# Patient Record
Sex: MALE | NOT HISPANIC OR LATINO | Employment: OTHER | URBAN - METROPOLITAN AREA
[De-identification: names, ages, dates, MRNs, and addresses within clinical notes are randomized per-mention and may not be internally consistent; named-entity substitution may affect disease eponyms.]

---

## 2017-01-17 ENCOUNTER — HOSPITAL ENCOUNTER (OUTPATIENT)
Dept: RADIATION ONCOLOGY | Facility: HOSPITAL | Age: 57
Setting detail: RADIATION/ONCOLOGY SERIES
Discharge: HOME | End: 2017-01-17

## 2017-01-17 ENCOUNTER — LAB (OUTPATIENT)
Dept: LAB | Facility: HOSPITAL | Age: 57
End: 2017-01-17

## 2017-01-17 ENCOUNTER — OFFICE VISIT (OUTPATIENT)
Dept: RADIATION ONCOLOGY | Facility: HOSPITAL | Age: 57
End: 2017-01-17

## 2017-01-17 ENCOUNTER — CONSULT (OUTPATIENT)
Dept: ONCOLOGY | Facility: CLINIC | Age: 57
End: 2017-01-17

## 2017-01-17 VITALS
BODY MASS INDEX: 21.6 KG/M2 | HEART RATE: 61 BPM | WEIGHT: 134.4 LBS | TEMPERATURE: 98.1 F | OXYGEN SATURATION: 99 % | SYSTOLIC BLOOD PRESSURE: 132 MMHG | HEIGHT: 66 IN | RESPIRATION RATE: 16 BRPM | DIASTOLIC BLOOD PRESSURE: 64 MMHG

## 2017-01-17 VITALS
HEART RATE: 61 BPM | HEIGHT: 66 IN | TEMPERATURE: 98.1 F | RESPIRATION RATE: 16 BRPM | SYSTOLIC BLOOD PRESSURE: 132 MMHG | WEIGHT: 132 LBS | DIASTOLIC BLOOD PRESSURE: 64 MMHG | BODY MASS INDEX: 21.21 KG/M2

## 2017-01-17 DIAGNOSIS — C18.7 MALIGNANT NEOPLASM OF SIGMOID COLON (HCC): Primary | ICD-10-CM

## 2017-01-17 DIAGNOSIS — R91.1 SOLITARY LUNG NODULE: ICD-10-CM

## 2017-01-17 DIAGNOSIS — C79.51 BONE METASTASIS: ICD-10-CM

## 2017-01-17 DIAGNOSIS — C18.7 MALIGNANT NEOPLASM OF SIGMOID COLON (HCC): ICD-10-CM

## 2017-01-17 LAB
ALBUMIN SERPL-MCNC: 4.5 G/DL (ref 3.2–4.8)
ALBUMIN/GLOB SERPL: 1.3 G/DL (ref 1.5–2.5)
ALP SERPL-CCNC: 80 U/L (ref 25–100)
ALT SERPL W P-5'-P-CCNC: 51 U/L (ref 7–40)
ANION GAP SERPL CALCULATED.3IONS-SCNC: 10 MMOL/L (ref 3–11)
AST SERPL-CCNC: 35 U/L (ref 0–33)
BILIRUB SERPL-MCNC: 0.5 MG/DL (ref 0.3–1.2)
BUN BLD-MCNC: 10 MG/DL (ref 9–23)
BUN/CREAT SERPL: 14.3 (ref 7–25)
CALCIUM SPEC-SCNC: 10 MG/DL (ref 8.7–10.4)
CEA SERPL-MCNC: 488 NG/ML (ref 0–2.5)
CHLORIDE SERPL-SCNC: 102 MMOL/L (ref 99–109)
CO2 SERPL-SCNC: 33 MMOL/L (ref 20–31)
CREAT BLD-MCNC: 0.7 MG/DL (ref 0.6–1.3)
ERYTHROCYTE [DISTWIDTH] IN BLOOD BY AUTOMATED COUNT: 23.3 % (ref 11.3–14.5)
GFR SERPL CREATININE-BSD FRML MDRD: 118 ML/MIN/1.73
GLOBULIN UR ELPH-MCNC: 3.5 GM/DL
GLUCOSE BLD-MCNC: 111 MG/DL (ref 70–100)
HCT VFR BLD AUTO: 41.3 % (ref 38.9–50.9)
HGB BLD-MCNC: 13 G/DL (ref 13.1–17.5)
LYMPHOCYTES # BLD AUTO: 0.9 10*3/MM3 (ref 0.6–4.8)
LYMPHOCYTES NFR BLD AUTO: 18.5 % (ref 24–44)
MCH RBC QN AUTO: 22.6 PG (ref 27–31)
MCHC RBC AUTO-ENTMCNC: 31.6 G/DL (ref 32–36)
MCV RBC AUTO: 71.4 FL (ref 80–99)
MONOCYTES # BLD AUTO: 0.4 10*3/MM3 (ref 0–1)
MONOCYTES NFR BLD AUTO: 8.4 % (ref 0–12)
NEUTROPHILS # BLD AUTO: 3.5 10*3/MM3 (ref 1.5–8.3)
NEUTROPHILS NFR BLD AUTO: 73.1 % (ref 41–71)
PLATELET # BLD AUTO: 226 10*3/MM3 (ref 150–450)
PMV BLD AUTO: 7.8 FL (ref 6–12)
POTASSIUM BLD-SCNC: 4.4 MMOL/L (ref 3.5–5.5)
PROT SERPL-MCNC: 8 G/DL (ref 5.7–8.2)
RBC # BLD AUTO: 5.78 10*6/MM3 (ref 4.2–5.76)
SODIUM BLD-SCNC: 145 MMOL/L (ref 132–146)
TSH SERPL DL<=0.05 MIU/L-ACNC: 1.95 MIU/ML (ref 0.35–5.35)
WBC NRBC COR # BLD: 4.8 10*3/MM3 (ref 3.5–10.8)

## 2017-01-17 PROCEDURE — 99205 OFFICE O/P NEW HI 60 MIN: CPT | Performed by: INTERNAL MEDICINE

## 2017-01-17 PROCEDURE — 84443 ASSAY THYROID STIM HORMONE: CPT | Performed by: INTERNAL MEDICINE

## 2017-01-17 PROCEDURE — 80053 COMPREHEN METABOLIC PANEL: CPT | Performed by: INTERNAL MEDICINE

## 2017-01-17 PROCEDURE — 36415 COLL VENOUS BLD VENIPUNCTURE: CPT

## 2017-01-17 PROCEDURE — 85025 COMPLETE CBC W/AUTO DIFF WBC: CPT

## 2017-01-17 PROCEDURE — 82378 CARCINOEMBRYONIC ANTIGEN: CPT | Performed by: INTERNAL MEDICINE

## 2017-01-17 RX ORDER — LACTULOSE 10 G/15ML
20 SOLUTION ORAL 3 TIMES DAILY
Qty: 240 ML | Refills: 2 | Status: SHIPPED | OUTPATIENT
Start: 2017-01-17 | End: 2017-01-26

## 2017-01-17 NOTE — LETTER
January 17, 2017     Jose Bradshaw MD  [address] [place] [id number]    Patient: Maqruis Esteban   YOB: 1960   Date of Visit: 1/17/2017       Dear Dr. Augustine MD:    Marquis Esteban was in my office today. Below is a copy of my note.    If you have questions, please do not hesitate to call me. I look forward to following Marquis along with you.         Sincerely,        Kristofer Rodriguez MD        CC: Pravin Gautam MD    DATE OF CONSULTATION: 1/17/2017    REFERRING PHYSICIAN: Jose Bradshaw MD    Dear Dr. Jose Bradshaw MD  Thank you for asking for my medical advice on this patient. I saw him in the  [place] office on 1/17/2017    REASON FOR CONSULTATION: Metastatic colon cancer     HISTORY OF PRESENT ILLNESS: The patient is a very pleasant 56 y.o.  male   who was in his usual state of health until approximately 10 years. The patient presented with abdominal pain as well as rectal bleeding.  Patient found to have sigmoid colon adenocarcinoma.  He underwent low anterior resection done by Dr. Young March 2007.  Final pathology showed poorly differentiated adenocarcinoma with 25 negative lymph nodes and clear surgical margins.  Isolated tumor deposits was found in the pericolonic fat.  Pathologic stage T2 N1c M0 stage IIIA disease.  Patient received 1 cycle of adjuvant chemotherapy with FOLFOX and stopped secondary to multiple toxicities.  The patient has been followed by serial colonoscopies as well as CAT scans that did document pulmonary metastatic disease with left lower lobe nodule that is gradually increasing in size.  Patient was doing fairly well until recently, 3 months ago, patient presented with low back pain.  Patient had restaging workup that revealed hypermetabolic L2 vertebral body lesion.  Patient had biopsy done on October 11, 2016 that revealed metastatic adenocarcinoma consistent of colon primary with CK 7 negative CK 20 positive CDX2 positive.  The patient was treated  with CyberKnife with significant improvement in his symptoms.  Patient had next generation gene sequencing that revealed high mutational load with microsatellite instability.  He met with [facility] oncologist for an opinion and it was recommended for him to undergo immune therapy.  Patient was referred to me to establish care with a local physician.    SUBJECTIVE: When I saw the patient today he is doing fairly well.  He continued to be fairly asymptomatic from his current disease.  He continues to be active exercise and work daily.    Review of Systems   Constitutional: Negative for activity change, appetite change, chills, fatigue, fever and unexpected weight change.   HENT: Negative for hearing loss, mouth sores, nosebleeds, sore throat and trouble swallowing.    Eyes: Negative for visual disturbance.   Respiratory: Negative for cough, chest tightness, shortness of breath and wheezing.    Cardiovascular: Negative for chest pain, palpitations and leg swelling.   Gastrointestinal: Negative for abdominal distention, abdominal pain, blood in stool, constipation, diarrhea, nausea, rectal pain and vomiting.   Endocrine: Negative for cold intolerance and heat intolerance.   Genitourinary: Negative for difficulty urinating, dysuria, frequency and urgency.   Musculoskeletal: Negative for arthralgias, back pain, gait problem, joint swelling and myalgias.   Skin: Negative for rash.   Neurological: Negative for dizziness, tremors, syncope, weakness, light-headedness, numbness and headaches.   Hematological: Negative for adenopathy. Does not bruise/bleed easily.   Psychiatric/Behavioral: Negative for confusion, sleep disturbance and suicidal ideas. The patient is not nervous/anxious.        Past Medical History   Diagnosis Date   • Colon cancer    • FHx: chemotherapy    • Metastatic cancer        Social History     Social History   • Marital status:      Spouse name: N/A   • Number of children: N/A   • Years of  education: N/A     Occupational History   • Not on file.     Social History Main Topics   • Smoking status: Never Smoker   • Smokeless tobacco: Never Used   • Alcohol use No   • Drug use: No   • Sexual activity: Defer     Other Topics Concern   • Not on file     Social History Narrative       Family History   Problem Relation Age of Onset   • Hypertension Father    • Prostate cancer Father    • Diabetes Brother        Past Surgical History   Procedure Laterality Date   • Colectomy partial / total       2007   • Cardiac catheterization  1996 & 2006   • Interventional radiology procedure Right 10/11/2016     Procedure: Biopsy of L2 vertebral body (right side);  Surgeon: Ashok Gonzalez MD;  Location: Located within [facility] INVASIVE LOCATION;  Service:        No Known Allergies       Current Outpatient Prescriptions:   •  Cholecalciferol (VITAMIN D3) 5000 UNITS capsule capsule, Take 5,000 Units by mouth Daily., Disp: , Rfl:     PHYSICAL EXAMINATION:   There were no vitals taken for this visit.   ECOG Performance Status: 0 - Asymptomatic  General Appearance:  alert, cooperative, no apparent distress and appears stated age   Neurologic/Psychiatric: A&O x 3, gait steady, appropriate affect, strength 5/5 in all muscle groups   HEENT:  Normocephalic, without obvious abnormality, mucous membranes moist   Neck: Supple, symmetrical, trachea midline, no adenopathy;  No thyromegaly, masses, or tenderness   Lungs:   Clear to auscultation bilaterally; respirations regular, even, and unlabored bilaterally   Heart:  Regular rate and rhythm, no murmurs appreciated   Abdomen:   Soft, non-tender, non-distended and no organomegaly   Lymph nodes: No cervical, supraclavicular, inguinal or axillary adenopathy noted   Extremities: Normal, atraumatic; no clubbing, cyanosis, or edema    Skin: No rashes, ulcers, or suspicious lesions noted       No visits with results within 2 Week(s) from this visit.  Latest known visit with results is:    Hospital  Outpatient Visit on 10/17/2016   Component Date Value Ref Range Status   • Creatinine 10/17/2016 0.80  0.60 - 1.30 mg/dL Final    Serial Number: [id number]    : [id number]        No results found.      DIAGNOSTIC DATA:   1. Radiology:   EXAMINATION: MRI CYBERKNIFE LUMBAR SPINE W CONTRAST-10/17/2016:      INDICATION: Bone mets to L2; C79.51-Secondary malignant neoplasm of  bone, bone mets.      TECHNIQUE: T1 MPR, post contrast, CyberKnife study.      COMPARISON: NONE      FINDINGS: A large soft tissue mass involves the right transverse process  of L2 extending into the right paraspinal soft tissues. There is also  extension of the lesion into the body of L2 and posterior elements of  L2. There is complete obliteration of the right neural foramen. The  lesion is somewhat necrotic centrally. The lesion measures approximately  5.6 x 4.9 cm in cross-sectional area. There is encroachment into the  rightward aspect of the epidural space at the L1-L2 level.      IMPRESSION:  Large necrotic mass involving the right lateral elements  and posterior elements of L2 with extension into the paraspinal soft  tissues and epidural space.      D: 10/17/2016  E: 10/17/2016      2. Dr. Gautam's note from September 22, 2016 reviewed by me and documented in the  chart.   3. Pathology report:   L2 BIOPSY:  Metastatic moderately differentiated adenocarcinoma, compatible with colonic primary.   DGD/klb    Electronically signed by Jose Guadalupe Kaur MD on 10/13/2016 at 1653   Gross Description       Received in two formalin container labeled as L2 biopsy is a 4.0 x 4.0 x 0.8 cm aggregate of red/pink bone fragments and blood clot, wrapped and submitted entirely in cassettes A-C following decalcification. HBM/klb    Special Stains       Testing performed at outside laboratory. See scanned report.    Microscopic Description       Sections show copious amounts of blood with numerous islands of adenocarcinomatous neoplasm. The tumor forms small  glandular elements with darkly hyperchromatic nuclei and features highly suggestive of colonic origin on the H&E stains. Immunohistochemistry with CDX-2 and CK20 are strongly positive, while antibodies CK7, and chromogranin are negative.    Scan on 10/13/2016  2:02 PM by Abril Charles : IO MSI by IHC             4. Laboratory data:  Results for NOELLE GUILLORY (MRN [id number]) as of 1/17/2017 10:09   Ref. Range 10/17/2016 08:54   Creatinine Latest Ref Range: 0.60 - 1.30 mg/dL 0.80       ASSESSMENT: The patient is a very pleasant 56 y.o.  male  with stage IV colon cancer    PROBLEM LIST:   1.  Currently stage IV colon cancer with lung and bony metastases.  2.  Status post low anterior resection done by Dr. Young 2007, [id number]  3.  Attempted adjuvant chemotherapy for 1 cycle could not tolerate secondary to multiple toxicities.  4.  Biopsy-proven L2 metastases done October 11, 2016.  Status post CyberKnife radiation treatment.    PLAN:   1. I had a long discussion today with the patient and his wife about his  diagnosis of metastatic colon cancer. I did go over the final pathology report in  detail with both of them. I reviewed the patient's documents including refereing provider's notes, lab results, imaging, and path report.   2.  I completely agree with [facility] oncologist recommendation.  Studies have shown patient to have high mutational load as well as microsatellite instability may respond well to immune therapy.  This is also recommended by NCCN guidelines.  3.  I will go ahead and do restaging workup with whole body PET scan.  4.  I will go ahead and get immune therapy approved with Keytruda 200 mg IV every 3 weeks.  5.  I will obtain the original pathology report, [facility] full report, and the most recent PET scan report done September 2016.  6.  Patient will come back and see me in 2 weeks to get treatment started as well as to get over the PET scan results.  7.  I shared with the patient potential  side effects from immune therapy including immune mediated reactions with pneumonitis, hepatitis, colitis, thyroiditis, and potential death.  I shared with the patient and his wife that this is not FDA approved treatment however there are multiple active clinical trials looking at immunotherapy in patients with microsatellite instability furthermore this has been adopted by NCCN guidelines.  8.  If immune therapy fails subsequent line treatment would include chemotherapy with anti VEGF treatment.  Patient also might be a candidate for anti-EGFR treatment if he does not have K-edwige, N-cedric, or BRAF mutations.  9.  I advised the patient to hold his alternative treatment with high dose vitamin C while he is on immunetherapy.    Kristofer Rodriguez MD  1/17/2017

## 2017-01-17 NOTE — LETTER
2017     Jose Bradshaw MD  [address] [place] [id number]    Patient: Marquis Esteban   YOB: 1960   Date of Visit: 2017       Dear Dr. Augustine MD:    Marquis Esteban was in my office today. Below is a copy of my note.    If you have questions, please do not hesitate to call me. I look forward to following Marquis along with you.         Sincerely,        Pravin Gautam MD        CC: MD Anson Castro MD    FOLLOW UP NOTE    PATIENT:                                                      Marquis Esteban  MEDICAL RECORD #:                        [id number]  :                                                          1960  COMPLETION DATE:   10/20/2016 & 2016  DIAGNOSIS:     Metastatic Colon Cancer      BRIEF HISTORY:    Initial follow-up visit status post stereotactic body radiotherapy for oligometastatic late recurrence of colon cancer involving the rightward L2 and left lower lobe lung.  He tolerated treatment with excellent early pain relief.  As he became more active, he has noticed some increase in right back pain and has backed off on physical exercise regimen.  He denies any change in bowel function or pulmonary function.  Not yet had follow-up imaging studies or blood work/CEA.  He is planned for MRI of the spine when he returns to his neurosurgeon.  Genetic testing through [facility] showed high MSI.  He would like to be considered for immune therapy.  His oncologist at [facility] does not have a clinical study.  He would like to see an oncologist locally.    MEDICATIONS: Medication reconciliation for the patient was reviewed and confirmed in the electronic medical record.    Review of Systems   Musculoskeletal: Positive for back pain.   Psychiatric/Behavioral: Positive for sleep disturbance.       KPS 80%    Physical Exam   Constitutional: He is oriented to person, place, and time. He appears well-developed and well-nourished.   HENT:  "  Head: Normocephalic and atraumatic.   Neck: Normal range of motion. Neck supple.   Cardiovascular: Normal rate, regular rhythm and normal heart sounds.    No murmur heard.  Pulmonary/Chest: Effort normal and breath sounds normal. He has no wheezes. He has no rales.   Abdominal: Soft. Bowel sounds are normal. He exhibits no distension. There is no hepatosplenomegaly. There is no tenderness.   Musculoskeletal: Normal range of motion. He exhibits tenderness (minimal tenderness right back/flank). He exhibits no edema.   Lymphadenopathy:     He has no cervical adenopathy.     He has no axillary adenopathy.        Right: No supraclavicular adenopathy present.        Left: No supraclavicular adenopathy present.   Neurological: He is alert and oriented to person, place, and time. He has normal strength. No sensory deficit.   Skin: Skin is warm and dry.   Psychiatric: He has a normal mood and affect. His behavior is normal. Judgment and thought content normal.   Nursing note and vitals reviewed.      VITAL SIGNS:   Vitals:    01/17/17 0909   BP: 132/64   Pulse: 61   Resp: 16   Temp: 98.1 °F (36.7 °C)   TempSrc: Oral   SpO2: 99%   Weight: 134 lb 6.4 oz (61 kg)   Height: 66\" (167.6 cm)   PainSc: 4  Comment: Low back pain       The following portions of the patient's history were reviewed and updated as appropriate: allergies, current medications, past family history, past medical history, past social history, past surgical history and problem list.         Malignant neoplasm of sigmoid colon [C18.7]    IMPRESSION:  Colorectal cancer, oligometastatic sites in the spine and lung were treated with SBRT 2 months ago.  He tolerated treatment well.    RECOMMENDATIONS:  Medical oncology consultation today with Dr. Rodriguez to consider immune therapy.  Restaging PET CT to be performed next month at approximately 3 months post completion of SBRT.  MRI and neurosurgical follow-up with Dr. Frazier  Blood work including CEA and any " additional radiographic graphic imaging helpful for follow-up and treatment decision making will be coordinated with Dr. Rodriguez.     Return in about 4 weeks (around 2/14/2017) for Imaging - See orders, Office Visit.     Pravin Gautam MD    Errors in dictation may reflect use of voice recognition software and not all errors in transcription may have been detected prior to signing.

## 2017-01-17 NOTE — PROGRESS NOTES
DATE OF CONSULTATION: 1/17/2017    REFERRING PHYSICIAN: Jose Bradshaw MD    Dear Dr. Jose Bradshaw MD  Thank you for asking for my medical advice on this patient. I saw him in the  [place] office on 1/17/2017    REASON FOR CONSULTATION: Metastatic colon cancer     HISTORY OF PRESENT ILLNESS: The patient is a very pleasant 56 y.o.  male   who was in his usual state of health until approximately 10 years. The patient presented with abdominal pain as well as rectal bleeding.  Patient found to have sigmoid colon adenocarcinoma.  He underwent low anterior resection done by Dr. Young March 2007.  Final pathology showed poorly differentiated adenocarcinoma with 25 negative lymph nodes and clear surgical margins.  Isolated tumor deposits was found in the pericolonic fat.  Pathologic stage T2 N1c M0 stage IIIA disease.  Patient received 1 cycle of adjuvant chemotherapy with FOLFOX and stopped secondary to multiple toxicities.  The patient has been followed by serial colonoscopies as well as CAT scans that did document pulmonary metastatic disease with left lower lobe nodule that is gradually increasing in size.  Patient was doing fairly well until recently, 3 months ago, patient presented with low back pain.  Patient had restaging workup that revealed hypermetabolic L2 vertebral body lesion.  Patient had biopsy done on October 11, 2016 that revealed metastatic adenocarcinoma consistent of colon primary with CK 7 negative CK 20 positive CDX2 positive.  The patient was treated with CyberKnife with significant improvement in his symptoms.  Patient had next generation gene sequencing that revealed high mutational load with microsatellite instability.  He met with [facility] oncologist for an opinion and it was recommended for him to undergo immune therapy.  Patient was referred to me to establish care with a local physician.    SUBJECTIVE: When I saw the patient today he is doing fairly well.  He continued to be  fairly asymptomatic from his current disease.  He continues to be active exercise and work daily.    Review of Systems   Constitutional: Negative for activity change, appetite change, chills, fatigue, fever and unexpected weight change.   HENT: Negative for hearing loss, mouth sores, nosebleeds, sore throat and trouble swallowing.    Eyes: Negative for visual disturbance.   Respiratory: Negative for cough, chest tightness, shortness of breath and wheezing.    Cardiovascular: Negative for chest pain, palpitations and leg swelling.   Gastrointestinal: Negative for abdominal distention, abdominal pain, blood in stool, constipation, diarrhea, nausea, rectal pain and vomiting.   Endocrine: Negative for cold intolerance and heat intolerance.   Genitourinary: Negative for difficulty urinating, dysuria, frequency and urgency.   Musculoskeletal: Negative for arthralgias, back pain, gait problem, joint swelling and myalgias.   Skin: Negative for rash.   Neurological: Negative for dizziness, tremors, syncope, weakness, light-headedness, numbness and headaches.   Hematological: Negative for adenopathy. Does not bruise/bleed easily.   Psychiatric/Behavioral: Negative for confusion, sleep disturbance and suicidal ideas. The patient is not nervous/anxious.        Past Medical History   Diagnosis Date   • Colon cancer    • FHx: chemotherapy    • Metastatic cancer        Social History     Social History   • Marital status:      Spouse name: N/A   • Number of children: N/A   • Years of education: N/A     Occupational History   • Not on file.     Social History Main Topics   • Smoking status: Never Smoker   • Smokeless tobacco: Never Used   • Alcohol use No   • Drug use: No   • Sexual activity: Defer     Other Topics Concern   • Not on file     Social History Narrative       Family History   Problem Relation Age of Onset   • Hypertension Father    • Prostate cancer Father    • Diabetes Brother        Past Surgical History    Procedure Laterality Date   • Colectomy partial / total       2007   • Cardiac catheterization  1996 & 2006   • Interventional radiology procedure Right 10/11/2016     Procedure: Biopsy of L2 vertebral body (right side);  Surgeon: Ashok Gonzalez MD;  Location: [facility] INVASIVE LOCATION;  Service:        No Known Allergies       Current Outpatient Prescriptions:   •  Cholecalciferol (VITAMIN D3) 5000 UNITS capsule capsule, Take 5,000 Units by mouth Daily., Disp: , Rfl:     PHYSICAL EXAMINATION:   There were no vitals taken for this visit.   ECOG Performance Status: 0 - Asymptomatic  General Appearance:  alert, cooperative, no apparent distress and appears stated age   Neurologic/Psychiatric: A&O x 3, gait steady, appropriate affect, strength 5/5 in all muscle groups   HEENT:  Normocephalic, without obvious abnormality, mucous membranes moist   Neck: Supple, symmetrical, trachea midline, no adenopathy;  No thyromegaly, masses, or tenderness   Lungs:   Clear to auscultation bilaterally; respirations regular, even, and unlabored bilaterally   Heart:  Regular rate and rhythm, no murmurs appreciated   Abdomen:   Soft, non-tender, non-distended and no organomegaly   Lymph nodes: No cervical, supraclavicular, inguinal or axillary adenopathy noted   Extremities: Normal, atraumatic; no clubbing, cyanosis, or edema    Skin: No rashes, ulcers, or suspicious lesions noted       No visits with results within 2 Week(s) from this visit.  Latest known visit with results is:    Hospital Outpatient Visit on 10/17/2016   Component Date Value Ref Range Status   • Creatinine 10/17/2016 0.80  0.60 - 1.30 mg/dL Final    Serial Number: [id number]    : [id number]        No results found.      DIAGNOSTIC DATA:   1. Radiology:   EXAMINATION: MRI CYBERKNIFE LUMBAR SPINE W CONTRAST-10/17/2016:      INDICATION: Bone mets to L2; C79.51-Secondary malignant neoplasm of  bone, bone mets.      TECHNIQUE: T1 MPR, post contrast, CyberKnife  study.      COMPARISON: NONE      FINDINGS: A large soft tissue mass involves the right transverse process  of L2 extending into the right paraspinal soft tissues. There is also  extension of the lesion into the body of L2 and posterior elements of  L2. There is complete obliteration of the right neural foramen. The  lesion is somewhat necrotic centrally. The lesion measures approximately  5.6 x 4.9 cm in cross-sectional area. There is encroachment into the  rightward aspect of the epidural space at the L1-L2 level.      IMPRESSION:  Large necrotic mass involving the right lateral elements  and posterior elements of L2 with extension into the paraspinal soft  tissues and epidural space.      D: 10/17/2016  E: 10/17/2016      2. Dr. Gautam's note from September 22, 2016 reviewed by me and documented in the  chart.   3. Pathology report:   L2 BIOPSY:  Metastatic moderately differentiated adenocarcinoma, compatible with colonic primary.   DGD/klb    Electronically signed by Jose Guadalupe Kaur MD on 10/13/2016 at 1653   Gross Description       Received in two formalin container labeled as L2 biopsy is a 4.0 x 4.0 x 0.8 cm aggregate of red/pink bone fragments and blood clot, wrapped and submitted entirely in cassettes A-C following decalcification. HBM/klb    Special Stains       Testing performed at outside laboratory. See scanned report.    Microscopic Description       Sections show copious amounts of blood with numerous islands of adenocarcinomatous neoplasm. The tumor forms small glandular elements with darkly hyperchromatic nuclei and features highly suggestive of colonic origin on the H&E stains. Immunohistochemistry with CDX-2 and CK20 are strongly positive, while antibodies CK7, and chromogranin are negative.    Scan on 10/13/2016  2:02 PM by Abril Charles : IO MSI by IHC             4. Laboratory data:  Results for NOELLE GUILLORY (MRN [id number]) as of 1/17/2017 10:09   Ref. Range 10/17/2016 08:54   Creatinine  Latest Ref Range: 0.60 - 1.30 mg/dL 0.80       ASSESSMENT: The patient is a very pleasant 56 y.o.  male  with stage IV colon cancer    PROBLEM LIST:   1.  Currently stage IV colon cancer with lung and bony metastases.  2.  Status post low anterior resection done by Dr. Young 2007, [id number]  3.  Attempted adjuvant chemotherapy for 1 cycle could not tolerate secondary to multiple toxicities.  4.  Biopsy-proven L2 metastases done October 11, 2016.  Status post CyberKnife radiation treatment.    PLAN:   1. I had a long discussion today with the patient and his wife about his  diagnosis of metastatic colon cancer. I did go over the final pathology report in  detail with both of them. I reviewed the patient's documents including refereing provider's notes, lab results, imaging, and path report.   2.  I completely agree with [facility] oncologist recommendation.  Studies have shown patient to have high mutational load as well as microsatellite instability may respond well to immune therapy.  This is also recommended by NCCN guidelines.  3.  I will go ahead and do restaging workup with whole body PET scan.  4.  I will go ahead and get immune therapy approved with Keytruda 200 mg IV every 3 weeks.  5.  I will obtain the original pathology report, [facility] full report, and the most recent PET scan report done September 2016.  6.  Patient will come back and see me in 2 weeks to get treatment started as well as to get over the PET scan results.  7.  I shared with the patient potential side effects from immune therapy including immune mediated reactions with pneumonitis, hepatitis, colitis, thyroiditis, and potential death.  I shared with the patient and his wife that this is not FDA approved treatment however there are multiple active clinical trials looking at immunotherapy in patients with microsatellite instability furthermore this has been adopted by NCCN guidelines.  8.  If immune therapy fails subsequent line  treatment would include chemotherapy with anti VEGF treatment.  Patient also might be a candidate for anti-EGFR treatment if he does not have K-edwige, N-cedric, or BRAF mutations.  9.  I advised the patient to hold his alternative treatment with high dose vitamin C while he is on immunetherapy.    Kristofer Rodriguez MD  1/17/2017

## 2017-01-17 NOTE — PROGRESS NOTES
FOLLOW UP NOTE    PATIENT:                                                      Marquis Esteban  MEDICAL RECORD #:                        [id number]  :                                                          1960  COMPLETION DATE:   10/20/2016 & 2016  DIAGNOSIS:     Metastatic Colon Cancer      BRIEF HISTORY:    Initial follow-up visit status post stereotactic body radiotherapy for oligometastatic late recurrence of colon cancer involving the rightward L2 and left lower lobe lung.  He tolerated treatment with excellent early pain relief.  As he became more active, he has noticed some increase in right back pain and has backed off on physical exercise regimen.  He denies any change in bowel function or pulmonary function.  Not yet had follow-up imaging studies or blood work/CEA.  He is planned for MRI of the spine when he returns to his neurosurgeon.  Genetic testing through [facility] showed high MSI.  He would like to be considered for immune therapy.  His oncologist at [facility] does not have a clinical study.  He would like to see an oncologist locally.    MEDICATIONS: Medication reconciliation for the patient was reviewed and confirmed in the electronic medical record.    Review of Systems   Musculoskeletal: Positive for back pain.   Psychiatric/Behavioral: Positive for sleep disturbance.       KPS 80%    Physical Exam   Constitutional: He is oriented to person, place, and time. He appears well-developed and well-nourished.   HENT:   Head: Normocephalic and atraumatic.   Neck: Normal range of motion. Neck supple.   Cardiovascular: Normal rate, regular rhythm and normal heart sounds.    No murmur heard.  Pulmonary/Chest: Effort normal and breath sounds normal. He has no wheezes. He has no rales.   Abdominal: Soft. Bowel sounds are normal. He exhibits no distension. There is no hepatosplenomegaly. There is no tenderness.   Musculoskeletal: Normal range of motion. He exhibits tenderness (minimal  "tenderness right back/flank). He exhibits no edema.   Lymphadenopathy:     He has no cervical adenopathy.     He has no axillary adenopathy.        Right: No supraclavicular adenopathy present.        Left: No supraclavicular adenopathy present.   Neurological: He is alert and oriented to person, place, and time. He has normal strength. No sensory deficit.   Skin: Skin is warm and dry.   Psychiatric: He has a normal mood and affect. His behavior is normal. Judgment and thought content normal.   Nursing note and vitals reviewed.      VITAL SIGNS:   Vitals:    01/17/17 0909   BP: 132/64   Pulse: 61   Resp: 16   Temp: 98.1 °F (36.7 °C)   TempSrc: Oral   SpO2: 99%   Weight: 134 lb 6.4 oz (61 kg)   Height: 66\" (167.6 cm)   PainSc: 4  Comment: Low back pain       The following portions of the patient's history were reviewed and updated as appropriate: allergies, current medications, past family history, past medical history, past social history, past surgical history and problem list.         Malignant neoplasm of sigmoid colon [C18.7]    IMPRESSION:  Colorectal cancer, oligometastatic sites in the spine and lung were treated with SBRT 2 months ago.  He tolerated treatment well.    RECOMMENDATIONS:  Medical oncology consultation today with Dr. Rodriguez to consider immune therapy.  Restaging PET CT to be performed next month at approximately 3 months post completion of SBRT.  MRI and neurosurgical follow-up with Dr. Frazier  Blood work including CEA and any additional radiographic graphic imaging helpful for follow-up and treatment decision making will be coordinated with Dr. Rodriguez.     Return in about 4 weeks (around 2/14/2017) for Imaging - See orders, Office Visit.     Pravin Gauatm MD    Errors in dictation may reflect use of voice recognition software and not all errors in transcription may have been detected prior to signing.  "

## 2017-01-26 ENCOUNTER — HOSPITAL ENCOUNTER (OUTPATIENT)
Dept: PET IMAGING | Facility: HOSPITAL | Age: 57
Discharge: HOME | End: 2017-01-26
Attending: INTERNAL MEDICINE | Admitting: INTERNAL MEDICINE

## 2017-01-26 ENCOUNTER — OFFICE VISIT (OUTPATIENT)
Dept: RADIATION ONCOLOGY | Facility: HOSPITAL | Age: 57
End: 2017-01-26

## 2017-01-26 ENCOUNTER — HOSPITAL ENCOUNTER (OUTPATIENT)
Dept: PET IMAGING | Facility: HOSPITAL | Age: 57
Discharge: HOME | End: 2017-01-26
Attending: INTERNAL MEDICINE

## 2017-01-26 VITALS
HEART RATE: 75 BPM | BODY MASS INDEX: 21.21 KG/M2 | OXYGEN SATURATION: 96 % | SYSTOLIC BLOOD PRESSURE: 123 MMHG | WEIGHT: 132 LBS | RESPIRATION RATE: 16 BRPM | HEIGHT: 66 IN | TEMPERATURE: 98.1 F | DIASTOLIC BLOOD PRESSURE: 63 MMHG

## 2017-01-26 DIAGNOSIS — C18.7 MALIGNANT NEOPLASM OF SIGMOID COLON (HCC): Primary | ICD-10-CM

## 2017-01-26 DIAGNOSIS — R91.1 SOLITARY LUNG NODULE: ICD-10-CM

## 2017-01-26 DIAGNOSIS — C79.51 BONE METASTASIS: ICD-10-CM

## 2017-01-26 DIAGNOSIS — C18.7 MALIGNANT NEOPLASM OF SIGMOID COLON (HCC): ICD-10-CM

## 2017-01-26 LAB — GLUCOSE BLDC GLUCOMTR-MCNC: 78 MG/DL (ref 70–130)

## 2017-01-26 PROCEDURE — 82962 GLUCOSE BLOOD TEST: CPT

## 2017-01-26 PROCEDURE — A9552 F18 FDG: HCPCS | Performed by: INTERNAL MEDICINE

## 2017-01-26 PROCEDURE — G0463 HOSPITAL OUTPT CLINIC VISIT: HCPCS

## 2017-01-26 PROCEDURE — 0 FLUDEOXYGLUCOSE F18 SOLUTION: Performed by: INTERNAL MEDICINE

## 2017-01-26 PROCEDURE — 78816 PET IMAGE W/CT FULL BODY: CPT

## 2017-01-26 RX ORDER — OXYCODONE AND ACETAMINOPHEN 10; 325 MG/1; MG/1
1 TABLET ORAL EVERY 6 HOURS PRN
COMMUNITY
End: 2017-03-14

## 2017-01-26 RX ADMIN — FLUDEOXYGLUCOSE F18 1 DOSE: 300 INJECTION INTRAVENOUS at 12:56

## 2017-01-26 NOTE — LETTER
2017     Jose Bradshaw MD  [address] [place] [id number]    Patient: Marquis Esteban   YOB: 1960   Date of Visit: 2017       Dear Dr. Augustine MD:    Marquis Esteban was in my office today. Below is a copy of my note.    If you have questions, please do not hesitate to call me. I look forward to following Marquis along with you.         Sincerely,        Pravin Gautam MD        CC: MD Anson Castro MD    FOLLOW UP NOTE    PATIENT:                                                      Marquis Esteban  MEDICAL RECORD #:                        [id number]  :                                                          1960  COMPLETION DATE:   L2- 10/24/2016, LLL tumor 2016  DIAGNOSIS:     Metastatic Colon Cancer      BRIEF HISTORY:    Back pain has been increasing recently.  He is still ambulating and has full bowel/bladder control.  MRI and CT scan of lumbar spine from [facility] show persistent expansile mass involving the rightward paravertebral L2 with persistent extension into the spinal canal and paraspinous musculature.  The spinal cord is displaced to the left.  Additionally, a new rightward L1 vertebral body tumor is identified is either a completely separate tumor or direct extension into that vertebral body.  On PET CT, the central and lateral portion of The L2 tumor is no longer metabolically active; however, it is difficult to tell how much is viable residual neoplasm versus imflammatory process/edema after SBRT.  The treated left lower lung tumor appears to be significantly improving with reduction in size and diminishing metabolic activity.  There are some tiny new non-metabolically active parenchymal lung nodules which are suspicious.  There is no other suspicious foci of metastatic disease.  CEA is 488.  He has not yet initiated immunotherapy.      MEDICATIONS: Medication reconciliation for the patient was reviewed and confirmed  "in the electronic medical record.    Review of Systems   Musculoskeletal: Positive for back pain.   Psychiatric/Behavioral: Positive for sleep disturbance.       KPS 80%    Physical Exam    VITAL SIGNS:   Vitals:    01/26/17 1556   BP: 123/63   Pulse: 75   Resp: 16   Temp: 98.1 °F (36.7 °C)   TempSrc: Oral   SpO2: 96%   Weight: 132 lb (59.9 kg)   Height: 66\" (167.6 cm)   PainSc: 5  Comment: Pain level form 3-5       The following portions of the patient's history were reviewed and updated as appropriate: allergies, current medications, past family history, past medical history, past social history, past surgical history and problem list.         Malignant neoplasm of sigmoid colon [C18.7]    IMPRESSION:  oligometastatic colorectal cancer with mixed response to CyberKnife SBRT.    The treated left lower lung tumor appears to be doing well.  The L2 tumor is responding, but there is persistent tumor, viable or necrotic, within the spinal canal causing impingement on the spinal cord.  Neurosurgical decompression has been recommended to preserve neurologic function.  There is a definite new metastatic deposit in L1 vertebral body. If not resected, this would be amenable to treatment with SBRT.  He has rising CEA and multiple small new pulmonary nodules indicating other subclinical disease.    RECOMMENDATIONS:    #1 agree with neurosurgical decompression at L2.  #2 initiate immune therapy as soon as possible.  #3 SBRT to L1 for control of that tumor and to possible facilitate immune response.      Pravin Gautam MD    Errors in dictation may reflect use of voice recognition software and not all errors in transcription may have been detected prior to signing.  "

## 2017-01-26 NOTE — PROGRESS NOTES
"FOLLOW UP NOTE    PATIENT:                                                      Marquis Esteban  MEDICAL RECORD #:                        [id number]  :                                                          1960  COMPLETION DATE:   L2- 10/24/2016, LLL tumor 2016  DIAGNOSIS:     Metastatic Colon Cancer      BRIEF HISTORY:    Back pain has been increasing recently.  He is still ambulating and has full bowel/bladder control.  MRI and CT scan of lumbar spine from [facility] show persistent expansile mass involving the rightward paravertebral L2 with persistent extension into the spinal canal and paraspinous musculature.  The spinal cord is displaced to the left.  Additionally, a new rightward L1 vertebral body tumor is identified is either a completely separate tumor or direct extension into that vertebral body.  On PET CT, the central and lateral portion of The L2 tumor is no longer metabolically active; however, it is difficult to tell how much is viable residual neoplasm versus imflammatory process/edema after SBRT.  The treated left lower lung tumor appears to be significantly improving with reduction in size and diminishing metabolic activity.  There are some tiny new non-metabolically active parenchymal lung nodules which are suspicious.  There is no other suspicious foci of metastatic disease.  CEA is 488.  He has not yet initiated immunotherapy.      MEDICATIONS: Medication reconciliation for the patient was reviewed and confirmed in the electronic medical record.    Review of Systems   Musculoskeletal: Positive for back pain.   Psychiatric/Behavioral: Positive for sleep disturbance.       KPS 80%    Physical Exam    VITAL SIGNS:   Vitals:    17 1556   BP: 123/63   Pulse: 75   Resp: 16   Temp: 98.1 °F (36.7 °C)   TempSrc: Oral   SpO2: 96%   Weight: 132 lb (59.9 kg)   Height: 66\" (167.6 cm)   PainSc: 5  Comment: Pain level form 3-5       The following portions of the patient's history were " reviewed and updated as appropriate: allergies, current medications, past family history, past medical history, past social history, past surgical history and problem list.         Malignant neoplasm of sigmoid colon [C18.7]    IMPRESSION:  oligometastatic colorectal cancer with mixed response to CyberKnife SBRT.    The treated left lower lung tumor appears to be doing well.  The L2 tumor is responding, but there is persistent tumor, viable or necrotic, within the spinal canal causing impingement on the spinal cord.  Neurosurgical decompression has been recommended to preserve neurologic function.  There is a definite new metastatic deposit in L1 vertebral body. If not resected, this would be amenable to treatment with SBRT.  He has rising CEA and multiple small new pulmonary nodules indicating other subclinical disease.    RECOMMENDATIONS:    #1 agree with neurosurgical decompression at L2.  #2 initiate immune therapy as soon as possible.  #3 SBRT to L1 for control of that tumor and to possible facilitate immune response.      Pravin Gautam MD    Errors in dictation may reflect use of voice recognition software and not all errors in transcription may have been detected prior to signing.

## 2017-01-31 ENCOUNTER — APPOINTMENT (OUTPATIENT)
Dept: LAB | Facility: HOSPITAL | Age: 57
End: 2017-01-31

## 2017-01-31 ENCOUNTER — APPOINTMENT (OUTPATIENT)
Dept: PET IMAGING | Facility: HOSPITAL | Age: 57
End: 2017-01-31
Attending: RADIOLOGY

## 2017-01-31 ENCOUNTER — HOSPITAL ENCOUNTER (OUTPATIENT)
Dept: CARDIOLOGY | Facility: HOSPITAL | Age: 57
Discharge: HOME | End: 2017-01-31
Admitting: NURSE PRACTITIONER

## 2017-01-31 ENCOUNTER — OFFICE VISIT (OUTPATIENT)
Dept: ONCOLOGY | Facility: CLINIC | Age: 57
End: 2017-01-31

## 2017-01-31 VITALS
SYSTOLIC BLOOD PRESSURE: 118 MMHG | DIASTOLIC BLOOD PRESSURE: 71 MMHG | TEMPERATURE: 98.3 F | RESPIRATION RATE: 14 BRPM | WEIGHT: 138 LBS | BODY MASS INDEX: 22.18 KG/M2 | HEART RATE: 72 BPM | HEIGHT: 66 IN

## 2017-01-31 DIAGNOSIS — C18.7 MALIGNANT NEOPLASM OF SIGMOID COLON (HCC): Primary | ICD-10-CM

## 2017-01-31 DIAGNOSIS — R91.1 SOLITARY LUNG NODULE: ICD-10-CM

## 2017-01-31 LAB
ALBUMIN SERPL-MCNC: 4.5 G/DL (ref 3.2–4.8)
ALBUMIN/GLOB SERPL: 1.3 G/DL (ref 1.5–2.5)
ALP SERPL-CCNC: 81 U/L (ref 25–100)
ALT SERPL W P-5'-P-CCNC: 54 U/L (ref 7–40)
ANION GAP SERPL CALCULATED.3IONS-SCNC: 6 MMOL/L (ref 3–11)
AST SERPL-CCNC: 37 U/L (ref 0–33)
BILIRUB SERPL-MCNC: 0.4 MG/DL (ref 0.3–1.2)
BUN BLD-MCNC: 13 MG/DL (ref 9–23)
BUN/CREAT SERPL: 18.6 (ref 7–25)
CALCIUM SPEC-SCNC: 10.1 MG/DL (ref 8.7–10.4)
CHLORIDE SERPL-SCNC: 98 MMOL/L (ref 99–109)
CO2 SERPL-SCNC: 34 MMOL/L (ref 20–31)
CREAT BLD-MCNC: 0.7 MG/DL (ref 0.6–1.3)
ERYTHROCYTE [DISTWIDTH] IN BLOOD BY AUTOMATED COUNT: 22.2 % (ref 11.3–14.5)
GFR SERPL CREATININE-BSD FRML MDRD: 118 ML/MIN/1.73
GLOBULIN UR ELPH-MCNC: 3.5 GM/DL
GLUCOSE BLD-MCNC: 94 MG/DL (ref 70–100)
HCT VFR BLD AUTO: 39.8 % (ref 38.9–50.9)
HGB BLD-MCNC: 12.7 G/DL (ref 13.1–17.5)
LDH SERPL-CCNC: 185 U/L (ref 120–246)
LYMPHOCYTES # BLD AUTO: 0.8 10*3/MM3 (ref 0.6–4.8)
LYMPHOCYTES NFR BLD AUTO: 17.2 % (ref 24–44)
MAGNESIUM SERPL-MCNC: 2.3 MG/DL (ref 1.3–2.7)
MCH RBC QN AUTO: 22.9 PG (ref 27–31)
MCHC RBC AUTO-ENTMCNC: 31.9 G/DL (ref 32–36)
MCV RBC AUTO: 71.6 FL (ref 80–99)
MONOCYTES # BLD AUTO: 0.4 10*3/MM3 (ref 0–1)
MONOCYTES NFR BLD AUTO: 9 % (ref 0–12)
NEUTROPHILS # BLD AUTO: 3.6 10*3/MM3 (ref 1.5–8.3)
NEUTROPHILS NFR BLD AUTO: 73.8 % (ref 41–71)
PHOSPHATE SERPL-MCNC: 3.9 MG/DL (ref 2.4–5.1)
PLATELET # BLD AUTO: 213 10*3/MM3 (ref 150–450)
PMV BLD AUTO: 7.8 FL (ref 6–12)
POTASSIUM BLD-SCNC: 4.4 MMOL/L (ref 3.5–5.5)
PROT SERPL-MCNC: 8 G/DL (ref 5.7–8.2)
RBC # BLD AUTO: 5.56 10*6/MM3 (ref 4.2–5.76)
SODIUM BLD-SCNC: 138 MMOL/L (ref 132–146)
WBC NRBC COR # BLD: 4.9 10*3/MM3 (ref 3.5–10.8)

## 2017-01-31 PROCEDURE — 93005 ELECTROCARDIOGRAM TRACING: CPT | Performed by: NURSE PRACTITIONER

## 2017-01-31 PROCEDURE — 36415 COLL VENOUS BLD VENIPUNCTURE: CPT | Performed by: INTERNAL MEDICINE

## 2017-01-31 PROCEDURE — 93010 ELECTROCARDIOGRAM REPORT: CPT | Performed by: INTERNAL MEDICINE

## 2017-01-31 PROCEDURE — 83615 LACTATE (LD) (LDH) ENZYME: CPT | Performed by: NURSE PRACTITIONER

## 2017-01-31 PROCEDURE — 84100 ASSAY OF PHOSPHORUS: CPT | Performed by: NURSE PRACTITIONER

## 2017-01-31 PROCEDURE — 85025 COMPLETE CBC W/AUTO DIFF WBC: CPT | Performed by: NURSE PRACTITIONER

## 2017-01-31 PROCEDURE — 83735 ASSAY OF MAGNESIUM: CPT | Performed by: NURSE PRACTITIONER

## 2017-01-31 PROCEDURE — 80053 COMPREHEN METABOLIC PANEL: CPT | Performed by: NURSE PRACTITIONER

## 2017-01-31 PROCEDURE — 99215 OFFICE O/P EST HI 40 MIN: CPT | Performed by: INTERNAL MEDICINE

## 2017-01-31 NOTE — PROGRESS NOTES
DATE OF VISIT: 1/31/2017    REASON FOR VISIT: Followup for metastatic colon cancer.      HISTORY OF PRESENT ILLNESS: The patient is a very pleasant 56 y.o. male  with past medical history significant for metastatic colon cancer diagnosed March 2007 . He is status post lower anterior resection as well as 1 cycle FOLFOX, stopped due to multiple toxicities. Final pathology showed poorly differentiated adenocarcinoma with 25 negative lymph nodes and clear surgical margins. Isolated tumor deposits was found in the pericolonic fat. Pathologic stage T2 N1c M0 stage IIIA disease.The patient has been followed by serial colonoscopies as well as CAT scans that did document pulmonary metastatic disease with left lower lobe nodule that is gradually increasing in size. Patient was doing fairly well until recently, 3 months ago, patient presented with low back pain. Patient had restaging workup that revealed hypermetabolic L2 vertebral body lesion. Patient had biopsy done on October 11, 2016 that revealed metastatic adenocarcinoma consistent of colon primary with CK 7 negative CK 20 positive CDX2 positive. Patient had next generation gene sequencing that revealed intermediate mutational load with microsatellite stability.  The patient is here today for scheduled follow up visit.     SUBJECTIVE: The patient has been doing fairly well. He continues to complain of back pain. He has no difficulty with bowel or bladder control. He is eating and drinking well. He has no cough or shortness of breath.     PAST MEDICAL HISTORY/SOCIAL HISTORY/FAMILY HISTORY: Unchanged from my prior documentation done on 01/17/2017.     Review of Systems   Constitutional: Negative for activity change, appetite change, chills, fatigue, fever and unexpected weight change.   HENT: Negative for hearing loss, mouth sores, nosebleeds, sore throat and trouble swallowing.    Eyes: Negative for visual disturbance.   Respiratory: Negative for cough, chest tightness,  shortness of breath and wheezing.    Cardiovascular: Negative for chest pain, palpitations and leg swelling.   Gastrointestinal: Negative for abdominal distention, abdominal pain, blood in stool, constipation, diarrhea, nausea, rectal pain and vomiting.   Endocrine: Negative for cold intolerance and heat intolerance.   Genitourinary: Negative for difficulty urinating, dysuria, frequency and urgency.   Musculoskeletal: Positive for back pain. Negative for arthralgias, gait problem, joint swelling and myalgias.   Skin: Negative for rash.   Neurological: Negative for dizziness, tremors, syncope, weakness, light-headedness, numbness and headaches.        Sleep disturbance   Hematological: Negative for adenopathy. Does not bruise/bleed easily.   Psychiatric/Behavioral: Negative for confusion, sleep disturbance and suicidal ideas. The patient is not nervous/anxious.          Current Outpatient Prescriptions:   •  Cholecalciferol (VITAMIN D3) 5000 UNITS capsule capsule, Take 5,000 Units by mouth Daily., Disp: , Rfl:   •  magnesium hydroxide (MILK OF MAGNESIA) 2400 MG/10ML suspension suspension, Take 10 mL by mouth Daily., Disp: , Rfl:   •  oxyCODONE-acetaminophen (PERCOCET)  MG per tablet, Take 1 tablet by mouth Every 6 (Six) Hours As Needed for moderate pain (4-6)., Disp: , Rfl:     PHYSICAL EXAMINATION:   There were no vitals taken for this visit.   ECOG Performance Status: 1 - Symptomatic but completely ambulatory  General Appearance:  alert, cooperative, no apparent distress and appears stated age   Neurologic/Psychiatric: A&O x 3, gait steady, appropriate affect, strength 5/5 in all muscle groups   HEENT:  Normocephalic, without obvious abnormality, mucous membranes moist   Neck: Supple, symmetrical, trachea midline, no adenopathy;  No thyromegaly, masses, or tenderness   Lungs:   Clear to auscultation bilaterally; respirations regular, even, and unlabored bilaterally   Heart:  Regular rate and rhythm, no  murmurs appreciated   Abdomen:   Soft, non-tender, non-distended and no organomegaly   Lymph nodes: No cervical, supraclavicular, inguinal or axillary adenopathy noted   Extremities: Normal, atraumatic; no clubbing, cyanosis, or edema    Skin: No rashes, ulcers, or suspicious lesions noted     Hospital Outpatient Visit on 01/26/2017   Component Date Value Ref Range Status   • Glucose 01/26/2017 78  70 - 130 mg/dL Final        Mri Outside Films    Result Date: 1/25/2017  Narrative: This order has been auto-finalized and does not contain a result.    Mri Outside Films    Result Date: 1/25/2017  Narrative: This order has been auto-finalized and does not contain a result.    Nm Pet Whole Body    Result Date: 1/26/2017  Narrative: EXAMINATION: NM PET WHOLE BODY-  INDICATION: restage colon cancer; C18.7-Malignant neoplasm of sigmoid colon     TECHNIQUE: With fasting blood glucose level of 79 MG/DL, a total of 13.7 mCi of FDG was was administered via left antecubital vein. Following appropriate delay, PET and CT images were obtained from the level of the skull vertex to the feet and fused multiplanar images reconstructed. The CT scan is an unenhanced low-dose study for reference to the PET scan only and does not constitute a standard diagnostic CT scan.  COMPARISON: Outside PET scan from 9/15/2016. Previous outside CT and MRI studies dated. 1/25/2017   FINDINGS: 3-D images show strong uptake interposed almost precisely between kidneys, mimicking a third renal collecting system. This correlates with a large destructive mass of the thoracic spine on CT scan. Focus of mild uptake is seen in the left lung base medially. There appears to be largely normal variant uptake elsewhere.  Multiplanar images show no significant asymmetry of uptake in the brain. No hypermetabolic node or mass is seen in the neck.  In the chest, there is no evidence of hypermetabolic mediastinal, hilar or axillary disease. Previously noted strongly  hypermetabolic left lower lobe mass is measured at 10.8 maximal SUV on the old study, and has diminished only 3.8 today.  Since the prior PET exam, numerous but mostly small parenchymal nodules have developed bilaterally in the lungs, the largest of which is only approximately 14 mm in the right posterior costophrenic recess. Maximal SUV is only 1.0, but these presumably represent small metastases.  Below the diaphragm, there is a destructive mass of the lumbar spine and paraspinous soft tissues at L1 and 2 on the right, with extension of disease in the canal, which previously appeared confined to the L2 level. Maximal SUV the today is approximately 12.4, previously 22.5. No new hypermetabolic spinal lesion is seen. No hypermetabolic node or mass is seen in the abdomen or pelvis. There is no evidence of hypermetabolic solid organ disease.  No pathologic marrow space uptake is identified elsewhere. Incidental note is made of some normal variant vascular activity in the lower extremities as is typical, and a small amount of normal variant brown fat uptake in the supraclavicular fossae.       Impression: 1. Decreasing size and decreasing hypermetabolic activity in the patient's primary left lower lobe mass. 2. Interval development of numerous small pulmonary nodules, nonhypermetabolic but very suspicious for metastatic disease. 3. Extension of the patient's destructive right L2/paraspinous mass since outside CT scan from September 20, 2016, to involve L1 as well. Decreased overall PET activity in this lesion however. 4. No new hypermetabolic disease elsewhere.    This report was finalized on 1/26/2017 2:56 PM by DR. Eliseo Riley MD.      Ct Outside Films    Result Date: 1/25/2017  Narrative: This order has been auto-finalized and does not contain a result.      ASSESSMENT: The patient is a very pleasant 56 y.o. male with stage IV colon cancer    PROBLEM LIST:  1. Currently stage IV colon cancer with lung and bony  metastases.  K-edwige mutant, microsatellite stable, and intermediate mutational load.  2. Status post low anterior resection done by Dr. Young 2007, [id number]  3.  Attempted adjuvant chemotherapy for 1 cycle could not tolerate secondary to multiple toxicities.  4. Biopsy-proven L2 metastases done October 11, 2016. Status post CyberKnife radiation treatment.  5.  Will start  Keytruda 200 mg IV every 3 weeks tomorrow February 1, 2017    PLAN:  1.I reviewed the PET scan results with the patient and his wife. I told him that there was evidence of multiple small pulmonary nodules suspicious for metastatic disease, as well as increased size of the L2 mass with extension to L1.   2. The patient is interested in enrolling in the MATCH trial. We will proceed with registration and tissue submission. Per study protocol, registration has to be completed prior to initiation of active treatment.  3. We will plan to start treatment with Keytruda 200 mg IV given every 3 weeks tomorrow.  I told the patient that I am less into the gastric about the likelihood immune therapy working given the fact that his facial Foundation one report did not show microsatellite instability.  Patient is interested in immune therapy at this point.  He is not interested in chemotherapy.  He is not a candidate for anti-EGFR treatment because of the K-edwige mutation.  4. He will continue planned treatment with CyberKnife to L1 lesion under the care of Dr. Gautam.   5. He will continue follow up with neurosurgery regarding decompression at L2.   6. I reviewed with the patient the potential side effects from immune therapy including immune mediated reactions with pneumonitis, hepatitis, colitis, thyroiditis, and potential death.   7.  If immune therapy fails subsequent line treatment would include chemotherapy with anti VEGF treatment.  Another option might be targeted treatment on match trial.  8.  I advised the patient to hold his alternative treatment with  high dose vitamin C while he is on immunetherapy.  9. The patient will come back to see us in 3 weeks for next cycle of treatment.     Scribed for Kristofer Rodriguez MD by JESSEE Osborne. 1/31/2017  2:07 PM  Kristofer Rodriguez MD  1/31/2017   I have reviewed the notes, assessments, and/or procedures performed by JESSEE Osborne, I concur with her/his documentation of Marquis Esteban.

## 2017-01-31 NOTE — MR AVS SNAPSHOT
Marquis Carlito   1/31/2017 1:15 PM   Office Visit    Dept Phone:  [phone]   Encounter #:  [id number]    Provider:  Kristofer Rodriguez MD   Department:  [facility] HEMATOLOGY  AND ONCOLOGY                Your Full Care Plan              Your Updated Medication List          This list is accurate as of: 1/31/17  2:29 PM.  Always use your most recent med list.                magnesium hydroxide 2400 MG/10ML suspension suspension   Commonly known as:  MILK OF MAGNESIA       oxyCODONE-acetaminophen  MG per tablet   Commonly known as:  PERCOCET       vitamin D3 5000 UNITS capsule capsule               We Performed the Following     CBC & Differential     Comprehensive Metabolic Panel     ECG 12 Lead     Lactate Dehydrogenase     Magnesium     Phosphorus       Instructions     None    Patient Instructions History      [employer] Signup     Our records indicate that you have an active [employer] account.    You can view your After Visit Summary by going to [facility] and logging in with your [employer] username and password.  If you don't have a [employer] username and password but a parent or guardian has access to your record, the parent or guardian should login with their own [employer] username and password and access your record to view the After Visit Summary.    If you have questions, you can email [email or web address] or call [phone] to talk to our [employer] staff.  Remember, [employer] is NOT to be used for urgent needs.  For medical emergencies, dial 911.               Other Info from Your Visit           Your appointments     Date & Time Provider Appointment Department    Feb 01, 2017  1:00 PM EST CHAIR [facility] OUTPATIENT ONCOLOGY    Feb 21, 2017  5:10 AM [facility] ONC BILLING ONLY Billing Only: Do Not Attend [facility]    This appointment was scheduled for billing  "purposes only. Please note that you should not plan to attend this appointment.    Feb 21, 2017  8:45 AM JUVE Rodriguez MD FOLLOW UP [facility] HEMATOLOGY  AND ONCOLOGY    Feb 21, 2017  9:30 AM EST CHAIR 17 [facility] OUTPATIENT ONCOLOGY        [facility] OUTPATIENT ONCOLOGY  [facility]  [address] [place], [place] [id number] [phone] [facility] HEMATOLOGY  AND ONCOLOGY  [address], [place] [id number] [phone] [place]  [place]  [address] [place], [place] [address] [phone]              Vital Signs     Blood Pressure Pulse Temperature Respirations Height Weight    118/71 72 98.3 °F (36.8 °C) 14 66\" (167.6 cm) 138 lb (62.6 kg)    Body Mass Index Smoking Status                22.27 kg/m2 Never Smoker          Problems and Diagnoses Noted     Colon cancer        "

## 2017-01-31 NOTE — LETTER
January 31, 2017     Jose Bradshaw MD  [address] [place] [id number]    Patient: Marquis Esteban   YOB: 1960   Date of Visit: 1/31/2017       Dear Dr. Augustine MD:    Marquis Esteban was in my office today. Below is a copy of my note.    If you have questions, please do not hesitate to call me. I look forward to following Marquis along with you.         Sincerely,        Kristofer Rodriguez MD        CC: MD Anson Baxter MD    DATE OF VISIT: 1/31/2017    REASON FOR VISIT: Followup for metastatic colon cancer.      HISTORY OF PRESENT ILLNESS: The patient is a very pleasant 56 y.o. male  with past medical history significant for metastatic colon cancer diagnosed March 2007 . He is status post lower anterior resection as well as 1 cycle FOLFOX, stopped due to multiple toxicities. Final pathology showed poorly differentiated adenocarcinoma with 25 negative lymph nodes and clear surgical margins. Isolated tumor deposits was found in the pericolonic fat. Pathologic stage T2 N1c M0 stage IIIA disease.The patient has been followed by serial colonoscopies as well as CAT scans that did document pulmonary metastatic disease with left lower lobe nodule that is gradually increasing in size. Patient was doing fairly well until recently, 3 months ago, patient presented with low back pain. Patient had restaging workup that revealed hypermetabolic L2 vertebral body lesion. Patient had biopsy done on October 11, 2016 that revealed metastatic adenocarcinoma consistent of colon primary with CK 7 negative CK 20 positive CDX2 positive. Patient had next generation gene sequencing that revealed intermediate mutational load with microsatellite stability.  The patient is here today for scheduled follow up visit.     SUBJECTIVE: The patient has been doing fairly well. He continues to complain of back pain. He has no difficulty with bowel or bladder control. He is eating and drinking well. He has no cough  or shortness of breath.     PAST MEDICAL HISTORY/SOCIAL HISTORY/FAMILY HISTORY: Unchanged from my prior documentation done on 01/17/2017.     Review of Systems   Constitutional: Negative for activity change, appetite change, chills, fatigue, fever and unexpected weight change.   HENT: Negative for hearing loss, mouth sores, nosebleeds, sore throat and trouble swallowing.    Eyes: Negative for visual disturbance.   Respiratory: Negative for cough, chest tightness, shortness of breath and wheezing.    Cardiovascular: Negative for chest pain, palpitations and leg swelling.   Gastrointestinal: Negative for abdominal distention, abdominal pain, blood in stool, constipation, diarrhea, nausea, rectal pain and vomiting.   Endocrine: Negative for cold intolerance and heat intolerance.   Genitourinary: Negative for difficulty urinating, dysuria, frequency and urgency.   Musculoskeletal: Positive for back pain. Negative for arthralgias, gait problem, joint swelling and myalgias.   Skin: Negative for rash.   Neurological: Negative for dizziness, tremors, syncope, weakness, light-headedness, numbness and headaches.        Sleep disturbance   Hematological: Negative for adenopathy. Does not bruise/bleed easily.   Psychiatric/Behavioral: Negative for confusion, sleep disturbance and suicidal ideas. The patient is not nervous/anxious.          Current Outpatient Prescriptions:   •  Cholecalciferol (VITAMIN D3) 5000 UNITS capsule capsule, Take 5,000 Units by mouth Daily., Disp: , Rfl:   •  magnesium hydroxide (MILK OF MAGNESIA) 2400 MG/10ML suspension suspension, Take 10 mL by mouth Daily., Disp: , Rfl:   •  oxyCODONE-acetaminophen (PERCOCET)  MG per tablet, Take 1 tablet by mouth Every 6 (Six) Hours As Needed for moderate pain (4-6)., Disp: , Rfl:     PHYSICAL EXAMINATION:   There were no vitals taken for this visit.   ECOG Performance Status: 1 - Symptomatic but completely ambulatory  General Appearance:  alert, cooperative,  no apparent distress and appears stated age   Neurologic/Psychiatric: A&O x 3, gait steady, appropriate affect, strength 5/5 in all muscle groups   HEENT:  Normocephalic, without obvious abnormality, mucous membranes moist   Neck: Supple, symmetrical, trachea midline, no adenopathy;  No thyromegaly, masses, or tenderness   Lungs:   Clear to auscultation bilaterally; respirations regular, even, and unlabored bilaterally   Heart:  Regular rate and rhythm, no murmurs appreciated   Abdomen:   Soft, non-tender, non-distended and no organomegaly   Lymph nodes: No cervical, supraclavicular, inguinal or axillary adenopathy noted   Extremities: Normal, atraumatic; no clubbing, cyanosis, or edema    Skin: No rashes, ulcers, or suspicious lesions noted     Hospital Outpatient Visit on 01/26/2017   Component Date Value Ref Range Status   • Glucose 01/26/2017 78  70 - 130 mg/dL Final        Mri Outside Films    Result Date: 1/25/2017  Narrative: This order has been auto-finalized and does not contain a result.    Mri Outside Films    Result Date: 1/25/2017  Narrative: This order has been auto-finalized and does not contain a result.    Nm Pet Whole Body    Result Date: 1/26/2017  Narrative: EXAMINATION: NM PET WHOLE BODY-  INDICATION: restage colon cancer; C18.7-Malignant neoplasm of sigmoid colon     TECHNIQUE: With fasting blood glucose level of 79 MG/DL, a total of 13.7 mCi of FDG was was administered via left antecubital vein. Following appropriate delay, PET and CT images were obtained from the level of the skull vertex to the feet and fused multiplanar images reconstructed. The CT scan is an unenhanced low-dose study for reference to the PET scan only and does not constitute a standard diagnostic CT scan.  COMPARISON: Outside PET scan from 9/15/2016. Previous outside CT and MRI studies dated. 1/25/2017   FINDINGS: 3-D images show strong uptake interposed almost precisely between kidneys, mimicking a third renal collecting  system. This correlates with a large destructive mass of the thoracic spine on CT scan. Focus of mild uptake is seen in the left lung base medially. There appears to be largely normal variant uptake elsewhere.  Multiplanar images show no significant asymmetry of uptake in the brain. No hypermetabolic node or mass is seen in the neck.  In the chest, there is no evidence of hypermetabolic mediastinal, hilar or axillary disease. Previously noted strongly hypermetabolic left lower lobe mass is measured at 10.8 maximal SUV on the old study, and has diminished only 3.8 today.  Since the prior PET exam, numerous but mostly small parenchymal nodules have developed bilaterally in the lungs, the largest of which is only approximately 14 mm in the right posterior costophrenic recess. Maximal SUV is only 1.0, but these presumably represent small metastases.  Below the diaphragm, there is a destructive mass of the lumbar spine and paraspinous soft tissues at L1 and 2 on the right, with extension of disease in the canal, which previously appeared confined to the L2 level. Maximal SUV the today is approximately 12.4, previously 22.5. No new hypermetabolic spinal lesion is seen. No hypermetabolic node or mass is seen in the abdomen or pelvis. There is no evidence of hypermetabolic solid organ disease.  No pathologic marrow space uptake is identified elsewhere. Incidental note is made of some normal variant vascular activity in the lower extremities as is typical, and a small amount of normal variant brown fat uptake in the supraclavicular fossae.       Impression: 1. Decreasing size and decreasing hypermetabolic activity in the patient's primary left lower lobe mass. 2. Interval development of numerous small pulmonary nodules, nonhypermetabolic but very suspicious for metastatic disease. 3. Extension of the patient's destructive right L2/paraspinous mass since outside CT scan from September 20, 2016, to involve L1 as well.  Decreased overall PET activity in this lesion however. 4. No new hypermetabolic disease elsewhere.    This report was finalized on 1/26/2017 2:56 PM by DR. Eliseo Riley MD.      Ct Outside Films    Result Date: 1/25/2017  Narrative: This order has been auto-finalized and does not contain a result.      ASSESSMENT: The patient is a very pleasant 56 y.o. male with stage IV colon cancer    PROBLEM LIST:  1. Currently stage IV colon cancer with lung and bony metastases.  K-edwige mutant, microsatellite stable, and intermediate mutational load.  2. Status post low anterior resection done by Dr. Young 2007, [id number]  3.  Attempted adjuvant chemotherapy for 1 cycle could not tolerate secondary to multiple toxicities.  4. Biopsy-proven L2 metastases done October 11, 2016. Status post CyberKnife radiation treatment.  5.  Will start  Keytruda 200 mg IV every 3 weeks tomorrow February 1, 2017    PLAN:  1.I reviewed the PET scan results with the patient and his wife. I told him that there was evidence of multiple small pulmonary nodules suspicious for metastatic disease, as well as increased size of the L2 mass with extension to L1.   2. The patient is interested in enrolling in the MATCH trial. We will proceed with registration and tissue submission. Per study protocol, registration has to be completed prior to initiation of active treatment.  3. We will plan to start treatment with Keytruda 200 mg IV given every 3 weeks tomorrow.  I told the patient that I am less into the gastric about the likelihood immune therapy working given the fact that his facial Foundation one report did not show microsatellite instability.  Patient is interested in immune therapy at this point.  He is not interested in chemotherapy.  He is not a candidate for anti-EGFR treatment because of the K-edwige mutation.  4. He will continue planned treatment with CyberKnife to L1 lesion under the care of Dr. Gautam.   5. He will continue follow up with neurosurgery  regarding decompression at L2.   6. I reviewed with the patient the potential side effects from immune therapy including immune mediated reactions with pneumonitis, hepatitis, colitis, thyroiditis, and potential death.   7.  If immune therapy fails subsequent line treatment would include chemotherapy with anti VEGF treatment.  Another option might be targeted treatment on match trial.  8.  I advised the patient to hold his alternative treatment with high dose vitamin C while he is on immunetherapy.  9. The patient will come back to see us in 3 weeks for next cycle of treatment.     Scribed for Kristofer Rodriguez MD by JESSEE Osborne. 1/31/2017  2:07 PM  Kristofer Rodriguez MD  1/31/2017   I have reviewed the notes, assessments, and/or procedures performed by JESSEE Osborne, I concur with her/his documentation of Marquis Esteban.

## 2017-02-01 ENCOUNTER — INFUSION (OUTPATIENT)
Dept: ONCOLOGY | Facility: HOSPITAL | Age: 57
End: 2017-02-01

## 2017-02-01 ENCOUNTER — DOCUMENTATION (OUTPATIENT)
Dept: RADIATION ONCOLOGY | Facility: HOSPITAL | Age: 57
End: 2017-02-01

## 2017-02-01 ENCOUNTER — DOCUMENTATION (OUTPATIENT)
Dept: NUTRITION | Facility: HOSPITAL | Age: 57
End: 2017-02-01

## 2017-02-01 ENCOUNTER — EDUCATION (OUTPATIENT)
Dept: ONCOLOGY | Facility: HOSPITAL | Age: 57
End: 2017-02-01

## 2017-02-01 VITALS
WEIGHT: 133 LBS | TEMPERATURE: 98 F | RESPIRATION RATE: 14 BRPM | HEART RATE: 68 BPM | HEIGHT: 66 IN | SYSTOLIC BLOOD PRESSURE: 139 MMHG | DIASTOLIC BLOOD PRESSURE: 67 MMHG | BODY MASS INDEX: 21.38 KG/M2

## 2017-02-01 DIAGNOSIS — C79.51 BONE METASTASIS: ICD-10-CM

## 2017-02-01 DIAGNOSIS — C18.7 MALIGNANT NEOPLASM OF SIGMOID COLON (HCC): ICD-10-CM

## 2017-02-01 DIAGNOSIS — C18.7 MALIGNANT NEOPLASM OF SIGMOID COLON (HCC): Primary | ICD-10-CM

## 2017-02-01 DIAGNOSIS — C79.51 BONE METASTASIS: Primary | ICD-10-CM

## 2017-02-01 LAB
T4 FREE SERPL-MCNC: 1.12 NG/DL (ref 0.89–1.76)
TSH SERPL DL<=0.05 MIU/L-ACNC: 2.4 MIU/ML (ref 0.35–5.35)

## 2017-02-01 PROCEDURE — 96413 CHEMO IV INFUSION 1 HR: CPT

## 2017-02-01 PROCEDURE — 25010000002 PEMBROLIZUMAB 100 MG/4ML SOLUTION 4 ML VIAL: Performed by: NURSE PRACTITIONER

## 2017-02-01 RX ORDER — SODIUM CHLORIDE 9 MG/ML
250 INJECTION, SOLUTION INTRAVENOUS ONCE
Status: CANCELLED | OUTPATIENT
Start: 2017-02-07

## 2017-02-01 RX ORDER — SODIUM CHLORIDE 9 MG/ML
250 INJECTION, SOLUTION INTRAVENOUS ONCE
Status: COMPLETED | OUTPATIENT
Start: 2017-02-01 | End: 2017-02-01

## 2017-02-01 RX ORDER — ONDANSETRON HYDROCHLORIDE 8 MG/1
8 TABLET, FILM COATED ORAL 3 TIMES DAILY PRN
Qty: 30 TABLET | Refills: 5 | Status: SHIPPED | OUTPATIENT
Start: 2017-02-01 | End: 2017-03-14

## 2017-02-01 RX ADMIN — SODIUM CHLORIDE 250 ML: 9 INJECTION, SOLUTION INTRAVENOUS at 14:19

## 2017-02-01 RX ADMIN — SODIUM CHLORIDE 200 MG: 9 INJECTION, SOLUTION INTRAVENOUS at 14:20

## 2017-02-01 NOTE — PROGRESS NOTES
Pt is [facility], for CK MRI Friday @ 3:45 for 4:15p, clinic on 2/7 @2p, sim @ 2:30p.  Pt notified.

## 2017-02-01 NOTE — PLAN OF CARE
Outpatient Infusion • [address] [facility] • [address] • [place] [id number] • [phone]      CHEMOTHERAPY EDUCATION SHEET    NAME:  Marquis Esteban      : 1960           DATE: 17    Booklets Given: Chemotherapy and You []  Eating Hints []    Sexuality/Fertility Books []     Chemotherapy/Biotherapy Education Sheets: (list all that apply)  pembrolizumab                                                                                                                                                                Chemotherapy Regimen:  Pembrolizumab every 3 weeks (cycle 1)    TOPICS EDUCATION PROVIDED EDUCATION REINFORCED COMMENTS   ANEMIA:  role of RBC, cause, s/s, ways to manage, role of transfusion [x] []    THROMBOCYTOPENIA:  role of platelet, cause, s/s, ways to prevent bleeding, things to avoid, when to seek help [x] []    NEUTROPENIA:  role of WBC, cause, infection precautions, s/s of infection, when to call MD [x] []    NUTRITION & APPETITE CHANGES:  importance of maintaining healthy diet & weight, ways to manage to improve intake, dietary consult, exercise regimen [x] []    DIARRHEA:  causes, s/s of dehydration, ways to manage, dietary changes, when to call MD [x] [] Discussed risk of diarrhea and colitis.  Discussed Immodium might not be successful but steroids at a high dose taper might be needed.  Advised to call MD office to report # of stools per 24 hours.   CONSTIPATION:  causes, ways to manage, dietary changes, when to call MD [x] []    NAUSEA & VOMITING:  cause, use of antiemetics, dietary changes, when to call MD [x] [] Discussed minimal emetic risk.   MOUTH SORES:  causes, oral care, ways to manage [] []    ALOPECIA:  cause, ways to manage, resources [] []    INFERTILITY & SEXUALITY:  causes, fertility preservation options, sexuality changes, ways to manage, importance of birth control [] []    NERVOUS SYSTEM CHANGES:  causes, s/s, neuropathies, cognitive changes, ways to manage [] []     PAIN:  causes, ways to manage [] [] ????   SKIN & NAIL CHANGES:  cause, s/s, ways to manage [x] [] Discussed risk of rash and the possible need of steroid cream.   ORGAN TOXICITIES:  cause, s/s, need for diagnostic tests, labs, when to notify MD [x] [] Discussed labs to monitor: Scr, LFTs, TSH.   SURVIVORSHIP:  distress, distress assessment, secondary malignancies, early/late effects, follow-up, social issues, social support [] []    HOME CARE:  use of spill kits, storing of PO chemo, how to manage bodily fluids [] []    MISCELLANEOUS:  drug interactions, administration, vesicant, et [x] [] Discussed MOA of immunotherapy.  Discussed length of infusion and what to expect with first day of treatment.     Referrals:        Notes:   Provided patient and wife with my business card and advised to call with any questions/concerns.

## 2017-02-02 ENCOUNTER — DOCUMENTATION (OUTPATIENT)
Dept: RADIATION ONCOLOGY | Facility: HOSPITAL | Age: 57
End: 2017-02-02

## 2017-02-02 NOTE — PROGRESS NOTES
Oncology Nutrition Screening    Patient Name:  Marquis Esteban  YOB: 1960  MRN: [id number]  Date:  02/02/17  Physician:  Dr. Rodriguez    Type of Cancer Treatment:   Chemotherapy:  Keytruda-every 3 weeks    Patient Active Problem List   Diagnosis   • Malignant neoplasm of sigmoid colon   • Bone metastasis   • Solitary lung nodule       Current Outpatient Prescriptions   Medication Sig Dispense Refill   • Cholecalciferol (VITAMIN D3) 5000 UNITS capsule capsule Take 5,000 Units by mouth Daily.     • magnesium hydroxide (MILK OF MAGNESIA) 2400 MG/10ML suspension suspension Take 10 mL by mouth Daily.     • ondansetron (ZOFRAN) 8 MG tablet Take 1 tablet by mouth 3 (Three) Times a Day As Needed for nausea or vomiting. 30 tablet 5   • oxyCODONE-acetaminophen (PERCOCET)  MG per tablet Take 1 tablet by mouth Every 6 (Six) Hours As Needed for moderate pain (4-6).       No current facility-administered medications for this visit.        Glycemic Risk:   n/a    Weight:   Height: 66 inches  Weight: 133 lbs.  Usual Body Weight: 135-140 lbs.   BMI: 21.5  Normal  Weight -no significant changes.    Oral Food Intake:  Patient follows a vegan diet.    Hydration Status:   How many 8 ounce glass of water of fluid do you drink per day?  Specific amount not assessed during consult.    Enteral Feeding:   n/a    Nutrition Symptoms:   No Problems with Eating    Activity:   Not assessed at time of consult.     reports that he has never smoked. He has never used smokeless tobacco. He reports that he does not drink alcohol or use illicit drugs.    Evaluation of Nutritional Risk:   Patient identified at nutritional risk due to diagnosis and treatment plan.  Met with patient and his wife during his initial immunotherapy infusion appointment.  Nutritional screening completed and confirmed by patient as above.    Discussed the importance of good nutrition during his treatment course focusing on adequate calorie, protein, nutrient and  fluid intake.  Encouraged him to be consuming smaller more frequent meals throughout the day.  Emphasized the importance of protein and its role in the diet; reviewed high protein vegan foods; and recommended he have a protein source at each meal/snack.  Also emphasized the importance of hydration and recommended he drink at least 64 ounces of hydrating fluids daily.  Discussed nutritional supplements and their role in the diet and suggested he purchase a vegan protein powder to make homemade protein shakes/smoothies.    Answered their questions and both voiced understanding of information discussed.  RD's contact information provided and encouraged them to call with further nutritional questions or concerns.  Will follow up as needed.  RD available to assist prn.  Thanks,     Electronically signed by:  Deanna Schulz RD  12:27 PM

## 2017-02-02 NOTE — PROGRESS NOTES
He initiated systemic immune treatment yesterday.  Spine surgery is on hold since he is neurologically intact and a neurosurgical procedure for tumor involving the L2 spine would involve highly complex reconstruction and perhaps long rehabilitation.  Pain from the new tumor involving L1 vertebral body is persistent.  Palliative treatment to L1 is indicated.  This would again be best accomplished with stereotactic body radiotherapy since the new focus of metastasis is immediately adjacent and overlapping his previous radiation fields.    We'll proceed with CyberKnife treatment planning this week to deliver radiosurgery to the L1 tumor.  Due to overlap of radiation dose to the immediately adjacent spinal cord, he will likely require a 3 fraction regimen.

## 2017-02-03 ENCOUNTER — HOSPITAL ENCOUNTER (OUTPATIENT)
Dept: RADIATION ONCOLOGY | Facility: HOSPITAL | Age: 57
Discharge: HOME | End: 2017-02-03

## 2017-02-03 ENCOUNTER — HOSPITAL ENCOUNTER (OUTPATIENT)
Dept: RADIATION ONCOLOGY | Facility: HOSPITAL | Age: 57
Setting detail: RADIATION/ONCOLOGY SERIES
Discharge: HOME | End: 2017-02-03

## 2017-02-03 ENCOUNTER — HOSPITAL ENCOUNTER (OUTPATIENT)
Dept: MRI IMAGING | Facility: HOSPITAL | Age: 57
Discharge: HOME | End: 2017-02-03
Attending: RADIOLOGY | Admitting: RADIOLOGY

## 2017-02-03 DIAGNOSIS — C79.51 BONE METASTASIS: ICD-10-CM

## 2017-02-03 PROCEDURE — 72149 MRI LUMBAR SPINE W/DYE: CPT

## 2017-02-03 PROCEDURE — A9577 INJ MULTIHANCE: HCPCS | Performed by: RADIOLOGY

## 2017-02-03 PROCEDURE — 77470 SPECIAL RADIATION TREATMENT: CPT | Performed by: RADIOLOGY

## 2017-02-03 PROCEDURE — 77290 THER RAD SIMULAJ FIELD CPLX: CPT | Performed by: RADIOLOGY

## 2017-02-03 PROCEDURE — 0 GADOBENATE DIMEGLUMINE 529 MG/ML SOLUTION: Performed by: RADIOLOGY

## 2017-02-03 RX ADMIN — GADOBENATE DIMEGLUMINE 12 ML: 529 INJECTION, SOLUTION INTRAVENOUS at 16:30

## 2017-02-07 ENCOUNTER — APPOINTMENT (OUTPATIENT)
Dept: ONCOLOGY | Facility: HOSPITAL | Age: 57
End: 2017-02-07

## 2017-02-07 ENCOUNTER — APPOINTMENT (OUTPATIENT)
Dept: MRI IMAGING | Facility: HOSPITAL | Age: 57
End: 2017-02-07
Attending: RADIOLOGY

## 2017-02-09 PROCEDURE — 77334 RADIATION TREATMENT AID(S): CPT | Performed by: RADIOLOGY

## 2017-02-09 PROCEDURE — 77295 3-D RADIOTHERAPY PLAN: CPT | Performed by: RADIOLOGY

## 2017-02-09 PROCEDURE — 77300 RADIATION THERAPY DOSE PLAN: CPT | Performed by: RADIOLOGY

## 2017-02-09 PROCEDURE — 77370 RADIATION PHYSICS CONSULT: CPT | Performed by: RADIOLOGY

## 2017-02-10 ENCOUNTER — HOSPITAL ENCOUNTER (OUTPATIENT)
Dept: RADIATION ONCOLOGY | Facility: HOSPITAL | Age: 57
Discharge: HOME | End: 2017-02-10

## 2017-02-10 PROCEDURE — 77373 STRTCTC BDY RAD THER TX DLVR: CPT | Performed by: RADIOLOGY

## 2017-02-10 PROCEDURE — 77290 THER RAD SIMULAJ FIELD CPLX: CPT | Performed by: RADIOLOGY

## 2017-02-13 ENCOUNTER — TELEPHONE (OUTPATIENT)
Dept: ONCOLOGY | Facility: CLINIC | Age: 57
End: 2017-02-13

## 2017-02-13 NOTE — TELEPHONE ENCOUNTER
----- Message from Tameka Platt sent at 2/13/2017 11:58 AM EST -----  Regarding: BADIN/REFILL REQUEST  Contact: [phone]  PATIENT IS REQUESTING A REFILL ON HIS oxyCODONE-acetaminophen (PERCOCET)  MG per tablet    HE CAN BE REACHED AT [phone] IF SOMEONE NEEDS TO SPEAK TO HIM

## 2017-02-14 ENCOUNTER — HOSPITAL ENCOUNTER (OUTPATIENT)
Dept: RADIATION ONCOLOGY | Facility: HOSPITAL | Age: 57
Discharge: HOME | End: 2017-02-14

## 2017-02-14 PROCEDURE — 77373 STRTCTC BDY RAD THER TX DLVR: CPT | Performed by: RADIOLOGY

## 2017-02-14 PROCEDURE — 77280 THER RAD SIMULAJ FIELD SMPL: CPT | Performed by: RADIOLOGY

## 2017-02-15 ENCOUNTER — DOCUMENTATION (OUTPATIENT)
Dept: NEUROSURGERY | Facility: HOSPITAL | Age: 57
End: 2017-02-15

## 2017-02-15 NOTE — PROGRESS NOTES
Procedure   Procedures      Date of procedure: February 10, 2017    Procedure: CyberKnife treatment to the L1 vertebral body and lesion 3000gy    Surgeon: Dr. Anson Frazier    Radiation oncologist: Dr. 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 He has been followed in the clinic for metastatic disease to the spine. Previous CyberKnife treatment to the L2 vertebral body where he he had metastatic disease. Unfortunately, expansion of the bone after treatment. Symptomatically improved with almost complete resolution of his back pain. Surveillance MRI at 3 months showed expansion of the bone and severe spinal stenosis. Treatment options were discussed with him at that time. There is also a new lesion within the bone at L1. Decision was made to proceed with immunotherapy and defer surgery. The risk of surgery deferments was discussed including potential permanent spinal cord injury and cauda equina syndrome. He understands the risk and decided to proceed with nonoperative treatment. Discussed with Dr. Swan and myself concerning appropriate radiation treatment to the L1 vertebral body. I think CyberKnife offers him a reasonable opportunity to control this lesion and took her further growth.    Treatment:    The patient had planning CT scan and MRI of the thoracic lumbar area. The L1 vertebral body was contoured as well as the lesion that extended into the pedicle on the right. Proposed radiation treatment was discussed with Dr. Swan. The spinal canal was appropriately identified. Plan for 3 treatments to reach the optimal dosing to the tumor and the vertebral body while limiting the dose to the spinal canal. The final plan was reviewed on the day of the first procedure. Discussion of the procedure was had with the patient and his family. The patient proceeded with a planned 3000 gy to the  L1 lesion. Isodense lines were reviewed and appropriate.    Follow-up: 1 month with a surveillance MRI

## 2017-02-16 ENCOUNTER — HOSPITAL ENCOUNTER (OUTPATIENT)
Dept: RADIATION ONCOLOGY | Facility: HOSPITAL | Age: 57
Discharge: HOME | End: 2017-02-16

## 2017-02-16 DIAGNOSIS — C18.7 MALIGNANT NEOPLASM OF SIGMOID COLON (HCC): ICD-10-CM

## 2017-02-16 DIAGNOSIS — C79.51 BONE METASTASIS: ICD-10-CM

## 2017-02-16 PROCEDURE — 77336 RADIATION PHYSICS CONSULT: CPT | Performed by: RADIOLOGY

## 2017-02-16 PROCEDURE — 77373 STRTCTC BDY RAD THER TX DLVR: CPT | Performed by: RADIOLOGY

## 2017-02-16 PROCEDURE — 77280 THER RAD SIMULAJ FIELD SMPL: CPT | Performed by: RADIOLOGY

## 2017-02-21 ENCOUNTER — OFFICE VISIT (OUTPATIENT)
Dept: ONCOLOGY | Facility: CLINIC | Age: 57
End: 2017-02-21

## 2017-02-21 ENCOUNTER — DOCUMENTATION (OUTPATIENT)
Dept: SOCIAL WORK | Facility: HOSPITAL | Age: 57
End: 2017-02-21

## 2017-02-21 ENCOUNTER — INFUSION (OUTPATIENT)
Dept: ONCOLOGY | Facility: HOSPITAL | Age: 57
End: 2017-02-21

## 2017-02-21 ENCOUNTER — LAB (OUTPATIENT)
Dept: LAB | Facility: HOSPITAL | Age: 57
End: 2017-02-21

## 2017-02-21 VITALS
HEIGHT: 66 IN | BODY MASS INDEX: 21.62 KG/M2 | TEMPERATURE: 98.6 F | DIASTOLIC BLOOD PRESSURE: 63 MMHG | SYSTOLIC BLOOD PRESSURE: 103 MMHG | RESPIRATION RATE: 16 BRPM | HEART RATE: 74 BPM | WEIGHT: 134.5 LBS

## 2017-02-21 DIAGNOSIS — C18.7 MALIGNANT NEOPLASM OF SIGMOID COLON (HCC): Primary | ICD-10-CM

## 2017-02-21 DIAGNOSIS — C79.51 BONE METASTASIS: ICD-10-CM

## 2017-02-21 DIAGNOSIS — C18.7 MALIGNANT NEOPLASM OF SIGMOID COLON (HCC): ICD-10-CM

## 2017-02-21 LAB
ALBUMIN SERPL-MCNC: 4.1 G/DL (ref 3.2–4.8)
ALBUMIN/GLOB SERPL: 1.2 G/DL (ref 1.5–2.5)
ALP SERPL-CCNC: 94 U/L (ref 25–100)
ALT SERPL W P-5'-P-CCNC: 857 U/L (ref 7–40)
ANION GAP SERPL CALCULATED.3IONS-SCNC: 4 MMOL/L (ref 3–11)
AST SERPL-CCNC: 563 U/L (ref 0–33)
BILIRUB SERPL-MCNC: 0.6 MG/DL (ref 0.3–1.2)
BUN BLD-MCNC: 7 MG/DL (ref 9–23)
BUN/CREAT SERPL: 11.7 (ref 7–25)
CALCIUM SPEC-SCNC: 9.2 MG/DL (ref 8.7–10.4)
CEA SERPL-MCNC: >500 NG/ML (ref 0–2.5)
CHLORIDE SERPL-SCNC: 100 MMOL/L (ref 99–109)
CO2 SERPL-SCNC: 32 MMOL/L (ref 20–31)
CREAT BLD-MCNC: 0.6 MG/DL (ref 0.6–1.3)
ERYTHROCYTE [DISTWIDTH] IN BLOOD BY AUTOMATED COUNT: 21.8 % (ref 11.3–14.5)
GFR SERPL CREATININE-BSD FRML MDRD: 140 ML/MIN/1.73
GLOBULIN UR ELPH-MCNC: 3.3 GM/DL
GLUCOSE BLD-MCNC: 149 MG/DL (ref 70–100)
HCT VFR BLD AUTO: 38.1 % (ref 38.9–50.9)
HGB BLD-MCNC: 12 G/DL (ref 13.1–17.5)
LYMPHOCYTES # BLD AUTO: 0.6 10*3/MM3 (ref 0.6–4.8)
LYMPHOCYTES NFR BLD AUTO: 15.4 % (ref 24–44)
MCH RBC QN AUTO: 22 PG (ref 27–31)
MCHC RBC AUTO-ENTMCNC: 31.4 G/DL (ref 32–36)
MCV RBC AUTO: 70.1 FL (ref 80–99)
MONOCYTES # BLD AUTO: 0.4 10*3/MM3 (ref 0–1)
MONOCYTES NFR BLD AUTO: 10 % (ref 0–12)
NEUTROPHILS # BLD AUTO: 2.9 10*3/MM3 (ref 1.5–8.3)
NEUTROPHILS NFR BLD AUTO: 74.6 % (ref 41–71)
PLATELET # BLD AUTO: 139 10*3/MM3 (ref 150–450)
PMV BLD AUTO: 6.9 FL (ref 6–12)
POTASSIUM BLD-SCNC: 4 MMOL/L (ref 3.5–5.5)
PROT SERPL-MCNC: 7.4 G/DL (ref 5.7–8.2)
RBC # BLD AUTO: 5.43 10*6/MM3 (ref 4.2–5.76)
SODIUM BLD-SCNC: 136 MMOL/L (ref 132–146)
T4 FREE SERPL-MCNC: 1.05 NG/DL (ref 0.89–1.76)
TSH SERPL DL<=0.05 MIU/L-ACNC: 2.05 MIU/ML (ref 0.35–5.35)
WBC NRBC COR # BLD: 3.9 10*3/MM3 (ref 3.5–10.8)

## 2017-02-21 PROCEDURE — 25010000002 PEMBROLIZUMAB 100 MG/4ML SOLUTION 4 ML VIAL

## 2017-02-21 PROCEDURE — 80053 COMPREHEN METABOLIC PANEL: CPT | Performed by: INTERNAL MEDICINE

## 2017-02-21 PROCEDURE — 85025 COMPLETE CBC W/AUTO DIFF WBC: CPT

## 2017-02-21 PROCEDURE — 84439 ASSAY OF FREE THYROXINE: CPT | Performed by: INTERNAL MEDICINE

## 2017-02-21 PROCEDURE — 99215 OFFICE O/P EST HI 40 MIN: CPT | Performed by: INTERNAL MEDICINE

## 2017-02-21 PROCEDURE — 36415 COLL VENOUS BLD VENIPUNCTURE: CPT

## 2017-02-21 PROCEDURE — 96413 CHEMO IV INFUSION 1 HR: CPT

## 2017-02-21 PROCEDURE — 84443 ASSAY THYROID STIM HORMONE: CPT | Performed by: INTERNAL MEDICINE

## 2017-02-21 PROCEDURE — 82378 CARCINOEMBRYONIC ANTIGEN: CPT | Performed by: INTERNAL MEDICINE

## 2017-02-21 RX ORDER — OXYCODONE HYDROCHLORIDE 10 MG/1
10 TABLET ORAL EVERY 6 HOURS PRN
Qty: 120 TABLET | Refills: 0 | Status: SHIPPED | OUTPATIENT
Start: 2017-02-21 | End: 2017-07-03

## 2017-02-21 RX ORDER — SODIUM CHLORIDE 9 MG/ML
250 INJECTION, SOLUTION INTRAVENOUS ONCE
Status: CANCELLED | OUTPATIENT
Start: 2017-02-21

## 2017-02-21 NOTE — PROGRESS NOTES
DATE OF VISIT: 2/21/2017    REASON FOR VISIT: Followup for metastatic colon cancer.      HISTORY OF PRESENT ILLNESS: The patient is a very pleasant 57 y.o. male  with past medical history significant for metastatic colon cancer diagnosed March 2007 . He is status post lower anterior resection as well as 1 cycle FOLFOX, stopped due to multiple toxicities. Final pathology showed poorly differentiated adenocarcinoma with 25 negative lymph nodes and clear surgical margins. Isolated tumor deposits was found in the pericolonic fat. Pathologic stage T2 N1c M0 stage IIIA disease.The patient has been followed by serial colonoscopies as well as CAT scans that did document pulmonary metastatic disease with left lower lobe nodule that is gradually increasing in size. Patient was doing fairly well until recently, 3 months ago, patient presented with low back pain. Patient had restaging workup that revealed hypermetabolic L2 vertebral body lesion. Patient had biopsy done on October 11, 2016 that revealed metastatic adenocarcinoma consistent of colon primary with CK 7 negative CK 20 positive CDX2 positive. Patient had next generation gene sequencing that revealed intermediate mutational load with microsatellite stability.  He started chemotherapy with Keytruda on 2/1/2017. The patient is here for cycle # 2 of treatment.     SUBJECTIVE: The patient has been doing fairly well. He continues to complain of back pain. He has no difficulty with bowel or bladder control. He is eating and drinking well. He has no cough or shortness of breath.     PAST MEDICAL HISTORY/SOCIAL HISTORY/FAMILY HISTORY: Unchanged from my prior documentation done on 01/17/2017.     Review of Systems   Constitutional: Negative for activity change, appetite change, chills, fatigue, fever and unexpected weight change.   HENT: Negative for hearing loss, mouth sores, nosebleeds, sore throat and trouble swallowing.    Eyes: Negative for visual disturbance.    Respiratory: Negative for cough, chest tightness, shortness of breath and wheezing.    Cardiovascular: Negative for chest pain, palpitations and leg swelling.   Gastrointestinal: Negative for abdominal distention, abdominal pain, blood in stool, constipation, diarrhea, nausea, rectal pain and vomiting.   Endocrine: Negative for cold intolerance and heat intolerance.   Genitourinary: Negative for difficulty urinating, dysuria, frequency and urgency.   Musculoskeletal: Positive for back pain. Negative for arthralgias, gait problem, joint swelling and myalgias.   Skin: Negative for rash.   Neurological: Negative for dizziness, tremors, syncope, weakness, light-headedness, numbness and headaches.        Sleep disturbance   Hematological: Negative for adenopathy. Does not bruise/bleed easily.   Psychiatric/Behavioral: Negative for confusion, sleep disturbance and suicidal ideas. The patient is not nervous/anxious.          Current Outpatient Prescriptions:   •  Cholecalciferol (VITAMIN D3) 5000 UNITS capsule capsule, Take 5,000 Units by mouth Daily., Disp: , Rfl:   •  magnesium hydroxide (MILK OF MAGNESIA) 2400 MG/10ML suspension suspension, Take 10 mL by mouth Daily., Disp: , Rfl:   •  ondansetron (ZOFRAN) 8 MG tablet, Take 1 tablet by mouth 3 (Three) Times a Day As Needed for nausea or vomiting., Disp: 30 tablet, Rfl: 5  •  oxyCODONE-acetaminophen (PERCOCET)  MG per tablet, Take 1 tablet by mouth Every 6 (Six) Hours As Needed for moderate pain (4-6)., Disp: , Rfl:     PHYSICAL EXAMINATION:   There were no vitals taken for this visit.   ECOG Performance Status: 1 - Symptomatic but completely ambulatory  General Appearance:  alert, cooperative, no apparent distress and appears stated age   Neurologic/Psychiatric: A&O x 3, gait steady, appropriate affect, strength 5/5 in all muscle groups   HEENT:  Normocephalic, without obvious abnormality, mucous membranes moist   Neck: Supple, symmetrical, trachea midline, no  adenopathy;  No thyromegaly, masses, or tenderness   Lungs:   Clear to auscultation bilaterally; respirations regular, even, and unlabored bilaterally   Heart:  Regular rate and rhythm, no murmurs appreciated   Abdomen:   Soft, non-tender, non-distended and no organomegaly   Lymph nodes: No cervical, supraclavicular, inguinal or axillary adenopathy noted   Extremities: Normal, atraumatic; no clubbing, cyanosis, or edema    Skin: No rashes, ulcers, or suspicious lesions noted     No visits with results within 2 Week(s) from this visit.  Latest known visit with results is:    Infusion on 02/01/2017   Component Date Value Ref Range Status   • TSH 02/01/2017 2.402  0.350 - 5.350 mIU/mL Final   • Free T4 02/01/2017 1.12  0.89 - 1.76 ng/dL Final        Mri Outside Films    Result Date: 1/25/2017  Narrative: This order has been auto-finalized and does not contain a result.    Mri Outside Films    Result Date: 1/25/2017  Narrative: This order has been auto-finalized and does not contain a result.    Nm Pet Whole Body    Result Date: 1/26/2017  Narrative: EXAMINATION: NM PET WHOLE BODY-  INDICATION: restage colon cancer; C18.7-Malignant neoplasm of sigmoid colon     TECHNIQUE: With fasting blood glucose level of 79 MG/DL, a total of 13.7 mCi of FDG was was administered via left antecubital vein. Following appropriate delay, PET and CT images were obtained from the level of the skull vertex to the feet and fused multiplanar images reconstructed. The CT scan is an unenhanced low-dose study for reference to the PET scan only and does not constitute a standard diagnostic CT scan.  COMPARISON: Outside PET scan from 9/15/2016. Previous outside CT and MRI studies dated. 1/25/2017   FINDINGS: 3-D images show strong uptake interposed almost precisely between kidneys, mimicking a third renal collecting system. This correlates with a large destructive mass of the thoracic spine on CT scan. Focus of mild uptake is seen in the left lung  base medially. There appears to be largely normal variant uptake elsewhere.  Multiplanar images show no significant asymmetry of uptake in the brain. No hypermetabolic node or mass is seen in the neck.  In the chest, there is no evidence of hypermetabolic mediastinal, hilar or axillary disease. Previously noted strongly hypermetabolic left lower lobe mass is measured at 10.8 maximal SUV on the old study, and has diminished only 3.8 today.  Since the prior PET exam, numerous but mostly small parenchymal nodules have developed bilaterally in the lungs, the largest of which is only approximately 14 mm in the right posterior costophrenic recess. Maximal SUV is only 1.0, but these presumably represent small metastases.  Below the diaphragm, there is a destructive mass of the lumbar spine and paraspinous soft tissues at L1 and 2 on the right, with extension of disease in the canal, which previously appeared confined to the L2 level. Maximal SUV the today is approximately 12.4, previously 22.5. No new hypermetabolic spinal lesion is seen. No hypermetabolic node or mass is seen in the abdomen or pelvis. There is no evidence of hypermetabolic solid organ disease.  No pathologic marrow space uptake is identified elsewhere. Incidental note is made of some normal variant vascular activity in the lower extremities as is typical, and a small amount of normal variant brown fat uptake in the supraclavicular fossae.       Impression: 1. Decreasing size and decreasing hypermetabolic activity in the patient's primary left lower lobe mass. 2. Interval development of numerous small pulmonary nodules, nonhypermetabolic but very suspicious for metastatic disease. 3. Extension of the patient's destructive right L2/paraspinous mass since outside CT scan from September 20, 2016, to involve L1 as well. Decreased overall PET activity in this lesion however. 4. No new hypermetabolic disease elsewhere.    This report was finalized on 1/26/2017  2:56 PM by DR. Eliseo Riley MD.      Mri Cyberknife Lumbar Spine With Contrast    Result Date: 2/4/2017  Narrative: EXAMINATION: MRI CYBERKNIFE LUMBAR SPINE W/CONTRAST - 02/03/2017  INDICATION:  C79.51-Secondary malignant neoplasm of bone. Pain.  TECHNIQUE: Axial images of the thoracic spine demonstrate a destructive process involving the rightward aspect of the L2 vertebral body extending into the pedicle transverse process and lamina but with also lytic change in a smaller portion of the rightward aspect of the L1 vertebral body.  COMPARISON: None.  FINDINGS: CyberKnife protocol was used for therapeutic purposes      Impression: Destructive processes involving the L2 vertebral body, pedicle and transverse process and to a lesser extent L1 involving only the vertebral body.  DICTATED:     02/03/2017 EDITED:         02/03/2017    This report was finalized on 2/4/2017 8:36 AM by Dr. Iker Sanders MD.      Ct Outside Films    Result Date: 1/25/2017  Narrative: This order has been auto-finalized and does not contain a result.      ASSESSMENT: The patient is a very pleasant 56 y.o. male with stage IV colon cancer    PROBLEM LIST:  1. Currently stage IV colon cancer with lung and bony metastases.  K-edwige mutant, microsatellite stable, and intermediate mutational load.  2. Status post low anterior resection done by Dr. Young 2007, [id number]  3.  Attempted adjuvant chemotherapy for 1 cycle could not tolerate secondary to multiple toxicities.  4. Biopsy-proven L2 metastases done October 11, 2016. Status post CyberKnife radiation treatment.  5.  Will start  Keytruda 200 mg IV every 3 weeks tomorrow February 1, 2017  6. Cough    PLAN:  1. We will proceed with cycle # 2 of Keytruda as scheduled today.   2. I will watch the patient cough for now since it is mild and not affecting his daily activities.   3. The patient will consider decompression of the spinal cord if neurologic compromise is suspected. At this time, this has been  deferred as the patient has no neurologic deficit.   4. I reviewed with the patient the potential side effects from immune therapy including immune mediated reactions with pneumonitis, hepatitis, colitis, thyroiditis, and potential death.   5.  If immune therapy fails subsequent line treatment would include chemotherapy with anti VEGF treatment.  Another option might be targeted treatment on match trial.  6.  I advised the patient to hold his alternative treatment with high dose vitamin C while he is on immunetherapy.  7. The patient will come back to see us in 3 weeks for next cycle of treatment.   8. We will monitor labs throughout treatment including blood counts, kidney function, liver functions, serum CEA, and thyroid function.   9. The patient will continue Zofran as needed for treatment related nausea.  10. The patient will continue Percocet 10/325 mg taken every 6 hours as needed for pain. He is currently using it once a day at bedtime.     Scribed for Kristofer Rodriguez MD by JESSEE Osborne. 2/21/2017  8:04 AM  Kristofer Rodriguez MD  2/21/2017   I, Kristofer Rodriguez MD, personally performed the services described in this documentation as scribed by the above named individual in my presence, and it is both accurate and complete.  3/21/2017  7:41 AM

## 2017-02-21 NOTE — PROGRESS NOTES
SW met with pt and wife in infusion room to provide support and resources.  Pt received his labwork today and plans to take a week off before his chemo treatment.  Pt appreciative of visit.  SW provided support to pt and educated pt about oncology social work services.  SW provided contact information for future needs or concerns.  SW available for ongoing support and resource needs.

## 2017-02-28 ENCOUNTER — APPOINTMENT (OUTPATIENT)
Dept: ONCOLOGY | Facility: HOSPITAL | Age: 57
End: 2017-02-28

## 2017-03-08 ENCOUNTER — TELEPHONE (OUTPATIENT)
Dept: ONCOLOGY | Facility: CLINIC | Age: 57
End: 2017-03-08

## 2017-03-09 DIAGNOSIS — C18.7 MALIGNANT NEOPLASM OF SIGMOID COLON (HCC): ICD-10-CM

## 2017-03-09 DIAGNOSIS — C79.51 BONE METASTASIS: ICD-10-CM

## 2017-03-13 LAB
ALBUMIN SERPL-MCNC: 4.5 G/DL (ref 3.2–4.8)
ALBUMIN/GLOB SERPL: 1.3 G/DL (ref 1.5–2.5)
ALP SERPL-CCNC: 98 U/L (ref 25–100)
ALT SERPL W P-5'-P-CCNC: 284 U/L (ref 7–40)
ANION GAP SERPL CALCULATED.3IONS-SCNC: 3 MMOL/L (ref 3–11)
AST SERPL-CCNC: 94 U/L (ref 0–33)
BASOPHILS # BLD AUTO: 0.01 10*3/MM3 (ref 0–0.2)
BASOPHILS NFR BLD AUTO: 0.2 % (ref 0–1)
BILIRUB SERPL-MCNC: 0.4 MG/DL (ref 0.3–1.2)
BUN BLD-MCNC: 8 MG/DL (ref 9–23)
BUN/CREAT SERPL: 13.3 (ref 7–25)
CALCIUM SPEC-SCNC: 9.9 MG/DL (ref 8.7–10.4)
CHLORIDE SERPL-SCNC: 99 MMOL/L (ref 99–109)
CO2 SERPL-SCNC: 33 MMOL/L (ref 20–31)
CREAT BLD-MCNC: 0.6 MG/DL (ref 0.6–1.3)
DEPRECATED RDW RBC AUTO: 53.9 FL (ref 37–54)
EOSINOPHIL # BLD AUTO: 0.47 10*3/MM3 (ref 0.1–0.3)
EOSINOPHIL NFR BLD AUTO: 9.2 % (ref 0–3)
ERYTHROCYTE [DISTWIDTH] IN BLOOD BY AUTOMATED COUNT: 20.9 % (ref 11.3–14.5)
GFR SERPL CREATININE-BSD FRML MDRD: 140 ML/MIN/1.73
GLOBULIN UR ELPH-MCNC: 3.6 GM/DL
GLUCOSE BLD-MCNC: 81 MG/DL (ref 70–100)
HCT VFR BLD AUTO: 36.1 % (ref 38.9–50.9)
HGB BLD-MCNC: 11.6 G/DL (ref 13.1–17.5)
IMM GRANULOCYTES # BLD: 0.01 10*3/MM3 (ref 0–0.03)
IMM GRANULOCYTES NFR BLD: 0.2 % (ref 0–0.6)
LYMPHOCYTES # BLD AUTO: 1.01 10*3/MM3 (ref 0.6–4.8)
LYMPHOCYTES NFR BLD AUTO: 19.8 % (ref 24–44)
MCH RBC QN AUTO: 22.8 PG (ref 27–31)
MCHC RBC AUTO-ENTMCNC: 32.1 G/DL (ref 32–36)
MCV RBC AUTO: 70.9 FL (ref 80–99)
MONOCYTES # BLD AUTO: 0.58 10*3/MM3 (ref 0–1)
MONOCYTES NFR BLD AUTO: 11.4 % (ref 0–12)
NEUTROPHILS # BLD AUTO: 3.03 10*3/MM3 (ref 1.5–8.3)
NEUTROPHILS NFR BLD AUTO: 59.2 % (ref 41–71)
PLATELET # BLD AUTO: 160 10*3/MM3 (ref 150–450)
PMV BLD AUTO: 8.4 FL (ref 6–12)
POTASSIUM BLD-SCNC: 4.4 MMOL/L (ref 3.5–5.5)
PROT SERPL-MCNC: 8.1 G/DL (ref 5.7–8.2)
RBC # BLD AUTO: 5.09 10*6/MM3 (ref 4.2–5.76)
SODIUM BLD-SCNC: 135 MMOL/L (ref 132–146)
T4 FREE SERPL-MCNC: 1.07 NG/DL (ref 0.89–1.76)
TSH SERPL DL<=0.05 MIU/L-ACNC: 2.56 MIU/ML (ref 0.35–5.35)
WBC NRBC COR # BLD: 5.11 10*3/MM3 (ref 3.5–10.8)

## 2017-03-13 PROCEDURE — 80053 COMPREHEN METABOLIC PANEL: CPT | Performed by: INTERNAL MEDICINE

## 2017-03-13 PROCEDURE — 85025 COMPLETE CBC W/AUTO DIFF WBC: CPT | Performed by: INTERNAL MEDICINE

## 2017-03-13 PROCEDURE — 84443 ASSAY THYROID STIM HORMONE: CPT | Performed by: INTERNAL MEDICINE

## 2017-03-13 PROCEDURE — 82378 CARCINOEMBRYONIC ANTIGEN: CPT | Performed by: INTERNAL MEDICINE

## 2017-03-13 PROCEDURE — 36415 COLL VENOUS BLD VENIPUNCTURE: CPT

## 2017-03-13 PROCEDURE — 84439 ASSAY OF FREE THYROXINE: CPT | Performed by: INTERNAL MEDICINE

## 2017-03-13 RX ORDER — SODIUM CHLORIDE 9 MG/ML
250 INJECTION, SOLUTION INTRAVENOUS ONCE
Status: CANCELLED | OUTPATIENT
Start: 2017-03-14

## 2017-03-13 NOTE — PROGRESS NOTES
DATE OF VISIT: 3/13/2017    REASON FOR VISIT: Followup for metastatic colon cancer.      HISTORY OF PRESENT ILLNESS: The patient is a very pleasant 57 y.o. male  with past medical history significant for metastatic colon cancer diagnosed March 2007 . He is status post lower anterior resection as well as 1 cycle FOLFOX, stopped due to multiple toxicities. Final pathology showed poorly differentiated adenocarcinoma with 25 negative lymph nodes and clear surgical margins. Isolated tumor deposits was found in the pericolonic fat. Pathologic stage T2 N1c M0 stage IIIA disease.The patient has been followed by serial colonoscopies as well as CAT scans that did document pulmonary metastatic disease with left lower lobe nodule that is gradually increasing in size. Patient was doing fairly well until recently, 3 months ago, patient presented with low back pain. Patient had restaging workup that revealed hypermetabolic L2 vertebral body lesion. Patient had biopsy done on October 11, 2016 that revealed metastatic adenocarcinoma consistent of colon primary with CK 7 negative CK 20 positive CDX2 positive. Patient had next generation gene sequencing that revealed intermediate mutational load with microsatellite stability.  He started chemotherapy with Keytruda on 2/1/2017. The patient is here for cycle # 3 of treatment.     SUBJECTIVE: The patient has been doing fairly well. He continues to complain of back pain. He has no difficulty with bowel or bladder control. He is eating and drinking well. He has no cough or shortness of breath.     PAST MEDICAL HISTORY/SOCIAL HISTORY/FAMILY HISTORY: Unchanged from my prior documentation done on 01/17/2017.     Review of Systems   Constitutional: Negative for activity change, appetite change, chills, fatigue, fever and unexpected weight change.   HENT: Negative for hearing loss, mouth sores, nosebleeds, sore throat and trouble swallowing.    Eyes: Negative for visual disturbance.    Respiratory: Negative for cough, chest tightness, shortness of breath and wheezing.    Cardiovascular: Negative for chest pain, palpitations and leg swelling.   Gastrointestinal: Negative for abdominal distention, abdominal pain, blood in stool, constipation, diarrhea, nausea, rectal pain and vomiting.   Endocrine: Negative for cold intolerance and heat intolerance.   Genitourinary: Negative for difficulty urinating, dysuria, frequency and urgency.   Musculoskeletal: Positive for back pain. Negative for arthralgias, gait problem, joint swelling and myalgias.   Skin: Negative for rash.   Neurological: Negative for dizziness, tremors, syncope, weakness, light-headedness, numbness and headaches.        Sleep disturbance   Hematological: Negative for adenopathy. Does not bruise/bleed easily.   Psychiatric/Behavioral: Negative for confusion, sleep disturbance and suicidal ideas. The patient is not nervous/anxious.          Current Outpatient Prescriptions:   •  Cholecalciferol (VITAMIN D3) 5000 UNITS capsule capsule, Take 5,000 Units by mouth Daily., Disp: , Rfl:   •  magnesium hydroxide (MILK OF MAGNESIA) 2400 MG/10ML suspension suspension, Take 10 mL by mouth Daily., Disp: , Rfl:   •  ondansetron (ZOFRAN) 8 MG tablet, Take 1 tablet by mouth 3 (Three) Times a Day As Needed for nausea or vomiting., Disp: 30 tablet, Rfl: 5  •  oxyCODONE (ROXICODONE) 10 MG tablet, Take 1 tablet by mouth Every 6 (Six) Hours As Needed for moderate pain (4-6)., Disp: 120 tablet, Rfl: 0  •  oxyCODONE-acetaminophen (PERCOCET)  MG per tablet, Take 1 tablet by mouth Every 6 (Six) Hours As Needed for moderate pain (4-6)., Disp: , Rfl:     PHYSICAL EXAMINATION:   There were no vitals taken for this visit.   ECOG Performance Status: 1 - Symptomatic but completely ambulatory  General Appearance:  alert, cooperative, no apparent distress and appears stated age   Neurologic/Psychiatric: A&O x 3, gait steady, appropriate affect, strength 5/5 in  all muscle groups   HEENT:  Normocephalic, without obvious abnormality, mucous membranes moist   Neck: Supple, symmetrical, trachea midline, no adenopathy;  No thyromegaly, masses, or tenderness   Lungs:   Clear to auscultation bilaterally; respirations regular, even, and unlabored bilaterally   Heart:  Regular rate and rhythm, no murmurs appreciated   Abdomen:   Soft, non-tender, non-distended and no organomegaly   Lymph nodes: No cervical, supraclavicular, inguinal or axillary adenopathy noted   Extremities: Normal, atraumatic; no clubbing, cyanosis, or edema    Skin: No rashes, ulcers, or suspicious lesions noted     No visits with results within 2 Week(s) from this visit.  Latest known visit with results is:    Lab on 02/21/2017   Component Date Value Ref Range Status   • Glucose 02/21/2017 149* 70 - 100 mg/dL Final   • BUN 02/21/2017 7* 9 - 23 mg/dL Final   • Creatinine 02/21/2017 0.60  0.60 - 1.30 mg/dL Final   • Sodium 02/21/2017 136  132 - 146 mmol/L Final   • Potassium 02/21/2017 4.0  3.5 - 5.5 mmol/L Final   • Chloride 02/21/2017 100  99 - 109 mmol/L Final   • CO2 02/21/2017 32.0* 20.0 - 31.0 mmol/L Final   • Calcium 02/21/2017 9.2  8.7 - 10.4 mg/dL Final   • Total Protein 02/21/2017 7.4  5.7 - 8.2 g/dL Final   • Albumin 02/21/2017 4.10  3.20 - 4.80 g/dL Final   • ALT (SGPT) 02/21/2017 857* 7 - 40 U/L Final   • AST (SGOT) 02/21/2017 563* 0 - 33 U/L Final   • Alkaline Phosphatase 02/21/2017 94  25 - 100 U/L Final   • Total Bilirubin 02/21/2017 0.6  0.3 - 1.2 mg/dL Final   • eGFR Non African Amer 02/21/2017 140  >60 mL/min/1.73 Final   • Globulin 02/21/2017 3.3  gm/dL Final   • A/G Ratio 02/21/2017 1.2* 1.5 - 2.5 g/dL Final   • BUN/Creatinine Ratio 02/21/2017 11.7  7.0 - 25.0 Final   • Anion Gap 02/21/2017 4.0  3.0 - 11.0 mmol/L Final   • TSH 02/21/2017 2.054  0.350 - 5.350 mIU/mL Final   • Free T4 02/21/2017 1.05  0.89 - 1.76 ng/dL Final   • WBC 02/21/2017 3.90  3.50 - 10.80 10*3/mm3 Final   • RBC  02/21/2017 5.43  4.20 - 5.76 10*6/mm3 Final   • Hemoglobin 02/21/2017 12.0* 13.1 - 17.5 g/dL Final   • Hematocrit 02/21/2017 38.1* 38.9 - 50.9 % Final   • RDW 02/21/2017 21.8* 11.3 - 14.5 % Final   • MCV 02/21/2017 70.1* 80.0 - 99.0 fL Final   • MCH 02/21/2017 22.0* 27.0 - 31.0 pg Final   • MCHC 02/21/2017 31.4* 32.0 - 36.0 g/dL Final   • MPV 02/21/2017 6.9  6.0 - 12.0 fL Final   • Platelets 02/21/2017 139* 150 - 450 10*3/mm3 Final    Verified by repeat analysis.    • Neutrophil % 02/21/2017 74.6* 41.0 - 71.0 % Final   • Lymphocyte % 02/21/2017 15.4* 24.0 - 44.0 % Final   • Monocyte % 02/21/2017 10.0  0.0 - 12.0 % Final   • Neutrophils, Absolute 02/21/2017 2.90  1.50 - 8.30 10*3/mm3 Final   • Lymphocytes, Absolute 02/21/2017 0.60  0.60 - 4.80 10*3/mm3 Final   • Monocytes, Absolute 02/21/2017 0.40  0.00 - 1.00 10*3/mm3 Final   • CEA 02/21/2017 >500.00* 0.00 - 2.50 ng/mL Final        No results found.    ASSESSMENT: The patient is a very pleasant 56 y.o. male with stage IV colon cancer    PROBLEM LIST:  1. Currently stage IV colon cancer with lung and bony metastases.  K-edwige mutant, microsatellite stable, and intermediate mutational load.  2. Status post low anterior resection done by Dr. Young 2007, [id number]  3.  Attempted adjuvant chemotherapy for 1 cycle could not tolerate secondary to multiple toxicities.  4. Biopsy-proven L2 metastases done October 11, 2016. Status post CyberKnife radiation treatment.  5.  Started Keytruda 200 mg IV every 3 weeks on February 1, 2017, status post 2 cycles of treatment  6. Cough  7.  Treatment induced hepatitis    PLAN:  1. We will proceed with cycle # 3 of Keytruda as scheduled today.   2. I will watch the patient cough for now since it is mild and not affecting his daily activities.   3. The patient will consider decompression of the spinal cord if neurologic compromise is suspected. At this time, this has been deferred as the patient has no neurologic deficit.   4. I reviewed with  the patient the potential side effects from immune therapy including immune mediated reactions with pneumonitis, hepatitis, colitis, thyroiditis, and potential death.   5.  If immune therapy fails subsequent line treatment would include chemotherapy with anti VEGF treatment.  Another option might be targeted treatment on match trial.  6.  I advised the patient to hold his alternative treatment with high dose vitamin C while he is on immunetherapy.  7. The patient will come back to see us in 3 weeks for next cycle of treatment.   8. We will monitor labs throughout treatment including blood counts, kidney function, liver functions, serum CEA, and thyroid function.   9. The patient will continue Zofran as needed for treatment related nausea.  10. The patient will continue oxycodone 10 mg as needed for cancer-related pain.  We'll stop Percocet secondary to increased liver enzymes.  He is currently using it once a day at bedtime.   11.  I did go over the blood work results with the patient I reassured him that his liver enzymes are significantly better.    Scribed for Kristofer Rodriguez MD by JESSEE Osborne. 3/13/2017  4:30 PM  Kristofer Rodriguez MD  3/13/2017   I, Kristofer Rodriguez MD, personally performed the services described in this documentation as scribed by the above named individual in my presence, and it is both accurate and complete.  3/21/2017  7:56 AM

## 2017-03-14 ENCOUNTER — OFFICE VISIT (OUTPATIENT)
Dept: ONCOLOGY | Facility: CLINIC | Age: 57
End: 2017-03-14

## 2017-03-14 ENCOUNTER — INFUSION (OUTPATIENT)
Dept: ONCOLOGY | Facility: HOSPITAL | Age: 57
End: 2017-03-14

## 2017-03-14 VITALS
WEIGHT: 135 LBS | SYSTOLIC BLOOD PRESSURE: 116 MMHG | DIASTOLIC BLOOD PRESSURE: 67 MMHG | RESPIRATION RATE: 16 BRPM | BODY MASS INDEX: 21.79 KG/M2 | TEMPERATURE: 97.1 F | HEART RATE: 78 BPM

## 2017-03-14 DIAGNOSIS — C18.7 MALIGNANT NEOPLASM OF SIGMOID COLON (HCC): ICD-10-CM

## 2017-03-14 DIAGNOSIS — C79.51 BONE METASTASIS: ICD-10-CM

## 2017-03-14 DIAGNOSIS — C18.7 MALIGNANT NEOPLASM OF SIGMOID COLON (HCC): Primary | ICD-10-CM

## 2017-03-14 LAB — CEA SERPL-MCNC: >500 NG/ML (ref 0–2.5)

## 2017-03-14 PROCEDURE — 99215 OFFICE O/P EST HI 40 MIN: CPT | Performed by: INTERNAL MEDICINE

## 2017-03-14 PROCEDURE — 96413 CHEMO IV INFUSION 1 HR: CPT

## 2017-03-14 PROCEDURE — 25010000002 PEMBROLIZUMAB 100 MG/4ML SOLUTION 4 ML VIAL

## 2017-03-14 RX ORDER — SODIUM CHLORIDE 9 MG/ML
250 INJECTION, SOLUTION INTRAVENOUS ONCE
Status: DISCONTINUED | OUTPATIENT
Start: 2017-03-14 | End: 2017-11-15 | Stop reason: HOSPADM

## 2017-03-30 ENCOUNTER — HOSPITAL ENCOUNTER (OUTPATIENT)
Dept: PET IMAGING | Facility: HOSPITAL | Age: 57
Discharge: HOME | End: 2017-03-30
Attending: INTERNAL MEDICINE | Admitting: INTERNAL MEDICINE

## 2017-03-30 ENCOUNTER — HOSPITAL ENCOUNTER (OUTPATIENT)
Dept: PET IMAGING | Facility: HOSPITAL | Age: 57
Discharge: HOME | End: 2017-03-30
Attending: INTERNAL MEDICINE

## 2017-03-30 DIAGNOSIS — C79.51 BONE METASTASIS: ICD-10-CM

## 2017-03-30 DIAGNOSIS — C18.7 MALIGNANT NEOPLASM OF SIGMOID COLON (HCC): ICD-10-CM

## 2017-03-30 LAB — GLUCOSE BLDC GLUCOMTR-MCNC: 78 MG/DL (ref 70–130)

## 2017-03-30 PROCEDURE — A9552 F18 FDG: HCPCS | Performed by: INTERNAL MEDICINE

## 2017-03-30 PROCEDURE — 0 FLUDEOXYGLUCOSE F18 SOLUTION: Performed by: INTERNAL MEDICINE

## 2017-03-30 PROCEDURE — 82962 GLUCOSE BLOOD TEST: CPT

## 2017-03-30 PROCEDURE — 78816 PET IMAGE W/CT FULL BODY: CPT

## 2017-03-30 RX ADMIN — FLUDEOXYGLUCOSE F18 1 DOSE: 300 INJECTION INTRAVENOUS at 09:30

## 2017-04-01 DIAGNOSIS — C18.7 MALIGNANT NEOPLASM OF SIGMOID COLON (HCC): ICD-10-CM

## 2017-04-01 DIAGNOSIS — C79.51 BONE METASTASIS: ICD-10-CM

## 2017-04-01 RX ORDER — SODIUM CHLORIDE 9 MG/ML
250 INJECTION, SOLUTION INTRAVENOUS ONCE
Status: CANCELLED | OUTPATIENT
Start: 2017-04-02

## 2017-04-02 ENCOUNTER — LAB (OUTPATIENT)
Dept: LAB | Facility: HOSPITAL | Age: 57
End: 2017-04-02
Attending: INTERNAL MEDICINE

## 2017-04-02 DIAGNOSIS — C79.51 BONE METASTASIS: ICD-10-CM

## 2017-04-02 DIAGNOSIS — C18.7 MALIGNANT NEOPLASM OF SIGMOID COLON (HCC): ICD-10-CM

## 2017-04-02 LAB
ALBUMIN SERPL-MCNC: 4.3 G/DL (ref 3.2–4.8)
ALBUMIN/GLOB SERPL: 1.3 G/DL (ref 1.5–2.5)
ALP SERPL-CCNC: 91 U/L (ref 25–100)
ALT SERPL W P-5'-P-CCNC: 55 U/L (ref 7–40)
ANION GAP SERPL CALCULATED.3IONS-SCNC: 10 MMOL/L (ref 3–11)
AST SERPL-CCNC: 41 U/L (ref 0–33)
BASOPHILS # BLD AUTO: 0.01 10*3/MM3 (ref 0–0.2)
BASOPHILS NFR BLD AUTO: 0.2 % (ref 0–1)
BILIRUB SERPL-MCNC: 0.4 MG/DL (ref 0.3–1.2)
BUN BLD-MCNC: 10 MG/DL (ref 9–23)
BUN/CREAT SERPL: 14.3 (ref 7–25)
CALCIUM SPEC-SCNC: 10 MG/DL (ref 8.7–10.4)
CEA SERPL-MCNC: 450.4 NG/ML (ref 0–2.5)
CHLORIDE SERPL-SCNC: 100 MMOL/L (ref 99–109)
CO2 SERPL-SCNC: 26 MMOL/L (ref 20–31)
CREAT BLD-MCNC: 0.7 MG/DL (ref 0.6–1.3)
DEPRECATED RDW RBC AUTO: 53.7 FL (ref 37–54)
EOSINOPHIL # BLD AUTO: 0.57 10*3/MM3 (ref 0.1–0.3)
EOSINOPHIL NFR BLD AUTO: 10.8 % (ref 0–3)
ERYTHROCYTE [DISTWIDTH] IN BLOOD BY AUTOMATED COUNT: 20.8 % (ref 11.3–14.5)
GFR SERPL CREATININE-BSD FRML MDRD: 117 ML/MIN/1.73
GLOBULIN UR ELPH-MCNC: 3.2 GM/DL
GLUCOSE BLD-MCNC: 97 MG/DL (ref 70–100)
HCT VFR BLD AUTO: 35.8 % (ref 38.9–50.9)
HGB BLD-MCNC: 11.5 G/DL (ref 13.1–17.5)
IMM GRANULOCYTES # BLD: 0 10*3/MM3 (ref 0–0.03)
IMM GRANULOCYTES NFR BLD: 0 % (ref 0–0.6)
LYMPHOCYTES # BLD AUTO: 0.73 10*3/MM3 (ref 0.6–4.8)
LYMPHOCYTES NFR BLD AUTO: 13.8 % (ref 24–44)
MCH RBC QN AUTO: 22.8 PG (ref 27–31)
MCHC RBC AUTO-ENTMCNC: 32.1 G/DL (ref 32–36)
MCV RBC AUTO: 70.9 FL (ref 80–99)
MONOCYTES # BLD AUTO: 0.6 10*3/MM3 (ref 0–1)
MONOCYTES NFR BLD AUTO: 11.3 % (ref 0–12)
NEUTROPHILS # BLD AUTO: 3.39 10*3/MM3 (ref 1.5–8.3)
NEUTROPHILS NFR BLD AUTO: 63.9 % (ref 41–71)
PLATELET # BLD AUTO: 168 10*3/MM3 (ref 150–450)
PMV BLD AUTO: 9.1 FL (ref 6–12)
POTASSIUM BLD-SCNC: 4.3 MMOL/L (ref 3.5–5.5)
PROT SERPL-MCNC: 7.5 G/DL (ref 5.7–8.2)
RBC # BLD AUTO: 5.05 10*6/MM3 (ref 4.2–5.76)
SODIUM BLD-SCNC: 136 MMOL/L (ref 132–146)
T4 FREE SERPL-MCNC: 1.02 NG/DL (ref 0.89–1.76)
TSH SERPL DL<=0.05 MIU/L-ACNC: 1.23 MIU/ML (ref 0.35–5.35)
WBC NRBC COR # BLD: 5.3 10*3/MM3 (ref 3.5–10.8)

## 2017-04-02 PROCEDURE — 80053 COMPREHEN METABOLIC PANEL: CPT

## 2017-04-02 PROCEDURE — 36415 COLL VENOUS BLD VENIPUNCTURE: CPT

## 2017-04-02 PROCEDURE — 85025 COMPLETE CBC W/AUTO DIFF WBC: CPT

## 2017-04-02 PROCEDURE — 84439 ASSAY OF FREE THYROXINE: CPT

## 2017-04-02 PROCEDURE — 82378 CARCINOEMBRYONIC ANTIGEN: CPT

## 2017-04-02 PROCEDURE — 84443 ASSAY THYROID STIM HORMONE: CPT

## 2017-04-04 ENCOUNTER — INFUSION (OUTPATIENT)
Dept: ONCOLOGY | Facility: HOSPITAL | Age: 57
End: 2017-04-04

## 2017-04-04 ENCOUNTER — OFFICE VISIT (OUTPATIENT)
Dept: ONCOLOGY | Facility: CLINIC | Age: 57
End: 2017-04-04

## 2017-04-04 VITALS
RESPIRATION RATE: 14 BRPM | HEIGHT: 66 IN | SYSTOLIC BLOOD PRESSURE: 115 MMHG | HEART RATE: 72 BPM | TEMPERATURE: 98.6 F | WEIGHT: 138 LBS | BODY MASS INDEX: 22.18 KG/M2 | DIASTOLIC BLOOD PRESSURE: 63 MMHG

## 2017-04-04 DIAGNOSIS — C79.51 BONE METASTASIS: ICD-10-CM

## 2017-04-04 DIAGNOSIS — C18.7 MALIGNANT NEOPLASM OF SIGMOID COLON (HCC): Primary | ICD-10-CM

## 2017-04-04 PROCEDURE — 96413 CHEMO IV INFUSION 1 HR: CPT

## 2017-04-04 PROCEDURE — 25010000002 PEMBROLIZUMAB 100 MG/4ML SOLUTION 4 ML VIAL

## 2017-04-04 PROCEDURE — 99215 OFFICE O/P EST HI 40 MIN: CPT | Performed by: INTERNAL MEDICINE

## 2017-04-04 RX ORDER — SODIUM CHLORIDE 9 MG/ML
250 INJECTION, SOLUTION INTRAVENOUS ONCE
Status: CANCELLED | OUTPATIENT
Start: 2017-06-12

## 2017-04-04 RX ORDER — SODIUM CHLORIDE 9 MG/ML
250 INJECTION, SOLUTION INTRAVENOUS ONCE
Status: CANCELLED | OUTPATIENT
Start: 2017-05-23

## 2017-04-04 RX ORDER — SODIUM CHLORIDE 9 MG/ML
250 INJECTION, SOLUTION INTRAVENOUS ONCE
Status: CANCELLED | OUTPATIENT
Start: 2017-05-02

## 2017-04-04 RX ORDER — SODIUM CHLORIDE 9 MG/ML
250 INJECTION, SOLUTION INTRAVENOUS ONCE
Status: DISCONTINUED | OUTPATIENT
Start: 2017-04-04 | End: 2017-04-04 | Stop reason: HOSPADM

## 2017-04-04 NOTE — PROGRESS NOTES
DATE OF VISIT: 4/4/2017    REASON FOR VISIT: Followup for metastatic colon cancer.      HISTORY OF PRESENT ILLNESS: The patient is a very pleasant 57 y.o. male  with past medical history significant for metastatic colon cancer diagnosed March 2007 . He is status post lower anterior resection as well as 1 cycle FOLFOX, stopped due to multiple toxicities. Final pathology showed poorly differentiated adenocarcinoma with 25 negative lymph nodes and clear surgical margins. Isolated tumor deposits was found in the pericolonic fat. Pathologic stage T2 N1c M0 stage IIIA disease.The patient has been followed by serial colonoscopies as well as CAT scans that did document pulmonary metastatic disease with left lower lobe nodule that is gradually increasing in size. Patient was doing fairly well until recently, 3 months ago, patient presented with low back pain. Patient had restaging workup that revealed hypermetabolic L2 vertebral body lesion. Patient had biopsy done on October 11, 2016 that revealed metastatic adenocarcinoma consistent of colon primary with CK 7 negative CK 20 positive CDX2 positive. Patient had next generation gene sequencing that revealed intermediate mutational load with microsatellite stability.  He started chemotherapy with Keytruda on 2/1/2017. The patient is here for cycle # 4 of treatment.     SUBJECTIVE: The patient has been doing fairly well. He continues to complain of back pain. He has no difficulty with bowel or bladder control. He is eating and drinking well. He has no cough or shortness of breath.     PAST MEDICAL HISTORY/SOCIAL HISTORY/FAMILY HISTORY: Unchanged from my prior documentation done on 01/17/2017.     Review of Systems   Constitutional: Negative for activity change, appetite change, chills, fatigue, fever and unexpected weight change.   HENT: Negative for hearing loss, mouth sores, nosebleeds, sore throat and trouble swallowing.    Eyes: Negative for visual disturbance.    Respiratory: Negative for cough, chest tightness, shortness of breath and wheezing.    Cardiovascular: Negative for chest pain, palpitations and leg swelling.   Gastrointestinal: Negative for abdominal distention, abdominal pain, blood in stool, constipation, diarrhea, nausea, rectal pain and vomiting.   Endocrine: Negative for cold intolerance and heat intolerance.   Genitourinary: Negative for difficulty urinating, dysuria, frequency and urgency.   Musculoskeletal: Positive for back pain. Negative for arthralgias, gait problem, joint swelling and myalgias.   Skin: Negative for rash.   Neurological: Negative for dizziness, tremors, syncope, weakness, light-headedness, numbness and headaches.        Sleep disturbance   Hematological: Negative for adenopathy. Does not bruise/bleed easily.   Psychiatric/Behavioral: Negative for confusion, sleep disturbance and suicidal ideas. The patient is not nervous/anxious.          Current Outpatient Prescriptions:   •  Cholecalciferol (VITAMIN D3) 5000 UNITS capsule capsule, Take 5,000 Units by mouth Daily., Disp: , Rfl:   •  magnesium hydroxide (MILK OF MAGNESIA) 2400 MG/10ML suspension suspension, Take 10 mL by mouth Daily., Disp: , Rfl:   •  oxyCODONE (ROXICODONE) 10 MG tablet, Take 1 tablet by mouth Every 6 (Six) Hours As Needed for moderate pain (4-6)., Disp: 120 tablet, Rfl: 0  No current facility-administered medications for this visit.     Facility-Administered Medications Ordered in Other Visits:   •  sodium chloride 0.9 % infusion 250 mL, 250 mL, Intravenous, Once, JESSEE Manuel    PHYSICAL EXAMINATION:   There were no vitals taken for this visit.   ECOG Performance Status: 1 - Symptomatic but completely ambulatory  General Appearance:  alert, cooperative, no apparent distress and appears stated age   Neurologic/Psychiatric: A&O x 3, gait steady, appropriate affect, strength 5/5 in all muscle groups   HEENT:  Normocephalic, without obvious abnormality, mucous  membranes moist   Neck: Supple, symmetrical, trachea midline, no adenopathy;  No thyromegaly, masses, or tenderness   Lungs:   Clear to auscultation bilaterally; respirations regular, even, and unlabored bilaterally   Heart:  Regular rate and rhythm, no murmurs appreciated   Abdomen:   Soft, non-tender, non-distended and no organomegaly   Lymph nodes: No cervical, supraclavicular, inguinal or axillary adenopathy noted   Extremities: Normal, atraumatic; no clubbing, cyanosis, or edema    Skin: No rashes, ulcers, or suspicious lesions noted     Lab on 04/02/2017   Component Date Value Ref Range Status   • CEA 04/02/2017 450.40* 0.00 - 2.50 ng/mL Final   • Glucose 04/02/2017 97  70 - 100 mg/dL Final   • BUN 04/02/2017 10  9 - 23 mg/dL Final   • Creatinine 04/02/2017 0.70  0.60 - 1.30 mg/dL Final   • Sodium 04/02/2017 136  132 - 146 mmol/L Final   • Potassium 04/02/2017 4.3  3.5 - 5.5 mmol/L Final   • Chloride 04/02/2017 100  99 - 109 mmol/L Final   • CO2 04/02/2017 26.0  20.0 - 31.0 mmol/L Final   • Calcium 04/02/2017 10.0  8.7 - 10.4 mg/dL Final   • Total Protein 04/02/2017 7.5  5.7 - 8.2 g/dL Final   • Albumin 04/02/2017 4.30  3.20 - 4.80 g/dL Final   • ALT (SGPT) 04/02/2017 55* 7 - 40 U/L Final   • AST (SGOT) 04/02/2017 41* 0 - 33 U/L Final   • Alkaline Phosphatase 04/02/2017 91  25 - 100 U/L Final   • Total Bilirubin 04/02/2017 0.4  0.3 - 1.2 mg/dL Final   • eGFR Non African Amer 04/02/2017 117  >60 mL/min/1.73 Final   • Globulin 04/02/2017 3.2  gm/dL Final   • A/G Ratio 04/02/2017 1.3* 1.5 - 2.5 g/dL Final   • BUN/Creatinine Ratio 04/02/2017 14.3  7.0 - 25.0 Final   • Anion Gap 04/02/2017 10.0  3.0 - 11.0 mmol/L Final   • TSH 04/02/2017 1.232  0.350 - 5.350 mIU/mL Final   • Free T4 04/02/2017 1.02  0.89 - 1.76 ng/dL Final   • WBC 04/02/2017 5.30  3.50 - 10.80 10*3/mm3 Final   • RBC 04/02/2017 5.05  4.20 - 5.76 10*6/mm3 Final   • Hemoglobin 04/02/2017 11.5* 13.1 - 17.5 g/dL Final   • Hematocrit 04/02/2017 35.8*  38.9 - 50.9 % Final   • MCV 04/02/2017 70.9* 80.0 - 99.0 fL Final   • MCH 04/02/2017 22.8* 27.0 - 31.0 pg Final   • MCHC 04/02/2017 32.1  32.0 - 36.0 g/dL Final   • RDW 04/02/2017 20.8* 11.3 - 14.5 % Final   • RDW-SD 04/02/2017 53.7  37.0 - 54.0 fl Final   • MPV 04/02/2017 9.1  6.0 - 12.0 fL Final   • Platelets 04/02/2017 168  150 - 450 10*3/mm3 Final   • Neutrophil % 04/02/2017 63.9  41.0 - 71.0 % Final   • Lymphocyte % 04/02/2017 13.8* 24.0 - 44.0 % Final   • Monocyte % 04/02/2017 11.3  0.0 - 12.0 % Final   • Eosinophil % 04/02/2017 10.8* 0.0 - 3.0 % Final   • Basophil % 04/02/2017 0.2  0.0 - 1.0 % Final   • Immature Grans % 04/02/2017 0.0  0.0 - 0.6 % Final   • Neutrophils, Absolute 04/02/2017 3.39  1.50 - 8.30 10*3/mm3 Final   • Lymphocytes, Absolute 04/02/2017 0.73  0.60 - 4.80 10*3/mm3 Final   • Monocytes, Absolute 04/02/2017 0.60  0.00 - 1.00 10*3/mm3 Final   • Eosinophils, Absolute 04/02/2017 0.57* 0.10 - 0.30 10*3/mm3 Final   • Basophils, Absolute 04/02/2017 0.01  0.00 - 0.20 10*3/mm3 Final   • Immature Grans, Absolute 04/02/2017 0.00  0.00 - 0.03 10*3/mm3 Final   Hospital Outpatient Visit on 03/30/2017   Component Date Value Ref Range Status   • Glucose 03/30/2017 78  70 - 130 mg/dL Final        Nm Pet Whole Body    Result Date: 3/30/2017  Narrative: EXAMINATION: NUCLEAR MEDICINE PET, WHOLE BODY-03/30/2017:  INDICATION: Follow-up scan; C18.7-Malignant neoplasm of sigmoid colon; C79.51-Secondary malignant neoplasm of bone, bone mets, sigmoid colon cancer.  TECHNIQUE: With a fasting blood glucose of 78 mg/dL, 12.54 mCi of F-18 glucose was injected into the right antecubital fossa. After the appropriate amount of time, the patient was scanned. The CT data set acquired for this exam is utilized for fusion purposes only as it is of low dose technique.  The radiation dose reduction device was turned on as low as reasonably achievable for each scan per ALARA protocol.  COMPARISON: 01/26/2017.  FINDINGS:  HEAD AND  NECK:  There is normal distribution of radiopharmaceutical throughout the brain parenchyma. Expected activity is present in the extraocular muscles, parotid gland, nasopharynx, oropharynx, submandibular glands, glottis, and thyroid gland. There are no abnormal areas of FDG activity in the head or neck.  CHEST:  A 5 mm soft tissue nodule is present in the left apex demonstrating a maximum SUV of 3. Multiple patchy airspace opacities are present in the left lower lobe with hypermetabolic uptake; the size of these lesions have somewhat increased since the previous exam. Collectively these lesions measure approximately 8.7 cm in diameter and demonstrating a maximum SUV of 4.3. A hypermetabolic focus is present in the anterior portion of the right lower lobe measuring approximately 5 mm; this lesion demonstrates a maximum SUV of 2.3. Multiple 2-3 mm slightly hypermetabolic nodules are present throughout the right middle and right lower lobes which are not significantly changed since the previous exam. The right upper lobe is normal.  ABDOMEN:  For reference, the maximum SUV of the hepatic parenchyma measures 2.3. There are no hepatic masses identified. Normal renal uptake is present. A lytic lesion involves the vertebral body of L1 and extends into the vertebral body of L2 and posterior elements of L2. The degree of hypermetabolic uptake throughout this area measures a maximum value of 17.3 which suggests persistent tumor. The overall size of the lesion has not significantly changed since the previous exam. There is increasing sclerosis within the L2 vertebra which suggests an element of healing. The pelvic structures are grossly unremarkable. There are no abnormal areas of FDG activity in the pelvis.  Lower extremities:  There are no abnormal areas of FDG activity in the lower extremities.      Impression: 1.  New hypermetabolic focus in the left upper lobe measuring 5 mm consistent with metastatic disease. 2.  Stable  patchy airspace opacities with moderately increased FDG activity in the left lower lobe possibly representing treated metastatic disease. 3.  Multiple punctate slightly hypermetabolic nodules in the right lower and right middle lobe; not significantly changed. 4.  Mixed lytic and sclerotic lesions involving the L1 and L2 vertebra; the size of these lesions have not significantly changed, however there is significant hypermetabolic uptake. There is some increased sclerosis in this lesion which suggests an element of healing.  Please fax a copy of this report to Dr. Anson Frazier.  D:  03/30/2017 E:  03/30/2017  This report was finalized on 3/30/2017 12:50 PM by Dr. Esa Pham MD.        ASSESSMENT: The patient is a very pleasant 56 y.o. male with stage IV colon cancer    PROBLEM LIST:  1. Currently stage IV colon cancer with lung and bony metastases.  K-edwige mutant, microsatellite stable, and intermediate mutational load.  2. Status post low anterior resection done by Dr. Young 2007, [id number]  3.  Attempted adjuvant chemotherapy for 1 cycle could not tolerate secondary to multiple toxicities.  4. Biopsy-proven L2 metastases done October 11, 2016. Status post CyberKnife radiation treatment.  5.  Started Keytruda 200 mg IV every 3 weeks on February 1, 2017, status post 3 cycles of treatment  6. Cough  7.  Treatment induced hepatitis    PLAN:  1. We will proceed with cycle # 4 of Keytruda as scheduled today.   2. I will watch the patient cough for now since it is mild and not affecting his daily activities.   3. The patient will consider decompression of the spinal cord if neurologic compromise is suspected. At this time, this has been deferred as the patient has no neurologic deficit.   4. I reviewed with the patient the potential side effects from immune therapy including immune mediated reactions with pneumonitis, hepatitis, colitis, thyroiditis, and potential death.   5.  If immune therapy fails subsequent line  treatment would include chemotherapy with anti VEGF treatment.  Another option might be targeted treatment on match trial.  6.  I advised the patient to hold his alternative treatment with high dose vitamin C while he is on immunetherapy.  7. The patient will come back to see us in 3 weeks for next cycle of treatment.   8. We will monitor labs throughout treatment including blood counts, kidney function, liver functions, serum CEA, and thyroid function. We will monitor his liver enzymes that have been elevated secondary to treatment with immune-therapy.   9. The patient will continue Zofran as needed for treatment related nausea.  10. The patient will continue oxycodone 10 mg as needed for cancer-related pain.    11.  I did go over the PET scan results as well as the pictures with the patient and his wife.  I reviewed the films myself.  We compared the current films to previous PET scan done January 2016.  I told the patient the slight increased activity in the L2 mass as well as left upper lobe nodule is mild and this can be pseudo-progression from inflammation.  We'll do repeat CAT scan down the road to assure stability and we will continue to monitor patient blood work specifically his liver enzymes and CEA levels.    Scribed for Kristofer Rodriguez MD by JESSEE Osborne. 4/4/2017  8:07 AM  Kristofer Rodriguez MD  4/4/2017   I, Kristofer Rodriguez MD, personally performed the services described in this documentation as scribed by the above named individual in my presence, and it is both accurate and complete.  4/4/2017  9:23 AM

## 2017-04-11 ENCOUNTER — HOSPITAL ENCOUNTER (OUTPATIENT)
Dept: RADIATION ONCOLOGY | Facility: HOSPITAL | Age: 57
Setting detail: RADIATION/ONCOLOGY SERIES
Discharge: HOME | End: 2017-04-11

## 2017-04-25 ENCOUNTER — APPOINTMENT (OUTPATIENT)
Dept: ONCOLOGY | Facility: HOSPITAL | Age: 57
End: 2017-04-25

## 2017-04-30 ENCOUNTER — LAB (OUTPATIENT)
Dept: LAB | Facility: HOSPITAL | Age: 57
End: 2017-04-30

## 2017-04-30 DIAGNOSIS — C18.7 MALIGNANT NEOPLASM OF SIGMOID COLON (HCC): ICD-10-CM

## 2017-04-30 DIAGNOSIS — C79.51 BONE METASTASIS: ICD-10-CM

## 2017-04-30 LAB
ALBUMIN SERPL-MCNC: 4 G/DL (ref 3.2–4.8)
ALBUMIN/GLOB SERPL: 1.3 G/DL (ref 1.5–2.5)
ALP SERPL-CCNC: 73 U/L (ref 25–100)
ALT SERPL W P-5'-P-CCNC: 28 U/L (ref 7–40)
ANION GAP SERPL CALCULATED.3IONS-SCNC: 10 MMOL/L (ref 3–11)
AST SERPL-CCNC: 24 U/L (ref 0–33)
BASOPHILS # BLD AUTO: 0.01 10*3/MM3 (ref 0–0.2)
BASOPHILS NFR BLD AUTO: 0.1 % (ref 0–1)
BILIRUB SERPL-MCNC: 0.3 MG/DL (ref 0.3–1.2)
BUN BLD-MCNC: 13 MG/DL (ref 9–23)
BUN/CREAT SERPL: 21.7 (ref 7–25)
CALCIUM SPEC-SCNC: 9.5 MG/DL (ref 8.7–10.4)
CEA SERPL-MCNC: 78.6 NG/ML (ref 0–2.5)
CHLORIDE SERPL-SCNC: 101 MMOL/L (ref 99–109)
CO2 SERPL-SCNC: 26 MMOL/L (ref 20–31)
CREAT BLD-MCNC: 0.6 MG/DL (ref 0.6–1.3)
DEPRECATED RDW RBC AUTO: 56.9 FL (ref 37–54)
EOSINOPHIL # BLD AUTO: 0.25 10*3/MM3 (ref 0.1–0.3)
EOSINOPHIL NFR BLD AUTO: 3.2 % (ref 0–3)
ERYTHROCYTE [DISTWIDTH] IN BLOOD BY AUTOMATED COUNT: 21.1 % (ref 11.3–14.5)
GFR SERPL CREATININE-BSD FRML MDRD: 140 ML/MIN/1.73
GLOBULIN UR ELPH-MCNC: 3 GM/DL
GLUCOSE BLD-MCNC: 178 MG/DL (ref 70–100)
HCT VFR BLD AUTO: 30.5 % (ref 38.9–50.9)
HGB BLD-MCNC: 9.8 G/DL (ref 13.1–17.5)
IMM GRANULOCYTES # BLD: 0.02 10*3/MM3 (ref 0–0.03)
IMM GRANULOCYTES NFR BLD: 0.3 % (ref 0–0.6)
LYMPHOCYTES # BLD AUTO: 0.84 10*3/MM3 (ref 0.6–4.8)
LYMPHOCYTES NFR BLD AUTO: 10.6 % (ref 24–44)
MCH RBC QN AUTO: 23.6 PG (ref 27–31)
MCHC RBC AUTO-ENTMCNC: 32.1 G/DL (ref 32–36)
MCV RBC AUTO: 73.3 FL (ref 80–99)
MONOCYTES # BLD AUTO: 0.64 10*3/MM3 (ref 0–1)
MONOCYTES NFR BLD AUTO: 8.1 % (ref 0–12)
NEUTROPHILS # BLD AUTO: 6.16 10*3/MM3 (ref 1.5–8.3)
NEUTROPHILS NFR BLD AUTO: 77.7 % (ref 41–71)
PLATELET # BLD AUTO: 269 10*3/MM3 (ref 150–450)
PMV BLD AUTO: 8.7 FL (ref 6–12)
POTASSIUM BLD-SCNC: 4.5 MMOL/L (ref 3.5–5.5)
PROT SERPL-MCNC: 7 G/DL (ref 5.7–8.2)
RBC # BLD AUTO: 4.16 10*6/MM3 (ref 4.2–5.76)
SODIUM BLD-SCNC: 137 MMOL/L (ref 132–146)
T4 FREE SERPL-MCNC: 1.08 NG/DL (ref 0.89–1.76)
TSH SERPL DL<=0.05 MIU/L-ACNC: 1.74 MIU/ML (ref 0.35–5.35)
WBC NRBC COR # BLD: 7.92 10*3/MM3 (ref 3.5–10.8)

## 2017-04-30 PROCEDURE — 85025 COMPLETE CBC W/AUTO DIFF WBC: CPT

## 2017-04-30 PROCEDURE — 84439 ASSAY OF FREE THYROXINE: CPT

## 2017-04-30 PROCEDURE — 80053 COMPREHEN METABOLIC PANEL: CPT

## 2017-04-30 PROCEDURE — 36415 COLL VENOUS BLD VENIPUNCTURE: CPT

## 2017-04-30 PROCEDURE — 84443 ASSAY THYROID STIM HORMONE: CPT

## 2017-04-30 PROCEDURE — 82378 CARCINOEMBRYONIC ANTIGEN: CPT

## 2017-05-02 ENCOUNTER — INFUSION (OUTPATIENT)
Dept: ONCOLOGY | Facility: HOSPITAL | Age: 57
End: 2017-05-02

## 2017-05-02 ENCOUNTER — HOSPITAL ENCOUNTER (OUTPATIENT)
Dept: CARDIOLOGY | Facility: HOSPITAL | Age: 57
Discharge: HOME | End: 2017-05-02
Admitting: NURSE PRACTITIONER

## 2017-05-02 ENCOUNTER — OFFICE VISIT (OUTPATIENT)
Dept: ONCOLOGY | Facility: CLINIC | Age: 57
End: 2017-05-02

## 2017-05-02 VITALS
SYSTOLIC BLOOD PRESSURE: 124 MMHG | WEIGHT: 138 LBS | BODY MASS INDEX: 22.18 KG/M2 | RESPIRATION RATE: 16 BRPM | HEIGHT: 66 IN | HEART RATE: 81 BPM | DIASTOLIC BLOOD PRESSURE: 72 MMHG | TEMPERATURE: 97.9 F

## 2017-05-02 DIAGNOSIS — C18.7 MALIGNANT NEOPLASM OF SIGMOID COLON (HCC): ICD-10-CM

## 2017-05-02 DIAGNOSIS — Z51.11 ENCOUNTER FOR ANTINEOPLASTIC CHEMOTHERAPY: Primary | ICD-10-CM

## 2017-05-02 DIAGNOSIS — C79.51 BONE METASTASIS: ICD-10-CM

## 2017-05-02 DIAGNOSIS — C18.7 MALIGNANT NEOPLASM OF SIGMOID COLON (HCC): Primary | ICD-10-CM

## 2017-05-02 DIAGNOSIS — Z51.11 ENCOUNTER FOR ANTINEOPLASTIC CHEMOTHERAPY: ICD-10-CM

## 2017-05-02 LAB
LDH SERPL-CCNC: 159 U/L (ref 120–246)
MAGNESIUM SERPL-MCNC: 2.2 MG/DL (ref 1.3–2.7)
PHOSPHATE SERPL-MCNC: 3 MG/DL (ref 2.4–5.1)

## 2017-05-02 PROCEDURE — 93005 ELECTROCARDIOGRAM TRACING: CPT | Performed by: NURSE PRACTITIONER

## 2017-05-02 PROCEDURE — 36415 COLL VENOUS BLD VENIPUNCTURE: CPT | Performed by: INTERNAL MEDICINE

## 2017-05-02 PROCEDURE — 93010 ELECTROCARDIOGRAM REPORT: CPT | Performed by: INTERNAL MEDICINE

## 2017-05-02 PROCEDURE — 96413 CHEMO IV INFUSION 1 HR: CPT

## 2017-05-02 PROCEDURE — 99215 OFFICE O/P EST HI 40 MIN: CPT | Performed by: INTERNAL MEDICINE

## 2017-05-02 PROCEDURE — 83735 ASSAY OF MAGNESIUM: CPT | Performed by: NURSE PRACTITIONER

## 2017-05-02 PROCEDURE — 83615 LACTATE (LD) (LDH) ENZYME: CPT | Performed by: NURSE PRACTITIONER

## 2017-05-02 PROCEDURE — 25010000002 PEMBROLIZUMAB 100 MG/4ML SOLUTION 4 ML VIAL

## 2017-05-02 PROCEDURE — 84100 ASSAY OF PHOSPHORUS: CPT | Performed by: NURSE PRACTITIONER

## 2017-05-02 RX ORDER — CELECOXIB 200 MG/1
200 CAPSULE ORAL DAILY
COMMUNITY
End: 2017-07-03

## 2017-05-02 RX ORDER — DIAZEPAM 5 MG/1
TABLET ORAL
COMMUNITY
Start: 2017-04-24 | End: 2017-09-10

## 2017-05-02 RX ORDER — LACTULOSE 10 G/15ML
SOLUTION ORAL
COMMUNITY
Start: 2017-04-28 | End: 2017-07-03

## 2017-05-02 RX ORDER — OXYCODONE HYDROCHLORIDE 5 MG/1
TABLET ORAL
COMMUNITY
Start: 2017-04-24 | End: 2017-07-03

## 2017-05-07 ENCOUNTER — LAB (OUTPATIENT)
Dept: LAB | Facility: HOSPITAL | Age: 57
End: 2017-05-07

## 2017-05-07 DIAGNOSIS — R50.9 FEVER CHILLS: ICD-10-CM

## 2017-05-07 DIAGNOSIS — C79.51 BONE METASTASIS: ICD-10-CM

## 2017-05-07 DIAGNOSIS — C18.7 MALIGNANT NEOPLASM OF SIGMOID COLON (HCC): ICD-10-CM

## 2017-05-07 DIAGNOSIS — C18.7 MALIGNANT NEOPLASM OF SIGMOID COLON (HCC): Primary | ICD-10-CM

## 2017-05-07 LAB
ALBUMIN SERPL-MCNC: 4.4 G/DL (ref 3.2–4.8)
ALBUMIN/GLOB SERPL: 1.2 G/DL (ref 1.5–2.5)
ALP SERPL-CCNC: 88 U/L (ref 25–100)
ALT SERPL W P-5'-P-CCNC: 29 U/L (ref 7–40)
ANION GAP SERPL CALCULATED.3IONS-SCNC: 8 MMOL/L (ref 3–11)
AST SERPL-CCNC: 20 U/L (ref 0–33)
BASOPHILS # BLD AUTO: 0.01 10*3/MM3 (ref 0–0.2)
BASOPHILS NFR BLD AUTO: 0.1 % (ref 0–1)
BILIRUB SERPL-MCNC: 0.4 MG/DL (ref 0.3–1.2)
BILIRUB UR QL STRIP: NEGATIVE
BUN BLD-MCNC: 9 MG/DL (ref 9–23)
BUN/CREAT SERPL: 15 (ref 7–25)
CALCIUM SPEC-SCNC: 9.9 MG/DL (ref 8.7–10.4)
CHLORIDE SERPL-SCNC: 99 MMOL/L (ref 99–109)
CLARITY UR: CLEAR
CO2 SERPL-SCNC: 31 MMOL/L (ref 20–31)
COLOR UR: YELLOW
CREAT BLD-MCNC: 0.6 MG/DL (ref 0.6–1.3)
DEPRECATED RDW RBC AUTO: 57.9 FL (ref 37–54)
EOSINOPHIL # BLD AUTO: 0.19 10*3/MM3 (ref 0.1–0.3)
EOSINOPHIL NFR BLD AUTO: 2.3 % (ref 0–3)
ERYTHROCYTE [DISTWIDTH] IN BLOOD BY AUTOMATED COUNT: 21.1 % (ref 11.3–14.5)
GFR SERPL CREATININE-BSD FRML MDRD: 140 ML/MIN/1.73
GLOBULIN UR ELPH-MCNC: 3.8 GM/DL
GLUCOSE BLD-MCNC: 191 MG/DL (ref 70–100)
GLUCOSE UR STRIP-MCNC: NEGATIVE MG/DL
HCT VFR BLD AUTO: 34.9 % (ref 38.9–50.9)
HGB BLD-MCNC: 11.1 G/DL (ref 13.1–17.5)
HGB UR QL STRIP.AUTO: NEGATIVE
IMM GRANULOCYTES # BLD: 0.03 10*3/MM3 (ref 0–0.03)
IMM GRANULOCYTES NFR BLD: 0.4 % (ref 0–0.6)
KETONES UR QL STRIP: NEGATIVE
LEUKOCYTE ESTERASE UR QL STRIP.AUTO: NEGATIVE
LYMPHOCYTES # BLD AUTO: 0.83 10*3/MM3 (ref 0.6–4.8)
LYMPHOCYTES NFR BLD AUTO: 9.9 % (ref 24–44)
MCH RBC QN AUTO: 23.8 PG (ref 27–31)
MCHC RBC AUTO-ENTMCNC: 31.8 G/DL (ref 32–36)
MCV RBC AUTO: 74.7 FL (ref 80–99)
MONOCYTES # BLD AUTO: 0.59 10*3/MM3 (ref 0–1)
MONOCYTES NFR BLD AUTO: 7.1 % (ref 0–12)
NEUTROPHILS # BLD AUTO: 6.71 10*3/MM3 (ref 1.5–8.3)
NEUTROPHILS NFR BLD AUTO: 80.2 % (ref 41–71)
NITRITE UR QL STRIP: NEGATIVE
PH UR STRIP.AUTO: 7.5 [PH] (ref 5–8)
PLATELET # BLD AUTO: 317 10*3/MM3 (ref 150–450)
PMV BLD AUTO: 8.2 FL (ref 6–12)
POTASSIUM BLD-SCNC: 5.2 MMOL/L (ref 3.5–5.5)
PROT SERPL-MCNC: 8.2 G/DL (ref 5.7–8.2)
PROT UR QL STRIP: NEGATIVE
RBC # BLD AUTO: 4.67 10*6/MM3 (ref 4.2–5.76)
SODIUM BLD-SCNC: 138 MMOL/L (ref 132–146)
SP GR UR STRIP: <=1.005 (ref 1–1.03)
UROBILINOGEN UR QL STRIP: NORMAL
WBC NRBC COR # BLD: 8.36 10*3/MM3 (ref 3.5–10.8)

## 2017-05-07 PROCEDURE — 36415 COLL VENOUS BLD VENIPUNCTURE: CPT

## 2017-05-07 PROCEDURE — 85025 COMPLETE CBC W/AUTO DIFF WBC: CPT

## 2017-05-07 PROCEDURE — 80053 COMPREHEN METABOLIC PANEL: CPT

## 2017-05-07 PROCEDURE — 81003 URINALYSIS AUTO W/O SCOPE: CPT | Performed by: RADIOLOGY

## 2017-05-16 ENCOUNTER — APPOINTMENT (OUTPATIENT)
Dept: ONCOLOGY | Facility: HOSPITAL | Age: 57
End: 2017-05-16

## 2017-05-21 ENCOUNTER — LAB (OUTPATIENT)
Dept: LAB | Facility: HOSPITAL | Age: 57
End: 2017-05-21
Attending: INTERNAL MEDICINE

## 2017-05-21 DIAGNOSIS — C18.7 MALIGNANT NEOPLASM OF SIGMOID COLON (HCC): ICD-10-CM

## 2017-05-21 DIAGNOSIS — C79.51 BONE METASTASIS: ICD-10-CM

## 2017-05-21 LAB
ALBUMIN SERPL-MCNC: 4.6 G/DL (ref 3.2–4.8)
ALBUMIN/GLOB SERPL: 1.3 G/DL (ref 1.5–2.5)
ALP SERPL-CCNC: 99 U/L (ref 25–100)
ALT SERPL W P-5'-P-CCNC: 28 U/L (ref 7–40)
ANION GAP SERPL CALCULATED.3IONS-SCNC: 7 MMOL/L (ref 3–11)
AST SERPL-CCNC: 26 U/L (ref 0–33)
BASOPHILS # BLD AUTO: 0.01 10*3/MM3 (ref 0–0.2)
BASOPHILS NFR BLD AUTO: 0.2 % (ref 0–1)
BILIRUB SERPL-MCNC: 0.4 MG/DL (ref 0.3–1.2)
BUN BLD-MCNC: 10 MG/DL (ref 9–23)
BUN/CREAT SERPL: 16.7 (ref 7–25)
CALCIUM SPEC-SCNC: 10.2 MG/DL (ref 8.7–10.4)
CEA SERPL-MCNC: 45.4 NG/ML (ref 0–2.5)
CHLORIDE SERPL-SCNC: 102 MMOL/L (ref 99–109)
CO2 SERPL-SCNC: 31 MMOL/L (ref 20–31)
CREAT BLD-MCNC: 0.6 MG/DL (ref 0.6–1.3)
DEPRECATED RDW RBC AUTO: 55.6 FL (ref 37–54)
EOSINOPHIL # BLD AUTO: 0.26 10*3/MM3 (ref 0.1–0.3)
EOSINOPHIL NFR BLD AUTO: 3.9 % (ref 0–3)
ERYTHROCYTE [DISTWIDTH] IN BLOOD BY AUTOMATED COUNT: 20.6 % (ref 11.3–14.5)
GFR SERPL CREATININE-BSD FRML MDRD: 140 ML/MIN/1.73
GLOBULIN UR ELPH-MCNC: 3.5 GM/DL
GLUCOSE BLD-MCNC: 124 MG/DL (ref 70–100)
HCT VFR BLD AUTO: 35.7 % (ref 38.9–50.9)
HGB BLD-MCNC: 11.2 G/DL (ref 13.1–17.5)
IMM GRANULOCYTES # BLD: 0.02 10*3/MM3 (ref 0–0.03)
IMM GRANULOCYTES NFR BLD: 0.3 % (ref 0–0.6)
LYMPHOCYTES # BLD AUTO: 0.73 10*3/MM3 (ref 0.6–4.8)
LYMPHOCYTES NFR BLD AUTO: 11 % (ref 24–44)
MCH RBC QN AUTO: 23 PG (ref 27–31)
MCHC RBC AUTO-ENTMCNC: 31.4 G/DL (ref 32–36)
MCV RBC AUTO: 73.5 FL (ref 80–99)
MONOCYTES # BLD AUTO: 0.47 10*3/MM3 (ref 0–1)
MONOCYTES NFR BLD AUTO: 7.1 % (ref 0–12)
NEUTROPHILS # BLD AUTO: 5.16 10*3/MM3 (ref 1.5–8.3)
NEUTROPHILS NFR BLD AUTO: 77.5 % (ref 41–71)
PLATELET # BLD AUTO: 356 10*3/MM3 (ref 150–450)
PMV BLD AUTO: 8.9 FL (ref 6–12)
POTASSIUM BLD-SCNC: 4.2 MMOL/L (ref 3.5–5.5)
PROT SERPL-MCNC: 8.1 G/DL (ref 5.7–8.2)
RBC # BLD AUTO: 4.86 10*6/MM3 (ref 4.2–5.76)
SODIUM BLD-SCNC: 140 MMOL/L (ref 132–146)
T4 FREE SERPL-MCNC: 1.31 NG/DL (ref 0.89–1.76)
TSH SERPL DL<=0.05 MIU/L-ACNC: 2.64 MIU/ML (ref 0.35–5.35)
WBC NRBC COR # BLD: 6.65 10*3/MM3 (ref 3.5–10.8)

## 2017-05-21 PROCEDURE — 80053 COMPREHEN METABOLIC PANEL: CPT

## 2017-05-21 PROCEDURE — 36415 COLL VENOUS BLD VENIPUNCTURE: CPT

## 2017-05-21 PROCEDURE — 85025 COMPLETE CBC W/AUTO DIFF WBC: CPT

## 2017-05-21 PROCEDURE — 82378 CARCINOEMBRYONIC ANTIGEN: CPT

## 2017-05-21 PROCEDURE — 84443 ASSAY THYROID STIM HORMONE: CPT

## 2017-05-21 PROCEDURE — 84439 ASSAY OF FREE THYROXINE: CPT

## 2017-05-23 ENCOUNTER — OFFICE VISIT (OUTPATIENT)
Dept: ONCOLOGY | Facility: CLINIC | Age: 57
End: 2017-05-23

## 2017-05-23 ENCOUNTER — INFUSION (OUTPATIENT)
Dept: ONCOLOGY | Facility: HOSPITAL | Age: 57
End: 2017-05-23

## 2017-05-23 VITALS
SYSTOLIC BLOOD PRESSURE: 117 MMHG | TEMPERATURE: 97.3 F | HEART RATE: 86 BPM | DIASTOLIC BLOOD PRESSURE: 68 MMHG | RESPIRATION RATE: 16 BRPM | HEIGHT: 66 IN | BODY MASS INDEX: 21.38 KG/M2 | WEIGHT: 133 LBS

## 2017-05-23 DIAGNOSIS — C79.51 BONE METASTASIS: ICD-10-CM

## 2017-05-23 DIAGNOSIS — C18.7 MALIGNANT NEOPLASM OF SIGMOID COLON (HCC): Primary | ICD-10-CM

## 2017-05-23 PROCEDURE — 96413 CHEMO IV INFUSION 1 HR: CPT

## 2017-05-23 PROCEDURE — 99215 OFFICE O/P EST HI 40 MIN: CPT | Performed by: INTERNAL MEDICINE

## 2017-05-23 PROCEDURE — 25010000002 PEMBROLIZUMAB 100 MG/4ML SOLUTION 4 ML VIAL: Performed by: INTERNAL MEDICINE

## 2017-05-23 RX ORDER — BLOOD-GLUCOSE METER
EACH MISCELLANEOUS
COMMUNITY
Start: 2017-05-16 | End: 2017-09-10

## 2017-05-23 RX ORDER — SODIUM CHLORIDE 9 MG/ML
250 INJECTION, SOLUTION INTRAVENOUS ONCE
Status: CANCELLED | OUTPATIENT
Start: 2017-07-24

## 2017-05-23 RX ORDER — LANCETS 33 GAUGE
EACH MISCELLANEOUS
COMMUNITY
Start: 2017-05-16 | End: 2017-09-19

## 2017-05-23 RX ORDER — ZOLPIDEM TARTRATE 10 MG/1
5 TABLET ORAL NIGHTLY PRN
COMMUNITY
Start: 2017-05-15 | End: 2017-07-03

## 2017-05-23 RX ORDER — MIRTAZAPINE 15 MG/1
15 TABLET, FILM COATED ORAL NIGHTLY
Qty: 30 TABLET | Refills: 5 | Status: SHIPPED | OUTPATIENT
Start: 2017-05-23 | End: 2017-07-03

## 2017-05-23 RX ORDER — METFORMIN HYDROCHLORIDE 500 MG/1
TABLET, EXTENDED RELEASE ORAL
COMMUNITY
Start: 2017-05-15 | End: 2017-09-10

## 2017-05-23 RX ORDER — SODIUM CHLORIDE 9 MG/ML
250 INJECTION, SOLUTION INTRAVENOUS ONCE
Status: CANCELLED | OUTPATIENT
Start: 2017-07-03

## 2017-05-23 RX ADMIN — SODIUM CHLORIDE 200 MG: 9 INJECTION, SOLUTION INTRAVENOUS at 09:56

## 2017-06-06 ENCOUNTER — APPOINTMENT (OUTPATIENT)
Dept: ONCOLOGY | Facility: HOSPITAL | Age: 57
End: 2017-06-06

## 2017-06-11 ENCOUNTER — LAB (OUTPATIENT)
Dept: LAB | Facility: HOSPITAL | Age: 57
End: 2017-06-11
Attending: INTERNAL MEDICINE

## 2017-06-11 DIAGNOSIS — C79.51 BONE METASTASIS: ICD-10-CM

## 2017-06-11 DIAGNOSIS — C18.7 MALIGNANT NEOPLASM OF SIGMOID COLON (HCC): ICD-10-CM

## 2017-06-11 LAB
ALBUMIN SERPL-MCNC: 4.4 G/DL (ref 3.2–4.8)
ALBUMIN/GLOB SERPL: 1.2 G/DL (ref 1.5–2.5)
ALP SERPL-CCNC: 83 U/L (ref 25–100)
ALT SERPL W P-5'-P-CCNC: 21 U/L (ref 7–40)
ANION GAP SERPL CALCULATED.3IONS-SCNC: 10 MMOL/L (ref 3–11)
AST SERPL-CCNC: 31 U/L (ref 0–33)
BASOPHILS # BLD AUTO: 0 10*3/MM3 (ref 0–0.2)
BASOPHILS NFR BLD AUTO: 0 % (ref 0–1)
BILIRUB SERPL-MCNC: 0.4 MG/DL (ref 0.3–1.2)
BUN BLD-MCNC: 8 MG/DL (ref 9–23)
BUN/CREAT SERPL: 13.3 (ref 7–25)
CALCIUM SPEC-SCNC: 10.2 MG/DL (ref 8.7–10.4)
CEA SERPL-MCNC: 47.1 NG/ML (ref 0–2.5)
CHLORIDE SERPL-SCNC: 102 MMOL/L (ref 99–109)
CO2 SERPL-SCNC: 29 MMOL/L (ref 20–31)
CREAT BLD-MCNC: 0.6 MG/DL (ref 0.6–1.3)
DEPRECATED RDW RBC AUTO: 52.8 FL (ref 37–54)
EOSINOPHIL # BLD AUTO: 0.3 10*3/MM3 (ref 0.1–0.3)
EOSINOPHIL NFR BLD AUTO: 4.8 % (ref 0–3)
ERYTHROCYTE [DISTWIDTH] IN BLOOD BY AUTOMATED COUNT: 19.7 % (ref 11.3–14.5)
GFR SERPL CREATININE-BSD FRML MDRD: 140 ML/MIN/1.73
GLOBULIN UR ELPH-MCNC: 3.7 GM/DL
GLUCOSE BLD-MCNC: 76 MG/DL (ref 70–100)
HCT VFR BLD AUTO: 35.2 % (ref 38.9–50.9)
HGB BLD-MCNC: 10.9 G/DL (ref 13.1–17.5)
IMM GRANULOCYTES # BLD: 0.01 10*3/MM3 (ref 0–0.03)
IMM GRANULOCYTES NFR BLD: 0.2 % (ref 0–0.6)
LYMPHOCYTES # BLD AUTO: 0.76 10*3/MM3 (ref 0.6–4.8)
LYMPHOCYTES NFR BLD AUTO: 12.3 % (ref 24–44)
MCH RBC QN AUTO: 22.5 PG (ref 27–31)
MCHC RBC AUTO-ENTMCNC: 31 G/DL (ref 32–36)
MCV RBC AUTO: 72.6 FL (ref 80–99)
MONOCYTES # BLD AUTO: 0.54 10*3/MM3 (ref 0–1)
MONOCYTES NFR BLD AUTO: 8.7 % (ref 0–12)
NEUTROPHILS # BLD AUTO: 4.58 10*3/MM3 (ref 1.5–8.3)
NEUTROPHILS NFR BLD AUTO: 74 % (ref 41–71)
PLATELET # BLD AUTO: 220 10*3/MM3 (ref 150–450)
PMV BLD AUTO: 8.2 FL (ref 6–12)
POTASSIUM BLD-SCNC: 4.5 MMOL/L (ref 3.5–5.5)
PROT SERPL-MCNC: 8.1 G/DL (ref 5.7–8.2)
RBC # BLD AUTO: 4.85 10*6/MM3 (ref 4.2–5.76)
SODIUM BLD-SCNC: 141 MMOL/L (ref 132–146)
T4 FREE SERPL-MCNC: 1.16 NG/DL (ref 0.89–1.76)
TSH SERPL DL<=0.05 MIU/L-ACNC: 3.09 MIU/ML (ref 0.35–5.35)
WBC NRBC COR # BLD: 6.19 10*3/MM3 (ref 3.5–10.8)

## 2017-06-11 PROCEDURE — 85025 COMPLETE CBC W/AUTO DIFF WBC: CPT

## 2017-06-11 PROCEDURE — 82378 CARCINOEMBRYONIC ANTIGEN: CPT

## 2017-06-11 PROCEDURE — 83540 ASSAY OF IRON: CPT

## 2017-06-11 PROCEDURE — 36415 COLL VENOUS BLD VENIPUNCTURE: CPT

## 2017-06-11 PROCEDURE — 82728 ASSAY OF FERRITIN: CPT

## 2017-06-11 PROCEDURE — 85045 AUTOMATED RETICULOCYTE COUNT: CPT

## 2017-06-11 PROCEDURE — 83550 IRON BINDING TEST: CPT

## 2017-06-11 PROCEDURE — 84443 ASSAY THYROID STIM HORMONE: CPT

## 2017-06-11 PROCEDURE — 84439 ASSAY OF FREE THYROXINE: CPT

## 2017-06-11 PROCEDURE — 80053 COMPREHEN METABOLIC PANEL: CPT

## 2017-06-12 ENCOUNTER — INFUSION (OUTPATIENT)
Dept: ONCOLOGY | Facility: HOSPITAL | Age: 57
End: 2017-06-12

## 2017-06-12 ENCOUNTER — OFFICE VISIT (OUTPATIENT)
Dept: ONCOLOGY | Facility: CLINIC | Age: 57
End: 2017-06-12

## 2017-06-12 VITALS
HEART RATE: 75 BPM | TEMPERATURE: 97.4 F | BODY MASS INDEX: 21.69 KG/M2 | WEIGHT: 135 LBS | HEIGHT: 66 IN | DIASTOLIC BLOOD PRESSURE: 73 MMHG | SYSTOLIC BLOOD PRESSURE: 110 MMHG | RESPIRATION RATE: 16 BRPM

## 2017-06-12 DIAGNOSIS — C79.51 BONE METASTASIS: ICD-10-CM

## 2017-06-12 DIAGNOSIS — C18.7 MALIGNANT NEOPLASM OF SIGMOID COLON (HCC): Primary | ICD-10-CM

## 2017-06-12 LAB
FERRITIN SERPL-MCNC: 45 NG/ML (ref 22–322)
IRON 24H UR-MRATE: 38 MCG/DL (ref 50–175)
IRON SATN MFR SERPL: 10 % (ref 20–50)
RETICS/RBC NFR AUTO: 1.11 % (ref 0.5–1.5)
TIBC SERPL-MCNC: 364 MCG/DL (ref 250–450)

## 2017-06-12 PROCEDURE — 99215 OFFICE O/P EST HI 40 MIN: CPT | Performed by: INTERNAL MEDICINE

## 2017-06-12 PROCEDURE — 25010000002 PEMBROLIZUMAB 100 MG/4ML SOLUTION 4 ML VIAL

## 2017-06-12 PROCEDURE — 96413 CHEMO IV INFUSION 1 HR: CPT

## 2017-06-12 RX ORDER — ZALEPLON 10 MG/1
10 CAPSULE ORAL NIGHTLY PRN
COMMUNITY
Start: 2017-06-07 | End: 2018-06-12

## 2017-06-12 RX ORDER — SODIUM CHLORIDE 9 MG/ML
250 INJECTION, SOLUTION INTRAVENOUS ONCE
Status: COMPLETED | OUTPATIENT
Start: 2017-06-12 | End: 2017-06-12

## 2017-06-12 RX ADMIN — SODIUM CHLORIDE 250 ML: 9 INJECTION, SOLUTION INTRAVENOUS at 14:10

## 2017-06-12 NOTE — PROGRESS NOTES
DATE OF VISIT: 6/12/2017    REASON FOR VISIT: Followup for metastatic colon cancer.      HISTORY OF PRESENT ILLNESS: The patient is a very pleasant 57 y.o. male  with past medical history significant for metastatic colon cancer diagnosed March 2007 . He is status post lower anterior resection as well as 1 cycle FOLFOX, stopped due to multiple toxicities. Final pathology showed poorly differentiated adenocarcinoma with 25 negative lymph nodes and clear surgical margins. Isolated tumor deposits was found in the pericolonic fat. Pathologic stage T2 N1c M0 stage IIIA disease.The patient has been followed by serial colonoscopies as well as CAT scans that did document pulmonary metastatic disease with left lower lobe nodule that is gradually increasing in size. Patient was doing fairly well until recently, 3 months ago, patient presented with low back pain. Patient had restaging workup that revealed hypermetabolic L2 vertebral body lesion. Patient had biopsy done on October 11, 2016 that revealed metastatic adenocarcinoma consistent of colon primary with CK 7 negative CK 20 positive CDX2 positive. Patient had next generation gene sequencing that revealed intermediate mutational load with microsatellite stability.  He started chemotherapy with Keytruda on 2/1/2017. The patient is here for cycle # 7 of treatment.     SUBJECTIVE: The patient has been doing fairly well. His back pain is gone, he is currently not taking any pain medicine. He has no difficulty with bowel or bladder control. He is eating and drinking well. He has no cough or shortness of breath.  He is complaining of fatigue.    PAST MEDICAL HISTORY/SOCIAL HISTORY/FAMILY HISTORY: Unchanged from my prior documentation done on 01/17/2017.     Review of Systems   Constitutional: Negative for activity change, appetite change, chills, fatigue, fever and unexpected weight change.   HENT: Negative for hearing loss, mouth sores, nosebleeds, sore throat and trouble  swallowing.    Eyes: Negative for visual disturbance.   Respiratory: Negative for cough, chest tightness, shortness of breath and wheezing.    Cardiovascular: Negative for chest pain, palpitations and leg swelling.   Gastrointestinal: Negative for abdominal distention, abdominal pain, blood in stool, constipation, diarrhea, nausea, rectal pain and vomiting.   Endocrine: Negative for cold intolerance and heat intolerance.   Genitourinary: Negative for difficulty urinating, dysuria, frequency and urgency.   Musculoskeletal: Positive for back pain. Negative for arthralgias, gait problem, joint swelling and myalgias.   Skin: Negative for rash.   Neurological: Negative for dizziness, tremors, syncope, weakness, light-headedness, numbness and headaches.        Sleep disturbance   Hematological: Negative for adenopathy. Does not bruise/bleed easily.   Psychiatric/Behavioral: Negative for confusion, sleep disturbance and suicidal ideas. The patient is not nervous/anxious.          Current Outpatient Prescriptions:   •  Blood Glucose Monitoring Suppl (ONE TOUCH ULTRA 2) W/DEVICE kit, , Disp: , Rfl:   •  celecoxib (CeleBREX) 200 MG capsule, Take 200 mg by mouth Daily., Disp: , Rfl:   •  Cholecalciferol (VITAMIN D3) 5000 UNITS capsule capsule, Take 5,000 Units by mouth Daily., Disp: , Rfl:   •  diazePAM (VALIUM) 5 MG tablet, , Disp: , Rfl:   •  lactulose (CHRONULAC) 10 GM/15ML solution, , Disp: , Rfl:   •  magnesium hydroxide (MILK OF MAGNESIA) 2400 MG/10ML suspension suspension, Take 10 mL by mouth Daily., Disp: , Rfl:   •  metFORMIN ER (GLUCOPHAGE-XR) 500 MG 24 hr tablet, , Disp: , Rfl:   •  mirtazapine (REMERON) 15 MG tablet, Take 1 tablet by mouth Every Night., Disp: 30 tablet, Rfl: 5  •  ONE TOUCH ULTRA TEST test strip, , Disp: , Rfl:   •  ONETOUCH DELICA LANCETS 33G misc, , Disp: , Rfl:   •  oxyCODONE (ROXICODONE) 10 MG tablet, Take 1 tablet by mouth Every 6 (Six) Hours As Needed for moderate pain (4-6)., Disp: 120  "tablet, Rfl: 0  •  oxyCODONE (ROXICODONE) 5 MG immediate release tablet, , Disp: , Rfl:   •  zaleplon (SONATA) 10 MG capsule, , Disp: , Rfl:   •  zolpidem (AMBIEN) 10 MG tablet, Take 5 mg by mouth At Night As Needed., Disp: , Rfl:   No current facility-administered medications for this visit.     Facility-Administered Medications Ordered in Other Visits:   •  sodium chloride 0.9 % infusion 250 mL, 250 mL, Intravenous, Once, Natasha Herrera, APRN    PHYSICAL EXAMINATION:   /73 Comment: RUE  Pulse 75  Temp 97.4 °F (36.3 °C) (Temporal Artery )   Resp 16  Ht 66\" (167.6 cm)  Wt 135 lb (61.2 kg)  BMI 21.79 kg/m2   ECOG Performance Status: 1 - Symptomatic but completely ambulatory  General Appearance:  alert, cooperative, no apparent distress and appears stated age   Neurologic/Psychiatric: A&O x 3, gait steady, appropriate affect, strength 5/5 in all muscle groups   HEENT:  Normocephalic, without obvious abnormality, mucous membranes moist   Neck: Supple, symmetrical, trachea midline, no adenopathy;  No thyromegaly, masses, or tenderness   Lungs:   Clear to auscultation bilaterally; respirations regular, even, and unlabored bilaterally   Heart:  Regular rate and rhythm, no murmurs appreciated   Abdomen:   Soft, non-tender, non-distended and no organomegaly   Lymph nodes: No cervical, supraclavicular, inguinal or axillary adenopathy noted   Extremities: Normal, atraumatic; no clubbing, cyanosis, or edema    Skin: No rashes, ulcers, or suspicious lesions noted     Lab on 06/11/2017   Component Date Value Ref Range Status   • CEA 06/11/2017 47.10* 0.00 - 2.50 ng/mL Final   • TSH 06/11/2017 3.086  0.350 - 5.350 mIU/mL Final   • Free T4 06/11/2017 1.16  0.89 - 1.76 ng/dL Final   • Glucose 06/11/2017 76  70 - 100 mg/dL Final   • BUN 06/11/2017 8* 9 - 23 mg/dL Final   • Creatinine 06/11/2017 0.60  0.60 - 1.30 mg/dL Final   • Sodium 06/11/2017 141  132 - 146 mmol/L Final   • Potassium 06/11/2017 4.5  3.5 - 5.5 mmol/L " Final   • Chloride 06/11/2017 102  99 - 109 mmol/L Final   • CO2 06/11/2017 29.0  20.0 - 31.0 mmol/L Final   • Calcium 06/11/2017 10.2  8.7 - 10.4 mg/dL Final   • Total Protein 06/11/2017 8.1  5.7 - 8.2 g/dL Final   • Albumin 06/11/2017 4.40  3.20 - 4.80 g/dL Final   • ALT (SGPT) 06/11/2017 21  7 - 40 U/L Final   • AST (SGOT) 06/11/2017 31  0 - 33 U/L Final   • Alkaline Phosphatase 06/11/2017 83  25 - 100 U/L Final   • Total Bilirubin 06/11/2017 0.4  0.3 - 1.2 mg/dL Final   • eGFR Non African Amer 06/11/2017 140  >60 mL/min/1.73 Final   • Globulin 06/11/2017 3.7  gm/dL Final   • A/G Ratio 06/11/2017 1.2* 1.5 - 2.5 g/dL Final   • BUN/Creatinine Ratio 06/11/2017 13.3  7.0 - 25.0 Final   • Anion Gap 06/11/2017 10.0  3.0 - 11.0 mmol/L Final   • WBC 06/11/2017 6.19  3.50 - 10.80 10*3/mm3 Final   • RBC 06/11/2017 4.85  4.20 - 5.76 10*6/mm3 Final   • Hemoglobin 06/11/2017 10.9* 13.1 - 17.5 g/dL Final   • Hematocrit 06/11/2017 35.2* 38.9 - 50.9 % Final   • MCV 06/11/2017 72.6* 80.0 - 99.0 fL Final   • MCH 06/11/2017 22.5* 27.0 - 31.0 pg Final   • MCHC 06/11/2017 31.0* 32.0 - 36.0 g/dL Final   • RDW 06/11/2017 19.7* 11.3 - 14.5 % Final   • RDW-SD 06/11/2017 52.8  37.0 - 54.0 fl Final   • MPV 06/11/2017 8.2  6.0 - 12.0 fL Final   • Platelets 06/11/2017 220  150 - 450 10*3/mm3 Final   • Neutrophil % 06/11/2017 74.0* 41.0 - 71.0 % Final   • Lymphocyte % 06/11/2017 12.3* 24.0 - 44.0 % Final   • Monocyte % 06/11/2017 8.7  0.0 - 12.0 % Final   • Eosinophil % 06/11/2017 4.8* 0.0 - 3.0 % Final   • Basophil % 06/11/2017 0.0  0.0 - 1.0 % Final   • Immature Grans % 06/11/2017 0.2  0.0 - 0.6 % Final   • Neutrophils, Absolute 06/11/2017 4.58  1.50 - 8.30 10*3/mm3 Final   • Lymphocytes, Absolute 06/11/2017 0.76  0.60 - 4.80 10*3/mm3 Final   • Monocytes, Absolute 06/11/2017 0.54  0.00 - 1.00 10*3/mm3 Final   • Eosinophils, Absolute 06/11/2017 0.30  0.10 - 0.30 10*3/mm3 Final   • Basophils, Absolute 06/11/2017 0.00  0.00 - 0.20 10*3/mm3  Final   • Immature Grans, Absolute 06/11/2017 0.01  0.00 - 0.03 10*3/mm3 Final        No results found.    ASSESSMENT: The patient is a very pleasant 56 y.o. male with stage IV colon cancer    PROBLEM LIST:  1. Currently stage IV colon cancer with lung and bony metastases.  K-edwige mutant, microsatellite stable, and intermediate mutational load.  2. Status post low anterior resection done by Dr. Young 2007, [id number]  3.  Attempted adjuvant chemotherapy for 1 cycle could not tolerate secondary to multiple toxicities.  4. Biopsy-proven L2 metastases done October 11, 2016. Status post CyberKnife radiation treatment.  5.  Started Keytruda 200 mg IV every 3 weeks on February 1, 2017, status post 6 cycles of treatment  6. Status post tumor debulking at L2 done by Dr. Frazier at [facility] on April 21, 2017  7.  Treatment induced hepatitis, resolved  8.  Cancer related pain  9.  Opioid-induced constipation  10.  Treatment induced asthenia  11.  Mild depression  12.  Insomnia    PLAN:  1.  We will proceed with cycle # 7 of Keytruda as scheduled today.   2.  The patient will follow-up with me in 3 weeks for next cycle of treatment.  3.  Patient will continue taking over-the-counter stool softeners for constipation.  4.  I reviewed with the patient the potential side effects from immune therapy including immune mediated reactions with pneumonitis, hepatitis, colitis, thyroiditis, and potential death.   5.  If immune therapy fails subsequent line treatment would include chemotherapy with anti VEGF treatment.  Another option might be targeted treatment on match trial.  Patient was consented again for the study today we will submit a fresh tissue from his recent surgery.  6.  I advised the patient to hold his alternative treatment with high dose vitamin C while he is on immunetherapy.  7.  I will order repeat PET scan before cycle #8. This will be ordered for prior to return.    8. We will monitor labs throughout treatment  including blood counts, kidney function, liver functions, serum CEA, and thyroid function. We will monitor his liver enzymes that have been elevated secondary to treatment with immune-therapy.   9. The patient will continue Zofran as needed for treatment related nausea.  10. The patient will continue oxycodone 10 mg as needed for cancer-related pain.  We'll continue Celebrex 200 mg twice a day.  11.  I offered patient Ritalin 5 mg daily to see if that might help with his fatigue that he would like to hold off for now.  12.  The patient could not tolerate Remeron 15 mg daily at bedtime secondary to mental status changes.  13.  Patient will continue Zaleplon as needed for insomnia.  14.  I will do basic anemia workup today.    Kristofer Rodriguez MD  6/12/2017   1:16 PM

## 2017-06-13 ENCOUNTER — APPOINTMENT (OUTPATIENT)
Dept: ONCOLOGY | Facility: HOSPITAL | Age: 57
End: 2017-06-13

## 2017-06-28 DIAGNOSIS — C18.7 MALIGNANT NEOPLASM OF SIGMOID COLON (HCC): Primary | ICD-10-CM

## 2017-06-30 ENCOUNTER — APPOINTMENT (OUTPATIENT)
Dept: PET IMAGING | Facility: HOSPITAL | Age: 57
End: 2017-06-30
Attending: INTERNAL MEDICINE

## 2017-06-30 ENCOUNTER — HOSPITAL ENCOUNTER (OUTPATIENT)
Dept: CT IMAGING | Facility: HOSPITAL | Age: 57
Discharge: HOME | End: 2017-06-30
Attending: INTERNAL MEDICINE | Admitting: INTERNAL MEDICINE

## 2017-06-30 DIAGNOSIS — C18.7 MALIGNANT NEOPLASM OF SIGMOID COLON (HCC): ICD-10-CM

## 2017-06-30 PROCEDURE — 0 IOPAMIDOL 61 % SOLUTION: Performed by: INTERNAL MEDICINE

## 2017-06-30 PROCEDURE — 74177 CT ABD & PELVIS W/CONTRAST: CPT

## 2017-06-30 PROCEDURE — 71260 CT THORAX DX C+: CPT

## 2017-06-30 RX ADMIN — IOPAMIDOL 80 ML: 612 INJECTION, SOLUTION INTRAVENOUS at 09:32

## 2017-07-01 ENCOUNTER — LAB (OUTPATIENT)
Dept: LAB | Facility: HOSPITAL | Age: 57
End: 2017-07-01

## 2017-07-01 DIAGNOSIS — C18.7 MALIGNANT NEOPLASM OF SIGMOID COLON (HCC): ICD-10-CM

## 2017-07-01 DIAGNOSIS — C79.51 BONE METASTASIS: ICD-10-CM

## 2017-07-01 DIAGNOSIS — C79.51 BONE METASTASIS: Primary | ICD-10-CM

## 2017-07-01 LAB
25(OH)D3 SERPL-MCNC: 85.1 NG/ML
ALBUMIN SERPL-MCNC: 4.5 G/DL (ref 3.2–4.8)
ALBUMIN/GLOB SERPL: 1.2 G/DL (ref 1.5–2.5)
ALP SERPL-CCNC: 93 U/L (ref 25–100)
ALT SERPL W P-5'-P-CCNC: 17 U/L (ref 7–40)
ANION GAP SERPL CALCULATED.3IONS-SCNC: 7 MMOL/L (ref 3–11)
AST SERPL-CCNC: 22 U/L (ref 0–33)
BASOPHILS # BLD AUTO: 0.01 10*3/MM3 (ref 0–0.2)
BASOPHILS NFR BLD AUTO: 0.2 % (ref 0–1)
BILIRUB SERPL-MCNC: 0.4 MG/DL (ref 0.3–1.2)
BUN BLD-MCNC: 9 MG/DL (ref 9–23)
BUN/CREAT SERPL: 15 (ref 7–25)
CALCIUM SPEC-SCNC: 9.8 MG/DL (ref 8.7–10.4)
CEA SERPL-MCNC: 54.4 NG/ML (ref 0–2.5)
CHLORIDE SERPL-SCNC: 102 MMOL/L (ref 99–109)
CO2 SERPL-SCNC: 31 MMOL/L (ref 20–31)
CREAT BLD-MCNC: 0.6 MG/DL (ref 0.6–1.3)
DEPRECATED RDW RBC AUTO: 51.2 FL (ref 37–54)
EOSINOPHIL # BLD AUTO: 0.45 10*3/MM3 (ref 0–0.3)
EOSINOPHIL NFR BLD AUTO: 7.7 % (ref 0–3)
ERYTHROCYTE [DISTWIDTH] IN BLOOD BY AUTOMATED COUNT: 19.6 % (ref 11.3–14.5)
GFR SERPL CREATININE-BSD FRML MDRD: 140 ML/MIN/1.73
GLOBULIN UR ELPH-MCNC: 3.8 GM/DL
GLUCOSE BLD-MCNC: 118 MG/DL (ref 70–100)
HCT VFR BLD AUTO: 35.7 % (ref 38.9–50.9)
HGB BLD-MCNC: 11.1 G/DL (ref 13.1–17.5)
IMM GRANULOCYTES # BLD: 0.01 10*3/MM3 (ref 0–0.03)
IMM GRANULOCYTES NFR BLD: 0.2 % (ref 0–0.6)
LYMPHOCYTES # BLD AUTO: 1.12 10*3/MM3 (ref 0.6–4.8)
LYMPHOCYTES NFR BLD AUTO: 19.2 % (ref 24–44)
MCH RBC QN AUTO: 22 PG (ref 27–31)
MCHC RBC AUTO-ENTMCNC: 31.1 G/DL (ref 32–36)
MCV RBC AUTO: 70.8 FL (ref 80–99)
MONOCYTES # BLD AUTO: 0.38 10*3/MM3 (ref 0–1)
MONOCYTES NFR BLD AUTO: 6.5 % (ref 0–12)
NEUTROPHILS # BLD AUTO: 3.85 10*3/MM3 (ref 1.5–8.3)
NEUTROPHILS NFR BLD AUTO: 66.2 % (ref 41–71)
PLATELET # BLD AUTO: 271 10*3/MM3 (ref 150–450)
PMV BLD AUTO: 8.8 FL (ref 6–12)
POTASSIUM BLD-SCNC: 4.3 MMOL/L (ref 3.5–5.5)
PROT SERPL-MCNC: 8.3 G/DL (ref 5.7–8.2)
RBC # BLD AUTO: 5.04 10*6/MM3 (ref 4.2–5.76)
SODIUM BLD-SCNC: 140 MMOL/L (ref 132–146)
T4 FREE SERPL-MCNC: 0.96 NG/DL (ref 0.89–1.76)
TSH SERPL DL<=0.05 MIU/L-ACNC: 3.88 MIU/ML (ref 0.35–5.35)
WBC NRBC COR # BLD: 5.82 10*3/MM3 (ref 3.5–10.8)

## 2017-07-01 PROCEDURE — 82378 CARCINOEMBRYONIC ANTIGEN: CPT

## 2017-07-01 PROCEDURE — 80053 COMPREHEN METABOLIC PANEL: CPT

## 2017-07-01 PROCEDURE — 84443 ASSAY THYROID STIM HORMONE: CPT

## 2017-07-01 PROCEDURE — 36415 COLL VENOUS BLD VENIPUNCTURE: CPT

## 2017-07-01 PROCEDURE — 82306 VITAMIN D 25 HYDROXY: CPT | Performed by: INTERNAL MEDICINE

## 2017-07-01 PROCEDURE — 84439 ASSAY OF FREE THYROXINE: CPT

## 2017-07-01 PROCEDURE — 85025 COMPLETE CBC W/AUTO DIFF WBC: CPT

## 2017-07-03 ENCOUNTER — INFUSION (OUTPATIENT)
Dept: ONCOLOGY | Facility: HOSPITAL | Age: 57
End: 2017-07-03

## 2017-07-03 ENCOUNTER — OFFICE VISIT (OUTPATIENT)
Dept: ONCOLOGY | Facility: CLINIC | Age: 57
End: 2017-07-03

## 2017-07-03 VITALS
HEART RATE: 72 BPM | WEIGHT: 135 LBS | SYSTOLIC BLOOD PRESSURE: 114 MMHG | RESPIRATION RATE: 13 BRPM | DIASTOLIC BLOOD PRESSURE: 75 MMHG | BODY MASS INDEX: 21.79 KG/M2 | TEMPERATURE: 97.6 F

## 2017-07-03 DIAGNOSIS — C79.51 BONE METASTASIS: ICD-10-CM

## 2017-07-03 DIAGNOSIS — C18.7 MALIGNANT NEOPLASM OF SIGMOID COLON (HCC): Primary | ICD-10-CM

## 2017-07-03 PROCEDURE — 96413 CHEMO IV INFUSION 1 HR: CPT

## 2017-07-03 PROCEDURE — 25010000002 PEMBROLIZUMAB 100 MG/4ML SOLUTION 4 ML VIAL: Performed by: INTERNAL MEDICINE

## 2017-07-03 PROCEDURE — 99215 OFFICE O/P EST HI 40 MIN: CPT | Performed by: INTERNAL MEDICINE

## 2017-07-03 RX ORDER — SODIUM CHLORIDE 9 MG/ML
250 INJECTION, SOLUTION INTRAVENOUS ONCE
Status: CANCELLED | OUTPATIENT
Start: 2017-08-14

## 2017-07-03 RX ORDER — SODIUM CHLORIDE 9 MG/ML
250 INJECTION, SOLUTION INTRAVENOUS ONCE
Status: COMPLETED | OUTPATIENT
Start: 2017-07-03 | End: 2017-07-03

## 2017-07-03 RX ADMIN — SODIUM CHLORIDE 200 MG: 9 INJECTION, SOLUTION INTRAVENOUS at 09:44

## 2017-07-03 RX ADMIN — SODIUM CHLORIDE 250 ML: 9 INJECTION, SOLUTION INTRAVENOUS at 09:40

## 2017-07-03 NOTE — PROGRESS NOTES
DATE OF VISIT: 7/3/2017    REASON FOR VISIT: Followup for metastatic colon cancer.      HISTORY OF PRESENT ILLNESS: The patient is a very pleasant 57 y.o. male  with past medical history significant for metastatic colon cancer diagnosed March 2007 . He is status post lower anterior resection as well as 1 cycle FOLFOX, stopped due to multiple toxicities. Final pathology showed poorly differentiated adenocarcinoma with 25 negative lymph nodes and clear surgical margins. Isolated tumor deposits was found in the pericolonic fat. Pathologic stage T2 N1c M0 stage IIIA disease.The patient has been followed by serial colonoscopies as well as CAT scans that did document pulmonary metastatic disease with left lower lobe nodule that is gradually increasing in size. Patient was doing fairly well until recently, 3 months ago, patient presented with low back pain. Patient had restaging workup that revealed hypermetabolic L2 vertebral body lesion. Patient had biopsy done on October 11, 2016 that revealed metastatic adenocarcinoma consistent of colon primary with CK 7 negative CK 20 positive CDX2 positive. Patient had next generation gene sequencing that revealed intermediate mutational load with microsatellite stability.  He started chemotherapy with Keytruda on 2/1/2017. The patient is here for cycle # 8 of treatment.     SUBJECTIVE: The patient has been doing fairly well. His back pain is gone, he is currently not taking any pain medicine. He has no difficulty with bowel or bladder control. He is eating and drinking well. He has no cough or shortness of breath.  He is complaining of fatigue.    PAST MEDICAL HISTORY/SOCIAL HISTORY/FAMILY HISTORY: Unchanged from my prior documentation done on 01/17/2017.     Review of Systems   Constitutional: Negative for activity change, appetite change, chills, fatigue, fever and unexpected weight change.   HENT: Negative for hearing loss, mouth sores, nosebleeds, sore throat and trouble  swallowing.    Eyes: Negative for visual disturbance.   Respiratory: Negative for cough, chest tightness, shortness of breath and wheezing.    Cardiovascular: Negative for chest pain, palpitations and leg swelling.   Gastrointestinal: Negative for abdominal distention, abdominal pain, blood in stool, constipation, diarrhea, nausea, rectal pain and vomiting.   Endocrine: Negative for cold intolerance and heat intolerance.   Genitourinary: Negative for difficulty urinating, dysuria, frequency and urgency.   Musculoskeletal: Positive for back pain. Negative for arthralgias, gait problem, joint swelling and myalgias.   Skin: Negative for rash.   Neurological: Negative for dizziness, tremors, syncope, weakness, light-headedness, numbness and headaches.        Sleep disturbance   Hematological: Negative for adenopathy. Does not bruise/bleed easily.   Psychiatric/Behavioral: Negative for confusion, sleep disturbance and suicidal ideas. The patient is not nervous/anxious.          Current Outpatient Prescriptions:   •  Blood Glucose Monitoring Suppl (ONE TOUCH ULTRA 2) W/DEVICE kit, , Disp: , Rfl:   •  Cholecalciferol (VITAMIN D3) 5000 UNITS capsule capsule, Take 5,000 Units by mouth Daily., Disp: , Rfl:   •  diazePAM (VALIUM) 5 MG tablet, , Disp: , Rfl:   •  magnesium hydroxide (MILK OF MAGNESIA) 2400 MG/10ML suspension suspension, Take 10 mL by mouth Daily., Disp: , Rfl:   •  metFORMIN ER (GLUCOPHAGE-XR) 500 MG 24 hr tablet, , Disp: , Rfl:   •  ONE TOUCH ULTRA TEST test strip, , Disp: , Rfl:   •  ONETOUCH DELICA LANCETS 33G misc, , Disp: , Rfl:   •  zaleplon (SONATA) 10 MG capsule, , Disp: , Rfl:   No current facility-administered medications for this visit.     Facility-Administered Medications Ordered in Other Visits:   •  sodium chloride 0.9 % infusion 250 mL, 250 mL, Intravenous, Once, Natasha Herrera, APRN    PHYSICAL EXAMINATION:   /75  Pulse 72  Temp 97.6 °F (36.4 °C) (Temporal Artery )   Resp 13  Wt 135  lb (61.2 kg)  BMI 21.79 kg/m2   ECOG Performance Status: 1 - Symptomatic but completely ambulatory  General Appearance:  alert, cooperative, no apparent distress and appears stated age   Neurologic/Psychiatric: A&O x 3, gait steady, appropriate affect, strength 5/5 in all muscle groups   HEENT:  Normocephalic, without obvious abnormality, mucous membranes moist   Neck: Supple, symmetrical, trachea midline, no adenopathy;  No thyromegaly, masses, or tenderness   Lungs:   Clear to auscultation bilaterally; respirations regular, even, and unlabored bilaterally   Heart:  Regular rate and rhythm, no murmurs appreciated   Abdomen:   Soft, non-tender, non-distended and no organomegaly   Lymph nodes: No cervical, supraclavicular, inguinal or axillary adenopathy noted   Extremities: Normal, atraumatic; no clubbing, cyanosis, or edema    Skin: No rashes, ulcers, or suspicious lesions noted     Lab on 07/01/2017   Component Date Value Ref Range Status   • CEA 07/01/2017 54.40* 0.00 - 2.50 ng/mL Final   • TSH 07/01/2017 3.876  0.350 - 5.350 mIU/mL Final   • Free T4 07/01/2017 0.96  0.89 - 1.76 ng/dL Final   • WBC 07/01/2017 5.82  3.50 - 10.80 10*3/mm3 Final   • RBC 07/01/2017 5.04  4.20 - 5.76 10*6/mm3 Final   • Hemoglobin 07/01/2017 11.1* 13.1 - 17.5 g/dL Final   • Hematocrit 07/01/2017 35.7* 38.9 - 50.9 % Final   • MCV 07/01/2017 70.8* 80.0 - 99.0 fL Final   • MCH 07/01/2017 22.0* 27.0 - 31.0 pg Final   • MCHC 07/01/2017 31.1* 32.0 - 36.0 g/dL Final   • RDW 07/01/2017 19.6* 11.3 - 14.5 % Final   • RDW-SD 07/01/2017 51.2  37.0 - 54.0 fl Final   • MPV 07/01/2017 8.8  6.0 - 12.0 fL Final   • Platelets 07/01/2017 271  150 - 450 10*3/mm3 Final   • Neutrophil % 07/01/2017 66.2  41.0 - 71.0 % Final   • Lymphocyte % 07/01/2017 19.2* 24.0 - 44.0 % Final   • Monocyte % 07/01/2017 6.5  0.0 - 12.0 % Final   • Eosinophil % 07/01/2017 7.7* 0.0 - 3.0 % Final   • Basophil % 07/01/2017 0.2  0.0 - 1.0 % Final   • Immature Grans % 07/01/2017  0.2  0.0 - 0.6 % Final   • Neutrophils, Absolute 07/01/2017 3.85  1.50 - 8.30 10*3/mm3 Final   • Lymphocytes, Absolute 07/01/2017 1.12  0.60 - 4.80 10*3/mm3 Final   • Monocytes, Absolute 07/01/2017 0.38  0.00 - 1.00 10*3/mm3 Final   • Eosinophils, Absolute 07/01/2017 0.45* 0.00 - 0.30 10*3/mm3 Final   • Basophils, Absolute 07/01/2017 0.01  0.00 - 0.20 10*3/mm3 Final   • Immature Grans, Absolute 07/01/2017 0.01  0.00 - 0.03 10*3/mm3 Final   • Glucose 07/01/2017 118* 70 - 100 mg/dL Final   • BUN 07/01/2017 9  9 - 23 mg/dL Final   • Creatinine 07/01/2017 0.60  0.60 - 1.30 mg/dL Final   • Sodium 07/01/2017 140  132 - 146 mmol/L Final   • Potassium 07/01/2017 4.3  3.5 - 5.5 mmol/L Final   • Chloride 07/01/2017 102  99 - 109 mmol/L Final   • CO2 07/01/2017 31.0  20.0 - 31.0 mmol/L Final   • Calcium 07/01/2017 9.8  8.7 - 10.4 mg/dL Final   • Total Protein 07/01/2017 8.3* 5.7 - 8.2 g/dL Final   • Albumin 07/01/2017 4.50  3.20 - 4.80 g/dL Final   • ALT (SGPT) 07/01/2017 17  7 - 40 U/L Final   • AST (SGOT) 07/01/2017 22  0 - 33 U/L Final   • Alkaline Phosphatase 07/01/2017 93  25 - 100 U/L Final   • Total Bilirubin 07/01/2017 0.4  0.3 - 1.2 mg/dL Final   • eGFR Non African Amer 07/01/2017 140  >60 mL/min/1.73 Final   • Globulin 07/01/2017 3.8  gm/dL Final   • A/G Ratio 07/01/2017 1.2* 1.5 - 2.5 g/dL Final   • BUN/Creatinine Ratio 07/01/2017 15.0  7.0 - 25.0 Final   • Anion Gap 07/01/2017 7.0  3.0 - 11.0 mmol/L Final   Orders Only on 07/01/2017   Component Date Value Ref Range Status   • 25 Hydroxy, Vitamin D 07/01/2017 85.1  ng/ml Final        Ct Chest With Contrast    Result Date: 6/30/2017  Narrative: EXAMINATION: CT CHEST W CONTRAST-  INDICATION: Followup scan; C18.7-Malignant neoplasm of sigmoid colon.  TECHNIQUE: CT scan of the chest was performed following intravenous contrast.  The radiation dose reduction device was turned on for each scan per the ALARA (As Low as Reasonably Achievable) protocol.  COMPARISON: Whole  body PET/CT dated 03/30/2017.  FINDINGS: There is an enlarged left axillary lymph node which is new when compared to the PET CT scan of 03/30/2017. This has a short axis diameter of 9 mm. There is no mediastinal or hilar lymphadenopathy. There is no pericardial effusion.  Images displayed at lung window settings reveal a dense lower lobe nodular process with surrounding linear changes, insignificantly changed.  The tiny punctate nodule in the left upper lobe is unchanged. There are rare small punctate nodules in the right middle and right lower lobe, insignificantly changed.      Impression: There is a somewhat coalescent nodular linear change in the left lower lobe as well as rare punctate nodules in the left upper, right middle and right lower lobe, the sum of which has not changed significantly since the whole body PET CT scan of 03/30/2017.  D:  06/30/2017 E:  06/30/2017   This report was finalized on 6/30/2017 11:02 AM by Dr. Iker Sanders MD.      Ct Abdomen Pelvis With Contrast    Result Date: 6/30/2017  Narrative: EXAMINATION: CT ABDOMEN AND PELVIS W CONTRAST-  INDICATION: Followup scan; C18.7-Malignant neoplasm of sigmoid colon.  TECHNIQUE: Images of the abdomen and pelvis were obtained following intravenous contrast.  The radiation dose reduction device was turned on for each scan per the ALARA (As Low as Reasonably Achievable) protocol.  COMPARISON: Whole-body PET/CT scan dated 03/30/2017.  FINDINGS: The liver and spleen are normal. There is no adrenal or pancreatic mass. There is no renal mass, stone or obstruction. There is no ascites, aneurysm or retroperitoneal lymphadenopathy. There is no pelvic mass or fluid and there is no inguinal lymphadenopathy. There is a marked destructive process involving the posterior rightward aspect of the vertebral bodies and right pedicle at L1 and L2. This area is stabilized with posterior lumbar fusion. These findings were also present on the CT scan of 03/30/2017.       Impression: Grossly destructive lesions at L1 and at L2, stabilizing posterior lumbar fusion. There are no other abnormalities and the overall appearance of the chest has not changed significantly since a whole-body PET/CT scan dated 03/30/2017.  D:  06/30/2017 E:  06/30/2017  This report was finalized on 6/30/2017 11:01 AM by Dr. Iker Sanders MD.        ASSESSMENT: The patient is a very pleasant 56 y.o. male with stage IV colon cancer    PROBLEM LIST:  1. Currently stage IV colon cancer with lung and bony metastases.  K-edwige mutant, microsatellite stable, and intermediate mutational load.  2. Status post low anterior resection done by Dr. Young 2007, [id number]  3.  Attempted adjuvant chemotherapy for 1 cycle could not tolerate secondary to multiple toxicities.  4. Biopsy-proven L2 metastases done October 11, 2016. Status post CyberKnife radiation treatment.  5.  Started Keytruda 200 mg IV every 3 weeks on February 1, 2017, status post 7 cycles of treatment  6. Status post tumor debulking at L2 done by Dr. Frazier at [facility] on April 21, 2017  7.  Treatment induced hepatitis, resolved  8.  Cancer related pain  9.  Opioid-induced constipation  10.  Treatment induced asthenia  11.  Mild depression  12.  Insomnia    PLAN:  1.  We will proceed with cycle # 8 of Keytruda as scheduled today.   2.  The patient will follow-up with me in 3 weeks for next cycle of treatment.  3.  Patient will continue taking over-the-counter stool softeners for constipation.  4.  I reviewed with the patient the potential side effects from immune therapy including immune mediated reactions with pneumonitis, hepatitis, colitis, thyroiditis, and potential death.   5.  If immune therapy fails subsequent line treatment would include chemotherapy with anti VEGF treatment.  Patient tissue was submitted for the matched trial he did match on PETN mutation however that arm was closed.  6.  I advised the patient to hold his alternative  treatment with high dose vitamin C while he is on immunetherapy.  7.  I did go over the CT scan results with the patient and his wife, I reviewed the films myself.  Patient has essentially stable disease.  I will do a follow-up CAT scan in 3 months which would be due end of September 2017.  8. We will monitor labs throughout treatment including blood counts, kidney function, liver functions, serum CEA, and thyroid function. We will monitor his liver enzymes that have been elevated secondary to treatment with immune-therapy.   9. The patient will continue Zofran as needed for treatment related nausea.  10. The patient will continue oxycodone 10 mg as needed for cancer-related pain.  We'll continue Celebrex 200 mg twice a day.  He has not been requiring any pain medicine over the last few weeks.  11.  I offered patient Ritalin 5 mg daily to see if that might help with his fatigue that he would like to hold off for now.  12.  The patient could not tolerate Remeron 15 mg daily at bedtime secondary to mental status changes.  13.  Patient will continue Zaleplon as needed for insomnia.  Kristofer Rodriguez MD  7/3/2017   8:48 AM

## 2017-07-07 ENCOUNTER — APPOINTMENT (OUTPATIENT)
Dept: CT IMAGING | Facility: HOSPITAL | Age: 57
End: 2017-07-07
Attending: INTERNAL MEDICINE

## 2017-08-02 ENCOUNTER — LAB (OUTPATIENT)
Dept: LAB | Facility: HOSPITAL | Age: 57
End: 2017-08-02

## 2017-08-02 DIAGNOSIS — C79.51 BONE METASTASIS: ICD-10-CM

## 2017-08-02 DIAGNOSIS — C18.7 MALIGNANT NEOPLASM OF SIGMOID COLON (HCC): ICD-10-CM

## 2017-08-02 LAB
ALBUMIN SERPL-MCNC: 4.2 G/DL (ref 3.2–4.8)
ALBUMIN/GLOB SERPL: 1.1 G/DL (ref 1.5–2.5)
ALP SERPL-CCNC: 80 U/L (ref 25–100)
ALT SERPL W P-5'-P-CCNC: 18 U/L (ref 7–40)
ANION GAP SERPL CALCULATED.3IONS-SCNC: 7 MMOL/L (ref 3–11)
AST SERPL-CCNC: 26 U/L (ref 0–33)
BASOPHILS # BLD AUTO: 0.01 10*3/MM3 (ref 0–0.2)
BASOPHILS NFR BLD AUTO: 0.2 % (ref 0–1)
BILIRUB SERPL-MCNC: 0.4 MG/DL (ref 0.3–1.2)
BUN BLD-MCNC: 14 MG/DL (ref 9–23)
BUN/CREAT SERPL: 23.3 (ref 7–25)
CALCIUM SPEC-SCNC: 9.6 MG/DL (ref 8.7–10.4)
CEA SERPL-MCNC: 64.7 NG/ML (ref 0–2.5)
CHLORIDE SERPL-SCNC: 103 MMOL/L (ref 99–109)
CO2 SERPL-SCNC: 30 MMOL/L (ref 20–31)
CREAT BLD-MCNC: 0.6 MG/DL (ref 0.6–1.3)
DEPRECATED RDW RBC AUTO: 49.5 FL (ref 37–54)
EOSINOPHIL # BLD AUTO: 0.59 10*3/MM3 (ref 0–0.3)
EOSINOPHIL NFR BLD AUTO: 12.2 % (ref 0–3)
ERYTHROCYTE [DISTWIDTH] IN BLOOD BY AUTOMATED COUNT: 19.9 % (ref 11.3–14.5)
GFR SERPL CREATININE-BSD FRML MDRD: 140 ML/MIN/1.73
GLOBULIN UR ELPH-MCNC: 3.7 GM/DL
GLUCOSE BLD-MCNC: 122 MG/DL (ref 70–100)
HCT VFR BLD AUTO: 33.7 % (ref 38.9–50.9)
HGB BLD-MCNC: 10.5 G/DL (ref 13.1–17.5)
IMM GRANULOCYTES # BLD: 0.01 10*3/MM3 (ref 0–0.03)
IMM GRANULOCYTES NFR BLD: 0.2 % (ref 0–0.6)
LYMPHOCYTES # BLD AUTO: 1.18 10*3/MM3 (ref 0.6–4.8)
LYMPHOCYTES NFR BLD AUTO: 24.5 % (ref 24–44)
MCH RBC QN AUTO: 21.1 PG (ref 27–31)
MCHC RBC AUTO-ENTMCNC: 31.2 G/DL (ref 32–36)
MCV RBC AUTO: 67.7 FL (ref 80–99)
MONOCYTES # BLD AUTO: 0.35 10*3/MM3 (ref 0–1)
MONOCYTES NFR BLD AUTO: 7.3 % (ref 0–12)
NEUTROPHILS # BLD AUTO: 2.68 10*3/MM3 (ref 1.5–8.3)
NEUTROPHILS NFR BLD AUTO: 55.6 % (ref 41–71)
PLATELET # BLD AUTO: 183 10*3/MM3 (ref 150–450)
PMV BLD AUTO: 8.4 FL (ref 6–12)
POTASSIUM BLD-SCNC: 4.8 MMOL/L (ref 3.5–5.5)
PROT SERPL-MCNC: 7.9 G/DL (ref 5.7–8.2)
RBC # BLD AUTO: 4.98 10*6/MM3 (ref 4.2–5.76)
SODIUM BLD-SCNC: 140 MMOL/L (ref 132–146)
T4 FREE SERPL-MCNC: 0.96 NG/DL (ref 0.89–1.76)
TSH SERPL DL<=0.05 MIU/L-ACNC: 1.68 MIU/ML (ref 0.35–5.35)
WBC NRBC COR # BLD: 4.82 10*3/MM3 (ref 3.5–10.8)

## 2017-08-02 PROCEDURE — 84439 ASSAY OF FREE THYROXINE: CPT

## 2017-08-02 PROCEDURE — 36415 COLL VENOUS BLD VENIPUNCTURE: CPT

## 2017-08-02 PROCEDURE — 85025 COMPLETE CBC W/AUTO DIFF WBC: CPT

## 2017-08-02 PROCEDURE — 82378 CARCINOEMBRYONIC ANTIGEN: CPT

## 2017-08-02 PROCEDURE — 83036 HEMOGLOBIN GLYCOSYLATED A1C: CPT

## 2017-08-02 PROCEDURE — 84443 ASSAY THYROID STIM HORMONE: CPT

## 2017-08-02 PROCEDURE — 80053 COMPREHEN METABOLIC PANEL: CPT

## 2017-08-03 ENCOUNTER — TELEPHONE (OUTPATIENT)
Dept: ONCOLOGY | Facility: CLINIC | Age: 57
End: 2017-08-03

## 2017-08-03 DIAGNOSIS — C18.7 MALIGNANT NEOPLASM OF SIGMOID COLON (HCC): Primary | ICD-10-CM

## 2017-08-03 LAB — HBA1C MFR BLD: 5.4 % (ref 4.8–5.6)

## 2017-08-03 NOTE — TELEPHONE ENCOUNTER
----- Message from Abida Otoole sent at 8/3/2017 10:56 AM EDT -----  Regarding: [email or web address]  Contact: [phone]  Dr hernadez ask that you call him because he wants you to add a lab order.

## 2017-08-04 ENCOUNTER — INFUSION (OUTPATIENT)
Dept: ONCOLOGY | Facility: HOSPITAL | Age: 57
End: 2017-08-04

## 2017-08-04 ENCOUNTER — OFFICE VISIT (OUTPATIENT)
Dept: ONCOLOGY | Facility: CLINIC | Age: 57
End: 2017-08-04

## 2017-08-04 VITALS
SYSTOLIC BLOOD PRESSURE: 129 MMHG | DIASTOLIC BLOOD PRESSURE: 74 MMHG | HEART RATE: 66 BPM | TEMPERATURE: 97.2 F | RESPIRATION RATE: 16 BRPM | WEIGHT: 135 LBS | HEIGHT: 66 IN | BODY MASS INDEX: 21.69 KG/M2

## 2017-08-04 DIAGNOSIS — C79.51 BONE METASTASIS: ICD-10-CM

## 2017-08-04 DIAGNOSIS — C18.7 MALIGNANT NEOPLASM OF SIGMOID COLON (HCC): Primary | ICD-10-CM

## 2017-08-04 PROCEDURE — 96413 CHEMO IV INFUSION 1 HR: CPT

## 2017-08-04 PROCEDURE — 99215 OFFICE O/P EST HI 40 MIN: CPT | Performed by: INTERNAL MEDICINE

## 2017-08-04 PROCEDURE — 25010000002 PEMBROLIZUMAB 100 MG/4ML SOLUTION 4 ML VIAL

## 2017-08-04 RX ORDER — OXYCODONE AND ACETAMINOPHEN 10; 325 MG/1; MG/1
1 TABLET ORAL EVERY 6 HOURS PRN
COMMUNITY
End: 2018-02-12

## 2017-08-04 RX ORDER — CELECOXIB 200 MG/1
200 CAPSULE ORAL DAILY PRN
COMMUNITY
End: 2018-06-12

## 2017-08-04 RX ORDER — SODIUM CHLORIDE 9 MG/ML
250 INJECTION, SOLUTION INTRAVENOUS ONCE
Status: DISCONTINUED | OUTPATIENT
Start: 2017-08-04 | End: 2017-08-04 | Stop reason: HOSPADM

## 2017-08-04 NOTE — PROGRESS NOTES
DATE OF VISIT: 8/4/2017    REASON FOR VISIT: Followup for metastatic colon cancer.      HISTORY OF PRESENT ILLNESS: The patient is a very pleasant 57 y.o. male  with past medical history significant for metastatic colon cancer diagnosed March 2007 . He is status post lower anterior resection as well as 1 cycle FOLFOX, stopped due to multiple toxicities. Final pathology showed poorly differentiated adenocarcinoma with 25 negative lymph nodes and clear surgical margins. Isolated tumor deposits was found in the pericolonic fat. Pathologic stage T2 N1c M0 stage IIIA disease.The patient has been followed by serial colonoscopies as well as CAT scans that did document pulmonary metastatic disease with left lower lobe nodule that is gradually increasing in size. Patient was doing fairly well until recently, 3 months ago, patient presented with low back pain. Patient had restaging workup that revealed hypermetabolic L2 vertebral body lesion. Patient had biopsy done on October 11, 2016 that revealed metastatic adenocarcinoma consistent of colon primary with CK 7 negative CK 20 positive CDX2 positive. Patient had next generation gene sequencing that revealed intermediate mutational load with microsatellite stability.  He started chemotherapy with Keytruda on 2/1/2017. The patient is here for cycle # 9 of treatment.     SUBJECTIVE: The patient has been doing fairly well. His back pain is stable.  It gets worse after trip to [place].  Currently he is using Percocet 10 mg at night only.  He has no difficulty with bowel or bladder control. He is eating and drinking well. He has no cough or shortness of breath.  He is complaining of fatigue.    PAST MEDICAL HISTORY/SOCIAL HISTORY/FAMILY HISTORY: Unchanged from my prior documentation done on 01/17/2017.     Review of Systems   Constitutional: Negative for activity change, appetite change, chills, fatigue, fever and unexpected weight change.   HENT: Negative for hearing loss, mouth  sores, nosebleeds, sore throat and trouble swallowing.    Eyes: Negative for visual disturbance.   Respiratory: Negative for cough, chest tightness, shortness of breath and wheezing.    Cardiovascular: Negative for chest pain, palpitations and leg swelling.   Gastrointestinal: Negative for abdominal distention, abdominal pain, blood in stool, constipation, diarrhea, nausea, rectal pain and vomiting.   Endocrine: Negative for cold intolerance and heat intolerance.   Genitourinary: Negative for difficulty urinating, dysuria, frequency and urgency.   Musculoskeletal: Positive for back pain. Negative for arthralgias, gait problem, joint swelling and myalgias.   Skin: Negative for rash.   Neurological: Negative for dizziness, tremors, syncope, weakness, light-headedness, numbness and headaches.        Sleep disturbance   Hematological: Negative for adenopathy. Does not bruise/bleed easily.   Psychiatric/Behavioral: Negative for confusion, sleep disturbance and suicidal ideas. The patient is not nervous/anxious.          Current Outpatient Prescriptions:   •  celecoxib (CELEBREX) 200 MG capsule, Take 200 mg by mouth Daily As Needed for Mild Pain (1-3)., Disp: , Rfl:   •  Blood Glucose Monitoring Suppl (ONE TOUCH ULTRA 2) W/DEVICE kit, , Disp: , Rfl:   •  Cholecalciferol (VITAMIN D3) 5000 UNITS capsule capsule, Take 5,000 Units by mouth Daily., Disp: , Rfl:   •  diazePAM (VALIUM) 5 MG tablet, , Disp: , Rfl:   •  magnesium hydroxide (MILK OF MAGNESIA) 2400 MG/10ML suspension suspension, Take 10 mL by mouth Daily., Disp: , Rfl:   •  metFORMIN ER (GLUCOPHAGE-XR) 500 MG 24 hr tablet, , Disp: , Rfl:   •  ONE TOUCH ULTRA TEST test strip, , Disp: , Rfl:   •  ONETOUCH DELICA LANCETS 33G misc, , Disp: , Rfl:   •  oxyCODONE-acetaminophen (PERCOCET)  MG per tablet, Take 1 tablet by mouth., Disp: , Rfl:   •  zaleplon (SONATA) 10 MG capsule, , Disp: , Rfl:   No current facility-administered medications for this visit.  "    Facility-Administered Medications Ordered in Other Visits:   •  sodium chloride 0.9 % infusion 250 mL, 250 mL, Intravenous, Once, Natasha Herrera, APRN    PHYSICAL EXAMINATION:   /74 Comment: RUE  Pulse 66  Temp 97.2 °F (36.2 °C) (Temporal Artery )   Resp 16  Ht 66\" (167.6 cm)  Wt 135 lb (61.2 kg)  BMI 21.79 kg/m2   ECOG Performance Status: 1 - Symptomatic but completely ambulatory  General Appearance:  alert, cooperative, no apparent distress and appears stated age   Neurologic/Psychiatric: A&O x 3, gait steady, appropriate affect, strength 5/5 in all muscle groups   HEENT:  Normocephalic, without obvious abnormality, mucous membranes moist   Neck: Supple, symmetrical, trachea midline, no adenopathy;  No thyromegaly, masses, or tenderness   Lungs:   Clear to auscultation bilaterally; respirations regular, even, and unlabored bilaterally   Heart:  Regular rate and rhythm, no murmurs appreciated   Abdomen:   Soft, non-tender, non-distended and no organomegaly   Lymph nodes: No cervical, supraclavicular, inguinal or axillary adenopathy noted   Extremities: Normal, atraumatic; no clubbing, cyanosis, or edema    Skin: No rashes, ulcers, or suspicious lesions noted     Lab on 08/02/2017   Component Date Value Ref Range Status   • CEA 08/02/2017 64.70* 0.00 - 2.50 ng/mL Final   • Glucose 08/02/2017 122* 70 - 100 mg/dL Final   • BUN 08/02/2017 14  9 - 23 mg/dL Final   • Creatinine 08/02/2017 0.60  0.60 - 1.30 mg/dL Final   • Sodium 08/02/2017 140  132 - 146 mmol/L Final   • Potassium 08/02/2017 4.8  3.5 - 5.5 mmol/L Final   • Chloride 08/02/2017 103  99 - 109 mmol/L Final   • CO2 08/02/2017 30.0  20.0 - 31.0 mmol/L Final   • Calcium 08/02/2017 9.6  8.7 - 10.4 mg/dL Final   • Total Protein 08/02/2017 7.9  5.7 - 8.2 g/dL Final   • Albumin 08/02/2017 4.20  3.20 - 4.80 g/dL Final   • ALT (SGPT) 08/02/2017 18  7 - 40 U/L Final   • AST (SGOT) 08/02/2017 26  0 - 33 U/L Final   • Alkaline Phosphatase 08/02/2017 80  " 25 - 100 U/L Final   • Total Bilirubin 08/02/2017 0.4  0.3 - 1.2 mg/dL Final   • eGFR Non African Amer 08/02/2017 140  >60 mL/min/1.73 Final   • Globulin 08/02/2017 3.7  gm/dL Final   • A/G Ratio 08/02/2017 1.1* 1.5 - 2.5 g/dL Final   • BUN/Creatinine Ratio 08/02/2017 23.3  7.0 - 25.0 Final   • Anion Gap 08/02/2017 7.0  3.0 - 11.0 mmol/L Final   • TSH 08/02/2017 1.680  0.350 - 5.350 mIU/mL Final   • Free T4 08/02/2017 0.96  0.89 - 1.76 ng/dL Final   • WBC 08/02/2017 4.82  3.50 - 10.80 10*3/mm3 Final   • RBC 08/02/2017 4.98  4.20 - 5.76 10*6/mm3 Final   • Hemoglobin 08/02/2017 10.5* 13.1 - 17.5 g/dL Final   • Hematocrit 08/02/2017 33.7* 38.9 - 50.9 % Final   • MCV 08/02/2017 67.7* 80.0 - 99.0 fL Final   • MCH 08/02/2017 21.1* 27.0 - 31.0 pg Final   • MCHC 08/02/2017 31.2* 32.0 - 36.0 g/dL Final   • RDW 08/02/2017 19.9* 11.3 - 14.5 % Final   • RDW-SD 08/02/2017 49.5  37.0 - 54.0 fl Final   • MPV 08/02/2017 8.4  6.0 - 12.0 fL Final   • Platelets 08/02/2017 183  150 - 450 10*3/mm3 Final   • Neutrophil % 08/02/2017 55.6  41.0 - 71.0 % Final   • Lymphocyte % 08/02/2017 24.5  24.0 - 44.0 % Final   • Monocyte % 08/02/2017 7.3  0.0 - 12.0 % Final   • Eosinophil % 08/02/2017 12.2* 0.0 - 3.0 % Final   • Basophil % 08/02/2017 0.2  0.0 - 1.0 % Final   • Immature Grans % 08/02/2017 0.2  0.0 - 0.6 % Final   • Neutrophils, Absolute 08/02/2017 2.68  1.50 - 8.30 10*3/mm3 Final   • Lymphocytes, Absolute 08/02/2017 1.18  0.60 - 4.80 10*3/mm3 Final   • Monocytes, Absolute 08/02/2017 0.35  0.00 - 1.00 10*3/mm3 Final   • Eosinophils, Absolute 08/02/2017 0.59* 0.00 - 0.30 10*3/mm3 Final   • Basophils, Absolute 08/02/2017 0.01  0.00 - 0.20 10*3/mm3 Final   • Immature Grans, Absolute 08/02/2017 0.01  0.00 - 0.03 10*3/mm3 Final   • Hemoglobin A1C 08/02/2017 5.40  4.80 - 5.60 % Final        No results found.    ASSESSMENT: The patient is a very pleasant 56 y.o. male with stage IV colon cancer    PROBLEM LIST:  1. Currently stage IV colon  cancer with lung and bony metastases.  K-edwige mutant, microsatellite stable, and intermediate mutational load.  2. Status post low anterior resection done by Dr. Young 2007, [id number]  3.  Attempted adjuvant chemotherapy for 1 cycle could not tolerate secondary to multiple toxicities.  4. Biopsy-proven L2 metastases done October 11, 2016. Status post CyberKnife radiation treatment.  5.  Started Keytruda 200 mg IV every 3 weeks on February 1, 2017, status post 7 cycles of treatment  6. Status post tumor debulking at L2 done by Dr. Frazier at [facility] on April 21, 2017  7.  Treatment induced hepatitis, resolved  8.  Cancer related pain  9.  Opioid-induced constipation  10.  Treatment induced asthenia  11.  Mild depression  12.  Insomnia    PLAN:  1.  We will proceed with cycle # 9 of Keytruda as scheduled today.   2.  The patient will follow-up with me in 3 weeks for next cycle of treatment.  3.  Patient will continue taking over-the-counter stool softeners for constipation.  4.  I reviewed with the patient the potential side effects from immune therapy including immune mediated reactions with pneumonitis, hepatitis, colitis, thyroiditis, and potential death.   5.  If immune therapy fails subsequent line treatment would include chemotherapy with anti VEGF treatment.  Patient tissue was submitted for the matched trial he did match on PETN mutation however that arm was closed.  6.  I advised the patient to hold his alternative treatment with high dose vitamin C while he is on immunetherapy.  7.  I did go over the CT scan results with the patient and his wife, I reviewed the films myself.  Patient has essentially stable disease.  I will do a follow-up CAT scan in 3 months which would be due end of September 2017.  8. We will monitor labs throughout treatment including blood counts, kidney function, liver functions, serum CEA, and thyroid function. We will monitor his liver enzymes that have been elevated  secondary to treatment with immune-therapy.   9. The patient will continue Zofran as needed for treatment related nausea.  10. The patient will continue oxycodone 10 mg as needed for cancer-related pain.  We'll continue Celebrex 200 mg twice a day as needed.    11.  I offered patient Ritalin 5 mg daily to see if that might help with his fatigue that he would like to hold off for now.  12.  The patient could not tolerate Remeron 15 mg daily at bedtime secondary to mental status changes.  13.  Patient will continue Zaleplon as needed for insomnia.    Kristofer Rodriguez MD  8/4/2017   1:47 PM

## 2017-08-27 ENCOUNTER — LAB (OUTPATIENT)
Dept: LAB | Facility: HOSPITAL | Age: 57
End: 2017-08-27
Attending: INTERNAL MEDICINE

## 2017-08-27 DIAGNOSIS — C18.7 MALIGNANT NEOPLASM OF SIGMOID COLON (HCC): ICD-10-CM

## 2017-08-27 DIAGNOSIS — C79.51 BONE METASTASIS: ICD-10-CM

## 2017-08-27 LAB
ALBUMIN SERPL-MCNC: 4.3 G/DL (ref 3.2–4.8)
ALBUMIN/GLOB SERPL: 1.3 G/DL (ref 1.5–2.5)
ALP SERPL-CCNC: 78 U/L (ref 25–100)
ALT SERPL W P-5'-P-CCNC: 22 U/L (ref 7–40)
ANION GAP SERPL CALCULATED.3IONS-SCNC: 5 MMOL/L (ref 3–11)
AST SERPL-CCNC: 30 U/L (ref 0–33)
BASOPHILS # BLD AUTO: 0.02 10*3/MM3 (ref 0–0.2)
BASOPHILS NFR BLD AUTO: 0.3 % (ref 0–1)
BILIRUB SERPL-MCNC: 0.4 MG/DL (ref 0.3–1.2)
BUN BLD-MCNC: 14 MG/DL (ref 9–23)
BUN/CREAT SERPL: 23.3 (ref 7–25)
CALCIUM SPEC-SCNC: 9 MG/DL (ref 8.7–10.4)
CEA SERPL-MCNC: 140.6 NG/ML (ref 0–2.5)
CHLORIDE SERPL-SCNC: 103 MMOL/L (ref 99–109)
CO2 SERPL-SCNC: 29 MMOL/L (ref 20–31)
CREAT BLD-MCNC: 0.6 MG/DL (ref 0.6–1.3)
DEPRECATED RDW RBC AUTO: 49.9 FL (ref 37–54)
EOSINOPHIL # BLD AUTO: 0.75 10*3/MM3 (ref 0–0.3)
EOSINOPHIL NFR BLD AUTO: 11.3 % (ref 0–3)
ERYTHROCYTE [DISTWIDTH] IN BLOOD BY AUTOMATED COUNT: 20.3 % (ref 11.3–14.5)
GFR SERPL CREATININE-BSD FRML MDRD: 140 ML/MIN/1.73
GLOBULIN UR ELPH-MCNC: 3.2 GM/DL
GLUCOSE BLD-MCNC: 115 MG/DL (ref 70–100)
HCT VFR BLD AUTO: 34.4 % (ref 38.9–50.9)
HGB BLD-MCNC: 10.7 G/DL (ref 13.1–17.5)
IMM GRANULOCYTES # BLD: 0.01 10*3/MM3 (ref 0–0.03)
IMM GRANULOCYTES NFR BLD: 0.2 % (ref 0–0.6)
LYMPHOCYTES # BLD AUTO: 1.53 10*3/MM3 (ref 0.6–4.8)
LYMPHOCYTES NFR BLD AUTO: 23.1 % (ref 24–44)
MCH RBC QN AUTO: 20.8 PG (ref 27–31)
MCHC RBC AUTO-ENTMCNC: 31.1 G/DL (ref 32–36)
MCV RBC AUTO: 66.9 FL (ref 80–99)
MONOCYTES # BLD AUTO: 0.52 10*3/MM3 (ref 0–1)
MONOCYTES NFR BLD AUTO: 7.8 % (ref 0–12)
NEUTROPHILS # BLD AUTO: 3.8 10*3/MM3 (ref 1.5–8.3)
NEUTROPHILS NFR BLD AUTO: 57.3 % (ref 41–71)
PLATELET # BLD AUTO: 199 10*3/MM3 (ref 150–450)
PMV BLD AUTO: 9.3 FL (ref 6–12)
POTASSIUM BLD-SCNC: 4.3 MMOL/L (ref 3.5–5.5)
PROT SERPL-MCNC: 7.5 G/DL (ref 5.7–8.2)
RBC # BLD AUTO: 5.14 10*6/MM3 (ref 4.2–5.76)
SODIUM BLD-SCNC: 137 MMOL/L (ref 132–146)
T4 FREE SERPL-MCNC: 1.03 NG/DL (ref 0.89–1.76)
TSH SERPL DL<=0.05 MIU/L-ACNC: 2.38 MIU/ML (ref 0.35–5.35)
WBC NRBC COR # BLD: 6.63 10*3/MM3 (ref 3.5–10.8)

## 2017-08-27 PROCEDURE — 84443 ASSAY THYROID STIM HORMONE: CPT

## 2017-08-27 PROCEDURE — 85025 COMPLETE CBC W/AUTO DIFF WBC: CPT

## 2017-08-27 PROCEDURE — 82378 CARCINOEMBRYONIC ANTIGEN: CPT

## 2017-08-27 PROCEDURE — 84439 ASSAY OF FREE THYROXINE: CPT

## 2017-08-27 PROCEDURE — 36415 COLL VENOUS BLD VENIPUNCTURE: CPT

## 2017-08-27 PROCEDURE — 80053 COMPREHEN METABOLIC PANEL: CPT

## 2017-08-29 ENCOUNTER — DOCUMENTATION (OUTPATIENT)
Dept: ONCOLOGY | Facility: CLINIC | Age: 57
End: 2017-08-29

## 2017-08-29 ENCOUNTER — OFFICE VISIT (OUTPATIENT)
Dept: ONCOLOGY | Facility: CLINIC | Age: 57
End: 2017-08-29

## 2017-08-29 ENCOUNTER — INFUSION (OUTPATIENT)
Dept: ONCOLOGY | Facility: HOSPITAL | Age: 57
End: 2017-08-29

## 2017-08-29 VITALS
HEART RATE: 55 BPM | BODY MASS INDEX: 21.86 KG/M2 | TEMPERATURE: 97.8 F | RESPIRATION RATE: 14 BRPM | SYSTOLIC BLOOD PRESSURE: 121 MMHG | WEIGHT: 136 LBS | HEIGHT: 66 IN | DIASTOLIC BLOOD PRESSURE: 67 MMHG

## 2017-08-29 DIAGNOSIS — C79.51 BONE METASTASIS: ICD-10-CM

## 2017-08-29 DIAGNOSIS — C18.7 MALIGNANT NEOPLASM OF SIGMOID COLON (HCC): Primary | ICD-10-CM

## 2017-08-29 PROCEDURE — 96413 CHEMO IV INFUSION 1 HR: CPT

## 2017-08-29 PROCEDURE — 99215 OFFICE O/P EST HI 40 MIN: CPT | Performed by: INTERNAL MEDICINE

## 2017-08-29 PROCEDURE — 25010000002 PEMBROLIZUMAB 100 MG/4ML SOLUTION 4 ML VIAL

## 2017-08-29 RX ORDER — SODIUM CHLORIDE 9 MG/ML
250 INJECTION, SOLUTION INTRAVENOUS ONCE
Status: CANCELLED | OUTPATIENT
Start: 2017-08-29

## 2017-08-29 RX ORDER — SODIUM CHLORIDE 9 MG/ML
250 INJECTION, SOLUTION INTRAVENOUS ONCE
Status: COMPLETED | OUTPATIENT
Start: 2017-08-29 | End: 2017-08-29

## 2017-08-29 RX ADMIN — SODIUM CHLORIDE 250 ML: 9 INJECTION, SOLUTION INTRAVENOUS at 10:23

## 2017-08-29 NOTE — PROGRESS NOTES
DATE OF VISIT: 8/29/2017    REASON FOR VISIT: Followup for metastatic colon cancer.      HISTORY OF PRESENT ILLNESS: The patient is a very pleasant 57 y.o. male  with past medical history significant for metastatic colon cancer diagnosed March 2007 . He is status post lower anterior resection as well as 1 cycle FOLFOX, stopped due to multiple toxicities. Final pathology showed poorly differentiated adenocarcinoma with 25 negative lymph nodes and clear surgical margins. Isolated tumor deposits was found in the pericolonic fat. Pathologic stage T2 N1c M0 stage IIIA disease.The patient has been followed by serial colonoscopies as well as CAT scans that did document pulmonary metastatic disease with left lower lobe nodule that is gradually increasing in size. Patient was doing fairly well until recently, 3 months ago, patient presented with low back pain. Patient had restaging workup that revealed hypermetabolic L2 vertebral body lesion. Patient had biopsy done on October 11, 2016 that revealed metastatic adenocarcinoma consistent of colon primary with CK 7 negative CK 20 positive CDX2 positive. Patient had next generation gene sequencing that revealed intermediate mutational load with microsatellite stability.  He started chemotherapy with Keytruda on 2/1/2017. The patient is here for cycle # 10 of treatment.     SUBJECTIVE: The patient has been doing fairly well. His back pain is stable.  It gets worse after trip to [place].  Currently he is using Percocet 10 mg at night only.  He has no difficulty with bowel or bladder control. He is eating and drinking well. He has no cough or shortness of breath.  He is complaining of fatigue.    PAST MEDICAL HISTORY/SOCIAL HISTORY/FAMILY HISTORY: Unchanged from my prior documentation done on 01/17/2017.     Review of Systems   Constitutional: Negative for activity change, appetite change, chills, fatigue, fever and unexpected weight change.   HENT: Negative for hearing loss, mouth  sores, nosebleeds, sore throat and trouble swallowing.    Eyes: Negative for visual disturbance.   Respiratory: Negative for cough, chest tightness, shortness of breath and wheezing.    Cardiovascular: Negative for chest pain, palpitations and leg swelling.   Gastrointestinal: Negative for abdominal distention, abdominal pain, blood in stool, constipation, diarrhea, nausea, rectal pain and vomiting.   Endocrine: Negative for cold intolerance and heat intolerance.   Genitourinary: Negative for difficulty urinating, dysuria, frequency and urgency.   Musculoskeletal: Positive for back pain. Negative for arthralgias, gait problem, joint swelling and myalgias.   Skin: Negative for rash.   Neurological: Negative for dizziness, tremors, syncope, weakness, light-headedness, numbness and headaches.        Sleep disturbance   Hematological: Negative for adenopathy. Does not bruise/bleed easily.   Psychiatric/Behavioral: Negative for confusion, sleep disturbance and suicidal ideas. The patient is not nervous/anxious.          Current Outpatient Prescriptions:   •  Blood Glucose Monitoring Suppl (ONE TOUCH ULTRA 2) W/DEVICE kit, , Disp: , Rfl:   •  celecoxib (CELEBREX) 200 MG capsule, Take 200 mg by mouth Daily As Needed for Mild Pain (1-3)., Disp: , Rfl:   •  Cholecalciferol (VITAMIN D3) 5000 UNITS capsule capsule, Take 5,000 Units by mouth Daily., Disp: , Rfl:   •  diazePAM (VALIUM) 5 MG tablet, , Disp: , Rfl:   •  magnesium hydroxide (MILK OF MAGNESIA) 2400 MG/10ML suspension suspension, Take 10 mL by mouth Daily., Disp: , Rfl:   •  metFORMIN ER (GLUCOPHAGE-XR) 500 MG 24 hr tablet, , Disp: , Rfl:   •  ONE TOUCH ULTRA TEST test strip, , Disp: , Rfl:   •  ONETOUCH DELICA LANCETS 33G misc, , Disp: , Rfl:   •  oxyCODONE-acetaminophen (PERCOCET)  MG per tablet, Take 1 tablet by mouth., Disp: , Rfl:   •  zaleplon (SONATA) 10 MG capsule, , Disp: , Rfl:   No current facility-administered medications for this visit.      Facility-Administered Medications Ordered in Other Visits:   •  sodium chloride 0.9 % infusion 250 mL, 250 mL, Intravenous, Once, Natasha Herrera, JESSEE    PHYSICAL EXAMINATION:   There were no vitals taken for this visit.   ECOG Performance Status: 1 - Symptomatic but completely ambulatory  General Appearance:  alert, cooperative, no apparent distress and appears stated age   Neurologic/Psychiatric: A&O x 3, gait steady, appropriate affect, strength 5/5 in all muscle groups   HEENT:  Normocephalic, without obvious abnormality, mucous membranes moist   Neck: Supple, symmetrical, trachea midline, no adenopathy;  No thyromegaly, masses, or tenderness   Lungs:   Clear to auscultation bilaterally; respirations regular, even, and unlabored bilaterally   Heart:  Regular rate and rhythm, no murmurs appreciated   Abdomen:   Soft, non-tender, non-distended and no organomegaly   Lymph nodes: No cervical, supraclavicular, inguinal or axillary adenopathy noted   Extremities: Normal, atraumatic; no clubbing, cyanosis, or edema    Skin: No rashes, ulcers, or suspicious lesions noted     Lab on 08/27/2017   Component Date Value Ref Range Status   • CEA 08/27/2017 140.60* 0.00 - 2.50 ng/mL Final   • Glucose 08/27/2017 115* 70 - 100 mg/dL Final   • BUN 08/27/2017 14  9 - 23 mg/dL Final   • Creatinine 08/27/2017 0.60  0.60 - 1.30 mg/dL Final   • Sodium 08/27/2017 137  132 - 146 mmol/L Final   • Potassium 08/27/2017 4.3  3.5 - 5.5 mmol/L Final   • Chloride 08/27/2017 103  99 - 109 mmol/L Final   • CO2 08/27/2017 29.0  20.0 - 31.0 mmol/L Final   • Calcium 08/27/2017 9.0  8.7 - 10.4 mg/dL Final   • Total Protein 08/27/2017 7.5  5.7 - 8.2 g/dL Final   • Albumin 08/27/2017 4.30  3.20 - 4.80 g/dL Final   • ALT (SGPT) 08/27/2017 22  7 - 40 U/L Final   • AST (SGOT) 08/27/2017 30  0 - 33 U/L Final   • Alkaline Phosphatase 08/27/2017 78  25 - 100 U/L Final   • Total Bilirubin 08/27/2017 0.4  0.3 - 1.2 mg/dL Final   • eGFR Non African Amer  08/27/2017 140  >60 mL/min/1.73 Final   • Globulin 08/27/2017 3.2  gm/dL Final   • A/G Ratio 08/27/2017 1.3* 1.5 - 2.5 g/dL Final   • BUN/Creatinine Ratio 08/27/2017 23.3  7.0 - 25.0 Final   • Anion Gap 08/27/2017 5.0  3.0 - 11.0 mmol/L Final   • TSH 08/27/2017 2.381  0.350 - 5.350 mIU/mL Final   • Free T4 08/27/2017 1.03  0.89 - 1.76 ng/dL Final   • WBC 08/27/2017 6.63  3.50 - 10.80 10*3/mm3 Final   • RBC 08/27/2017 5.14  4.20 - 5.76 10*6/mm3 Final   • Hemoglobin 08/27/2017 10.7* 13.1 - 17.5 g/dL Final   • Hematocrit 08/27/2017 34.4* 38.9 - 50.9 % Final   • MCV 08/27/2017 66.9* 80.0 - 99.0 fL Final   • MCH 08/27/2017 20.8* 27.0 - 31.0 pg Final   • MCHC 08/27/2017 31.1* 32.0 - 36.0 g/dL Final   • RDW 08/27/2017 20.3* 11.3 - 14.5 % Final   • RDW-SD 08/27/2017 49.9  37.0 - 54.0 fl Final   • MPV 08/27/2017 9.3  6.0 - 12.0 fL Final   • Platelets 08/27/2017 199  150 - 450 10*3/mm3 Final   • Neutrophil % 08/27/2017 57.3  41.0 - 71.0 % Final   • Lymphocyte % 08/27/2017 23.1* 24.0 - 44.0 % Final   • Monocyte % 08/27/2017 7.8  0.0 - 12.0 % Final   • Eosinophil % 08/27/2017 11.3* 0.0 - 3.0 % Final   • Basophil % 08/27/2017 0.3  0.0 - 1.0 % Final   • Immature Grans % 08/27/2017 0.2  0.0 - 0.6 % Final   • Neutrophils, Absolute 08/27/2017 3.80  1.50 - 8.30 10*3/mm3 Final   • Lymphocytes, Absolute 08/27/2017 1.53  0.60 - 4.80 10*3/mm3 Final   • Monocytes, Absolute 08/27/2017 0.52  0.00 - 1.00 10*3/mm3 Final   • Eosinophils, Absolute 08/27/2017 0.75* 0.00 - 0.30 10*3/mm3 Final   • Basophils, Absolute 08/27/2017 0.02  0.00 - 0.20 10*3/mm3 Final   • Immature Grans, Absolute 08/27/2017 0.01  0.00 - 0.03 10*3/mm3 Final        No results found.    ASSESSMENT: The patient is a very pleasant 56 y.o. male with stage IV colon cancer    PROBLEM LIST:  1. Currently stage IV colon cancer with lung and bony metastases.  K-edwige mutant, microsatellite stable, and intermediate mutational load.  2. Status post low anterior resection done by   [facility] 2007, [id number]  3.  Attempted adjuvant chemotherapy for 1 cycle could not tolerate secondary to multiple toxicities.  4. Biopsy-proven L2 metastases done October 11, 2016. Status post CyberKnife radiation treatment.  5.  Started Keytruda 200 mg IV every 3 weeks on February 1, 2017, status post 9 cycles of treatment  6. Status post tumor debulking at L2 done by Dr. Frazier at [facility] on April 21, 2017  7.  Treatment induced hepatitis, resolved  8.  Cancer related pain  9.  Opioid-induced constipation  10.  Treatment induced asthenia  11.  Mild depression  12.  Insomnia    PLAN:  1.  We will proceed with cycle # 10 of Keytruda as scheduled today.   2.  The patient will follow-up with me in 3 weeks for next cycle of treatment.  3.  Patient will continue taking over-the-counter stool softeners for constipation.  4.  I reviewed with the patient the potential side effects from immune therapy including immune mediated reactions with pneumonitis, hepatitis, colitis, thyroiditis, and potential death.   5.  If immune therapy fails subsequent line treatment would include chemotherapy with anti VEGF treatment.  Patient tissue was submitted for the matched trial he did match on PETN mutation however that arm was closed.  6.  I advised the patient to hold his alternative treatment with high dose vitamin C while he is on immunetherapy.  7.  I will do a follow-up PET scan prior to return.  The reason for PET is constantly increasing CEA, new symptoms with worsening low back pain, as well as his last CAT scan showed abnormalities that were not sure if related to malignancy or not.    8. We will monitor labs throughout treatment including blood counts, kidney function, liver functions, serum CEA, and thyroid function. We will monitor his liver enzymes that have been elevated secondary to treatment with immune-therapy.   9. The patient will continue Zofran as needed for treatment related nausea.  10. The patient will  continue oxycodone 10 mg as needed for cancer-related pain.  We'll continue Celebrex 200 mg twice a day as needed.    11.  I offered patient Ritalin 5 mg daily to see if that might help with his fatigue that he would like to hold off for now.  12.  The patient could not tolerate Remeron 15 mg daily at bedtime secondary to mental status changes.  13.  Patient will continue Zaleplon as needed for insomnia.    Scribed for Kristofer Rodriguez MD by JESSEE Osborne. 8/29/2017  8:09 AM  Kristofer Rodriguez MD  8/29/2017   8:08 AM  I, Kristofer Rodriguez MD, personally performed the services described in this documentation as scribed by the above named individual in my presence, and it is both accurate and complete.  8/29/2017  9:18 AM

## 2017-08-29 NOTE — PROGRESS NOTES
8/29/2017  Rec'd disability form.  JULIA-8/29/2017  $15 fee collected.  Once completed, fax to #[phone]    Completed form, attached MR, & put in MD box.    8/31/2017  Was notified that pt was informed by disability that form is not needed.  Not sure by this but will fax just in case when I get it back.    9/1/2017  Rec'd form with MD signature.  Faxed to Tash Arce @ fax# [phone].

## 2017-08-30 ENCOUNTER — TELEPHONE (OUTPATIENT)
Dept: ONCOLOGY | Facility: CLINIC | Age: 57
End: 2017-08-30

## 2017-08-31 ENCOUNTER — APPOINTMENT (OUTPATIENT)
Dept: OTHER | Facility: HOSPITAL | Age: 57
End: 2017-08-31
Attending: INTERNAL MEDICINE

## 2017-08-31 ENCOUNTER — HOSPITAL ENCOUNTER (OUTPATIENT)
Dept: PET IMAGING | Facility: HOSPITAL | Age: 57
Discharge: HOME | End: 2017-08-31
Attending: INTERNAL MEDICINE

## 2017-08-31 ENCOUNTER — HOSPITAL ENCOUNTER (OUTPATIENT)
Dept: PET IMAGING | Facility: HOSPITAL | Age: 57
Discharge: HOME | End: 2017-08-31
Attending: INTERNAL MEDICINE | Admitting: INTERNAL MEDICINE

## 2017-08-31 DIAGNOSIS — C18.7 MALIGNANT NEOPLASM OF SIGMOID COLON (HCC): ICD-10-CM

## 2017-08-31 DIAGNOSIS — Z00.6 EXAMINATION FOR NORMAL COMPARISON FOR CLINICAL RESEARCH: ICD-10-CM

## 2017-08-31 LAB — GLUCOSE BLDC GLUCOMTR-MCNC: 96 MG/DL (ref 70–130)

## 2017-08-31 PROCEDURE — 0 FLUDEOXYGLUCOSE F18 SOLUTION: Performed by: INTERNAL MEDICINE

## 2017-08-31 PROCEDURE — 82962 GLUCOSE BLOOD TEST: CPT

## 2017-08-31 PROCEDURE — 78816 PET IMAGE W/CT FULL BODY: CPT

## 2017-08-31 PROCEDURE — A9552 F18 FDG: HCPCS | Performed by: INTERNAL MEDICINE

## 2017-08-31 RX ADMIN — FLUDEOXYGLUCOSE F18 1 DOSE: 300 INJECTION INTRAVENOUS at 08:06

## 2017-09-01 DIAGNOSIS — C79.51 BONE METASTASIS: ICD-10-CM

## 2017-09-01 DIAGNOSIS — C18.7 MALIGNANT NEOPLASM OF SIGMOID COLON (HCC): Primary | ICD-10-CM

## 2017-09-01 RX ORDER — ONDANSETRON HYDROCHLORIDE 8 MG/1
8 TABLET, FILM COATED ORAL 3 TIMES DAILY PRN
Qty: 30 TABLET | Refills: 5 | Status: SHIPPED | OUTPATIENT
Start: 2017-09-01 | End: 2017-09-19 | Stop reason: SDUPTHER

## 2017-09-05 ENCOUNTER — PREP FOR SURGERY (OUTPATIENT)
Dept: OTHER | Facility: HOSPITAL | Age: 57
End: 2017-09-05

## 2017-09-05 ENCOUNTER — TELEPHONE (OUTPATIENT)
Dept: ONCOLOGY | Facility: CLINIC | Age: 57
End: 2017-09-05

## 2017-09-05 ENCOUNTER — SPECIALTY PHARMACY (OUTPATIENT)
Dept: ONCOLOGY | Facility: HOSPITAL | Age: 57
End: 2017-09-05

## 2017-09-05 DIAGNOSIS — C79.51 COLON CANCER METASTASIZED TO BONE (HCC): Primary | ICD-10-CM

## 2017-09-05 DIAGNOSIS — C79.51 BONE METASTASIS: ICD-10-CM

## 2017-09-05 DIAGNOSIS — C18.7 MALIGNANT NEOPLASM OF SIGMOID COLON (HCC): ICD-10-CM

## 2017-09-05 DIAGNOSIS — C18.7 MALIGNANT NEOPLASM OF SIGMOID COLON (HCC): Primary | ICD-10-CM

## 2017-09-05 DIAGNOSIS — C18.9 MALIGNANT NEOPLASM OF COLON, UNSPECIFIED PART OF COLON (HCC): Primary | ICD-10-CM

## 2017-09-05 DIAGNOSIS — C18.9 COLON CANCER METASTASIZED TO BONE (HCC): Primary | ICD-10-CM

## 2017-09-05 RX ORDER — DEXTROSE MONOHYDRATE 50 MG/ML
250 INJECTION, SOLUTION INTRAVENOUS ONCE
Status: CANCELLED | OUTPATIENT
Start: 2017-10-10

## 2017-09-05 RX ORDER — DEXTROSE MONOHYDRATE 50 MG/ML
250 INJECTION, SOLUTION INTRAVENOUS ONCE
Status: CANCELLED | OUTPATIENT
Start: 2017-10-31

## 2017-09-05 RX ORDER — CAPECITABINE 500 MG/1
TABLET, FILM COATED ORAL
Qty: 56 TABLET | Refills: 3 | Status: SHIPPED | OUTPATIENT
Start: 2017-09-05 | End: 2018-01-03 | Stop reason: SDUPTHER

## 2017-09-05 RX ORDER — CHLORHEXIDINE GLUCONATE 500 MG/1
1 CLOTH TOPICAL EVERY 12 HOURS PRN
Status: CANCELLED | OUTPATIENT
Start: 2017-09-18

## 2017-09-05 RX ORDER — SODIUM CHLORIDE 9 MG/ML
250 INJECTION, SOLUTION INTRAVENOUS ONCE
Status: CANCELLED | OUTPATIENT
Start: 2017-09-19

## 2017-09-05 RX ORDER — DEXTROSE MONOHYDRATE 50 MG/ML
250 INJECTION, SOLUTION INTRAVENOUS ONCE
Status: CANCELLED | OUTPATIENT
Start: 2017-09-19

## 2017-09-05 RX ORDER — PALONOSETRON 0.05 MG/ML
0.25 INJECTION, SOLUTION INTRAVENOUS ONCE
Status: CANCELLED | OUTPATIENT
Start: 2017-10-31

## 2017-09-05 RX ORDER — PALONOSETRON 0.05 MG/ML
0.25 INJECTION, SOLUTION INTRAVENOUS ONCE
Status: CANCELLED | OUTPATIENT
Start: 2017-09-19

## 2017-09-05 RX ORDER — PALONOSETRON 0.05 MG/ML
0.25 INJECTION, SOLUTION INTRAVENOUS ONCE
Status: CANCELLED | OUTPATIENT
Start: 2017-10-10

## 2017-09-05 RX ORDER — SODIUM CHLORIDE 9 MG/ML
250 INJECTION, SOLUTION INTRAVENOUS ONCE
Status: CANCELLED | OUTPATIENT
Start: 2017-10-10

## 2017-09-05 RX ORDER — SODIUM CHLORIDE 9 MG/ML
250 INJECTION, SOLUTION INTRAVENOUS ONCE
Status: CANCELLED | OUTPATIENT
Start: 2017-10-31

## 2017-09-05 NOTE — PROGRESS NOTES
9/5/17 @ 8:16 am - Pending provider signature  9/5/17 @ 3:03- Went on cover my meds [id number] pending for PA   9/6/17 @ 9:06 am - Fax came thorough no PA needed- Sent to pharmacy-   9/6/17- email and call from [facility] pharmacy can not fill due to contract. Gabriela email script to [facility]  9/6/17 @ 2:54- I apologize for any extra work.  Our PA team was able to approve the PA with the diagnoses that were provided on the prescription.  His copay is $839.48 and I am having our copay assistance team look now to see if there is any funding available.  I will let you know what they are able to find.   9/6/17 @ 3:30-Our copay assistance team advised that at this time there isn’t any funding available for his diagnosis.  We have placed him on our ‘fully allocated’ list and will have him called as soon as the ayla funding becomes available.  Let me know if you would like to have him called now to schedule or wait to see if funding opens up before his need by date.  9/7/17 emails with [facility] to check copay relief funding had closed they have patient on [facility] pending assistance for his DX list.   9/7/17 @ 11:02- checked online  1- PANF- Allocated funding  2-good days - Allocated  3- copays relief  9/8/17 @ 8:40 funds are still allocated - will keep looking for other options  9/11/17 @ 9:57 am -  1-PANF - Allocated  2-Copay Relief Allocated  3-Cancer Care- Allocated/closed  Will continue to look- [facility] is also looking  9/12/17 @ 12:42- emailed [facility] to obtain new status?  9/12/17 @ 1:57- I just double checked with the copay assistance supervisor again and there is still nothing open for colon at this time.  I will check with her again tomorrow.  9/13/17 @ 1:27 pm -Looks like patient will not qualify for foundation assistance his income is to high but should funding come open if we could leave him on list to try and obtain assistance as to his income will change due to diagnosis.   I just spoke with patient looks like he is  going to do 7 day supply to start Delivery date is 9/14/17. Patient will be in soon for infusion 9/16.  Gabriela please call patient and advise on medication   Thank you!  Cholo Multani to close- marnie

## 2017-09-05 NOTE — TELEPHONE ENCOUNTER
----- Message from Zara Pro MA sent at 9/5/2017  8:17 AM EDT -----  Regarding: Michael- questions about port  Contact: [phone]  Pt has questions about port placement scheduling and when to expect treatments, etc. He asked to discuss this in detail with Jessenia.

## 2017-09-05 NOTE — TELEPHONE ENCOUNTER
Advised patient that we will try to get port scheduled with Dr. Cornejo on Monday, 9/11, and have him see Dr Rodriguez and start treatment on 9/12 per request.  Message sent to scheduling with update, and to schedule appointments for patient.

## 2017-09-05 NOTE — PROGRESS NOTES
Xeloda 1000mg PO BID on days 1-14, followed by 7 days off - Submitted to [facility]   Oral Chemotherapy Teaching      Patient Name/:  Marquis Esteban   1960  Oral Chemotherapy Regimen:  Xeloda 1000mg PO BID on days 1-14, followed by 7 days off  + Oxaliplatin IV every 21 days + (starting with Cycle 2 Avastin IV every 21 days)  Date Started Medication: Cycle1: 17    Initial Teaching Follow Up Comments     Safety     Storage instructions (away from children; away from heat/cold, sunlight, or moisture), handling - use of gloves (caregivers), washing hands after touching pills, managing waste     “How are you storing your medications?”, reminders on storage, proper handling (caregivers using gloves, washing hands, away from children, managing waste, etc.), disposal of medication with D/C or dosage change    Discussed appropriate handling and storage of hazardous medication and oral chemotherapy. Advised to wash hands after handling oral chemotherapy. If others assist in handling medication advised to wear latex gloves to avoid unnecessary exposure.      Adherence      patient and/or caregiver on how to take medication, take with/without food, assess their adherence potential, stress importance of adherence, ways to manage adherence (pill boxes, phone reminders, calendars), what to do if miss a dose   “How are you taking your medication?” “How are you remembering to take your medication?”, “How many doses have you missed?”, determine reasons for non-adherence (not remembering, side effects, etc), ways to improve, overadherence? Remind patient of ways to improve/maintain adherence   Discussed starting oral chemotherapy on start date of 17 with IV oxaliplatin for start of Cycle 1. Patient instructed to take 2 tablets (1000mg) PO BID on days 1-14, followed by 7 days off. Instructed to take oral Xeloda within 30 minutes of food. Will mail out calendar. Discussed oxaliplatin administration every 21 days. Will  add Bevacizumab with cycle 2.       Side Effects/Adverse Reactions      patient on potential side effects, s/s, ways to manage, when to call MD/seek help     Determine if patient experiencing side effects, ways to manage  Discussed side effects including but not limited to: diarrhea, HFS, neutropenia, myelosuppresion, proteinuria, fatigue, cold induced neuropathy, peripheral neuropathy, and N/V.      Miscellaneous     Food interactions, DDIs, financial issues Determine if patient started any new medications since being placed on oral chemo (analyze for DDI) Patient prefers only first 7 days of oral Xeloda to be sent at this time. Processed with schedule for delivery on 9/14/17. Patient with high copay, no funding currently open or available, Cholo and Prasanth are continuing to look for support for future.      Additional Notes: Discussed aforementioned material with patient over the phone. Answered all questions. Will mail out calendar.

## 2017-09-06 RX ORDER — CAPECITABINE 500 MG/1
TABLET, FILM COATED ORAL
Qty: 56 TABLET | Refills: 3 | Status: SHIPPED | OUTPATIENT
Start: 2017-09-06 | End: 2018-01-03 | Stop reason: SDUPTHER

## 2017-09-10 ENCOUNTER — APPOINTMENT (OUTPATIENT)
Dept: PREADMISSION TESTING | Facility: HOSPITAL | Age: 57
End: 2017-09-10

## 2017-09-10 ENCOUNTER — HOSPITAL ENCOUNTER (OUTPATIENT)
Dept: GENERAL RADIOLOGY | Facility: HOSPITAL | Age: 57
Discharge: HOME | End: 2017-09-10
Admitting: PHYSICIAN ASSISTANT

## 2017-09-10 VITALS — BODY MASS INDEX: 22.25 KG/M2 | WEIGHT: 138.45 LBS | HEIGHT: 66 IN

## 2017-09-10 DIAGNOSIS — C18.9 MALIGNANT NEOPLASM OF COLON, UNSPECIFIED PART OF COLON (HCC): ICD-10-CM

## 2017-09-10 LAB
ANION GAP SERPL CALCULATED.3IONS-SCNC: 1 MMOL/L (ref 3–11)
APTT PPP: 26.3 SECONDS (ref 24–31)
BUN BLD-MCNC: 13 MG/DL (ref 9–23)
BUN/CREAT SERPL: 21.7 (ref 7–25)
CALCIUM SPEC-SCNC: 9.2 MG/DL (ref 8.7–10.4)
CHLORIDE SERPL-SCNC: 104 MMOL/L (ref 99–109)
CO2 SERPL-SCNC: 33 MMOL/L (ref 20–31)
CREAT BLD-MCNC: 0.6 MG/DL (ref 0.6–1.3)
DEPRECATED RDW RBC AUTO: 50 FL (ref 37–54)
ERYTHROCYTE [DISTWIDTH] IN BLOOD BY AUTOMATED COUNT: 20.4 % (ref 11.3–14.5)
GFR SERPL CREATININE-BSD FRML MDRD: 140 ML/MIN/1.73
GLUCOSE BLD-MCNC: 87 MG/DL (ref 70–100)
HBA1C MFR BLD: 5 % (ref 4.8–5.6)
HCT VFR BLD AUTO: 35.6 % (ref 38.9–50.9)
HGB BLD-MCNC: 11.2 G/DL (ref 13.1–17.5)
INR PPP: 1.05
MCH RBC QN AUTO: 21.2 PG (ref 27–31)
MCHC RBC AUTO-ENTMCNC: 31.5 G/DL (ref 32–36)
MCV RBC AUTO: 67.4 FL (ref 80–99)
PLATELET # BLD AUTO: 177 10*3/MM3 (ref 150–450)
PMV BLD AUTO: 8.7 FL (ref 6–12)
POTASSIUM BLD-SCNC: 4.3 MMOL/L (ref 3.5–5.5)
PROTHROMBIN TIME: 11.5 SECONDS (ref 9.6–11.5)
RBC # BLD AUTO: 5.28 10*6/MM3 (ref 4.2–5.76)
SODIUM BLD-SCNC: 138 MMOL/L (ref 132–146)
WBC NRBC COR # BLD: 6.68 10*3/MM3 (ref 3.5–10.8)

## 2017-09-10 NOTE — PAT
PATIENT ORIGINALLY SCHEDULED FOR PORT PLACEMENT ON 9/18 AND PAT ON 9/17. PATIENT ARRIVED AT PAT TODAY UNDER THE IMPRESSION THAT HIS SURGERY DATE HAD BEEN CHANGED TO 9/11 AND REPORTS THAT HE HAD SPOKEN WITH DR KIDD' OFFICE ABOUT THE CHANGE. AFTER CONFERRING WITH PREOP AND CALLING JOSEPH FROM DR KIDD' OFFICE WE COULD FIND NO INDICATION THAT THE SURGERY DATE HAD BEEN CHANGED. JOSEPH INSTRUCTED ME TO COMPLETE PAT AND LET THE PATIENT KNOW THAT SOMEONE FROM THEIR OFFICE WOULD NOTIFY HIM IN THE MORNING OF PROCEDURE SCHEDULE. PATIENT AND WIFE INDICATED THAT THEY WOULD CALL THE OFFICE IN THE MORNING AND ARE STILL HOPING TO HAVE PORT PLACED TOMORROW.

## 2017-09-12 ENCOUNTER — APPOINTMENT (OUTPATIENT)
Dept: GENERAL RADIOLOGY | Facility: HOSPITAL | Age: 57
End: 2017-09-12

## 2017-09-12 ENCOUNTER — ANESTHESIA EVENT (OUTPATIENT)
Dept: PERIOP | Facility: HOSPITAL | Age: 57
End: 2017-09-12

## 2017-09-12 ENCOUNTER — HOSPITAL ENCOUNTER (OUTPATIENT)
Facility: HOSPITAL | Age: 57
Setting detail: HOSPITAL OUTPATIENT SURGERY
Discharge: HOME | End: 2017-09-12
Attending: THORACIC SURGERY (CARDIOTHORACIC VASCULAR SURGERY) | Admitting: THORACIC SURGERY (CARDIOTHORACIC VASCULAR SURGERY)

## 2017-09-12 ENCOUNTER — ANESTHESIA (OUTPATIENT)
Dept: PERIOP | Facility: HOSPITAL | Age: 57
End: 2017-09-12

## 2017-09-12 VITALS
DIASTOLIC BLOOD PRESSURE: 81 MMHG | RESPIRATION RATE: 16 BRPM | OXYGEN SATURATION: 99 % | BODY MASS INDEX: 22.18 KG/M2 | SYSTOLIC BLOOD PRESSURE: 119 MMHG | WEIGHT: 138 LBS | HEIGHT: 66 IN | TEMPERATURE: 97.3 F | HEART RATE: 60 BPM

## 2017-09-12 DIAGNOSIS — C18.9 MALIGNANT NEOPLASM OF COLON, UNSPECIFIED PART OF COLON (HCC): ICD-10-CM

## 2017-09-12 DIAGNOSIS — C18.7 MALIGNANT NEOPLASM OF SIGMOID COLON (HCC): ICD-10-CM

## 2017-09-12 DIAGNOSIS — C79.51 BONE METASTASIS: ICD-10-CM

## 2017-09-12 PROCEDURE — S0260 H&P FOR SURGERY: HCPCS | Performed by: THORACIC SURGERY (CARDIOTHORACIC VASCULAR SURGERY)

## 2017-09-12 PROCEDURE — 71010 HC CHEST PA OR AP: CPT

## 2017-09-12 PROCEDURE — 25010000002 HEPARIN (PORCINE) PER 1000 UNITS: Performed by: THORACIC SURGERY (CARDIOTHORACIC VASCULAR SURGERY)

## 2017-09-12 PROCEDURE — 36561 INSERT TUNNELED CV CATH: CPT | Performed by: THORACIC SURGERY (CARDIOTHORACIC VASCULAR SURGERY)

## 2017-09-12 PROCEDURE — 25010000002 CEFUROXIME PER 750 MG: Performed by: PHYSICIAN ASSISTANT

## 2017-09-12 PROCEDURE — 25010000002 PROPOFOL 10 MG/ML EMULSION: Performed by: NURSE ANESTHETIST, CERTIFIED REGISTERED

## 2017-09-12 PROCEDURE — C1788 PORT, INDWELLING, IMP: HCPCS | Performed by: THORACIC SURGERY (CARDIOTHORACIC VASCULAR SURGERY)

## 2017-09-12 PROCEDURE — C1769 GUIDE WIRE: HCPCS | Performed by: THORACIC SURGERY (CARDIOTHORACIC VASCULAR SURGERY)

## 2017-09-12 PROCEDURE — 25010000002 FENTANYL CITRATE (PF) 100 MCG/2ML SOLUTION: Performed by: NURSE ANESTHETIST, CERTIFIED REGISTERED

## 2017-09-12 DEVICE — POWERPORT CLEARVUE IMPLANTABLE PORT WITH ATTACHABLE 8F POLYURETHANE OPEN-ENDED SINGLE-LUMEN VENOUS CATHETER INTERMEDIATE KIT
Type: IMPLANTABLE DEVICE | Status: FUNCTIONAL
Brand: POWERPORT CLEARVUE

## 2017-09-12 RX ORDER — PROPOFOL 10 MG/ML
VIAL (ML) INTRAVENOUS CONTINUOUS PRN
Status: DISCONTINUED | OUTPATIENT
Start: 2017-09-12 | End: 2017-09-12 | Stop reason: SURG

## 2017-09-12 RX ORDER — PROMETHAZINE HYDROCHLORIDE 25 MG/ML
6.25 INJECTION, SOLUTION INTRAMUSCULAR; INTRAVENOUS ONCE AS NEEDED
Status: DISCONTINUED | OUTPATIENT
Start: 2017-09-12 | End: 2017-09-12 | Stop reason: HOSPADM

## 2017-09-12 RX ORDER — ONDANSETRON 2 MG/ML
4 INJECTION INTRAMUSCULAR; INTRAVENOUS ONCE AS NEEDED
Status: DISCONTINUED | OUTPATIENT
Start: 2017-09-12 | End: 2017-09-12 | Stop reason: HOSPADM

## 2017-09-12 RX ORDER — LIDOCAINE HYDROCHLORIDE 10 MG/ML
0.5 INJECTION, SOLUTION EPIDURAL; INFILTRATION; INTRACAUDAL; PERINEURAL ONCE AS NEEDED
Status: COMPLETED | OUTPATIENT
Start: 2017-09-12 | End: 2017-09-12

## 2017-09-12 RX ORDER — FAMOTIDINE 20 MG/1
20 TABLET, FILM COATED ORAL ONCE
Status: COMPLETED | OUTPATIENT
Start: 2017-09-12 | End: 2017-09-12

## 2017-09-12 RX ORDER — FAMOTIDINE 10 MG/ML
20 INJECTION, SOLUTION INTRAVENOUS ONCE
Status: DISCONTINUED | OUTPATIENT
Start: 2017-09-12 | End: 2017-09-12

## 2017-09-12 RX ORDER — PROPOFOL 10 MG/ML
VIAL (ML) INTRAVENOUS AS NEEDED
Status: DISCONTINUED | OUTPATIENT
Start: 2017-09-12 | End: 2017-09-12 | Stop reason: SURG

## 2017-09-12 RX ORDER — PROMETHAZINE HYDROCHLORIDE 25 MG/1
25 TABLET ORAL ONCE AS NEEDED
Status: DISCONTINUED | OUTPATIENT
Start: 2017-09-12 | End: 2017-09-12 | Stop reason: HOSPADM

## 2017-09-12 RX ORDER — LIDOCAINE HYDROCHLORIDE 10 MG/ML
INJECTION, SOLUTION INFILTRATION; PERINEURAL AS NEEDED
Status: DISCONTINUED | OUTPATIENT
Start: 2017-09-12 | End: 2017-09-12 | Stop reason: SURG

## 2017-09-12 RX ORDER — PROMETHAZINE HYDROCHLORIDE 25 MG/1
25 SUPPOSITORY RECTAL ONCE AS NEEDED
Status: DISCONTINUED | OUTPATIENT
Start: 2017-09-12 | End: 2017-09-12 | Stop reason: HOSPADM

## 2017-09-12 RX ORDER — FENTANYL CITRATE 50 UG/ML
25 INJECTION, SOLUTION INTRAMUSCULAR; INTRAVENOUS
Status: DISCONTINUED | OUTPATIENT
Start: 2017-09-12 | End: 2017-09-12 | Stop reason: HOSPADM

## 2017-09-12 RX ORDER — LIDOCAINE HYDROCHLORIDE 10 MG/ML
INJECTION, SOLUTION INFILTRATION; PERINEURAL AS NEEDED
Status: DISCONTINUED | OUTPATIENT
Start: 2017-09-12 | End: 2017-09-12 | Stop reason: HOSPADM

## 2017-09-12 RX ORDER — SODIUM CHLORIDE 0.9 % (FLUSH) 0.9 %
1-10 SYRINGE (ML) INJECTION AS NEEDED
Status: DISCONTINUED | OUTPATIENT
Start: 2017-09-12 | End: 2017-09-12 | Stop reason: HOSPADM

## 2017-09-12 RX ORDER — SODIUM CHLORIDE, SODIUM LACTATE, POTASSIUM CHLORIDE, CALCIUM CHLORIDE 600; 310; 30; 20 MG/100ML; MG/100ML; MG/100ML; MG/100ML
9 INJECTION, SOLUTION INTRAVENOUS CONTINUOUS
Status: DISCONTINUED | OUTPATIENT
Start: 2017-09-12 | End: 2017-09-12 | Stop reason: HOSPADM

## 2017-09-12 RX ORDER — CHLORHEXIDINE GLUCONATE 500 MG/1
1 CLOTH TOPICAL EVERY 12 HOURS PRN
Status: DISCONTINUED | OUTPATIENT
Start: 2017-09-18 | End: 2017-09-12 | Stop reason: HOSPADM

## 2017-09-12 RX ADMIN — LIDOCAINE HYDROCHLORIDE 0.5 ML: 10 INJECTION, SOLUTION EPIDURAL; INFILTRATION; INTRACAUDAL; PERINEURAL at 07:07

## 2017-09-12 RX ADMIN — LIDOCAINE HYDROCHLORIDE 60 MG: 10 INJECTION, SOLUTION INFILTRATION; PERINEURAL at 07:36

## 2017-09-12 RX ADMIN — FENTANYL CITRATE 25 MCG: 50 INJECTION, SOLUTION INTRAMUSCULAR; INTRAVENOUS at 08:30

## 2017-09-12 RX ADMIN — FENTANYL CITRATE 25 MCG: 50 INJECTION, SOLUTION INTRAMUSCULAR; INTRAVENOUS at 08:35

## 2017-09-12 RX ADMIN — FAMOTIDINE 20 MG: 20 TABLET, FILM COATED ORAL at 07:07

## 2017-09-12 RX ADMIN — SODIUM CHLORIDE, POTASSIUM CHLORIDE, SODIUM LACTATE AND CALCIUM CHLORIDE 9 ML/HR: 600; 310; 30; 20 INJECTION, SOLUTION INTRAVENOUS at 07:08

## 2017-09-12 RX ADMIN — PROPOFOL 50 MG: 10 INJECTION, EMULSION INTRAVENOUS at 07:36

## 2017-09-12 RX ADMIN — CEFUROXIME 1.5 G: 1.5 INJECTION, SOLUTION INTRAVENOUS at 07:50

## 2017-09-12 RX ADMIN — FENTANYL CITRATE 25 MCG: 50 INJECTION, SOLUTION INTRAMUSCULAR; INTRAVENOUS at 08:40

## 2017-09-12 RX ADMIN — FENTANYL CITRATE 25 MCG: 50 INJECTION, SOLUTION INTRAMUSCULAR; INTRAVENOUS at 08:45

## 2017-09-12 RX ADMIN — PROPOFOL 125 MCG/KG/MIN: 10 INJECTION, EMULSION INTRAVENOUS at 07:36

## 2017-09-12 NOTE — OP NOTE
Operative Report      Marquis Esteban    : 1960  MRN: [id number]  [facility]: [id number]      Date:17      Preop Diagnosis: Metastatic carcinoma of the colon      Postop Diagnosis: Metastatic carcinoma of the colon      Procedure: Insertion of a Port-A-Cath (left subclavian vein)      Surgeon: Herberth Cardoso MD      Assistant: None      Indications: 57-year-old male with metastatic colon cancer being prepared or chemotherapy.  Central venous access was deemed necessary the patient is brought to the operating room at this point in time for placement of a Port-A-Cath      Operative Findings: There was some scarring about the innominate vein.  Access to the subclavian vein was obtained and a Glidewire was passed into the inferior vena cava.  The Port-A-Cath catheter was passed over the Glidewire and positioned under fluoroscopic control.      EBL: 2 cc      Operative Narrative: The patient was brought to the operating room and prepared for surgery in the usual fashion.  He was sedated and monitored by anesthesia.  The anterior chest was prepped and draped in the usual manner.  15 cc of 1% lidocaine was utilized to obtain local anesthesia of the left anterior chest wall.  A quarter inch incision was made 1 fingerbreadth below and parallel to the left clavicle.  The left subclavian vein was cannulated area. Attempts to pass a guidewire unsuccessful initially utilizing the standard J-wire.  A long Glidewire was then successfully passed into the right atrium.  A pocket was made in the usual fashion.  Hemostasis was obtained with Bovie cauterization.  Sheath over a dilator was passed over the Glidewire and left in position.  The catheter was tunneled from the lower vision to the upper incision.  The Glidewire and dilator were removed and the catheter was passed under fluoroscopic control through the sheath.  Initially the catheter first up the right internal jugular vein.  It was pulled back and the Glidewire was  reintroduced through the catheter. The catheter was then positioned in the superior vena cava at the junction of the right atrium and the superior vena cava. The Glidewire was removed, the catheter was cut and attached to the port.  The port was placed in the pocket.  The port was accessed and flushed with heparinized saline.  The 2 incisions were closed with layers of 4-0 Vicryl. Steri-Strips were placed on the wounds and sterile dressings were placed.  The patient was transferred to the recovery room in stable condition.          Herberth Cardoso MD  CTSurgery  09/12/17   11:55 AM

## 2017-09-12 NOTE — H&P
Patient Care Team:      Chief complaint: Here for insertion of port a cath for chemotherapy.    Subjective:      HISTORY OF PRESENT ILLNESS: The patient is a  57 y.o. male with colon cancer.    diagnosed March 2007. Was seen by Dr. Rodriguez on 8/29/2017 with the following note:     He is status post lower anterior resection as well as 1 cycle FOLFOX, stopped due to multiple toxicities. Final pathology showed poorly differentiated adenocarcinoma with 25 negative lymph nodes and clear surgical margins. Isolated tumor deposits was found in the pericolonic fat. Pathologic stage T2 N1c M0 stage IIIA disease.The patient has been followed by serial colonoscopies as well as CAT scans that did document pulmonary metastatic disease with left lower lobe nodule that is gradually increasing in size. Patient was doing fairly well until recently, 3 months ago, patient presented with low back pain. Patient had restaging workup that revealed hypermetabolic L2 vertebral body lesion. Patient had biopsy done on October 11, 2016 that revealed metastatic adenocarcinoma consistent of colon primary with CK 7 negative CK 20 positive CDX2 positive. Patient had next generation gene sequencing that revealed intermediate mutational load with microsatellite stability.  He started chemotherapy with Keytruda on 2/1/2017.      SUBJECTIVE: The patient has been doing fairly well. His back pain is stable.  It gets worse after trip to [place].  Currently he is using Percocet 10 mg at night only.  He has no difficulty with bowel or bladder control. He is eating and drinking well. He has no cough or shortness of breath.  He is complaining of fatigue.      Allergies: No Known Allergies       Latex: No  Contrast Dye: No    Home Meds    Prescriptions Prior to Admission   Medication Sig Dispense Refill Last Dose   • celecoxib (CELEBREX) 200 MG capsule Take 200 mg by mouth Daily As Needed for Mild Pain (1-3).   9/11/2017 at 0900   • Cholecalciferol (VITAMIN D3)  5000 UNITS capsule capsule Take 5,000 Units by mouth Daily.   9/11/2017 at 0900   • magnesium hydroxide (MILK OF MAGNESIA) 2400 MG/10ML suspension suspension Take 10 mL by mouth Daily.   9/11/2017 at 2100   • oxyCODONE-acetaminophen (PERCOCET)  MG per tablet Take 1 tablet by mouth Every 6 (Six) Hours As Needed for Severe Pain .   9/11/2017 at 2100   • zaleplon (SONATA) 10 MG capsule Take 10 mg by mouth At Night As Needed.   Past Month at Unknown time   • capecitabine (XELODA) 500 MG chemo tablet Take 2 tab(s) by mouth in the morning and 2 tabs by mouth in the evening on days 1-14, then off for 7 days. 56 tablet 3    • capecitabine (XELODA) 500 MG chemo tablet Take 2 tablets by mouth in the morning and 2 tablets by mouth in the evening on days 1-14, then off for 7 days. 56 tablet 3    • ondansetron (ZOFRAN) 8 MG tablet Take 1 tablet by mouth 3 (Three) Times a Day As Needed for Nausea or Vomiting. 30 tablet 5 More than a month at Unknown time   • ONE TOUCH ULTRA TEST test strip    Not Taking   • ONETOUCH DELICA LANCETS 33G misc    Not Taking     PMH:   Past Medical History:   Diagnosis Date   • Colon cancer    • Depression    • FHx: chemotherapy    • Hepatitis B     SERO CONVERTED WITH NO HX OF DISEASE   • Inguinal hernia     RIGHT   • Keloid scar of skin    • Metastatic cancer    • Wears glasses      PSH:    Past Surgical History:   Procedure Laterality Date   • BACK SURGERY      lumbar spine surgery (hardware and tumor debulking)   • CARDIAC CATHETERIZATION  1996 & 2006    NON INTERVENTION   • COLECTOMY PARTIAL / TOTAL      2007   • COLONOSCOPY     • INTERVENTIONAL RADIOLOGY PROCEDURE Right 10/11/2016    Procedure: Biopsy of L2 vertebral body (right side);  Surgeon: Ashok Gonzalez MD;  Location: [facility] INVASIVE LOCATION;  Service:    • PORTACATH PLACEMENT Left 2007     Immunization History: pneumo: Yes  Flu: Yes  Tetanus: Yes    Social History:   Tobacco: Never   Alcohol: No      Physical Exam:Temp 98 °F  "(36.7 °C) (Temporal Artery )   Ht 66\" (167.6 cm)  Wt 138 lb (62.6 kg)  BMI 22.27 kg/m2      General Appearance:    Alert, cooperative, no distress, appears stated age   Head:    Normocephalic, without obvious abnormality, atraumatic   Lungs:     Clear to auscultation bilaterally, respirations unlabored    Heart: Regular rate and rhythm, S1 and S2 normal, no murmur, rub    or gallop    Abdomen:    Soft without tenderness   Breast Exam:    deferred   Genitalia:    deferred   Extremities:   Extremities normal, atraumatic, no cyanosis or edema   Skin:   Skin color, texture, turgor normal, no rashes or lesions   Neurologic:   Grossly intact     Results Review: Labs were reviewed.    Results for NOELLE GUILLORY (MRN [id number]) as of 9/12/2017 07:29   Ref. Range 9/10/2017 15:08 9/10/2017 15:52   Glucose Latest Ref Range: 70 - 100 mg/dL 87    Sodium Latest Ref Range: 132 - 146 mmol/L 138    Potassium Latest Ref Range: 3.5 - 5.5 mmol/L 4.3    CO2 Latest Ref Range: 20.0 - 31.0 mmol/L 33.0 (H)    Chloride Latest Ref Range: 99 - 109 mmol/L 104    Anion Gap Latest Ref Range: 3.0 - 11.0 mmol/L 1.0 (L)    Creatinine Latest Ref Range: 0.60 - 1.30 mg/dL 0.60    BUN Latest Ref Range: 9 - 23 mg/dL 13    BUN/Creatinine Ratio Latest Ref Range: 7.0 - 25.0  21.7    Calcium Latest Ref Range: 8.7 - 10.4 mg/dL 9.2    eGFR Non African Amer Latest Ref Range: >60 mL/min/1.73 140    Hemoglobin A1C Latest Ref Range: 4.80 - 5.60 % 5.00    Protime Latest Ref Range: 9.6 - 11.5 Seconds 11.5    INR Unknown 1.05    aPTT Latest Ref Range: 24.0 - 31.0 seconds 26.3    WBC Latest Ref Range: 3.50 - 10.80 10*3/mm3 6.68    RBC Latest Ref Range: 4.20 - 5.76 10*6/mm3 5.28    Hemoglobin Latest Ref Range: 13.1 - 17.5 g/dL 11.2 (L)    Hematocrit Latest Ref Range: 38.9 - 50.9 % 35.6 (L)    RDW Latest Ref Range: 11.3 - 14.5 % 20.4 (H)    MCV Latest Ref Range: 80.0 - 99.0 fL 67.4 (L)    MCH Latest Ref Range: 27.0 - 31.0 pg 21.2 (L)    MCHC Latest Ref Range: 32.0 - " 36.0 g/dL 31.5 (L)    MPV Latest Ref Range: 6.0 - 12.0 fL 8.7    Platelets Latest Ref Range: 150 - 450 10*3/mm3 177    RDW-SD Latest Ref Range: 37.0 - 54.0 fl 50.0        Cancer Patient: _X_ yes __no __unknown; If yes, clinical stage IV T:__ N:__M:__, stage group    ASSESSMENT:stage IV colon cancer     PLAN: insertion of portacath       JESSEE Weems 9/12/2017 7:19 AM      The above H&P reviewed, edited, and verified.    Herberth Cardoso MD  09/12/17  8:53 AM

## 2017-09-12 NOTE — BRIEF OP NOTE
INSERTION VENOUS ACCESS DEVICE  Procedure Note    Marquis Carlito  9/12/2017    Pre-op Diagnosis:   Colon cancer metastasized to bone [C18.9, C79.51]    Post-op Diagnosis:     Post-Op Diagnosis Codes:     * Colon cancer metastasized to bone [C18.9, C79.51]    Procedure/CPT® Codes:      Procedure(s):  INSERTION OF PORTACATH    Surgeon(s):  Herberth Cardoso MD    Anesthesia: Monitor Anesthesia Care    Staff:   Circulator: Aarti Montoya RN  Radiology Technologist: Emmy Mario  Scrub Person: Russell Lou  Nursing Assistant: Tenisha Roberts  Orientee: Katty Champion    Estimated Blood Loss: 2 mL  Urine Voided: 0 mL    Specimens:                * No specimens in log *      Drains:           Findings: catheter in good position  Scar tissue around innominate vein ---> use of Glide wire    Complications: none      Herberth Cardoso MD     Date: 9/12/2017  Time: 9:42 AM

## 2017-09-12 NOTE — PLAN OF CARE
Problem: Perioperative Period (Adult)  Goal: Signs and Symptoms of Listed Potential Problems Will be Absent or Manageable (Perioperative Period)  Outcome: Outcome(s) achieved Date Met:  09/12/17

## 2017-09-13 ENCOUNTER — TELEPHONE (OUTPATIENT)
Dept: ONCOLOGY | Facility: CLINIC | Age: 57
End: 2017-09-13

## 2017-09-13 NOTE — TELEPHONE ENCOUNTER
Message sent to pharmacy to contact patient with updated information on financial assistance on his xeloda.  Also to try and ship only the first week of pills to patient to decrease the copay amount.

## 2017-09-13 NOTE — TELEPHONE ENCOUNTER
----- Message from Jeffrey Moon sent at 9/13/2017 10:01 AM EDT -----  Regarding: Michael, question about xeloda  Contact: [phone]  Had a question regarding Xeloda to make a change. He was asking for Jessenia.

## 2017-09-18 ENCOUNTER — LAB (OUTPATIENT)
Dept: LAB | Facility: HOSPITAL | Age: 57
End: 2017-09-18

## 2017-09-18 DIAGNOSIS — C18.7 MALIGNANT NEOPLASM OF SIGMOID COLON (HCC): ICD-10-CM

## 2017-09-18 DIAGNOSIS — C79.51 BONE METASTASIS: ICD-10-CM

## 2017-09-18 LAB
ALBUMIN SERPL-MCNC: 4.4 G/DL (ref 3.2–4.8)
ALBUMIN/GLOB SERPL: 1.3 G/DL (ref 1.5–2.5)
ALP SERPL-CCNC: 84 U/L (ref 25–100)
ALT SERPL W P-5'-P-CCNC: 15 U/L (ref 7–40)
ANION GAP SERPL CALCULATED.3IONS-SCNC: 9 MMOL/L (ref 3–11)
AST SERPL-CCNC: 25 U/L (ref 0–33)
BASOPHILS # BLD AUTO: 0.01 10*3/MM3 (ref 0–0.2)
BASOPHILS NFR BLD AUTO: 0.2 % (ref 0–1)
BILIRUB SERPL-MCNC: 0.4 MG/DL (ref 0.3–1.2)
BILIRUB UR QL STRIP: NEGATIVE
BUN BLD-MCNC: 11 MG/DL (ref 9–23)
BUN/CREAT SERPL: 18.3 (ref 7–25)
CALCIUM SPEC-SCNC: 9.5 MG/DL (ref 8.7–10.4)
CEA SERPL-MCNC: 201.9 NG/ML (ref 0–2.5)
CHLORIDE SERPL-SCNC: 102 MMOL/L (ref 99–109)
CLARITY UR: CLEAR
CO2 SERPL-SCNC: 30 MMOL/L (ref 20–31)
COLOR UR: YELLOW
CREAT BLD-MCNC: 0.6 MG/DL (ref 0.6–1.3)
DEPRECATED RDW RBC AUTO: 50.3 FL (ref 37–54)
EOSINOPHIL # BLD AUTO: 0.6 10*3/MM3 (ref 0–0.3)
EOSINOPHIL NFR BLD AUTO: 10.9 % (ref 0–3)
ERYTHROCYTE [DISTWIDTH] IN BLOOD BY AUTOMATED COUNT: 20.5 % (ref 11.3–14.5)
GFR SERPL CREATININE-BSD FRML MDRD: 140 ML/MIN/1.73
GLOBULIN UR ELPH-MCNC: 3.5 GM/DL
GLUCOSE BLD-MCNC: 95 MG/DL (ref 70–100)
GLUCOSE UR STRIP-MCNC: NEGATIVE MG/DL
HCT VFR BLD AUTO: 34.5 % (ref 38.9–50.9)
HGB BLD-MCNC: 10.9 G/DL (ref 13.1–17.5)
HGB UR QL STRIP.AUTO: NEGATIVE
IMM GRANULOCYTES # BLD: 0.01 10*3/MM3 (ref 0–0.03)
IMM GRANULOCYTES NFR BLD: 0.2 % (ref 0–0.6)
KETONES UR QL STRIP: NEGATIVE
LEUKOCYTE ESTERASE UR QL STRIP.AUTO: NEGATIVE
LYMPHOCYTES # BLD AUTO: 1.33 10*3/MM3 (ref 0.6–4.8)
LYMPHOCYTES NFR BLD AUTO: 24.2 % (ref 24–44)
MCH RBC QN AUTO: 21.2 PG (ref 27–31)
MCHC RBC AUTO-ENTMCNC: 31.6 G/DL (ref 32–36)
MCV RBC AUTO: 67 FL (ref 80–99)
MONOCYTES # BLD AUTO: 0.42 10*3/MM3 (ref 0–1)
MONOCYTES NFR BLD AUTO: 7.6 % (ref 0–12)
NEUTROPHILS # BLD AUTO: 3.13 10*3/MM3 (ref 1.5–8.3)
NEUTROPHILS NFR BLD AUTO: 56.9 % (ref 41–71)
NITRITE UR QL STRIP: NEGATIVE
PH UR STRIP.AUTO: 7.5 [PH] (ref 5–8)
PLAT MORPH BLD: NORMAL
PLATELET # BLD AUTO: 185 10*3/MM3 (ref 150–450)
PMV BLD AUTO: 9.4 FL (ref 6–12)
POTASSIUM BLD-SCNC: 4.2 MMOL/L (ref 3.5–5.5)
PROT SERPL-MCNC: 7.9 G/DL (ref 5.7–8.2)
PROT UR QL STRIP: NEGATIVE
RBC # BLD AUTO: 5.15 10*6/MM3 (ref 4.2–5.76)
RBC MORPH BLD: NORMAL
SODIUM BLD-SCNC: 141 MMOL/L (ref 132–146)
SP GR UR STRIP: 1.01 (ref 1–1.03)
UROBILINOGEN UR QL STRIP: NORMAL
WBC MORPH BLD: NORMAL
WBC NRBC COR # BLD: 5.5 10*3/MM3 (ref 3.5–10.8)

## 2017-09-18 PROCEDURE — 82378 CARCINOEMBRYONIC ANTIGEN: CPT

## 2017-09-18 PROCEDURE — 85007 BL SMEAR W/DIFF WBC COUNT: CPT

## 2017-09-18 PROCEDURE — 80053 COMPREHEN METABOLIC PANEL: CPT

## 2017-09-18 PROCEDURE — 85025 COMPLETE CBC W/AUTO DIFF WBC: CPT

## 2017-09-18 PROCEDURE — 81003 URINALYSIS AUTO W/O SCOPE: CPT

## 2017-09-18 PROCEDURE — 36415 COLL VENOUS BLD VENIPUNCTURE: CPT

## 2017-09-19 ENCOUNTER — OFFICE VISIT (OUTPATIENT)
Dept: ONCOLOGY | Facility: CLINIC | Age: 57
End: 2017-09-19

## 2017-09-19 ENCOUNTER — INFUSION (OUTPATIENT)
Dept: ONCOLOGY | Facility: HOSPITAL | Age: 57
End: 2017-09-19

## 2017-09-19 ENCOUNTER — EDUCATION (OUTPATIENT)
Dept: PHARMACY | Facility: HOSPITAL | Age: 57
End: 2017-09-19

## 2017-09-19 ENCOUNTER — DOCUMENTATION (OUTPATIENT)
Dept: NUTRITION | Facility: HOSPITAL | Age: 57
End: 2017-09-19

## 2017-09-19 VITALS
WEIGHT: 137 LBS | TEMPERATURE: 98 F | SYSTOLIC BLOOD PRESSURE: 116 MMHG | DIASTOLIC BLOOD PRESSURE: 72 MMHG | HEIGHT: 66 IN | BODY MASS INDEX: 22.02 KG/M2 | HEART RATE: 70 BPM | RESPIRATION RATE: 14 BRPM

## 2017-09-19 DIAGNOSIS — C18.7 MALIGNANT NEOPLASM OF SIGMOID COLON (HCC): Primary | ICD-10-CM

## 2017-09-19 DIAGNOSIS — C79.51 BONE METASTASIS: ICD-10-CM

## 2017-09-19 PROCEDURE — 25010000002 PALONOSETRON PER 25 MCG: Performed by: INTERNAL MEDICINE

## 2017-09-19 PROCEDURE — 99214 OFFICE O/P EST MOD 30 MIN: CPT | Performed by: INTERNAL MEDICINE

## 2017-09-19 PROCEDURE — 96375 TX/PRO/DX INJ NEW DRUG ADDON: CPT

## 2017-09-19 PROCEDURE — 25010000002 HEPARIN FLUSH (PORCINE) 100 UNIT/ML SOLUTION: Performed by: INTERNAL MEDICINE

## 2017-09-19 PROCEDURE — 25010000002 OXALIPLATIN PER 0.5 MG: Performed by: INTERNAL MEDICINE

## 2017-09-19 PROCEDURE — 96376 TX/PRO/DX INJ SAME DRUG ADON: CPT

## 2017-09-19 PROCEDURE — 96415 CHEMO IV INFUSION ADDL HR: CPT

## 2017-09-19 PROCEDURE — 25010000002 DEXAMETHASONE PER 1 MG: Performed by: INTERNAL MEDICINE

## 2017-09-19 PROCEDURE — 96413 CHEMO IV INFUSION 1 HR: CPT

## 2017-09-19 RX ORDER — PALONOSETRON 0.05 MG/ML
0.25 INJECTION, SOLUTION INTRAVENOUS ONCE
Status: COMPLETED | OUTPATIENT
Start: 2017-09-19 | End: 2017-09-19

## 2017-09-19 RX ORDER — DEXTROSE MONOHYDRATE 50 MG/ML
250 INJECTION, SOLUTION INTRAVENOUS ONCE
Status: COMPLETED | OUTPATIENT
Start: 2017-09-19 | End: 2017-09-19

## 2017-09-19 RX ORDER — LIDOCAINE AND PRILOCAINE 25; 25 MG/G; MG/G
CREAM TOPICAL AS NEEDED
Qty: 30 G | Refills: 3 | Status: SHIPPED | OUTPATIENT
Start: 2017-09-19 | End: 2019-12-02 | Stop reason: SDUPTHER

## 2017-09-19 RX ORDER — ONDANSETRON HYDROCHLORIDE 8 MG/1
8 TABLET, FILM COATED ORAL 3 TIMES DAILY PRN
Qty: 30 TABLET | Refills: 5 | Status: SHIPPED | OUTPATIENT
Start: 2017-09-19 | End: 2018-06-12

## 2017-09-19 RX ORDER — SODIUM CHLORIDE 0.9 % (FLUSH) 0.9 %
10 SYRINGE (ML) INJECTION AS NEEDED
Status: CANCELLED | OUTPATIENT
Start: 2017-09-19

## 2017-09-19 RX ADMIN — PALONOSETRON HYDROCHLORIDE 0.25 MG: 0.25 INJECTION INTRAVENOUS at 10:15

## 2017-09-19 RX ADMIN — HEPARIN 500 UNITS: 100 SYRINGE at 12:45

## 2017-09-19 RX ADMIN — DEXAMETHASONE SODIUM PHOSPHATE 12 MG: 4 INJECTION, SOLUTION INTRAMUSCULAR; INTRAVENOUS at 10:17

## 2017-09-19 RX ADMIN — OXALIPLATIN 170 MG: 5 INJECTION, SOLUTION INTRAVENOUS at 10:41

## 2017-09-19 RX ADMIN — DEXTROSE MONOHYDRATE 250 ML: 50 INJECTION, SOLUTION INTRAVENOUS at 10:14

## 2017-09-19 NOTE — PLAN OF CARE
Outpatient Infusion • [address] [facility] • [address] • [place] [id number] • [phone]      CHEMOTHERAPY EDUCATION SHEET    NAME:  Marquis Esteban      : 1960           DATE: 17    Booklets Given: Chemotherapy and You [x]  Eating Hints [x]    Sexuality/Fertility Books [x]     Chemotherapy/Biotherapy Education Sheets: (list all that apply)  Oxaliplatin                                                                                                                                                                Chemotherapy Regimen:  Oxaliplatin every 3 weeks    TOPICS EDUCATION PROVIDED EDUCATION REINFORCED COMMENTS   ANEMIA:  role of RBC, cause, s/s, ways to manage, role of transfusion [x] [] Discussed possibility of fatigue, and importance of rest, and how to manage.   THROMBOCYTOPENIA:  role of platelet, cause, s/s, ways to prevent bleeding, things to avoid, when to seek help [] []    NEUTROPENIA:  role of WBC, cause, infection precautions, s/s of infection, when to call MD [x] [] Discussed risk of infection, and to call MD if temperature >100.4.   NUTRITION & APPETITE CHANGES:  importance of maintaining healthy diet & weight, ways to manage to improve intake, dietary consult, exercise regimen [x] [] Discussed importance of hydration.   DIARRHEA:  causes, s/s of dehydration, ways to manage, dietary changes, when to call MD [x] [] Discussed possibility of diarrhea, and use of Imodium, as well as when to call MD.   CONSTIPATION:  causes, ways to manage, dietary changes, when to call MD [] []    NAUSEA & VOMITING:  cause, use of antiemetics, dietary changes, when to call MD [x] [] Discussed appropriate use of Zofran at home, and importance of hydration if vomiting occurs, as well as when to call MD.   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   PAIN:  causes, ways to manage [] [] ????   SKIN & NAIL CHANGES:  cause, s/s, ways to manage [] []    ORGAN TOXICITIES:  cause, s/s, need for diagnostic tests, labs, when to notify MD [] []    SURVIVORSHIP:  distress, distress assessment, secondary malignancies, early/late effects, follow-up, social issues, social support [] []    HOME CARE:  use of spill kits, storing of PO chemo, how to manage bodily fluids [x] [] Discussed proper cleaning of bodily fluids/secretions.   MISCELLANEOUS:  drug interactions, administration, vesicant, et [x] [] Discussed pre-medications including aloxi and dexamethasone, and possible infusion reactions that can occur.     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 They know to call Dr. Rodriguez's office with any questions or concerns.    Geovanna Stover, PharmD  9/19/2017  11:22 AM

## 2017-09-19 NOTE — PROGRESS NOTES
DATE OF VISIT: 9/19/2017    REASON FOR VISIT: Followup for metastatic colon cancer.      HISTORY OF PRESENT ILLNESS: The patient is a very pleasant 57 y.o. male  with past medical history significant for metastatic colon cancer diagnosed March 2007 . He is status post lower anterior resection as well as 1 cycle FOLFOX, stopped due to multiple toxicities. Final pathology showed poorly differentiated adenocarcinoma with 25 negative lymph nodes and clear surgical margins. Isolated tumor deposits was found in the pericolonic fat. Pathologic stage T2 N1c M0 stage IIIA disease.The patient has been followed by serial colonoscopies as well as CAT scans that did document pulmonary metastatic disease with left lower lobe nodule that is gradually increasing in size. Patient was doing fairly well until recently, 3 months ago, patient presented with low back pain. Patient had restaging workup that revealed hypermetabolic L2 vertebral body lesion. Patient had biopsy done on October 11, 2016 that revealed metastatic adenocarcinoma consistent of colon primary with CK 7 negative CK 20 positive CDX2 positive. Patient had next generation gene sequencing that revealed intermediate mutational load with microsatellite stability.  He started chemotherapy with Keytruda on 2/1/2017. The patient is here for cycle # 10 of treatment.     SUBJECTIVE: The patient has been doing fairly well. His back pain is slightly worse.  He has no difficulty with bowel or bladder control. He is eating and drinking well. He has no cough or shortness of breath.  He is complaining of fatigue.    PAST MEDICAL HISTORY/SOCIAL HISTORY/FAMILY HISTORY: Unchanged from my prior documentation done on 01/17/2017.     Review of Systems   Constitutional: Negative for activity change, appetite change, chills, fatigue, fever and unexpected weight change.   HENT: Negative for hearing loss, mouth sores, nosebleeds, sore throat and trouble swallowing.    Eyes: Negative for  visual disturbance.   Respiratory: Negative for cough, chest tightness, shortness of breath and wheezing.    Cardiovascular: Negative for chest pain, palpitations and leg swelling.   Gastrointestinal: Negative for abdominal distention, abdominal pain, blood in stool, constipation, diarrhea, nausea, rectal pain and vomiting.   Endocrine: Negative for cold intolerance and heat intolerance.   Genitourinary: Negative for difficulty urinating, dysuria, frequency and urgency.   Musculoskeletal: Positive for back pain. Negative for arthralgias, gait problem, joint swelling and myalgias.   Skin: Negative for rash.   Neurological: Negative for dizziness, tremors, syncope, weakness, light-headedness, numbness and headaches.        Sleep disturbance   Hematological: Negative for adenopathy. Does not bruise/bleed easily.   Psychiatric/Behavioral: Negative for confusion, sleep disturbance and suicidal ideas. The patient is not nervous/anxious.          Current Outpatient Prescriptions:   •  capecitabine (XELODA) 500 MG chemo tablet, Take 2 tab(s) by mouth in the morning and 2 tabs by mouth in the evening on days 1-14, then off for 7 days. (Patient taking differently: PT started [id number] ~Shell), Disp: 56 tablet, Rfl: 3  •  capecitabine (XELODA) 500 MG chemo tablet, Take 2 tablets by mouth in the morning and 2 tablets by mouth in the evening on days 1-14, then off for 7 days., Disp: 56 tablet, Rfl: 3  •  celecoxib (CELEBREX) 200 MG capsule, Take 200 mg by mouth Daily As Needed for Mild Pain (1-3)., Disp: , Rfl:   •  Cholecalciferol (VITAMIN D3) 5000 UNITS capsule capsule, Take 5,000 Units by mouth Daily., Disp: , Rfl:   •  magnesium hydroxide (MILK OF MAGNESIA) 2400 MG/10ML suspension suspension, Take 10 mL by mouth Daily., Disp: , Rfl:   •  ondansetron (ZOFRAN) 8 MG tablet, Take 1 tablet by mouth 3 (Three) Times a Day As Needed for Nausea or Vomiting., Disp: 30 tablet, Rfl: 5  •  oxyCODONE-acetaminophen (PERCOCET)  MG per  "tablet, Take 1 tablet by mouth Every 6 (Six) Hours As Needed for Severe Pain ., Disp: , Rfl:   •  zaleplon (SONATA) 10 MG capsule, Take 10 mg by mouth At Night As Needed., Disp: , Rfl:   No current facility-administered medications for this visit.     Facility-Administered Medications Ordered in Other Visits:   •  sodium chloride 0.9 % infusion 250 mL, 250 mL, Intravenous, Once, Natasha Herrera, APRN    PHYSICAL EXAMINATION:   /72  Pulse 70  Temp 98 °F (36.7 °C)  Resp 14  Ht 66\" (167.6 cm)  Wt 137 lb (62.1 kg)  BMI 22.11 kg/m2   ECOG Performance Status: 1 - Symptomatic but completely ambulatory  General Appearance:  alert, cooperative, no apparent distress and appears stated age   Neurologic/Psychiatric: A&O x 3, gait steady, appropriate affect, strength 5/5 in all muscle groups   HEENT:  Normocephalic, without obvious abnormality, mucous membranes moist   Neck: Supple, symmetrical, trachea midline, no adenopathy;  No thyromegaly, masses, or tenderness   Lungs:   Clear to auscultation bilaterally; respirations regular, even, and unlabored bilaterally   Heart:  Regular rate and rhythm, no murmurs appreciated   Abdomen:   Soft, non-tender, non-distended and no organomegaly   Lymph nodes: No cervical, supraclavicular, inguinal or axillary adenopathy noted   Extremities: Normal, atraumatic; no clubbing, cyanosis, or edema    Skin: No rashes, ulcers, or suspicious lesions noted     Lab on 09/18/2017   Component Date Value Ref Range Status   • Glucose 09/18/2017 95  70 - 100 mg/dL Final   • BUN 09/18/2017 11  9 - 23 mg/dL Final   • Creatinine 09/18/2017 0.60  0.60 - 1.30 mg/dL Final   • Sodium 09/18/2017 141  132 - 146 mmol/L Final   • Potassium 09/18/2017 4.2  3.5 - 5.5 mmol/L Final   • Chloride 09/18/2017 102  99 - 109 mmol/L Final   • CO2 09/18/2017 30.0  20.0 - 31.0 mmol/L Final   • Calcium 09/18/2017 9.5  8.7 - 10.4 mg/dL Final   • Total Protein 09/18/2017 7.9  5.7 - 8.2 g/dL Final   • Albumin 09/18/2017 " 4.40  3.20 - 4.80 g/dL Final   • ALT (SGPT) 09/18/2017 15  7 - 40 U/L Final   • AST (SGOT) 09/18/2017 25  0 - 33 U/L Final   • Alkaline Phosphatase 09/18/2017 84  25 - 100 U/L Final   • Total Bilirubin 09/18/2017 0.4  0.3 - 1.2 mg/dL Final   • eGFR Non African Amer 09/18/2017 140  >60 mL/min/1.73 Final   • Globulin 09/18/2017 3.5  gm/dL Final   • A/G Ratio 09/18/2017 1.3* 1.5 - 2.5 g/dL Final   • BUN/Creatinine Ratio 09/18/2017 18.3  7.0 - 25.0 Final   • Anion Gap 09/18/2017 9.0  3.0 - 11.0 mmol/L Final   • CEA 09/18/2017 201.90* 0.00 - 2.50 ng/mL Final   • WBC 09/18/2017 5.50  3.50 - 10.80 10*3/mm3 Final   • RBC 09/18/2017 5.15  4.20 - 5.76 10*6/mm3 Final   • Hemoglobin 09/18/2017 10.9* 13.1 - 17.5 g/dL Final   • Hematocrit 09/18/2017 34.5* 38.9 - 50.9 % Final   • MCV 09/18/2017 67.0* 80.0 - 99.0 fL Final   • MCH 09/18/2017 21.2* 27.0 - 31.0 pg Final   • MCHC 09/18/2017 31.6* 32.0 - 36.0 g/dL Final   • RDW 09/18/2017 20.5* 11.3 - 14.5 % Final   • RDW-SD 09/18/2017 50.3  37.0 - 54.0 fl Final   • MPV 09/18/2017 9.4  6.0 - 12.0 fL Final   • Platelets 09/18/2017 185  150 - 450 10*3/mm3 Final   • Neutrophil % 09/18/2017 56.9  41.0 - 71.0 % Final   • Lymphocyte % 09/18/2017 24.2  24.0 - 44.0 % Final   • Monocyte % 09/18/2017 7.6  0.0 - 12.0 % Final   • Eosinophil % 09/18/2017 10.9* 0.0 - 3.0 % Final   • Basophil % 09/18/2017 0.2  0.0 - 1.0 % Final   • Immature Grans % 09/18/2017 0.2  0.0 - 0.6 % Final   • Neutrophils, Absolute 09/18/2017 3.13  1.50 - 8.30 10*3/mm3 Final   • Lymphocytes, Absolute 09/18/2017 1.33  0.60 - 4.80 10*3/mm3 Final   • Monocytes, Absolute 09/18/2017 0.42  0.00 - 1.00 10*3/mm3 Final   • Eosinophils, Absolute 09/18/2017 0.60* 0.00 - 0.30 10*3/mm3 Final   • Basophils, Absolute 09/18/2017 0.01  0.00 - 0.20 10*3/mm3 Final   • Immature Grans, Absolute 09/18/2017 0.01  0.00 - 0.03 10*3/mm3 Final   • Color,  09/18/2017 Yellow  Yellow, Straw Final   • Appearance,  09/18/2017 Clear  Clear Final   • pH,  UA 09/18/2017 7.5  5.0 - 8.0 Final   • Specific Gravity, UA 09/18/2017 1.006  1.001 - 1.030 Final   • Glucose, UA 09/18/2017 Negative  Negative Final   • Ketones, UA 09/18/2017 Negative  Negative Final   • Bilirubin, UA 09/18/2017 Negative  Negative Final   • Blood, UA 09/18/2017 Negative  Negative Final   • Protein, UA 09/18/2017 Negative  Negative Final   • Leuk Esterase, UA 09/18/2017 Negative  Negative Final   • Nitrite, UA 09/18/2017 Negative  Negative Final   • Urobilinogen, UA 09/18/2017 0.2 E.U./dL  0.2 - 1.0 E.U./dL Final   • RBC Morphology 09/18/2017 Normal  Normal Final   • WBC Morphology 09/18/2017 Normal  Normal Final   • Platelet Morphology 09/18/2017 Normal  Normal Final   Appointment on 09/10/2017   Component Date Value Ref Range Status   • Glucose 09/10/2017 87  70 - 100 mg/dL Final   • BUN 09/10/2017 13  9 - 23 mg/dL Final   • Creatinine 09/10/2017 0.60  0.60 - 1.30 mg/dL Final   • Sodium 09/10/2017 138  132 - 146 mmol/L Final   • Potassium 09/10/2017 4.3  3.5 - 5.5 mmol/L Final   • Chloride 09/10/2017 104  99 - 109 mmol/L Final   • CO2 09/10/2017 33.0* 20.0 - 31.0 mmol/L Final   • Calcium 09/10/2017 9.2  8.7 - 10.4 mg/dL Final   • eGFR Non African Amer 09/10/2017 140  >60 mL/min/1.73 Final   • BUN/Creatinine Ratio 09/10/2017 21.7  7.0 - 25.0 Final   • Anion Gap 09/10/2017 1.0* 3.0 - 11.0 mmol/L Final   • Hemoglobin A1C 09/10/2017 5.00  4.80 - 5.60 % Final   • PTT 09/10/2017 26.3  24.0 - 31.0 seconds Final   • Protime 09/10/2017 11.5  9.6 - 11.5 Seconds Final   • INR 09/10/2017 1.05   Final   • WBC 09/10/2017 6.68  3.50 - 10.80 10*3/mm3 Final   • RBC 09/10/2017 5.28  4.20 - 5.76 10*6/mm3 Final   • Hemoglobin 09/10/2017 11.2* 13.1 - 17.5 g/dL Final   • Hematocrit 09/10/2017 35.6* 38.9 - 50.9 % Final   • MCV 09/10/2017 67.4* 80.0 - 99.0 fL Final   • MCH 09/10/2017 21.2* 27.0 - 31.0 pg Final   • MCHC 09/10/2017 31.5* 32.0 - 36.0 g/dL Final   • RDW 09/10/2017 20.4* 11.3 - 14.5 % Final   • RDW-SD  09/10/2017 50.0  37.0 - 54.0 fl Final   • MPV 09/10/2017 8.7  6.0 - 12.0 fL Final   • Platelets 09/10/2017 177  150 - 450 10*3/mm3 Final        Xr Chest 1 View    Result Date: 9/12/2017  Narrative: EXAMINATION: XR CHEST 1 VW- 09/12/2017  INDICATION: Port-A-Catheter; C18.9-Malignant neoplasm of colon, unspecified  COMPARISON: NONE  FINDINGS: 1. A PowerPort is in place from the left subclavian approach, the tip is perfectly positioned in the mid SVC. 2. There is a suggestion of a very slight left apical pneumothorax. This perhaps should be observed with a PA and lateral chest in 3 to 4 hours. 3. There is triangular airspace consolidative opacity at the left base.        Impression: 1. Suggestion of an occult small left apical pneumothorax. 2. Well-positioned PowerPort. 3. Triangular airspace opacity left base.  D:  09/12/2017 E:  09/12/2017  This report was finalized on 9/12/2017 11:48 AM by Dr. Carlito Gamboa MD.      Mri Outside Films    Result Date: 8/31/2017  Narrative: This procedure was auto-finalized with no dictation required.    Nm Pet Whole Body    Result Date: 8/31/2017  Narrative: EXAMINATION: NM PET WHOLE BODY-  INDICATION: Followup scan; C18.7-Malignant neoplasm of sigmoid colon.  TECHNIQUE: With fasting blood glucose level of 96 mg/dl, a total of 12.0 mCi of FDG was administered via right antecubital vein. Following appropriate delay, PET and CT images were obtained from the level of the skull vertex to the feet and fused multiplanar images reconstructed. The CT scan is an unenhanced low-dose study for reference to the PET scan only and does not constitute a standard diagnostic CT scan.  COMPARISON: 3/30/2017 whole body PET CT scan  FINDINGS: Report of previous study indicates new hypermetabolic 5 mm left upper lobe nodule, stable patchy left lower lobe disease with moderately increased FDG activity and multiple punctate slightly hypermetabolic right middle and lower lobe nodules. Stable mixed lytic and  sclerotic L1-L2 vertebral lesions.  History today indicates metastatic sigmoid cancer.  Three-D images today show previously noted hypermetabolic left apical lesion no longer identified. There is focal increased activity in the patient's dense left perihilar disease. Small right lower lung focus appears stable. Intense activity is again seen in the lower thoracic region.  Multiplanar images show no significant asymmetry of uptake in the brain. No hypermetabolic node or mass is seen in the neck.  In the chest, no hypermetabolic mediastinal disease is seen. The patient's left hilar mass/left lower lobe mass is now strongly hypermetabolic with maximal SUV of 12.9. Maximal SUV present here previously and a somewhat more diffuse patchy lesion was 4.3.  Activity in a right perihilar nodule today is 3.2, previously 2.3. There is no evidence of the previously noted hypermetabolic left upper lobe nodule. No other hypermetabolic disease is seen in the chest. There is weak nonhypermetabolic activity in an apparently new, but normal sized left axillary lymph node, maximal SUV 1.4.  Below the diaphragm, no hypermetabolic liver or adrenal lesions are seen. Intense activity previously noted in the upper lumbar spine show similar degree of activity, mildly increased overall from 17.3 to 19.6. There has been interval posterior lumbar fixation/stabilization of this area. On CT, there is no obvious evidence of progression. No new hypermetabolic node or mass is seen. No new hypermetabolic marrow space disease is appreciated.      Impression: 1. Increasing, now strongly hypermetabolic activity in the patient's left infrahilar/left lower lobe lesion. 2. Interval resolution of small left upper lobe lesion. 3. Slight progression of activity in the small right perihilar lung nodule. 4. Interval fixation/stabilization of the patient's large destructive upper lumbar lesion, which remains strongly hypermetabolic. No obvious progression by CT  appearance. 5. No new disease is identified elsewhere.  D:  08/31/2017 E:  08/31/2017    This report was finalized on 8/31/2017 4:27 PM by DR. Eliseo Riley MD.      Chest X-ray Pa & Lateral    Result Date: 9/12/2017  Narrative: EXAMINATION: XR CHEST PA AND LATERAL- 09/10/2017  INDICATION: Pre-Op Cardiac Surgery; C18.9-Malignant neoplasm of colon, unspecified  COMPARISON: NONE  FINDINGS: Cardiac silhouette within normal limits. No focal airspace opacity or overt edema. No pneumothorax or pleural effusion. Lumbar fusion hardware in place.         Impression: No acute cardiopulmonary findings.   D:  09/10/2017 E:  09/11/2017  This report was finalized on 9/12/2017 2:01 PM by Dr. Julian Horner.        ASSESSMENT: The patient is a very pleasant 56 y.o. male with stage IV colon cancer    PROBLEM LIST:  1. Currently stage IV colon cancer with lung and bony metastases.  K-edwige mutant, microsatellite stable, and intermediate mutational load.  2. Status post low anterior resection done by Dr. Young 2007, [id number]  3.  Attempted adjuvant chemotherapy for 1 cycle could not tolerate secondary to multiple toxicities.  4. Biopsy-proven L2 metastases done October 11, 2016. Status post CyberKnife radiation treatment.  5.  Started Keytruda 200 mg IV every 3 weeks on February 1, 2017, status post 10 cycles of treatment  6. Disease progression documented on PET scan completed 8/31/2017.   7. Treatment changed to Xeloda/Avastin/Oxaliplatin on 9/19/2017.   8. Status post tumor debulking at L2 done by Dr. Frazier at [facility] on April 21, 2017  9.  Treatment induced hepatitis, resolved  10.  Cancer related pain  11.  Opioid-induced constipation  12.  Treatment induced asthenia  13.  Mild depression  14.  Insomnia    PLAN:  1.  We will proceed with treatment today as planned with Oxaliplatin/Avastin cycle #1.   2. This is being given with Xeloda 1000 mg twice per day, 2 weeks on, 1 week off.   3. We will monitor the patient's labs  throughout treatment including blood counts, kidney function, CEA, thyroid function, and liver functions. We will monitor his liver enzymes that have been elevated secondary to treatment with immune-therapy.   4.  The patient will follow-up with me in 3 weeks for next cycle of treatment.  5.  Patient will continue taking over-the-counter stool softeners for constipation.  6.  I again reviewed with the patient the potential side effects from immune therapy including immune mediated reactions with pneumonitis, hepatitis, colitis, thyroiditis, and potential death.   7.  Patient tissue was submitted for the matched trial he did match on PETN mutation however that arm was closed.  8.  I advised the patient to hold his alternative treatment with high dose vitamin C while he is on immunetherapy.  9. The patient will have repeat imaging done after cycle # of treatment.   10. The patient will continue Zofran as needed for treatment related nausea.  11. The patient will continue oxycodone 10 mg as needed for cancer-related pain.  We'll continue Celebrex 200 mg twice a day as needed.    12.  I offered patient Ritalin 5 mg daily to see if that might help with his fatigue that he would like to hold off for now.  13.  The patient could not tolerate Remeron 15 mg daily at bedtime secondary to mental status changes.  14.  Patient will continue Zaleplon as needed for insomnia.    Scribed for Kristofer Rodriguez MD by JESSEE Osborne. 9/19/2017  9:27 AM  Kristofer Rodriguez MD  9/19/2017   9:27 AM  I, Kristofer Rodriguez MD, personally performed the services described in this documentation as scribed by the above named individual in my presence, and it is both accurate and complete.  9/19/2017  9:27 AM

## 2017-09-19 NOTE — PROGRESS NOTES
Oncology Nutrition    Patient Name:  Marquis Esteban  YOB: 1960  MRN: [id number]  Admit Date:  (Not on file)    Diagnosis:  Metastatic colon cancer  Treatment:  Xeloda(D1-14) / Oxaliplatin(D1) / Avastin(D1) - every 21 days; note Avastin to start with cycle 2    Weight:  138# stable UBW: 135-140#  Nutrition Symptoms:  No nutritional complaints at this time    Met with patient and his wife during his chemotherapy infusion appointment.  Note patient with a chemotherapy drug change due to disease progression.  He reports his appetite has been good; denies nutritional complaints; and states his weight has been stable recently.    Reviewed the importance of good nutrition during his treatment course.  Emphasized the importance of protein and reviewed high protein vegan foods.  Also emphasized the importance of adequate hydration.  Encouraged him to continue his vegan diet and instructed him to be choosing a wide variety of plant based foods.  Written diet material provided and reviewed regarding cold sensitivity with Oxaliplatin.      Answered their questions and both voiced understanding of information discussed.  RD's contact information provided and encouraged them to call with further nutritional questions/concerns.  Will monitor as needed for nutrition intervention/education.    Electronically signed by:  Deanna Schulz RD  09/19/17 1:53 PM

## 2017-10-08 ENCOUNTER — LAB (OUTPATIENT)
Dept: LAB | Facility: HOSPITAL | Age: 57
End: 2017-10-08
Attending: INTERNAL MEDICINE

## 2017-10-08 DIAGNOSIS — C18.7 MALIGNANT NEOPLASM OF SIGMOID COLON (HCC): ICD-10-CM

## 2017-10-08 DIAGNOSIS — C79.51 BONE METASTASIS: ICD-10-CM

## 2017-10-08 LAB
ALBUMIN SERPL-MCNC: 4.4 G/DL (ref 3.2–4.8)
ALBUMIN/GLOB SERPL: 1.4 G/DL (ref 1.5–2.5)
ALP SERPL-CCNC: 99 U/L (ref 25–100)
ALT SERPL W P-5'-P-CCNC: 15 U/L (ref 7–40)
ANION GAP SERPL CALCULATED.3IONS-SCNC: 5 MMOL/L (ref 3–11)
AST SERPL-CCNC: 24 U/L (ref 0–33)
BASOPHILS # BLD AUTO: 0.01 10*3/MM3 (ref 0–0.2)
BASOPHILS NFR BLD AUTO: 0.2 % (ref 0–1)
BILIRUB SERPL-MCNC: 0.6 MG/DL (ref 0.3–1.2)
BILIRUB UR QL STRIP: NEGATIVE
BUN BLD-MCNC: 14 MG/DL (ref 9–23)
BUN/CREAT SERPL: 20 (ref 7–25)
CALCIUM SPEC-SCNC: 9 MG/DL (ref 8.7–10.4)
CHLORIDE SERPL-SCNC: 102 MMOL/L (ref 99–109)
CLARITY UR: CLEAR
CO2 SERPL-SCNC: 30 MMOL/L (ref 20–31)
COLOR UR: YELLOW
CREAT BLD-MCNC: 0.7 MG/DL (ref 0.6–1.3)
DEPRECATED RDW RBC AUTO: 51.4 FL (ref 37–54)
EOSINOPHIL # BLD AUTO: 0.42 10*3/MM3 (ref 0–0.3)
EOSINOPHIL NFR BLD AUTO: 9.1 % (ref 0–3)
ERYTHROCYTE [DISTWIDTH] IN BLOOD BY AUTOMATED COUNT: 21.5 % (ref 11.3–14.5)
GFR SERPL CREATININE-BSD FRML MDRD: 117 ML/MIN/1.73
GLOBULIN UR ELPH-MCNC: 3.2 GM/DL
GLUCOSE BLD-MCNC: 112 MG/DL (ref 70–100)
GLUCOSE UR STRIP-MCNC: NEGATIVE MG/DL
HCT VFR BLD AUTO: 34.8 % (ref 38.9–50.9)
HGB BLD-MCNC: 11.3 G/DL (ref 13.1–17.5)
HGB UR QL STRIP.AUTO: NEGATIVE
IMM GRANULOCYTES # BLD: 0 10*3/MM3 (ref 0–0.03)
IMM GRANULOCYTES NFR BLD: 0 % (ref 0–0.6)
KETONES UR QL STRIP: NEGATIVE
LEUKOCYTE ESTERASE UR QL STRIP.AUTO: NEGATIVE
LYMPHOCYTES # BLD AUTO: 1.06 10*3/MM3 (ref 0.6–4.8)
LYMPHOCYTES NFR BLD AUTO: 22.8 % (ref 24–44)
MCH RBC QN AUTO: 21.7 PG (ref 27–31)
MCHC RBC AUTO-ENTMCNC: 32.5 G/DL (ref 32–36)
MCV RBC AUTO: 66.9 FL (ref 80–99)
MONOCYTES # BLD AUTO: 0.51 10*3/MM3 (ref 0–1)
MONOCYTES NFR BLD AUTO: 11 % (ref 0–12)
NEUTROPHILS # BLD AUTO: 2.64 10*3/MM3 (ref 1.5–8.3)
NEUTROPHILS NFR BLD AUTO: 56.9 % (ref 41–71)
NITRITE UR QL STRIP: NEGATIVE
PH UR STRIP.AUTO: 7 [PH] (ref 5–8)
PLAT MORPH BLD: NORMAL
PLATELET # BLD AUTO: 154 10*3/MM3 (ref 150–450)
PMV BLD AUTO: 9 FL (ref 6–12)
POTASSIUM BLD-SCNC: 4.5 MMOL/L (ref 3.5–5.5)
PROT SERPL-MCNC: 7.6 G/DL (ref 5.7–8.2)
PROT UR QL STRIP: NEGATIVE
RBC # BLD AUTO: 5.2 10*6/MM3 (ref 4.2–5.76)
RBC MORPH BLD: NORMAL
SODIUM BLD-SCNC: 137 MMOL/L (ref 132–146)
SP GR UR STRIP: 1.01 (ref 1–1.03)
UROBILINOGEN UR QL STRIP: NORMAL
WBC MORPH BLD: NORMAL
WBC NRBC COR # BLD: 4.64 10*3/MM3 (ref 3.5–10.8)

## 2017-10-08 PROCEDURE — 80053 COMPREHEN METABOLIC PANEL: CPT

## 2017-10-08 PROCEDURE — 85025 COMPLETE CBC W/AUTO DIFF WBC: CPT

## 2017-10-08 PROCEDURE — 85007 BL SMEAR W/DIFF WBC COUNT: CPT

## 2017-10-08 PROCEDURE — 81003 URINALYSIS AUTO W/O SCOPE: CPT

## 2017-10-08 PROCEDURE — 36415 COLL VENOUS BLD VENIPUNCTURE: CPT

## 2017-10-09 ENCOUNTER — SPECIALTY PHARMACY (OUTPATIENT)
Dept: ONCOLOGY | Facility: HOSPITAL | Age: 57
End: 2017-10-09

## 2017-10-09 DIAGNOSIS — C79.51 BONE METASTASIS: ICD-10-CM

## 2017-10-09 DIAGNOSIS — C18.7 MALIGNANT NEOPLASM OF SIGMOID COLON (HCC): ICD-10-CM

## 2017-10-09 NOTE — PROGRESS NOTES
Oral Chemotherapy Teaching      Patient Name/:  Marquis Esteban   1960  Oral Chemotherapy Regimen:  Capecitabine (Xeloda) 1000 mg PO x 7 days, then off for 7 days  Date Started Medication: Cycle 1: 17    Cycle 2: 10/3/17    Initial Teaching Follow Up Comments     Safety     Storage instructions (away from children; away from heat/cold, sunlight, or moisture), handling - use of gloves (caregivers), washing hands after touching pills, managing waste     “How are you storing your medications?”, reminders on storage, proper handling (caregivers using gloves, washing hands, away from children, managing waste, etc.), disposal of medication with D/C or dosage change    Patient is storing his medication away from caregivers and family members, and washes his hands after he handles the medication.     Adherence      patient and/or caregiver on how to take medication, take with/without food, assess their adherence potential, stress importance of adherence, ways to manage adherence (pill boxes, phone reminders, calendars), what to do if miss a dose   “How are you taking your medication?” “How are you remembering to take your medication?”, “How many doses have you missed?”, determine reasons for non-adherence (not remembering, side effects, etc), ways to improve, overadherence? Remind patient of ways to improve/maintain adherence   Patient states he takes the medication twice daily within 30 minutes of eating a meal. Patient denies missed doses of this medication, and is compliant.     Side Effects/Adverse Reactions      patient on potential side effects, s/s, ways to manage, when to call MD/seek help     Determine if patient experiencing side effects, ways to manage  Patient has experienced some nausea that he attributes to his oxaliplatin infusion, but has resolved. Patient states that his appetite is variable, but he is trying to ensure proper nutrition. Patient denies any other side effects.      Miscellaneous     Food interactions, DDIs, financial issues Determine if patient started any new medications since being placed on oral chemo (analyze for DDI) Patient has not started any new medications.     Additional Notes:  Patient verbalized understanding to call Dr. Rodriguez's office or Gabriela Guaman with any questions or concerns.

## 2017-10-10 ENCOUNTER — INFUSION (OUTPATIENT)
Dept: ONCOLOGY | Facility: HOSPITAL | Age: 57
End: 2017-10-10

## 2017-10-10 ENCOUNTER — EDUCATION (OUTPATIENT)
Dept: ONCOLOGY | Facility: HOSPITAL | Age: 57
End: 2017-10-10

## 2017-10-10 ENCOUNTER — DOCUMENTATION (OUTPATIENT)
Dept: NUTRITION | Facility: HOSPITAL | Age: 57
End: 2017-10-10

## 2017-10-10 ENCOUNTER — OFFICE VISIT (OUTPATIENT)
Dept: ONCOLOGY | Facility: CLINIC | Age: 57
End: 2017-10-10

## 2017-10-10 VITALS
SYSTOLIC BLOOD PRESSURE: 125 MMHG | BODY MASS INDEX: 22.02 KG/M2 | HEIGHT: 66 IN | WEIGHT: 137 LBS | RESPIRATION RATE: 16 BRPM | TEMPERATURE: 97.2 F | DIASTOLIC BLOOD PRESSURE: 76 MMHG | HEART RATE: 81 BPM

## 2017-10-10 DIAGNOSIS — C79.51 BONE METASTASIS: ICD-10-CM

## 2017-10-10 DIAGNOSIS — C18.7 MALIGNANT NEOPLASM OF SIGMOID COLON (HCC): Primary | ICD-10-CM

## 2017-10-10 LAB — CEA SERPL-MCNC: 400 NG/ML (ref 0–2.5)

## 2017-10-10 PROCEDURE — 96417 CHEMO IV INFUS EACH ADDL SEQ: CPT

## 2017-10-10 PROCEDURE — 96415 CHEMO IV INFUSION ADDL HR: CPT

## 2017-10-10 PROCEDURE — 96413 CHEMO IV INFUSION 1 HR: CPT

## 2017-10-10 PROCEDURE — 96375 TX/PRO/DX INJ NEW DRUG ADDON: CPT

## 2017-10-10 PROCEDURE — 82378 CARCINOEMBRYONIC ANTIGEN: CPT

## 2017-10-10 PROCEDURE — 96367 TX/PROPH/DG ADDL SEQ IV INF: CPT

## 2017-10-10 PROCEDURE — 99215 OFFICE O/P EST HI 40 MIN: CPT | Performed by: INTERNAL MEDICINE

## 2017-10-10 PROCEDURE — 25010000002 HEPARIN FLUSH (PORCINE) 100 UNIT/ML SOLUTION: Performed by: INTERNAL MEDICINE

## 2017-10-10 PROCEDURE — 25010000002 BEVACIZUMAB PER 10 MG: Performed by: INTERNAL MEDICINE

## 2017-10-10 PROCEDURE — 25010000002 OXALIPLATIN PER 0.5 MG: Performed by: INTERNAL MEDICINE

## 2017-10-10 PROCEDURE — 25010000002 DEXAMETHASONE PER 1 MG: Performed by: INTERNAL MEDICINE

## 2017-10-10 PROCEDURE — 25010000002 PALONOSETRON PER 25 MCG: Performed by: INTERNAL MEDICINE

## 2017-10-10 RX ORDER — SODIUM CHLORIDE 0.9 % (FLUSH) 0.9 %
10 SYRINGE (ML) INJECTION AS NEEDED
Status: CANCELLED | OUTPATIENT
Start: 2017-10-10

## 2017-10-10 RX ORDER — DEXTROSE MONOHYDRATE 50 MG/ML
250 INJECTION, SOLUTION INTRAVENOUS ONCE
Status: COMPLETED | OUTPATIENT
Start: 2017-10-10 | End: 2017-10-10

## 2017-10-10 RX ORDER — SODIUM CHLORIDE 0.9 % (FLUSH) 0.9 %
10 SYRINGE (ML) INJECTION AS NEEDED
Status: DISCONTINUED | OUTPATIENT
Start: 2017-10-10 | End: 2017-10-10 | Stop reason: HOSPADM

## 2017-10-10 RX ORDER — SODIUM CHLORIDE 9 MG/ML
250 INJECTION, SOLUTION INTRAVENOUS ONCE
Status: COMPLETED | OUTPATIENT
Start: 2017-10-10 | End: 2017-10-10

## 2017-10-10 RX ORDER — PALONOSETRON 0.05 MG/ML
0.25 INJECTION, SOLUTION INTRAVENOUS ONCE
Status: COMPLETED | OUTPATIENT
Start: 2017-10-10 | End: 2017-10-10

## 2017-10-10 RX ADMIN — BEVACIZUMAB 460 MG: 400 INJECTION, SOLUTION INTRAVENOUS at 12:26

## 2017-10-10 RX ADMIN — OXALIPLATIN 170 MG: 100 INJECTION, SOLUTION, CONCENTRATE INTRAVENOUS at 10:08

## 2017-10-10 RX ADMIN — SODIUM CHLORIDE 250 ML: 9 INJECTION, SOLUTION INTRAVENOUS at 12:25

## 2017-10-10 RX ADMIN — DEXTROSE MONOHYDRATE 250 ML: 50 INJECTION, SOLUTION INTRAVENOUS at 10:00

## 2017-10-10 RX ADMIN — PALONOSETRON HYDROCHLORIDE 0.25 MG: 0.25 INJECTION INTRAVENOUS at 09:38

## 2017-10-10 RX ADMIN — HEPARIN 500 UNITS: 100 SYRINGE at 14:13

## 2017-10-10 RX ADMIN — Medication 10 ML: at 14:13

## 2017-10-10 RX ADMIN — DEXAMETHASONE SODIUM PHOSPHATE 12 MG: 4 INJECTION, SOLUTION INTRAMUSCULAR; INTRAVENOUS at 09:40

## 2017-10-10 NOTE — PROGRESS NOTES
DATE OF VISIT: 10/10/2017    REASON FOR VISIT: Followup for metastatic colon cancer.      HISTORY OF PRESENT ILLNESS: The patient is a very pleasant 57 y.o. male  with past medical history significant for metastatic colon cancer diagnosed March 2007 . He is status post lower anterior resection as well as 1 cycle FOLFOX, stopped due to multiple toxicities. Final pathology showed poorly differentiated adenocarcinoma with 25 negative lymph nodes and clear surgical margins. Isolated tumor deposits was found in the pericolonic fat. Pathologic stage T2 N1c M0 stage IIIA disease.The patient has been followed by serial colonoscopies as well as CAT scans that did document pulmonary metastatic disease with left lower lobe nodule that is gradually increasing in size. Patient was doing fairly well until recently, 3 months ago, patient presented with low back pain. Patient had restaging workup that revealed hypermetabolic L2 vertebral body lesion. Patient had biopsy done on October 11, 2016 that revealed metastatic adenocarcinoma consistent of colon primary with CK 7 negative CK 20 positive CDX2 positive. Patient had next generation gene sequencing that revealed intermediate mutational load with microsatellite stability.  He started chemotherapy with Keytruda on 2/1/2017. The patient was changed to CapeOx with Avastin on 9/19/2017. He is here today for scheduled follow up visit with treatment.     SUBJECTIVE: The patient has been doing fairly well.  He was able to tolerate chemotherapy with multiple side effects.  He had constipation.  He had nausea that improves with Zofran.  He denied any bleeding no skin rash. He is eating and drinking well. He has no cough or shortness of breath.  He is complaining of fatigue.    PAST MEDICAL HISTORY/SOCIAL HISTORY/FAMILY HISTORY: Unchanged from my prior documentation done on 01/17/2017.     Review of Systems   Constitutional: Negative for activity change, appetite change, chills, fatigue,  fever and unexpected weight change.   HENT: Negative for hearing loss, mouth sores, nosebleeds, sore throat and trouble swallowing.    Eyes: Negative for visual disturbance.   Respiratory: Negative for cough, chest tightness, shortness of breath and wheezing.    Cardiovascular: Negative for chest pain, palpitations and leg swelling.   Gastrointestinal: Negative for abdominal distention, abdominal pain, blood in stool, constipation, diarrhea, nausea, rectal pain and vomiting.   Endocrine: Negative for cold intolerance and heat intolerance.   Genitourinary: Negative for difficulty urinating, dysuria, frequency and urgency.   Musculoskeletal: Positive for back pain. Negative for arthralgias, gait problem, joint swelling and myalgias.   Skin: Negative for rash.   Neurological: Negative for dizziness, tremors, syncope, weakness, light-headedness, numbness and headaches.        Sleep disturbance   Hematological: Negative for adenopathy. Does not bruise/bleed easily.   Psychiatric/Behavioral: Negative for confusion, sleep disturbance and suicidal ideas. The patient is not nervous/anxious.          Current Outpatient Prescriptions:   •  capecitabine (XELODA) 500 MG chemo tablet, Take 2 tab(s) by mouth in the morning and 2 tabs by mouth in the evening on days 1-14, then off for 7 days. (Patient taking differently: PT started [id number] ~Shell), Disp: 56 tablet, Rfl: 3  •  capecitabine (XELODA) 500 MG chemo tablet, Take 2 tablets by mouth in the morning and 2 tablets by mouth in the evening on days 1-14, then off for 7 days., Disp: 56 tablet, Rfl: 3  •  celecoxib (CELEBREX) 200 MG capsule, Take 200 mg by mouth Daily As Needed for Mild Pain (1-3)., Disp: , Rfl:   •  Cholecalciferol (VITAMIN D3) 5000 UNITS capsule capsule, Take 5,000 Units by mouth Daily., Disp: , Rfl:   •  lidocaine-prilocaine (EMLA) 2.5-2.5 % cream, Apply  topically As Needed (45-60 minutes prior to port access.  Cover with saran/plastic wrap.)., Disp: 30 g,  Rfl: 3  •  magnesium hydroxide (MILK OF MAGNESIA) 2400 MG/10ML suspension suspension, Take 10 mL by mouth Daily., Disp: , Rfl:   •  ondansetron (ZOFRAN) 8 MG tablet, Take 1 tablet by mouth 3 (Three) Times a Day As Needed for Nausea or Vomiting., Disp: 30 tablet, Rfl: 5  •  oxyCODONE-acetaminophen (PERCOCET)  MG per tablet, Take 1 tablet by mouth Every 6 (Six) Hours As Needed for Severe Pain ., Disp: , Rfl:   •  zaleplon (SONATA) 10 MG capsule, Take 10 mg by mouth At Night As Needed., Disp: , Rfl:   No current facility-administered medications for this visit.     Facility-Administered Medications Ordered in Other Visits:   •  sodium chloride 0.9 % infusion 250 mL, 250 mL, Intravenous, Once, JESSEE Manuel    PHYSICAL EXAMINATION:   There were no vitals taken for this visit.   ECOG Performance Status: 1 - Symptomatic but completely ambulatory  General Appearance:  alert, cooperative, no apparent distress and appears stated age   Neurologic/Psychiatric: A&O x 3, gait steady, appropriate affect, strength 5/5 in all muscle groups   HEENT:  Normocephalic, without obvious abnormality, mucous membranes moist   Neck: Supple, symmetrical, trachea midline, no adenopathy;  No thyromegaly, masses, or tenderness   Lungs:   Clear to auscultation bilaterally; respirations regular, even, and unlabored bilaterally   Heart:  Regular rate and rhythm, no murmurs appreciated   Abdomen:   Soft, non-tender, non-distended and no organomegaly   Lymph nodes: No cervical, supraclavicular, inguinal or axillary adenopathy noted   Extremities: Normal, atraumatic; no clubbing, cyanosis, or edema    Skin: No rashes, ulcers, or suspicious lesions noted     Lab on 10/08/2017   Component Date Value Ref Range Status   • Glucose 10/08/2017 112* 70 - 100 mg/dL Final   • BUN 10/08/2017 14  9 - 23 mg/dL Final   • Creatinine 10/08/2017 0.70  0.60 - 1.30 mg/dL Final   • Sodium 10/08/2017 137  132 - 146 mmol/L Final   • Potassium 10/08/2017 4.5   3.5 - 5.5 mmol/L Final   • Chloride 10/08/2017 102  99 - 109 mmol/L Final   • CO2 10/08/2017 30.0  20.0 - 31.0 mmol/L Final   • Calcium 10/08/2017 9.0  8.7 - 10.4 mg/dL Final   • Total Protein 10/08/2017 7.6  5.7 - 8.2 g/dL Final   • Albumin 10/08/2017 4.40  3.20 - 4.80 g/dL Final   • ALT (SGPT) 10/08/2017 15  7 - 40 U/L Final   • AST (SGOT) 10/08/2017 24  0 - 33 U/L Final   • Alkaline Phosphatase 10/08/2017 99  25 - 100 U/L Final   • Total Bilirubin 10/08/2017 0.6  0.3 - 1.2 mg/dL Final   • eGFR Non  Amer 10/08/2017 117  >60 mL/min/1.73 Final   • Globulin 10/08/2017 3.2  gm/dL Final   • A/G Ratio 10/08/2017 1.4* 1.5 - 2.5 g/dL Final   • BUN/Creatinine Ratio 10/08/2017 20.0  7.0 - 25.0 Final   • Anion Gap 10/08/2017 5.0  3.0 - 11.0 mmol/L Final   • Color, UA 10/08/2017 Yellow  Yellow, Straw Final   • Appearance, UA 10/08/2017 Clear  Clear Final   • pH, UA 10/08/2017 7.0  5.0 - 8.0 Final   • Specific Gravity, UA 10/08/2017 1.015  1.001 - 1.030 Final   • Glucose, UA 10/08/2017 Negative  Negative Final   • Ketones, UA 10/08/2017 Negative  Negative Final   • Bilirubin, UA 10/08/2017 Negative  Negative Final   • Blood, UA 10/08/2017 Negative  Negative Final   • Protein, UA 10/08/2017 Negative  Negative Final   • Leuk Esterase, UA 10/08/2017 Negative  Negative Final   • Nitrite, UA 10/08/2017 Negative  Negative Final   • Urobilinogen, UA 10/08/2017 0.2 E.U./dL  0.2 - 1.0 E.U./dL Final   • WBC 10/08/2017 4.64  3.50 - 10.80 10*3/mm3 Final   • RBC 10/08/2017 5.20  4.20 - 5.76 10*6/mm3 Final   • Hemoglobin 10/08/2017 11.3* 13.1 - 17.5 g/dL Final   • Hematocrit 10/08/2017 34.8* 38.9 - 50.9 % Final   • MCV 10/08/2017 66.9* 80.0 - 99.0 fL Final   • MCH 10/08/2017 21.7* 27.0 - 31.0 pg Final   • MCHC 10/08/2017 32.5  32.0 - 36.0 g/dL Final   • RDW 10/08/2017 21.5* 11.3 - 14.5 % Final   • RDW-SD 10/08/2017 51.4  37.0 - 54.0 fl Final   • MPV 10/08/2017 9.0  6.0 - 12.0 fL Final   • Platelets 10/08/2017 154  150 - 450 10*3/mm3  Final   • Neutrophil % 10/08/2017 56.9  41.0 - 71.0 % Final   • Lymphocyte % 10/08/2017 22.8* 24.0 - 44.0 % Final   • Monocyte % 10/08/2017 11.0  0.0 - 12.0 % Final   • Eosinophil % 10/08/2017 9.1* 0.0 - 3.0 % Final   • Basophil % 10/08/2017 0.2  0.0 - 1.0 % Final   • Immature Grans % 10/08/2017 0.0  0.0 - 0.6 % Final   • Neutrophils, Absolute 10/08/2017 2.64  1.50 - 8.30 10*3/mm3 Final   • Lymphocytes, Absolute 10/08/2017 1.06  0.60 - 4.80 10*3/mm3 Final   • Monocytes, Absolute 10/08/2017 0.51  0.00 - 1.00 10*3/mm3 Final   • Eosinophils, Absolute 10/08/2017 0.42* 0.00 - 0.30 10*3/mm3 Final   • Basophils, Absolute 10/08/2017 0.01  0.00 - 0.20 10*3/mm3 Final   • Immature Grans, Absolute 10/08/2017 0.00  0.00 - 0.03 10*3/mm3 Final   • RBC Morphology 10/08/2017 Normal  Normal Final   • WBC Morphology 10/08/2017 Normal  Normal Final   • Platelet Morphology 10/08/2017 Normal  Normal Final        Xr Chest 1 View    Result Date: 9/12/2017  Narrative: EXAMINATION: XR CHEST 1 VW- 09/12/2017  INDICATION: Port-A-Catheter; C18.9-Malignant neoplasm of colon, unspecified  COMPARISON: NONE  FINDINGS: 1. A PowerPort is in place from the left subclavian approach, the tip is perfectly positioned in the mid SVC. 2. There is a suggestion of a very slight left apical pneumothorax. This perhaps should be observed with a PA and lateral chest in 3 to 4 hours. 3. There is triangular airspace consolidative opacity at the left base.        Impression: 1. Suggestion of an occult small left apical pneumothorax. 2. Well-positioned PowerPort. 3. Triangular airspace opacity left base.  D:  09/12/2017 E:  09/12/2017  This report was finalized on 9/12/2017 11:48 AM by Dr. Carlito Gamboa MD.      Chest X-ray Pa & Lateral    Result Date: 9/12/2017  Narrative: EXAMINATION: XR CHEST PA AND LATERAL- 09/10/2017  INDICATION: Pre-Op Cardiac Surgery; C18.9-Malignant neoplasm of colon, unspecified  COMPARISON: NONE  FINDINGS: Cardiac silhouette within normal  limits. No focal airspace opacity or overt edema. No pneumothorax or pleural effusion. Lumbar fusion hardware in place.         Impression: No acute cardiopulmonary findings.   D:  09/10/2017 E:  09/11/2017  This report was finalized on 9/12/2017 2:01 PM by Dr. Julian Horner.        ASSESSMENT: The patient is a very pleasant 56 y.o. male with stage IV colon cancer    PROBLEM LIST:  1. Currently stage IV colon cancer with lung and bony metastases.  K-edwige mutant, microsatellite stable, and intermediate mutational load.  2. Status post low anterior resection done by Dr. Young 2007, [id number]  3.  Attempted adjuvant chemotherapy for 1 cycle could not tolerate secondary to multiple toxicities.  4. Biopsy-proven L2 metastases done October 11, 2016. Status post CyberKnife radiation treatment.  5.  Started Keytruda 200 mg IV every 3 weeks on February 1, 2017, status post 10 cycles of treatment  6. Disease progression documented on PET scan completed 8/31/2017.   7. Treatment changed to Xeloda/Avastin/Oxaliplatin on 9/19/2017, status post 1 cycle.   8. Status post tumor debulking at L2 done by Dr. Frazier at [facility] on April 21, 2017  9.  Treatment induced hepatitis, resolved  10.  Cancer related pain  11.  Opioid-induced constipation  12.  Treatment induced asthenia  13.  Mild depression  14.  Insomnia    PLAN:  1.  We will proceed with treatment today as planned with Oxaliplatin/Avastin cycle #2.   2. This is being given with Xeloda 1000 mg twice per day, 2 weeks on, 1 week off.   3. We will monitor the patient's labs throughout treatment including blood counts, kidney function, CEA, thyroid function, and liver functions. We will monitor his liver enzymes that have been elevated secondary to treatment with immune-therapy.   4.  The patient will follow-up with me in 3 weeks for next cycle of treatment.  5.  Patient will continue taking over-the-counter stool softeners for constipation.  6.  I again reviewed with  the patient the potential side effects from immune therapy including immune mediated reactions with pneumonitis, hepatitis, colitis, thyroiditis, and potential death.   7.  Patient tissue was submitted for the matched trial he did match on PETN mutation however that arm was closed.  8.  I advised the patient to hold his alternative treatment with high dose vitamin C while he is on immunetherapy.  9. The patient will have repeat imaging done after cycle # 4 of treatment.   10. The patient will continue Zofran as needed for treatment related nausea.  11. The patient will continue oxycodone 10 mg as needed for cancer-related pain.  We'll continue Celebrex 200 mg twice a day as needed.    12.  I offered patient Ritalin 5 mg daily to see if that might help with his fatigue that he would like to hold off for now.  13.  The patient could not tolerate Remeron 15 mg daily at bedtime secondary to mental status changes.  14.  Patient will continue Zaleplon as needed for insomnia.    Scribed for Kristofer Rodriguez MD by JESSEE Osborne. 10/10/2017  8:02 AM  Kristofer Rodriguez MD  10/10/2017   8:02 AM  I, Kristofer Rodriguez MD, personally performed the services described in this documentation as scribed by the above named individual in my presence, and it is both accurate and complete.  10/10/2017  9:13 AM

## 2017-10-10 NOTE — PLAN OF CARE
Outpatient Infusion • [address] • [address] • [place] [id number] • [phone]      CHEMOTHERAPY EDUCATION SHEET    NAME:  Marquis Esteban      : 1960           DATE: 10/10/17    Booklets Given: Chemotherapy and You []  Eating Hints []    Sexuality/Fertility Books []     Chemotherapy/Biotherapy Education Sheets: (list all that apply)  Avastin                                                                                                                                                                Chemotherapy Regimen:  Oxaliplatin + Avastin every 3 weeks    TOPICS EDUCATION PROVIDED EDUCATION REINFORCED COMMENTS   ANEMIA:  role of RBC, cause, s/s, ways to manage, role of transfusion [] []    THROMBOCYTOPENIA:  role of platelet, cause, s/s, ways to prevent bleeding, things to avoid, when to seek help [x] [] Discussed risk of bleeding or thrombosis with Avastin.   NEUTROPENIA:  role of WBC, cause, infection precautions, s/s of infection, when to call MD [] []    NUTRITION & APPETITE CHANGES:  importance of maintaining healthy diet & weight, ways to manage to improve intake, dietary consult, exercise regimen [x] [] Discussed importance of hydration and maintaining good nutrition.   DIARRHEA:  causes, s/s of dehydration, ways to manage, dietary changes, when to call MD [] []    CONSTIPATION:  causes, ways to manage, dietary changes, when to call MD [x] [] Patient experienced constipation with cycle one of his chemotherapy, family using diet to treat constipation.   NAUSEA & VOMITING:  cause, use of antiemetics, dietary changes, when to call MD [x] [] Discussed use of Aloxi as a premedication and Zofran at home if nausea/vomiting occur.   MOUTH SORES:  causes, oral care, ways to manage [] []    ALOPECIA:  cause, ways to manage, resources [] []    INFERTILITY & SEXUALITY:  causes, fertility preservation options, sexuality changes, ways to manage, importance of birth control [] []    NERVOUS SYSTEM  CHANGES:  causes, s/s, neuropathies, cognitive changes, ways to manage [] []    PAIN:  causes, ways to manage [] [] ????   SKIN & NAIL CHANGES:  cause, s/s, ways to manage [] []    ORGAN TOXICITIES:  cause, s/s, need for diagnostic tests, labs, when to notify MD [x] [] Discussed impaired wound healing with this medication, as well as possibility of hypertension.   SURVIVORSHIP:  distress, distress assessment, secondary malignancies, early/late effects, follow-up, social issues, social support [] []    HOME CARE:  use of spill kits, storing of PO chemo, how to manage bodily fluids [] []    MISCELLANEOUS:  drug interactions, administration, vesicant, et [x] [] Discussed premedications, expectations, and how long this medication will stay in the patient's system.       Notes:  Patient received treatment with Oxaliplatin 3 weeks ago, today he is starting combination therapy with Avastin. Counseled patient and his wife, they know to call the office with any issues or concerns.    Geovanna Stover, PharmD  10/10/2017  10:32 AM

## 2017-10-10 NOTE — PROGRESS NOTES
Oncology Nutrition    Patient Name:  Marquis Esteban  YOB: 1960    Weight: 137# (stable)  Nutrition Symptoms:  nausea and constipation    Follow up with patient during his chemotherapy infusion appointment.  He states his appetite has been fair.  He does report having some nausea and constipation which he is managing well.  He also reports having cold sensitivity for a few days.  Note his weight remains stable.    Encouraged him to be consuming smaller more frequent meals/snacks throughout the day (~every 3 hours) to aid with nausea management.  Also encouraged him to increase his water consumption and foods high in insoluble fiber to aid with constipation management.     He is without other nutritional concerns at this time.  Encouraged him to call RD if further concerns arise.  He voiced understanding of information discussed.  Will follow up as indicated.      Electronically signed by:  Deanna Schulz RD  10/10/17 11:22 AM

## 2017-10-29 ENCOUNTER — LAB (OUTPATIENT)
Dept: LAB | Facility: HOSPITAL | Age: 57
End: 2017-10-29
Attending: INTERNAL MEDICINE

## 2017-10-29 DIAGNOSIS — C18.7 MALIGNANT NEOPLASM OF SIGMOID COLON (HCC): ICD-10-CM

## 2017-10-29 DIAGNOSIS — C79.51 BONE METASTASIS: ICD-10-CM

## 2017-10-29 LAB
ALBUMIN SERPL-MCNC: 4.4 G/DL (ref 3.2–4.8)
ALBUMIN/GLOB SERPL: 1.5 G/DL (ref 1.5–2.5)
ALP SERPL-CCNC: 105 U/L (ref 25–100)
ALT SERPL W P-5'-P-CCNC: 12 U/L (ref 7–40)
ANION GAP SERPL CALCULATED.3IONS-SCNC: 0 MMOL/L (ref 3–11)
AST SERPL-CCNC: 22 U/L (ref 0–33)
BASOPHILS # BLD AUTO: 0.01 10*3/MM3 (ref 0–0.2)
BASOPHILS NFR BLD AUTO: 0.2 % (ref 0–1)
BILIRUB SERPL-MCNC: 0.4 MG/DL (ref 0.3–1.2)
BILIRUB UR QL STRIP: NEGATIVE
BUN BLD-MCNC: 10 MG/DL (ref 9–23)
BUN/CREAT SERPL: 16.7 (ref 7–25)
CALCIUM SPEC-SCNC: 9 MG/DL (ref 8.7–10.4)
CEA SERPL-MCNC: 200 NG/ML (ref 0–2.5)
CHLORIDE SERPL-SCNC: 104 MMOL/L (ref 99–109)
CLARITY UR: CLEAR
CO2 SERPL-SCNC: 31 MMOL/L (ref 20–31)
COLOR UR: YELLOW
CREAT BLD-MCNC: 0.6 MG/DL (ref 0.6–1.3)
DEPRECATED RDW RBC AUTO: 57.1 FL (ref 37–54)
EOSINOPHIL # BLD AUTO: 0.34 10*3/MM3 (ref 0–0.3)
EOSINOPHIL NFR BLD AUTO: 6.7 % (ref 0–3)
ERYTHROCYTE [DISTWIDTH] IN BLOOD BY AUTOMATED COUNT: 23.9 % (ref 11.3–14.5)
GFR SERPL CREATININE-BSD FRML MDRD: 140 ML/MIN/1.73
GLOBULIN UR ELPH-MCNC: 2.9 GM/DL
GLUCOSE BLD-MCNC: 82 MG/DL (ref 70–100)
GLUCOSE UR STRIP-MCNC: NEGATIVE MG/DL
HCT VFR BLD AUTO: 32.6 % (ref 38.9–50.9)
HGB BLD-MCNC: 10.4 G/DL (ref 13.1–17.5)
HGB UR QL STRIP.AUTO: NEGATIVE
IMM GRANULOCYTES # BLD: 0.01 10*3/MM3 (ref 0–0.03)
IMM GRANULOCYTES NFR BLD: 0.2 % (ref 0–0.6)
KETONES UR QL STRIP: NEGATIVE
LEUKOCYTE ESTERASE UR QL STRIP.AUTO: NEGATIVE
LYMPHOCYTES # BLD AUTO: 1.23 10*3/MM3 (ref 0.6–4.8)
LYMPHOCYTES NFR BLD AUTO: 24.2 % (ref 24–44)
MCH RBC QN AUTO: 22.2 PG (ref 27–31)
MCHC RBC AUTO-ENTMCNC: 31.9 G/DL (ref 32–36)
MCV RBC AUTO: 69.7 FL (ref 80–99)
MONOCYTES # BLD AUTO: 0.82 10*3/MM3 (ref 0–1)
MONOCYTES NFR BLD AUTO: 16.1 % (ref 0–12)
NEUTROPHILS # BLD AUTO: 2.67 10*3/MM3 (ref 1.5–8.3)
NEUTROPHILS NFR BLD AUTO: 52.6 % (ref 41–71)
NITRITE UR QL STRIP: NEGATIVE
PH UR STRIP.AUTO: 7 [PH] (ref 5–8)
PLATELET # BLD AUTO: 160 10*3/MM3 (ref 150–450)
PMV BLD AUTO: 8.7 FL (ref 6–12)
POTASSIUM BLD-SCNC: 4.2 MMOL/L (ref 3.5–5.5)
PROT SERPL-MCNC: 7.3 G/DL (ref 5.7–8.2)
PROT UR QL STRIP: NEGATIVE
RBC # BLD AUTO: 4.68 10*6/MM3 (ref 4.2–5.76)
SODIUM BLD-SCNC: 135 MMOL/L (ref 132–146)
SP GR UR STRIP: <=1.005 (ref 1–1.03)
UROBILINOGEN UR QL STRIP: NORMAL
WBC NRBC COR # BLD: 5.08 10*3/MM3 (ref 3.5–10.8)

## 2017-10-29 PROCEDURE — 80053 COMPREHEN METABOLIC PANEL: CPT

## 2017-10-29 PROCEDURE — 82378 CARCINOEMBRYONIC ANTIGEN: CPT

## 2017-10-29 PROCEDURE — 81003 URINALYSIS AUTO W/O SCOPE: CPT

## 2017-10-29 PROCEDURE — 85025 COMPLETE CBC W/AUTO DIFF WBC: CPT

## 2017-10-29 PROCEDURE — 36415 COLL VENOUS BLD VENIPUNCTURE: CPT

## 2017-10-30 NOTE — PROGRESS NOTES
DATE OF VISIT: 10/31/2017    REASON FOR VISIT: Followup for metastatic colon cancer.      HISTORY OF PRESENT ILLNESS: The patient is a very pleasant 57 y.o. male  with past medical history significant for metastatic colon cancer diagnosed March 2007 . He is status post lower anterior resection as well as 1 cycle FOLFOX, stopped due to multiple toxicities. Final pathology showed poorly differentiated adenocarcinoma with 25 negative lymph nodes and clear surgical margins. Isolated tumor deposits was found in the pericolonic fat. Pathologic stage T2 N1c M0 stage IIIA disease.The patient has been followed by serial colonoscopies as well as CAT scans that did document pulmonary metastatic disease with left lower lobe nodule that is gradually increasing in size. Patient was doing fairly well until recently, 3 months ago, patient presented with low back pain. Patient had restaging workup that revealed hypermetabolic L2 vertebral body lesion. Patient had biopsy done on October 11, 2016 that revealed metastatic adenocarcinoma consistent of colon primary with CK 7 negative CK 20 positive CDX2 positive. Patient had next generation gene sequencing that revealed intermediate mutational load with microsatellite stability.  He started chemotherapy with Keytruda on 2/1/2017. The patient was changed to CapeOx with Avastin on 9/19/2017. He is here today for scheduled follow up visit with treatment.     SUBJECTIVE: The patient has been doing fairly well.  He was able to tolerate chemotherapy with multiple side effects.  He had constipation.  He had nausea that improves with Zofran.  He denied any bleeding no skin rash. He is eating and drinking well. He has no cough or shortness of breath.  He is complaining of fatigue.    PAST MEDICAL HISTORY/SOCIAL HISTORY/FAMILY HISTORY: Unchanged from my prior documentation done on 01/17/2017.     Review of Systems   Constitutional: Negative for activity change, appetite change, chills, fatigue,  fever and unexpected weight change.   HENT: Negative for hearing loss, mouth sores, nosebleeds, sore throat and trouble swallowing.    Eyes: Negative for visual disturbance.   Respiratory: Negative for cough, chest tightness, shortness of breath and wheezing.    Cardiovascular: Negative for chest pain, palpitations and leg swelling.   Gastrointestinal: Negative for abdominal distention, abdominal pain, blood in stool, constipation, diarrhea, nausea, rectal pain and vomiting.   Endocrine: Negative for cold intolerance and heat intolerance.   Genitourinary: Negative for difficulty urinating, dysuria, frequency and urgency.   Musculoskeletal: Positive for back pain. Negative for arthralgias, gait problem, joint swelling and myalgias.   Skin: Negative for rash.   Neurological: Negative for dizziness, tremors, syncope, weakness, light-headedness, numbness and headaches.        Sleep disturbance   Hematological: Negative for adenopathy. Does not bruise/bleed easily.   Psychiatric/Behavioral: Negative for confusion, sleep disturbance and suicidal ideas. The patient is not nervous/anxious.          Current Outpatient Prescriptions:   •  capecitabine (XELODA) 500 MG chemo tablet, Take 2 tab(s) by mouth in the morning and 2 tabs by mouth in the evening on days 1-14, then off for 7 days. (Patient taking differently: PT started [id number] ~Shell), Disp: 56 tablet, Rfl: 3  •  capecitabine (XELODA) 500 MG chemo tablet, Take 2 tablets by mouth in the morning and 2 tablets by mouth in the evening on days 1-14, then off for 7 days., Disp: 56 tablet, Rfl: 3  •  celecoxib (CELEBREX) 200 MG capsule, Take 200 mg by mouth Daily As Needed for Mild Pain (1-3)., Disp: , Rfl:   •  Cholecalciferol (VITAMIN D3) 5000 UNITS capsule capsule, Take 5,000 Units by mouth Daily., Disp: , Rfl:   •  lidocaine-prilocaine (EMLA) 2.5-2.5 % cream, Apply  topically As Needed (45-60 minutes prior to port access.  Cover with saran/plastic wrap.)., Disp: 30 g,  "Rfl: 3  •  magnesium hydroxide (MILK OF MAGNESIA) 2400 MG/10ML suspension suspension, Take 10 mL by mouth Daily., Disp: , Rfl:   •  ondansetron (ZOFRAN) 8 MG tablet, Take 1 tablet by mouth 3 (Three) Times a Day As Needed for Nausea or Vomiting., Disp: 30 tablet, Rfl: 5  •  oxyCODONE-acetaminophen (PERCOCET)  MG per tablet, Take 1 tablet by mouth Every 6 (Six) Hours As Needed for Severe Pain ., Disp: , Rfl:   •  zaleplon (SONATA) 10 MG capsule, Take 10 mg by mouth At Night As Needed., Disp: , Rfl:   No current facility-administered medications for this visit.     Facility-Administered Medications Ordered in Other Visits:   •  bevacizumab (AVASTIN) 460 mg in sodium chloride 0.9 % 118.4 mL chemo IVPB, 7.5 mg/kg (Treatment Plan Recorded), Intravenous, Once, Kristofer Rodriguez MD  •  dexamethasone (DECADRON) 12 mg in sodium chloride 0.9 % IVPB, 12 mg, Intravenous, Once, Kristofer Rodriguez MD  •  dextrose (D5W) 5 % infusion 250 mL, 250 mL, Intravenous, Once, Kristofer Rodriguez MD  •  heparin flush (porcine) 100 UNIT/ML injection 500 Units, 500 Units, Intravenous, PRN, Kristofer Rodriguez MD  •  OXALIplatin (ELOXATIN) 170 mg in dextrose (D5W) 5 % 284 mL chemo IVPB, 100 mg/m2 (Treatment Plan Recorded), Intravenous, Once, Kristofer Rodriguez MD  •  palonosetron (ALOXI) injection 0.25 mg, 0.25 mg, Intravenous, Once, Kristofer Rodriguez MD  •  sodium chloride 0.9 % infusion 250 mL, 250 mL, Intravenous, Once, Natasha Herrera, APRN    PHYSICAL EXAMINATION:   /80 Comment: LUE  Pulse 66  Temp 97.7 °F (36.5 °C) (Temporal Artery )   Resp 16  Ht 66\" (167.6 cm)  Wt 139 lb (63 kg)  BMI 22.44 kg/m2   ECOG Performance Status: 1 - Symptomatic but completely ambulatory  General Appearance:  alert, cooperative, no apparent distress and appears stated age   Neurologic/Psychiatric: A&O x 3, gait steady, appropriate affect, strength 5/5 in all muscle groups   HEENT:  Normocephalic, without obvious abnormality, mucous membranes moist   Neck: Supple, " symmetrical, trachea midline, no adenopathy;  No thyromegaly, masses, or tenderness   Lungs:   Clear to auscultation bilaterally; respirations regular, even, and unlabored bilaterally   Heart:  Regular rate and rhythm, no murmurs appreciated   Abdomen:   Soft, non-tender, non-distended and no organomegaly   Lymph nodes: No cervical, supraclavicular, inguinal or axillary adenopathy noted   Extremities: Normal, atraumatic; no clubbing, cyanosis, or edema    Skin: No rashes, ulcers, or suspicious lesions noted     Lab on 10/29/2017   Component Date Value Ref Range Status   • Glucose 10/29/2017 82  70 - 100 mg/dL Final   • BUN 10/29/2017 10  9 - 23 mg/dL Final   • Creatinine 10/29/2017 0.60  0.60 - 1.30 mg/dL Final   • Sodium 10/29/2017 135  132 - 146 mmol/L Final   • Potassium 10/29/2017 4.2  3.5 - 5.5 mmol/L Final   • Chloride 10/29/2017 104  99 - 109 mmol/L Final   • CO2 10/29/2017 31.0  20.0 - 31.0 mmol/L Final   • Calcium 10/29/2017 9.0  8.7 - 10.4 mg/dL Final   • Total Protein 10/29/2017 7.3  5.7 - 8.2 g/dL Final   • Albumin 10/29/2017 4.40  3.20 - 4.80 g/dL Final   • ALT (SGPT) 10/29/2017 12  7 - 40 U/L Final   • AST (SGOT) 10/29/2017 22  0 - 33 U/L Final   • Alkaline Phosphatase 10/29/2017 105* 25 - 100 U/L Final   • Total Bilirubin 10/29/2017 0.4  0.3 - 1.2 mg/dL Final   • eGFR Non African Amer 10/29/2017 140  >60 mL/min/1.73 Final   • Globulin 10/29/2017 2.9  gm/dL Final   • A/G Ratio 10/29/2017 1.5  1.5 - 2.5 g/dL Final   • BUN/Creatinine Ratio 10/29/2017 16.7  7.0 - 25.0 Final   • Anion Gap 10/29/2017 0.0* 3.0 - 11.0 mmol/L Final   • Color, UA 10/29/2017 Yellow  Yellow, Straw Final   • Appearance, UA 10/29/2017 Clear  Clear Final   • pH, UA 10/29/2017 7.0  5.0 - 8.0 Final   • Specific Gravity, UA 10/29/2017 <=1.005  1.001 - 1.030 Final   • Glucose, UA 10/29/2017 Negative  Negative Final   • Ketones, UA 10/29/2017 Negative  Negative Final   • Bilirubin, UA 10/29/2017 Negative  Negative Final   • Blood, UA  10/29/2017 Negative  Negative Final   • Protein, UA 10/29/2017 Negative  Negative Final   • Leuk Esterase, UA 10/29/2017 Negative  Negative Final   • Nitrite, UA 10/29/2017 Negative  Negative Final   • Urobilinogen, UA 10/29/2017 0.2 E.U./dL  0.2 - 1.0 E.U./dL Final   • CEA 10/29/2017 200.00* 0.00 - 2.50 ng/mL Final   • WBC 10/29/2017 5.08  3.50 - 10.80 10*3/mm3 Final   • RBC 10/29/2017 4.68  4.20 - 5.76 10*6/mm3 Final   • Hemoglobin 10/29/2017 10.4* 13.1 - 17.5 g/dL Final   • Hematocrit 10/29/2017 32.6* 38.9 - 50.9 % Final   • MCV 10/29/2017 69.7* 80.0 - 99.0 fL Final   • MCH 10/29/2017 22.2* 27.0 - 31.0 pg Final   • MCHC 10/29/2017 31.9* 32.0 - 36.0 g/dL Final   • RDW 10/29/2017 23.9* 11.3 - 14.5 % Final   • RDW-SD 10/29/2017 57.1* 37.0 - 54.0 fl Final   • MPV 10/29/2017 8.7  6.0 - 12.0 fL Final   • Platelets 10/29/2017 160  150 - 450 10*3/mm3 Final   • Neutrophil % 10/29/2017 52.6  41.0 - 71.0 % Final   • Lymphocyte % 10/29/2017 24.2  24.0 - 44.0 % Final   • Monocyte % 10/29/2017 16.1* 0.0 - 12.0 % Final   • Eosinophil % 10/29/2017 6.7* 0.0 - 3.0 % Final   • Basophil % 10/29/2017 0.2  0.0 - 1.0 % Final   • Immature Grans % 10/29/2017 0.2  0.0 - 0.6 % Final   • Neutrophils, Absolute 10/29/2017 2.67  1.50 - 8.30 10*3/mm3 Final   • Lymphocytes, Absolute 10/29/2017 1.23  0.60 - 4.80 10*3/mm3 Final   • Monocytes, Absolute 10/29/2017 0.82  0.00 - 1.00 10*3/mm3 Final   • Eosinophils, Absolute 10/29/2017 0.34* 0.00 - 0.30 10*3/mm3 Final   • Basophils, Absolute 10/29/2017 0.01  0.00 - 0.20 10*3/mm3 Final   • Immature Grans, Absolute 10/29/2017 0.01  0.00 - 0.03 10*3/mm3 Final        No results found.    ASSESSMENT: The patient is a very pleasant 56 y.o. male with stage IV colon cancer    PROBLEM LIST:  1. Currently stage IV colon cancer with lung and bony metastases.  K-edwige mutant, microsatellite stable, and intermediate mutational load.  2. Status post low anterior resection done by Dr. Young 2007, [id number]  3.  Attempted  adjuvant chemotherapy for 1 cycle could not tolerate secondary to multiple toxicities.  4. Biopsy-proven L2 metastases done October 11, 2016. Status post CyberKnife radiation treatment.  5.  Started Keytruda 200 mg IV every 3 weeks on February 1, 2017, status post 10 cycles of treatment  6. Disease progression documented on PET scan completed 8/31/2017.   7. Treatment changed to Xeloda/Avastin/Oxaliplatin on 9/19/2017, status post 1 cycle.   8. Status post tumor debulking at L2 done by Dr. Frazier at [facility] on April 21, 2017  9.  Treatment induced hepatitis, resolved  10.  Cancer related pain  11.  Opioid-induced constipation  12.  Treatment induced asthenia  13.  Mild depression  14.  Insomnia    PLAN:  1.  We will proceed with treatment today as planned with Oxaliplatin/Avastin cycle #3.   2. This is being given with Xeloda 1000 mg twice per day, 2 weeks on, 1 week off.   3. We will monitor the patient's labs throughout treatment including blood counts, kidney function, CEA, thyroid function, and liver functions. We will monitor his liver enzymes that have been elevated secondary to treatment with immune-therapy.   4.  The patient will follow-up with me in 3 weeks for next cycle of treatment.  5.  Patient will continue taking over-the-counter stool softeners for constipation.  6.  I again reviewed with the patient the potential side effects from immune therapy including immune mediated reactions with pneumonitis, hepatitis, colitis, thyroiditis, and potential death.   7.  Patient tissue was submitted for the matched trial.  He did match on PETN mutation, however that arm was closed.  8.  I advised the patient to hold his alternative treatment with high dose vitamin C while he is on immunetherapy.  9. The patient will have repeat imaging done after cycle # 4 of treatment.   10. The patient will continue Zofran as needed for treatment related nausea.  11. The patient will continue oxycodone 10 mg as  needed for cancer-related pain.  We'll continue Celebrex 200 mg twice a day as needed.    12.  I offered patient Ritalin 5 mg daily to see if that might help with his fatigue that he would like to hold off for now.  13.  The patient could not tolerate Remeron 15 mg daily at bedtime secondary to mental status changes.  14.  Patient will continue Zaleplon as needed for insomnia.    Scribed for Kristofer Rodriguez MD by Natasha Herrera, JSESEE. 10/31/2017  9:32 AM  Kristofer Rodriguez MD  10/31/2017   9:32 AM  I, Kristofer Rodriguez MD, personally performed the services described in this documentation as scribed by the above named individual in my presence, and it is both accurate and complete.  10/31/2017  9:32 AM

## 2017-10-31 ENCOUNTER — SPECIALTY PHARMACY (OUTPATIENT)
Dept: ONCOLOGY | Facility: HOSPITAL | Age: 57
End: 2017-10-31

## 2017-10-31 ENCOUNTER — OFFICE VISIT (OUTPATIENT)
Dept: ONCOLOGY | Facility: CLINIC | Age: 57
End: 2017-10-31

## 2017-10-31 ENCOUNTER — INFUSION (OUTPATIENT)
Dept: ONCOLOGY | Facility: HOSPITAL | Age: 57
End: 2017-10-31

## 2017-10-31 VITALS
TEMPERATURE: 97.7 F | HEIGHT: 66 IN | BODY MASS INDEX: 22.34 KG/M2 | WEIGHT: 139 LBS | RESPIRATION RATE: 16 BRPM | HEART RATE: 66 BPM | DIASTOLIC BLOOD PRESSURE: 80 MMHG | SYSTOLIC BLOOD PRESSURE: 122 MMHG

## 2017-10-31 DIAGNOSIS — C79.51 BONE METASTASIS: ICD-10-CM

## 2017-10-31 DIAGNOSIS — C18.7 MALIGNANT NEOPLASM OF SIGMOID COLON (HCC): Primary | ICD-10-CM

## 2017-10-31 PROCEDURE — 99215 OFFICE O/P EST HI 40 MIN: CPT | Performed by: INTERNAL MEDICINE

## 2017-10-31 PROCEDURE — 25010000002 BEVACIZUMAB PER 10 MG: Performed by: INTERNAL MEDICINE

## 2017-10-31 PROCEDURE — 25010000002 OXALIPLATIN PER 0.5 MG: Performed by: INTERNAL MEDICINE

## 2017-10-31 PROCEDURE — 96375 TX/PRO/DX INJ NEW DRUG ADDON: CPT

## 2017-10-31 PROCEDURE — 96417 CHEMO IV INFUS EACH ADDL SEQ: CPT

## 2017-10-31 PROCEDURE — 96415 CHEMO IV INFUSION ADDL HR: CPT

## 2017-10-31 PROCEDURE — 25010000002 HEPARIN FLUSH (PORCINE) 100 UNIT/ML SOLUTION: Performed by: INTERNAL MEDICINE

## 2017-10-31 PROCEDURE — 25010000002 PALONOSETRON PER 25 MCG: Performed by: INTERNAL MEDICINE

## 2017-10-31 PROCEDURE — 96413 CHEMO IV INFUSION 1 HR: CPT

## 2017-10-31 PROCEDURE — 25010000002 DEXAMETHASONE PER 1 MG: Performed by: INTERNAL MEDICINE

## 2017-10-31 RX ORDER — PALONOSETRON 0.05 MG/ML
0.25 INJECTION, SOLUTION INTRAVENOUS ONCE
Status: CANCELLED | OUTPATIENT
Start: 2018-01-02

## 2017-10-31 RX ORDER — SODIUM CHLORIDE 0.9 % (FLUSH) 0.9 %
10 SYRINGE (ML) INJECTION AS NEEDED
Status: CANCELLED | OUTPATIENT
Start: 2017-10-31

## 2017-10-31 RX ORDER — DEXTROSE MONOHYDRATE 50 MG/ML
250 INJECTION, SOLUTION INTRAVENOUS ONCE
Status: CANCELLED | OUTPATIENT
Start: 2017-12-12

## 2017-10-31 RX ORDER — PALONOSETRON 0.05 MG/ML
0.25 INJECTION, SOLUTION INTRAVENOUS ONCE
Status: COMPLETED | OUTPATIENT
Start: 2017-10-31 | End: 2017-10-31

## 2017-10-31 RX ORDER — DEXTROSE MONOHYDRATE 50 MG/ML
250 INJECTION, SOLUTION INTRAVENOUS ONCE
Status: CANCELLED | OUTPATIENT
Start: 2017-11-21

## 2017-10-31 RX ORDER — PALONOSETRON 0.05 MG/ML
0.25 INJECTION, SOLUTION INTRAVENOUS ONCE
Status: CANCELLED | OUTPATIENT
Start: 2017-12-12

## 2017-10-31 RX ORDER — SODIUM CHLORIDE 9 MG/ML
250 INJECTION, SOLUTION INTRAVENOUS ONCE
Status: CANCELLED | OUTPATIENT
Start: 2017-12-12

## 2017-10-31 RX ORDER — SODIUM CHLORIDE 9 MG/ML
250 INJECTION, SOLUTION INTRAVENOUS ONCE
Status: CANCELLED | OUTPATIENT
Start: 2017-11-21

## 2017-10-31 RX ORDER — DEXTROSE MONOHYDRATE 50 MG/ML
250 INJECTION, SOLUTION INTRAVENOUS ONCE
Status: COMPLETED | OUTPATIENT
Start: 2017-10-31 | End: 2017-10-31

## 2017-10-31 RX ORDER — SODIUM CHLORIDE 9 MG/ML
250 INJECTION, SOLUTION INTRAVENOUS ONCE
Status: CANCELLED | OUTPATIENT
Start: 2018-01-02

## 2017-10-31 RX ORDER — PALONOSETRON 0.05 MG/ML
0.25 INJECTION, SOLUTION INTRAVENOUS ONCE
Status: CANCELLED | OUTPATIENT
Start: 2017-11-21

## 2017-10-31 RX ORDER — DEXTROSE MONOHYDRATE 50 MG/ML
250 INJECTION, SOLUTION INTRAVENOUS ONCE
Status: CANCELLED | OUTPATIENT
Start: 2018-01-02

## 2017-10-31 RX ADMIN — HEPARIN 500 UNITS: 100 SYRINGE at 13:38

## 2017-10-31 RX ADMIN — PALONOSETRON HYDROCHLORIDE 0.25 MG: 0.25 INJECTION INTRAVENOUS at 09:38

## 2017-10-31 RX ADMIN — BEVACIZUMAB 460 MG: 400 INJECTION, SOLUTION INTRAVENOUS at 10:16

## 2017-10-31 RX ADMIN — OXALIPLATIN 170 MG: 5 INJECTION, SOLUTION INTRAVENOUS at 11:32

## 2017-10-31 RX ADMIN — DEXTROSE MONOHYDRATE 250 ML: 50 INJECTION, SOLUTION INTRAVENOUS at 11:30

## 2017-10-31 RX ADMIN — DEXAMETHASONE SODIUM PHOSPHATE 12 MG: 4 INJECTION, SOLUTION INTRAMUSCULAR; INTRAVENOUS at 09:39

## 2017-10-31 NOTE — PROGRESS NOTES
"Oral Chemotherapy Teaching      Patient Name/:  Marquis Esteban   1960  Oral Chemotherapy Regimen:  Capecitabine (Xeloda) 1000 mg PO x 7 days, then off for 7 days + Oxaliplatin + Avastin IV every 21 days  Date Started Medication:     Cycle 1: 17  Cycle 2: 10/3/17  Cycle 3: 10/31/17    Initial Teaching Follow Up Comments     Safety     Storage instructions (away from children; away from heat/cold, sunlight, or moisture), handling - use of gloves (caregivers), washing hands after touching pills, managing waste     “How are you storing your medications?”, reminders on storage, proper handling (caregivers using gloves, washing hands, away from children, managing waste, etc.), disposal of medication with D/C or dosage change    Patient is storing his medication away from caregivers and family members, and washes his hands after he handles the medication.     Adherence      patient and/or caregiver on how to take medication, take with/without food, assess their adherence potential, stress importance of adherence, ways to manage adherence (pill boxes, phone reminders, calendars), what to do if miss a dose   “How are you taking your medication?” “How are you remembering to take your medication?”, “How many doses have you missed?”, determine reasons for non-adherence (not remembering, side effects, etc), ways to improve, overadherence? Remind patient of ways to improve/maintain adherence   Patient states he takes the medication twice daily within 30 minutes of eating a meal. Patient denies missed doses of this medication, and is compliant. Reports taking medication x 7 days followed by 7 days off, repeating. Reports started most recent cycle of 7 days \"on\" this morning.      Side Effects/Adverse Reactions      patient on potential side effects, s/s, ways to manage, when to call MD/seek help     Determine if patient experiencing side effects, ways to manage   Patient states that his appetite is " variable, but he is trying to ensure proper nutrition. Reports no changes in weight. Does report some constipation, reports using some lactulose with the last cycle but noticed some incontinence with use. Disucssed using docusate/senna BID and miralax.  Patient denies any other side effects.     Miscellaneous     Food interactions, DDIs, financial issues Determine if patient started any new medications since being placed on oral chemo (analyze for DDI) Patient has not started any new medications.     Additional Notes:  Pt requesting handout regarding Aloxi, provided in infusion along with my name and contact information. All questions answered, aware to call with questions or concerns.

## 2017-11-19 ENCOUNTER — LAB (OUTPATIENT)
Dept: LAB | Facility: HOSPITAL | Age: 57
End: 2017-11-19
Attending: INTERNAL MEDICINE

## 2017-11-19 DIAGNOSIS — C18.7 MALIGNANT NEOPLASM OF SIGMOID COLON (HCC): ICD-10-CM

## 2017-11-19 DIAGNOSIS — C79.51 BONE METASTASIS: ICD-10-CM

## 2017-11-19 LAB
ALBUMIN SERPL-MCNC: 4.2 G/DL (ref 3.2–4.8)
ALBUMIN/GLOB SERPL: 1.4 G/DL (ref 1.5–2.5)
ALP SERPL-CCNC: 94 U/L (ref 25–100)
ALT SERPL W P-5'-P-CCNC: 22 U/L (ref 7–40)
ANION GAP SERPL CALCULATED.3IONS-SCNC: 9 MMOL/L (ref 3–11)
AST SERPL-CCNC: 41 U/L (ref 0–33)
BASOPHILS # BLD AUTO: 0.01 10*3/MM3 (ref 0–0.2)
BASOPHILS NFR BLD AUTO: 0.2 % (ref 0–1)
BILIRUB SERPL-MCNC: 0.8 MG/DL (ref 0.3–1.2)
BILIRUB UR QL STRIP: NEGATIVE
BUN BLD-MCNC: 13 MG/DL (ref 9–23)
BUN/CREAT SERPL: 21.7 (ref 7–25)
CALCIUM SPEC-SCNC: 9.3 MG/DL (ref 8.7–10.4)
CEA SERPL-MCNC: 44.7 NG/ML (ref 0–2.5)
CHLORIDE SERPL-SCNC: 102 MMOL/L (ref 99–109)
CLARITY UR: CLEAR
CO2 SERPL-SCNC: 28 MMOL/L (ref 20–31)
COLOR UR: YELLOW
CREAT BLD-MCNC: 0.6 MG/DL (ref 0.6–1.3)
DEPRECATED RDW RBC AUTO: 62.6 FL (ref 37–54)
EOSINOPHIL # BLD AUTO: 0.24 10*3/MM3 (ref 0–0.3)
EOSINOPHIL NFR BLD AUTO: 5 % (ref 0–3)
ERYTHROCYTE [DISTWIDTH] IN BLOOD BY AUTOMATED COUNT: 24.9 % (ref 11.3–14.5)
GFR SERPL CREATININE-BSD FRML MDRD: 140 ML/MIN/1.73
GLOBULIN UR ELPH-MCNC: 3.1 GM/DL
GLUCOSE BLD-MCNC: 101 MG/DL (ref 70–100)
GLUCOSE UR STRIP-MCNC: NEGATIVE MG/DL
HCT VFR BLD AUTO: 33.2 % (ref 38.9–50.9)
HGB BLD-MCNC: 10.8 G/DL (ref 13.1–17.5)
HGB UR QL STRIP.AUTO: NEGATIVE
IMM GRANULOCYTES # BLD: 0.01 10*3/MM3 (ref 0–0.03)
IMM GRANULOCYTES NFR BLD: 0.2 % (ref 0–0.6)
KETONES UR QL STRIP: NEGATIVE
LEUKOCYTE ESTERASE UR QL STRIP.AUTO: NEGATIVE
LYMPHOCYTES # BLD AUTO: 1 10*3/MM3 (ref 0.6–4.8)
LYMPHOCYTES NFR BLD AUTO: 20.7 % (ref 24–44)
MCH RBC QN AUTO: 22.9 PG (ref 27–31)
MCHC RBC AUTO-ENTMCNC: 32.5 G/DL (ref 32–36)
MCV RBC AUTO: 70.5 FL (ref 80–99)
MONOCYTES # BLD AUTO: 0.48 10*3/MM3 (ref 0–1)
MONOCYTES NFR BLD AUTO: 10 % (ref 0–12)
NEUTROPHILS # BLD AUTO: 3.08 10*3/MM3 (ref 1.5–8.3)
NEUTROPHILS NFR BLD AUTO: 63.9 % (ref 41–71)
NITRITE UR QL STRIP: NEGATIVE
PH UR STRIP.AUTO: 7.5 [PH] (ref 5–8)
PLAT MORPH BLD: NORMAL
PLATELET # BLD AUTO: 135 10*3/MM3 (ref 150–450)
PMV BLD AUTO: ABNORMAL FL (ref 6–12)
POTASSIUM BLD-SCNC: 4.2 MMOL/L (ref 3.5–5.5)
PROT SERPL-MCNC: 7.3 G/DL (ref 5.7–8.2)
PROT UR QL STRIP: NEGATIVE
RBC # BLD AUTO: 4.71 10*6/MM3 (ref 4.2–5.76)
RBC MORPH BLD: NORMAL
SODIUM BLD-SCNC: 139 MMOL/L (ref 132–146)
SP GR UR STRIP: <=1.005 (ref 1–1.03)
UROBILINOGEN UR QL STRIP: NORMAL
WBC MORPH BLD: NORMAL
WBC NRBC COR # BLD: 4.82 10*3/MM3 (ref 3.5–10.8)

## 2017-11-19 PROCEDURE — 36415 COLL VENOUS BLD VENIPUNCTURE: CPT

## 2017-11-19 PROCEDURE — 82378 CARCINOEMBRYONIC ANTIGEN: CPT

## 2017-11-19 PROCEDURE — 80053 COMPREHEN METABOLIC PANEL: CPT

## 2017-11-19 PROCEDURE — 83036 HEMOGLOBIN GLYCOSYLATED A1C: CPT

## 2017-11-19 PROCEDURE — 85025 COMPLETE CBC W/AUTO DIFF WBC: CPT

## 2017-11-19 PROCEDURE — 85007 BL SMEAR W/DIFF WBC COUNT: CPT

## 2017-11-19 PROCEDURE — 81003 URINALYSIS AUTO W/O SCOPE: CPT

## 2017-11-20 DIAGNOSIS — C18.7 MALIGNANT NEOPLASM OF SIGMOID COLON (HCC): Primary | ICD-10-CM

## 2017-11-20 LAB — HBA1C MFR BLD: 5.5 % (ref 4.8–5.6)

## 2017-11-21 ENCOUNTER — INFUSION (OUTPATIENT)
Dept: ONCOLOGY | Facility: HOSPITAL | Age: 57
End: 2017-11-21

## 2017-11-21 ENCOUNTER — OFFICE VISIT (OUTPATIENT)
Dept: ONCOLOGY | Facility: CLINIC | Age: 57
End: 2017-11-21

## 2017-11-21 VITALS
DIASTOLIC BLOOD PRESSURE: 72 MMHG | RESPIRATION RATE: 14 BRPM | WEIGHT: 141 LBS | HEART RATE: 89 BPM | TEMPERATURE: 97.4 F | HEIGHT: 66 IN | BODY MASS INDEX: 22.66 KG/M2 | SYSTOLIC BLOOD PRESSURE: 123 MMHG

## 2017-11-21 DIAGNOSIS — C79.51 BONE METASTASIS: ICD-10-CM

## 2017-11-21 DIAGNOSIS — C18.7 MALIGNANT NEOPLASM OF SIGMOID COLON (HCC): Primary | ICD-10-CM

## 2017-11-21 PROCEDURE — 96417 CHEMO IV INFUS EACH ADDL SEQ: CPT

## 2017-11-21 PROCEDURE — 25010000002 OXALIPLATIN PER 0.5 MG: Performed by: INTERNAL MEDICINE

## 2017-11-21 PROCEDURE — 96413 CHEMO IV INFUSION 1 HR: CPT

## 2017-11-21 PROCEDURE — 25010000002 BEVACIZUMAB PER 10 MG: Performed by: INTERNAL MEDICINE

## 2017-11-21 PROCEDURE — 96415 CHEMO IV INFUSION ADDL HR: CPT

## 2017-11-21 PROCEDURE — 96375 TX/PRO/DX INJ NEW DRUG ADDON: CPT

## 2017-11-21 PROCEDURE — 25010000002 PALONOSETRON PER 25 MCG: Performed by: INTERNAL MEDICINE

## 2017-11-21 PROCEDURE — 25010000002 HEPARIN FLUSH (PORCINE) 100 UNIT/ML SOLUTION: Performed by: INTERNAL MEDICINE

## 2017-11-21 PROCEDURE — 99215 OFFICE O/P EST HI 40 MIN: CPT | Performed by: INTERNAL MEDICINE

## 2017-11-21 PROCEDURE — 25010000002 DEXAMETHASONE PER 1 MG: Performed by: INTERNAL MEDICINE

## 2017-11-21 RX ORDER — SODIUM CHLORIDE 0.9 % (FLUSH) 0.9 %
10 SYRINGE (ML) INJECTION AS NEEDED
Status: DISCONTINUED | OUTPATIENT
Start: 2017-11-21 | End: 2017-11-21 | Stop reason: HOSPADM

## 2017-11-21 RX ORDER — PALONOSETRON 0.05 MG/ML
0.25 INJECTION, SOLUTION INTRAVENOUS ONCE
Status: COMPLETED | OUTPATIENT
Start: 2017-11-21 | End: 2017-11-21

## 2017-11-21 RX ORDER — SODIUM CHLORIDE 0.9 % (FLUSH) 0.9 %
10 SYRINGE (ML) INJECTION AS NEEDED
Status: CANCELLED | OUTPATIENT
Start: 2017-11-21

## 2017-11-21 RX ADMIN — HEPARIN 500 UNITS: 100 SYRINGE at 12:39

## 2017-11-21 RX ADMIN — BEVACIZUMAB 460 MG: 400 INJECTION, SOLUTION INTRAVENOUS at 12:02

## 2017-11-21 RX ADMIN — OXALIPLATIN 170 MG: 5 INJECTION, SOLUTION INTRAVENOUS at 09:43

## 2017-11-21 RX ADMIN — Medication 10 ML: at 12:38

## 2017-11-21 RX ADMIN — DEXAMETHASONE SODIUM PHOSPHATE 12 MG: 4 INJECTION, SOLUTION INTRAMUSCULAR; INTRAVENOUS at 09:24

## 2017-11-21 RX ADMIN — PALONOSETRON HYDROCHLORIDE 0.25 MG: 0.25 INJECTION INTRAVENOUS at 09:24

## 2017-11-21 NOTE — PROGRESS NOTES
DATE OF VISIT: 11/21/2017    REASON FOR VISIT: Followup for metastatic colon cancer.      HISTORY OF PRESENT ILLNESS: The patient is a very pleasant 57 y.o. male  with past medical history significant for metastatic colon cancer diagnosed March 2007 . He is status post lower anterior resection as well as 1 cycle FOLFOX, stopped due to multiple toxicities. Final pathology showed poorly differentiated adenocarcinoma with 25 negative lymph nodes and clear surgical margins. Isolated tumor deposits was found in the pericolonic fat. Pathologic stage T2 N1c M0 stage IIIA disease.The patient has been followed by serial colonoscopies as well as CAT scans that did document pulmonary metastatic disease with left lower lobe nodule that is gradually increasing in size. Patient was doing fairly well until recently, 3 months ago, patient presented with low back pain. Patient had restaging workup that revealed hypermetabolic L2 vertebral body lesion. Patient had biopsy done on October 11, 2016 that revealed metastatic adenocarcinoma consistent of colon primary with CK 7 negative CK 20 positive CDX2 positive. Patient had next generation gene sequencing that revealed intermediate mutational load with microsatellite stability.  He started chemotherapy with Keytruda on 2/1/2017. The patient was changed to CapeOx with Avastin on 9/19/2017. He is here today for scheduled follow up visit with treatment.     SUBJECTIVE: The patient has been doing fairly well.  He was able to tolerate chemotherapy with multiple side effects.  He had constipation.  He had nausea that improves with Zofran.  He denied any skin rash.  He has minor nosebleeds.  He is eating and drinking well. He has no cough or shortness of breath.  His energy had improved.  He is recovering from an upper airway infection.    PAST MEDICAL HISTORY/SOCIAL HISTORY/FAMILY HISTORY: Unchanged from my prior documentation done on 01/17/2017.     Review of Systems   Constitutional:  Negative for activity change, appetite change, chills, fatigue, fever and unexpected weight change.   HENT: Negative for hearing loss, mouth sores, nosebleeds, sore throat and trouble swallowing.    Eyes: Negative for visual disturbance.   Respiratory: Negative for cough, chest tightness, shortness of breath and wheezing.    Cardiovascular: Negative for chest pain, palpitations and leg swelling.   Gastrointestinal: Negative for abdominal distention, abdominal pain, blood in stool, constipation, diarrhea, nausea, rectal pain and vomiting.   Endocrine: Negative for cold intolerance and heat intolerance.   Genitourinary: Negative for difficulty urinating, dysuria, frequency and urgency.   Musculoskeletal: Positive for back pain. Negative for arthralgias, gait problem, joint swelling and myalgias.   Skin: Negative for rash.   Neurological: Negative for dizziness, tremors, syncope, weakness, light-headedness, numbness and headaches.        Sleep disturbance   Hematological: Negative for adenopathy. Does not bruise/bleed easily.   Psychiatric/Behavioral: Negative for confusion, sleep disturbance and suicidal ideas. The patient is not nervous/anxious.          Current Outpatient Prescriptions:   •  capecitabine (XELODA) 500 MG chemo tablet, Take 2 tab(s) by mouth in the morning and 2 tabs by mouth in the evening on days 1-14, then off for 7 days. (Patient taking differently: PT started [id number] ~Shell), Disp: 56 tablet, Rfl: 3  •  capecitabine (XELODA) 500 MG chemo tablet, Take 2 tablets by mouth in the morning and 2 tablets by mouth in the evening on days 1-14, then off for 7 days., Disp: 56 tablet, Rfl: 3  •  celecoxib (CELEBREX) 200 MG capsule, Take 200 mg by mouth Daily As Needed for Mild Pain (1-3)., Disp: , Rfl:   •  Cholecalciferol (VITAMIN D3) 5000 UNITS capsule capsule, Take 5,000 Units by mouth Daily., Disp: , Rfl:   •  lidocaine-prilocaine (EMLA) 2.5-2.5 % cream, Apply  topically As Needed (45-60 minutes prior  "to port access.  Cover with saran/plastic wrap.)., Disp: 30 g, Rfl: 3  •  magnesium hydroxide (MILK OF MAGNESIA) 2400 MG/10ML suspension suspension, Take 10 mL by mouth Daily., Disp: , Rfl:   •  ondansetron (ZOFRAN) 8 MG tablet, Take 1 tablet by mouth 3 (Three) Times a Day As Needed for Nausea or Vomiting., Disp: 30 tablet, Rfl: 5  •  oxyCODONE-acetaminophen (PERCOCET)  MG per tablet, Take 1 tablet by mouth Every 6 (Six) Hours As Needed for Severe Pain ., Disp: , Rfl:   •  zaleplon (SONATA) 10 MG capsule, Take 10 mg by mouth At Night As Needed., Disp: , Rfl:     PHYSICAL EXAMINATION:   /72  Pulse 89  Temp 97.4 °F (36.3 °C)  Resp 14  Ht 66\" (167.6 cm)  Wt 141 lb (64 kg)  BMI 22.76 kg/m2   ECOG Performance Status: 1 - Symptomatic but completely ambulatory  General Appearance:  alert, cooperative, no apparent distress and appears stated age   Neurologic/Psychiatric: A&O x 3, gait steady, appropriate affect, strength 5/5 in all muscle groups   HEENT:  Normocephalic, without obvious abnormality, mucous membranes moist   Neck: Supple, symmetrical, trachea midline, no adenopathy;  No thyromegaly, masses, or tenderness   Lungs:   Clear to auscultation bilaterally; respirations regular, even, and unlabored bilaterally   Heart:  Regular rate and rhythm, no murmurs appreciated   Abdomen:   Soft, non-tender, non-distended and no organomegaly   Lymph nodes: No cervical, supraclavicular, inguinal or axillary adenopathy noted   Extremities: Normal, atraumatic; no clubbing, cyanosis, or edema    Skin: No rashes, ulcers, or suspicious lesions noted     Lab on 11/19/2017   Component Date Value Ref Range Status   • CEA 11/19/2017 44.70* 0.00 - 2.50 ng/mL Final   • Glucose 11/19/2017 101* 70 - 100 mg/dL Final   • BUN 11/19/2017 13  9 - 23 mg/dL Final   • Creatinine 11/19/2017 0.60  0.60 - 1.30 mg/dL Final   • Sodium 11/19/2017 139  132 - 146 mmol/L Final   • Potassium 11/19/2017 4.2  3.5 - 5.5 mmol/L Final   • " Chloride 11/19/2017 102  99 - 109 mmol/L Final   • CO2 11/19/2017 28.0  20.0 - 31.0 mmol/L Final   • Calcium 11/19/2017 9.3  8.7 - 10.4 mg/dL Final   • Total Protein 11/19/2017 7.3  5.7 - 8.2 g/dL Final   • Albumin 11/19/2017 4.20  3.20 - 4.80 g/dL Final   • ALT (SGPT) 11/19/2017 22  7 - 40 U/L Final   • AST (SGOT) 11/19/2017 41* 0 - 33 U/L Final   • Alkaline Phosphatase 11/19/2017 94  25 - 100 U/L Final   • Total Bilirubin 11/19/2017 0.8  0.3 - 1.2 mg/dL Final   • eGFR Non African Amer 11/19/2017 140  >60 mL/min/1.73 Final   • Globulin 11/19/2017 3.1  gm/dL Final   • A/G Ratio 11/19/2017 1.4* 1.5 - 2.5 g/dL Final   • BUN/Creatinine Ratio 11/19/2017 21.7  7.0 - 25.0 Final   • Anion Gap 11/19/2017 9.0  3.0 - 11.0 mmol/L Final   • Color, UA 11/19/2017 Yellow  Yellow, Straw Final   • Appearance, UA 11/19/2017 Clear  Clear Final   • pH, UA 11/19/2017 7.5  5.0 - 8.0 Final   • Specific Gravity, UA 11/19/2017 <=1.005  1.001 - 1.030 Final   • Glucose, UA 11/19/2017 Negative  Negative Final   • Ketones, UA 11/19/2017 Negative  Negative Final   • Bilirubin, UA 11/19/2017 Negative  Negative Final   • Blood, UA 11/19/2017 Negative  Negative Final   • Protein, UA 11/19/2017 Negative  Negative Final   • Leuk Esterase, UA 11/19/2017 Negative  Negative Final   • Nitrite, UA 11/19/2017 Negative  Negative Final   • Urobilinogen, UA 11/19/2017 0.2 E.U./dL  0.2 - 1.0 E.U./dL Final   • WBC 11/19/2017 4.82  3.50 - 10.80 10*3/mm3 Final   • RBC 11/19/2017 4.71  4.20 - 5.76 10*6/mm3 Final   • Hemoglobin 11/19/2017 10.8* 13.1 - 17.5 g/dL Final   • Hematocrit 11/19/2017 33.2* 38.9 - 50.9 % Final   • MCV 11/19/2017 70.5* 80.0 - 99.0 fL Final   • MCH 11/19/2017 22.9* 27.0 - 31.0 pg Final   • MCHC 11/19/2017 32.5  32.0 - 36.0 g/dL Final   • RDW 11/19/2017 24.9* 11.3 - 14.5 % Final   • RDW-SD 11/19/2017 62.6* 37.0 - 54.0 fl Final   • MPV 11/19/2017   6.0 - 12.0 fL Final    Unable to calculate    • Platelets 11/19/2017 135* 150 - 450 10*3/mm3  Final   • Neutrophil % 11/19/2017 63.9  41.0 - 71.0 % Final   • Lymphocyte % 11/19/2017 20.7* 24.0 - 44.0 % Final   • Monocyte % 11/19/2017 10.0  0.0 - 12.0 % Final   • Eosinophil % 11/19/2017 5.0* 0.0 - 3.0 % Final   • Basophil % 11/19/2017 0.2  0.0 - 1.0 % Final   • Immature Grans % 11/19/2017 0.2  0.0 - 0.6 % Final   • Neutrophils, Absolute 11/19/2017 3.08  1.50 - 8.30 10*3/mm3 Final   • Lymphocytes, Absolute 11/19/2017 1.00  0.60 - 4.80 10*3/mm3 Final   • Monocytes, Absolute 11/19/2017 0.48  0.00 - 1.00 10*3/mm3 Final   • Eosinophils, Absolute 11/19/2017 0.24  0.00 - 0.30 10*3/mm3 Final   • Basophils, Absolute 11/19/2017 0.01  0.00 - 0.20 10*3/mm3 Final   • Immature Grans, Absolute 11/19/2017 0.01  0.00 - 0.03 10*3/mm3 Final   • RBC Morphology 11/19/2017 Normal  Normal Final   • WBC Morphology 11/19/2017 Normal  Normal Final   • Platelet Morphology 11/19/2017 Normal  Normal Final   • Hemoglobin A1C 11/19/2017 5.50  4.80 - 5.60 % Final        No results found.    ASSESSMENT: The patient is a very pleasant 56 y.o. male with stage IV colon cancer    PROBLEM LIST:  1. Currently stage IV colon cancer with lung and bony metastases.  K-edwige mutant, microsatellite stable, and intermediate mutational load.  2. Status post low anterior resection done by Dr. Young 2007, [id number]  3.  Attempted adjuvant chemotherapy for 1 cycle could not tolerate secondary to multiple toxicities.  4. Biopsy-proven L2 metastases done October 11, 2016. Status post CyberKnife radiation treatment.  5.  Started Keytruda 200 mg IV every 3 weeks on February 1, 2017, status post 10 cycles of treatment  6. Disease progression documented on PET scan completed 8/31/2017.   7. Treatment changed to Xeloda/Avastin/Oxaliplatin on 9/19/2017, status post 3 cycle.   8. Status post tumor debulking at L2 done by Dr. Frazier at [facility] on April 21, 2017  9.  Treatment induced hepatitis, resolved  10.  Cancer related pain  11.  Opioid-induced  constipation  12.  Treatment induced asthenia  13.  Mild depression  14.  Insomnia  15.  Chemotherapy-induced anemia    PLAN:  1.  We will proceed with treatment today as planned with Oxaliplatin/Avastin cycle #4.   2. This is being given with Xeloda 1000 mg twice per day, 2 weeks on, 1 week off.   3. We will monitor the patient's labs throughout treatment including blood counts, kidney function, CEA, thyroid function, and liver functions. We will monitor his liver enzymes that have been elevated secondary to treatment with immune-therapy.   4.  The patient will follow-up with me in 3 weeks for next cycle of treatment.  5.  Patient will continue taking over-the-counter stool softeners for constipation.  6.  I again reviewed with the patient the potential side effects from immune therapy including immune mediated reactions with pneumonitis, hepatitis, colitis, thyroiditis, and potential death.   7.  Patient tissue was submitted for the matched trial.  He did match on PETN mutation, however that arm was closed.  8.  I advised the patient to hold his alternative treatment with high dose vitamin C while he is on immunetherapy.  9. The patient will have repeat imaging done after cycle # 4 of treatment, and ordered for prior to return.   10. The patient will continue Zofran as needed for treatment related nausea.  11. The patient will continue oxycodone 10 mg as needed for cancer-related pain.  We'll continue Celebrex 200 mg twice a day as needed.    12.  I offered patient Ritalin 5 mg daily to see if that might help with his fatigue that he would like to hold off for now.  13.  The patient could not tolerate Remeron 15 mg daily at bedtime secondary to mental status changes.  14.  Patient will continue Zaleplon as needed for insomnia.  15.  I will transfuse 2 units of blood if his hemoglobin drops below 8.    Scribed for Kristofer Rodriguez MD by JESSEE Osborne. 11/21/2017  9:07 AM  Kristofer Rodriguez MD  11/21/2017   9:07  AM  I, Kristofer Rodriguez MD, personally performed the services described in this documentation as scribed by the above named individual in my presence, and it is both accurate and complete.  11/21/2017  9:07 AM

## 2017-12-07 ENCOUNTER — HOSPITAL ENCOUNTER (OUTPATIENT)
Dept: PET IMAGING | Facility: HOSPITAL | Age: 57
Discharge: HOME | End: 2017-12-07
Attending: INTERNAL MEDICINE

## 2017-12-07 ENCOUNTER — HOSPITAL ENCOUNTER (OUTPATIENT)
Dept: PET IMAGING | Facility: HOSPITAL | Age: 57
Discharge: HOME | End: 2017-12-07
Attending: INTERNAL MEDICINE | Admitting: INTERNAL MEDICINE

## 2017-12-07 DIAGNOSIS — C18.7 MALIGNANT NEOPLASM OF SIGMOID COLON (HCC): ICD-10-CM

## 2017-12-07 LAB — GLUCOSE BLDC GLUCOMTR-MCNC: 96 MG/DL (ref 70–130)

## 2017-12-07 PROCEDURE — 78816 PET IMAGE W/CT FULL BODY: CPT

## 2017-12-07 PROCEDURE — 82962 GLUCOSE BLOOD TEST: CPT

## 2017-12-07 PROCEDURE — A9552 F18 FDG: HCPCS | Performed by: INTERNAL MEDICINE

## 2017-12-07 PROCEDURE — 0 FLUDEOXYGLUCOSE F18 SOLUTION: Performed by: INTERNAL MEDICINE

## 2017-12-07 RX ADMIN — FLUDEOXYGLUCOSE F18 1 DOSE: 300 INJECTION INTRAVENOUS at 09:10

## 2017-12-11 ENCOUNTER — LAB (OUTPATIENT)
Dept: LAB | Facility: HOSPITAL | Age: 57
End: 2017-12-11

## 2017-12-11 DIAGNOSIS — C79.51 BONE METASTASIS: ICD-10-CM

## 2017-12-11 DIAGNOSIS — C18.7 MALIGNANT NEOPLASM OF SIGMOID COLON (HCC): ICD-10-CM

## 2017-12-11 LAB
ALBUMIN SERPL-MCNC: 4.4 G/DL (ref 3.2–4.8)
ALBUMIN/GLOB SERPL: 1.5 G/DL (ref 1.5–2.5)
ALP SERPL-CCNC: 86 U/L (ref 25–100)
ALT SERPL W P-5'-P-CCNC: 16 U/L (ref 7–40)
ANION GAP SERPL CALCULATED.3IONS-SCNC: 8 MMOL/L (ref 3–11)
AST SERPL-CCNC: 26 U/L (ref 0–33)
BILIRUB SERPL-MCNC: 0.8 MG/DL (ref 0.3–1.2)
BILIRUB UR QL STRIP: NEGATIVE
BUN BLD-MCNC: 10 MG/DL (ref 9–23)
BUN/CREAT SERPL: 16.7 (ref 7–25)
CALCIUM SPEC-SCNC: 9 MG/DL (ref 8.7–10.4)
CEA SERPL-MCNC: 14.4 NG/ML (ref 0–2.5)
CHLORIDE SERPL-SCNC: 103 MMOL/L (ref 99–109)
CLARITY UR: CLEAR
CO2 SERPL-SCNC: 29 MMOL/L (ref 20–31)
COLOR UR: YELLOW
CREAT BLD-MCNC: 0.6 MG/DL (ref 0.6–1.3)
ERYTHROCYTE [DISTWIDTH] IN BLOOD BY AUTOMATED COUNT: 31 % (ref 11.3–14.5)
GFR SERPL CREATININE-BSD FRML MDRD: 140 ML/MIN/1.73
GLOBULIN UR ELPH-MCNC: 2.9 GM/DL
GLUCOSE BLD-MCNC: 78 MG/DL (ref 70–100)
GLUCOSE UR STRIP-MCNC: NEGATIVE MG/DL
HCT VFR BLD AUTO: 34 % (ref 38.9–50.9)
HGB BLD-MCNC: 10.8 G/DL (ref 13.1–17.5)
HGB UR QL STRIP.AUTO: NEGATIVE
KETONES UR QL STRIP: NEGATIVE
LEUKOCYTE ESTERASE UR QL STRIP.AUTO: NEGATIVE
LYMPHOCYTES # BLD AUTO: 1.5 10*3/MM3 (ref 0.6–4.8)
LYMPHOCYTES NFR BLD AUTO: 33.4 % (ref 24–44)
MCH RBC QN AUTO: 23.7 PG (ref 27–31)
MCHC RBC AUTO-ENTMCNC: 31.9 G/DL (ref 32–36)
MCV RBC AUTO: 74.6 FL (ref 80–99)
MONOCYTES # BLD AUTO: 0.7 10*3/MM3 (ref 0–1)
MONOCYTES NFR BLD AUTO: 14.5 % (ref 0–12)
NEUTROPHILS # BLD AUTO: 2.4 10*3/MM3 (ref 1.5–8.3)
NEUTROPHILS NFR BLD AUTO: 52.1 % (ref 41–71)
NITRITE UR QL STRIP: NEGATIVE
PH UR STRIP.AUTO: 6 [PH] (ref 5–8)
PLATELET # BLD AUTO: 181 10*3/MM3 (ref 150–450)
PMV BLD AUTO: 7 FL (ref 6–12)
POTASSIUM BLD-SCNC: 4.6 MMOL/L (ref 3.5–5.5)
PROT SERPL-MCNC: 7.3 G/DL (ref 5.7–8.2)
PROT UR QL STRIP: NEGATIVE
RBC # BLD AUTO: 4.56 10*6/MM3 (ref 4.2–5.76)
SODIUM BLD-SCNC: 140 MMOL/L (ref 132–146)
SP GR UR STRIP: 1 (ref 1–1.03)
UROBILINOGEN UR QL STRIP: NORMAL
WBC NRBC COR # BLD: 4.6 10*3/MM3 (ref 3.5–10.8)

## 2017-12-11 PROCEDURE — 81003 URINALYSIS AUTO W/O SCOPE: CPT

## 2017-12-11 PROCEDURE — 36415 COLL VENOUS BLD VENIPUNCTURE: CPT

## 2017-12-11 PROCEDURE — 82378 CARCINOEMBRYONIC ANTIGEN: CPT

## 2017-12-11 PROCEDURE — 80053 COMPREHEN METABOLIC PANEL: CPT

## 2017-12-11 PROCEDURE — 85025 COMPLETE CBC W/AUTO DIFF WBC: CPT

## 2017-12-12 ENCOUNTER — INFUSION (OUTPATIENT)
Dept: ONCOLOGY | Facility: HOSPITAL | Age: 57
End: 2017-12-12

## 2017-12-12 ENCOUNTER — OFFICE VISIT (OUTPATIENT)
Dept: ONCOLOGY | Facility: CLINIC | Age: 57
End: 2017-12-12

## 2017-12-12 VITALS
HEIGHT: 66 IN | HEART RATE: 76 BPM | WEIGHT: 144 LBS | SYSTOLIC BLOOD PRESSURE: 125 MMHG | RESPIRATION RATE: 16 BRPM | TEMPERATURE: 97.3 F | DIASTOLIC BLOOD PRESSURE: 75 MMHG | BODY MASS INDEX: 23.14 KG/M2

## 2017-12-12 DIAGNOSIS — C18.7 MALIGNANT NEOPLASM OF SIGMOID COLON (HCC): Primary | ICD-10-CM

## 2017-12-12 DIAGNOSIS — C79.51 BONE METASTASIS: ICD-10-CM

## 2017-12-12 PROCEDURE — 96367 TX/PROPH/DG ADDL SEQ IV INF: CPT

## 2017-12-12 PROCEDURE — 96415 CHEMO IV INFUSION ADDL HR: CPT

## 2017-12-12 PROCEDURE — 99215 OFFICE O/P EST HI 40 MIN: CPT | Performed by: INTERNAL MEDICINE

## 2017-12-12 PROCEDURE — 96413 CHEMO IV INFUSION 1 HR: CPT

## 2017-12-12 PROCEDURE — 25010000002 BEVACIZUMAB PER 10 MG: Performed by: INTERNAL MEDICINE

## 2017-12-12 PROCEDURE — 96375 TX/PRO/DX INJ NEW DRUG ADDON: CPT

## 2017-12-12 PROCEDURE — 96417 CHEMO IV INFUS EACH ADDL SEQ: CPT

## 2017-12-12 PROCEDURE — 25010000002 PALONOSETRON PER 25 MCG: Performed by: INTERNAL MEDICINE

## 2017-12-12 PROCEDURE — 25010000002 HEPARIN FLUSH (PORCINE) 100 UNIT/ML SOLUTION: Performed by: INTERNAL MEDICINE

## 2017-12-12 PROCEDURE — 25010000002 DEXAMETHASONE PER 1 MG: Performed by: INTERNAL MEDICINE

## 2017-12-12 PROCEDURE — 25010000002 OXALIPLATIN PER 0.5 MG: Performed by: INTERNAL MEDICINE

## 2017-12-12 RX ORDER — SODIUM CHLORIDE 9 MG/ML
250 INJECTION, SOLUTION INTRAVENOUS ONCE
Status: COMPLETED | OUTPATIENT
Start: 2017-12-12 | End: 2017-12-12

## 2017-12-12 RX ORDER — PALONOSETRON 0.05 MG/ML
0.25 INJECTION, SOLUTION INTRAVENOUS ONCE
Status: COMPLETED | OUTPATIENT
Start: 2017-12-12 | End: 2017-12-12

## 2017-12-12 RX ORDER — SODIUM CHLORIDE 0.9 % (FLUSH) 0.9 %
10 SYRINGE (ML) INJECTION AS NEEDED
Status: DISCONTINUED | OUTPATIENT
Start: 2017-12-12 | End: 2017-12-12 | Stop reason: HOSPADM

## 2017-12-12 RX ORDER — SODIUM CHLORIDE 0.9 % (FLUSH) 0.9 %
10 SYRINGE (ML) INJECTION AS NEEDED
Status: CANCELLED | OUTPATIENT
Start: 2017-12-12

## 2017-12-12 RX ORDER — DEXTROSE MONOHYDRATE 50 MG/ML
250 INJECTION, SOLUTION INTRAVENOUS ONCE
Status: COMPLETED | OUTPATIENT
Start: 2017-12-12 | End: 2017-12-12

## 2017-12-12 RX ADMIN — HEPARIN 500 UNITS: 100 SYRINGE at 13:08

## 2017-12-12 RX ADMIN — DEXTROSE MONOHYDRATE 250 ML: 50 INJECTION, SOLUTION INTRAVENOUS at 10:09

## 2017-12-12 RX ADMIN — OXALIPLATIN 170 MG: 100 INJECTION, SOLUTION, CONCENTRATE INTRAVENOUS at 10:13

## 2017-12-12 RX ADMIN — BEVACIZUMAB 460 MG: 400 INJECTION, SOLUTION INTRAVENOUS at 12:32

## 2017-12-12 RX ADMIN — DEXAMETHASONE SODIUM PHOSPHATE 12 MG: 4 INJECTION, SOLUTION INTRAMUSCULAR; INTRAVENOUS at 09:50

## 2017-12-12 RX ADMIN — Medication 10 ML: at 13:08

## 2017-12-12 RX ADMIN — SODIUM CHLORIDE 250 ML: 9 INJECTION, SOLUTION INTRAVENOUS at 12:31

## 2017-12-12 RX ADMIN — PALONOSETRON HYDROCHLORIDE 0.25 MG: 0.25 INJECTION INTRAVENOUS at 09:50

## 2017-12-12 NOTE — PROGRESS NOTES
DATE OF VISIT: 12/12/2017    REASON FOR VISIT: Followup for metastatic colon cancer.      HISTORY OF PRESENT ILLNESS: The patient is a very pleasant 57 y.o. male  with past medical history significant for metastatic colon cancer diagnosed March 2007 . He is status post lower anterior resection as well as 1 cycle FOLFOX, stopped due to multiple toxicities. Final pathology showed poorly differentiated adenocarcinoma with 25 negative lymph nodes and clear surgical margins. Isolated tumor deposits was found in the pericolonic fat. Pathologic stage T2 N1c M0 stage IIIA disease.The patient has been followed by serial colonoscopies as well as CAT scans that did document pulmonary metastatic disease with left lower lobe nodule that is gradually increasing in size. Patient was doing fairly well until recently, 3 months ago, patient presented with low back pain. Patient had restaging workup that revealed hypermetabolic L2 vertebral body lesion. Patient had biopsy done on October 11, 2016 that revealed metastatic adenocarcinoma consistent of colon primary with CK 7 negative CK 20 positive CDX2 positive. Patient had next generation gene sequencing that revealed intermediate mutational load with microsatellite stability.  He started chemotherapy with Keytruda on 2/1/2017. The patient was changed to CapeOx with Avastin on 9/19/2017. He is here today for scheduled follow up visit with treatment.     SUBJECTIVE: The patient has been doing fairly well.  He was able to tolerate chemotherapy with multiple side effects.  He had constipation.  He had nausea that improves with Zofran.  He denied any skin rash.  He has minor nosebleeds.  He is eating and drinking well. He has no cough or shortness of breath.  His energy had improved.  He is recovering from an upper airway infection.    PAST MEDICAL HISTORY/SOCIAL HISTORY/FAMILY HISTORY: Unchanged from my prior documentation done on 01/17/2017.     Review of Systems   Constitutional:  Negative for activity change, appetite change, chills, fatigue, fever and unexpected weight change.   HENT: Negative for hearing loss, mouth sores, nosebleeds, sore throat and trouble swallowing.    Eyes: Negative for visual disturbance.   Respiratory: Negative for cough, chest tightness, shortness of breath and wheezing.    Cardiovascular: Negative for chest pain, palpitations and leg swelling.   Gastrointestinal: Negative for abdominal distention, abdominal pain, blood in stool, constipation, diarrhea, nausea, rectal pain and vomiting.   Endocrine: Negative for cold intolerance and heat intolerance.   Genitourinary: Negative for difficulty urinating, dysuria, frequency and urgency.   Musculoskeletal: Positive for back pain. Negative for arthralgias, gait problem, joint swelling and myalgias.   Skin: Negative for rash.   Neurological: Negative for dizziness, tremors, syncope, weakness, light-headedness, numbness and headaches.        Sleep disturbance   Hematological: Negative for adenopathy. Does not bruise/bleed easily.   Psychiatric/Behavioral: Negative for confusion, sleep disturbance and suicidal ideas. The patient is not nervous/anxious.          Current Outpatient Prescriptions:   •  capecitabine (XELODA) 500 MG chemo tablet, Take 2 tab(s) by mouth in the morning and 2 tabs by mouth in the evening on days 1-14, then off for 7 days. (Patient taking differently: PT started [id number] ~Shell), Disp: 56 tablet, Rfl: 3  •  capecitabine (XELODA) 500 MG chemo tablet, Take 2 tablets by mouth in the morning and 2 tablets by mouth in the evening on days 1-14, then off for 7 days., Disp: 56 tablet, Rfl: 3  •  celecoxib (CELEBREX) 200 MG capsule, Take 200 mg by mouth Daily As Needed for Mild Pain (1-3)., Disp: , Rfl:   •  Cholecalciferol (VITAMIN D3) 5000 UNITS capsule capsule, Take 5,000 Units by mouth Daily., Disp: , Rfl:   •  lidocaine-prilocaine (EMLA) 2.5-2.5 % cream, Apply  topically As Needed (45-60 minutes prior  "to port access.  Cover with saran/plastic wrap.)., Disp: 30 g, Rfl: 3  •  magnesium hydroxide (MILK OF MAGNESIA) 2400 MG/10ML suspension suspension, Take 10 mL by mouth Daily., Disp: , Rfl:   •  ondansetron (ZOFRAN) 8 MG tablet, Take 1 tablet by mouth 3 (Three) Times a Day As Needed for Nausea or Vomiting., Disp: 30 tablet, Rfl: 5  •  oxyCODONE-acetaminophen (PERCOCET)  MG per tablet, Take 1 tablet by mouth Every 6 (Six) Hours As Needed for Severe Pain ., Disp: , Rfl:   •  zaleplon (SONATA) 10 MG capsule, Take 10 mg by mouth At Night As Needed., Disp: , Rfl:   No current facility-administered medications for this visit.     Facility-Administered Medications Ordered in Other Visits:   •  bevacizumab (AVASTIN) 460 mg in sodium chloride 0.9 % 100 mL chemo IVPB, 7.5 mg/kg (Treatment Plan Recorded), Intravenous, Once, Kristofer Rodriguez MD  •  dexamethasone (DECADRON) 12 mg IV, 12 mg, Intravenous, Once, Kristofer Rodriguez MD  •  dextrose (D5W) 5 % infusion 250 mL, 250 mL, Intravenous, Once, Kristofer Rodriguez MD  •  heparin flush (porcine) 100 UNIT/ML injection 500 Units, 500 Units, Intravenous, PRN, Kristofer Rodriguez MD  •  OXALIplatin (ELOXATIN) 170 mg in dextrose (D5W) 5 % 250 mL chemo IVPB, 100 mg/m2 (Treatment Plan Recorded), Intravenous, Once, Kristofer Rodriguez MD  •  palonosetron (ALOXI) injection 0.25 mg, 0.25 mg, Intravenous, Once, Kristofer Rodriguez MD  •  sodium chloride 0.9 % flush 10 mL, 10 mL, Intravenous, PRN, Kristofer Rodriguez MD  •  sodium chloride 0.9 % infusion 250 mL, 250 mL, Intravenous, Once, Kristofer Rodriguez MD    PHYSICAL EXAMINATION:   /75 Comment: LUE  Pulse 76  Temp 97.3 °F (36.3 °C) (Temporal Artery )   Resp 16  Ht 167.6 cm (66\")  Wt 65.3 kg (144 lb)  BMI 23.24 kg/m2   ECOG Performance Status: 1 - Symptomatic but completely ambulatory  General Appearance:  alert, cooperative, no apparent distress and appears stated age   Neurologic/Psychiatric: A&O x 3, gait steady, appropriate affect, strength 5/5 in " all muscle groups   HEENT:  Normocephalic, without obvious abnormality, mucous membranes moist   Neck: Supple, symmetrical, trachea midline, no adenopathy;  No thyromegaly, masses, or tenderness   Lungs:   Clear to auscultation bilaterally; respirations regular, even, and unlabored bilaterally   Heart:  Regular rate and rhythm, no murmurs appreciated   Abdomen:   Soft, non-tender, non-distended and no organomegaly   Lymph nodes: No cervical, supraclavicular, inguinal or axillary adenopathy noted   Extremities: Normal, atraumatic; no clubbing, cyanosis, or edema    Skin: No rashes, ulcers, or suspicious lesions noted     Lab on 12/11/2017   Component Date Value Ref Range Status   • CEA 12/11/2017 14.40* 0.00 - 2.50 ng/mL Final   • Glucose 12/11/2017 78  70 - 100 mg/dL Final   • BUN 12/11/2017 10  9 - 23 mg/dL Final   • Creatinine 12/11/2017 0.60  0.60 - 1.30 mg/dL Final   • Sodium 12/11/2017 140  132 - 146 mmol/L Final   • Potassium 12/11/2017 4.6  3.5 - 5.5 mmol/L Final   • Chloride 12/11/2017 103  99 - 109 mmol/L Final   • CO2 12/11/2017 29.0  20.0 - 31.0 mmol/L Final   • Calcium 12/11/2017 9.0  8.7 - 10.4 mg/dL Final   • Total Protein 12/11/2017 7.3  5.7 - 8.2 g/dL Final   • Albumin 12/11/2017 4.40  3.20 - 4.80 g/dL Final   • ALT (SGPT) 12/11/2017 16  7 - 40 U/L Final   • AST (SGOT) 12/11/2017 26  0 - 33 U/L Final   • Alkaline Phosphatase 12/11/2017 86  25 - 100 U/L Final   • Total Bilirubin 12/11/2017 0.8  0.3 - 1.2 mg/dL Final   • eGFR Non African Amer 12/11/2017 140  >60 mL/min/1.73 Final   • Globulin 12/11/2017 2.9  gm/dL Final   • A/G Ratio 12/11/2017 1.5  1.5 - 2.5 g/dL Final   • BUN/Creatinine Ratio 12/11/2017 16.7  7.0 - 25.0 Final   • Anion Gap 12/11/2017 8.0  3.0 - 11.0 mmol/L Final   • Color, UA 12/11/2017 Yellow  Yellow, Straw Final   • Appearance, UA 12/11/2017 Clear  Clear Final   • pH, UA 12/11/2017 6.0  5.0 - 8.0 Final   • Specific Gravity, UA 12/11/2017 1.005  1.005 - 1.030 Final   • Glucose, UA  12/11/2017 Negative  Negative Final   • Ketones, UA 12/11/2017 Negative  Negative Final   • Bilirubin, UA 12/11/2017 Negative  Negative Final   • Blood, UA 12/11/2017 Negative  Negative Final   • Protein, UA 12/11/2017 Negative  Negative Final   • Leuk Esterase, UA 12/11/2017 Negative  Negative Final   • Nitrite, UA 12/11/2017 Negative  Negative Final   • Urobilinogen, UA 12/11/2017 0.2 E.U./dL  0.2 - 1.0 E.U./dL Final   • WBC 12/11/2017 4.60  3.50 - 10.80 10*3/mm3 Final   • RBC 12/11/2017 4.56  4.20 - 5.76 10*6/mm3 Final   • Hemoglobin 12/11/2017 10.8* 13.1 - 17.5 g/dL Final   • Hematocrit 12/11/2017 34.0* 38.9 - 50.9 % Final   • RDW 12/11/2017 31.0* 11.3 - 14.5 % Final   • MCV 12/11/2017 74.6* 80.0 - 99.0 fL Final   • MCH 12/11/2017 23.7* 27.0 - 31.0 pg Final   • MCHC 12/11/2017 31.9* 32.0 - 36.0 g/dL Final   • MPV 12/11/2017 7.0  6.0 - 12.0 fL Final   • Platelets 12/11/2017 181  150 - 450 10*3/mm3 Final   • Neutrophil % 12/11/2017 52.1  41.0 - 71.0 % Final   • Lymphocyte % 12/11/2017 33.4  24.0 - 44.0 % Final   • Monocyte % 12/11/2017 14.5* 0.0 - 12.0 % Final   • Neutrophils, Absolute 12/11/2017 2.40  1.50 - 8.30 10*3/mm3 Final   • Lymphocytes, Absolute 12/11/2017 1.50  0.60 - 4.80 10*3/mm3 Final   • Monocytes, Absolute 12/11/2017 0.70  0.00 - 1.00 10*3/mm3 Final   Hospital Outpatient Visit on 12/07/2017   Component Date Value Ref Range Status   • Glucose 12/07/2017 96  70 - 130 mg/dL Final        Nm Pet Whole Body    Result Date: 12/7/2017  Narrative: EXAMINATION: NM PET WHOLE-BODY- - 12/07/2017  INDICATION: C18.7-Malignant neoplasm of sigmoid colon.  TECHNIQUE: With fasting blood glucose level of 96 mg/dl, a total of 13.6 mCi of FDG was administered via right antecubital vein. Following appropriate delay, PET and CT images were obtained from the level of the skull vertex to the feet and fused multiplanar images reconstructed. The CT scan is an unenhanced low-dose study for reference to the PET scan only and  does not constitute a standard diagnostic CT scan.  COMPARISON: Whole-body PET scan 8/31/2017.  FINDINGS: Patient history indicates history of sigmoid cancer. Previous 8/31/2017 exam report indicates increasing strongly hypermetabolic left infrahilar/left lower lobe lesion, slight progression of right perihilar lung nodule. Persistent strongly hypermetabolic destructive lumbar lesion.  3D images show no definite remaining hypermetabolic activity in the right or left chest, or upper lumbar region. No new hypermetabolic foci are seen. Normal variant skinfold activity is seen in both axillary regions, unchanged from the prior study.  Multiplanar images show no significant asymmetry of uptake in the brain. No hypermetabolic cervical mass or node is seen.  In the chest, there is a persistent masslike area in the left lung base, no longer visibly hypermetabolic. Maximum SUV previously was approximately 12.4, today only 1.5. Small hypermetabolic right lower lobe nodule seen on the prior study is no longer identified and no hypermetabolic activity is seen in this region. No new hypermetabolic nodule is appreciated.  Below the diaphragm, there is the usual heterogeneous activity in the liver. No hypermetabolic solid organ disease or adenopathy is seen. At the level of the patient's destructive lumbar mass, maximum SUV has decreased from 19.6 to only 2.8. No new hypermetabolic focus is seen in the pelvis. No hypermetabolic bony disease is seen elsewhere.      Impression: 1. Interval resolution of hypermetabolic activity in the patient's left lung lesion. Apparent interval resolution of both PET and CT abnormality of patient's smaller right lung lesion. 2. Previously strongly hypermetabolic destructive lumbar mass is now only weakly hypermetabolic. 3. No new disease is identified elsewhere.  DICTATED:     12/07/2017 EDITED:          12/07/2017   This report was finalized on 12/7/2017 10:18 PM by DR. Eliseo Riley MD.         ASSESSMENT: The patient is a very pleasant 56 y.o. male with stage IV colon cancer    PROBLEM LIST:  1. Currently stage IV colon cancer with lung and bony metastases.  K-edwige mutant, microsatellite stable, and intermediate mutational load.  2. Status post low anterior resection done by Dr. Young 2007, [id number]  3.  Attempted adjuvant chemotherapy for 1 cycle could not tolerate secondary to multiple toxicities.  4. Biopsy-proven L2 metastases done October 11, 2016. Status post CyberKnife radiation treatment.  5.  Started Keytruda 200 mg IV every 3 weeks on February 1, 2017, status post 10 cycles of treatment  6. Disease progression documented on PET scan completed 8/31/2017.   7. Treatment changed to Xeloda/Avastin/Oxaliplatin on 9/19/2017, status post 3 cycle.   8. Status post tumor debulking at L2 done by Dr. Frazier at [facility] on April 21, 2017  9.  Treatment induced hepatitis, resolved  10.  Cancer related pain  11.  Opioid-induced constipation  12.  Treatment induced asthenia  13.  Mild depression  14.  Insomnia  15.  Chemotherapy-induced anemia    PLAN:  1. I reviewed the PET scan results with the patient and his wife. I reviewed the images myself. I told him he has had good response to treatment with improvement seen in both the left lung lesion as well as the lumbar mass. He has no new or progressive disease.  2.  We will proceed with treatment today as planned with Oxaliplatin/Avastin cycle #5.   3. This is being given with Xeloda 1000 mg twice per day, 2 weeks on, 1 week off.   4. We will monitor the patient's labs throughout treatment including blood counts, kidney function, CEA, thyroid function, and liver functions. We will monitor his liver enzymes that have been elevated secondary to treatment with immune-therapy.   5.  The patient will follow-up with me in 3 weeks for next cycle of treatment.  6.  Patient will continue taking over-the-counter stool softeners for constipation.  7.  I  again reviewed with the patient the potential side effects from immune therapy including immune mediated reactions with pneumonitis, hepatitis, colitis, thyroiditis, and potential death.   8.  Patient tissue was submitted for the matched trial.  He did match on PETN mutation, however that arm was closed.  9.  I advised the patient to hold his alternative treatment with high dose vitamin C while he is on immunetherapy.  10. The patient will have repeat imaging done after cycle # 8 of treatment.   11. The patient will continue Zofran as needed for treatment related nausea.  12. The patient will continue oxycodone 10 mg as needed for cancer-related pain.  We'll continue Celebrex 200 mg twice a day as needed.    13.  I offered patient Ritalin 5 mg daily to see if that might help with his fatigue but he would like to hold off for now.  14.  The patient could not tolerate Remeron 15 mg daily at bedtime secondary to mental status changes.  15.  Patient will continue Zaleplon as needed for insomnia.  16.  I will transfuse 2 units of blood if his hemoglobin drops below 8.    Scribed for Kristofer Rodriguez MD by JESSEE Osborne. 12/12/2017  9:47 AM  Kristofer Rodriguez MD  12/12/2017   9:47 AM  I, Kristofer Rodriguez MD, personally performed the services described in this documentation as scribed by the above named individual in my presence, and it is both accurate and complete.  12/12/2017  9:47 AM

## 2017-12-15 ENCOUNTER — TELEPHONE (OUTPATIENT)
Dept: ONCOLOGY | Facility: CLINIC | Age: 57
End: 2017-12-15

## 2017-12-15 RX ORDER — LACTULOSE 10 G/15ML
20 SOLUTION ORAL 3 TIMES DAILY
Qty: 240 ML | Refills: 2 | Status: SHIPPED | OUTPATIENT
Start: 2017-12-15 | End: 2018-06-12

## 2017-12-15 NOTE — TELEPHONE ENCOUNTER
----- Message from Yanique Wiley sent at 12/15/2017 10:40 AM EST -----  Regarding: BADIN-MEDICATION  Contact: [phone]  Patient called he needs some lactulose called into [facility].

## 2018-01-03 ENCOUNTER — TELEPHONE (OUTPATIENT)
Dept: ONCOLOGY | Facility: HOSPITAL | Age: 58
End: 2018-01-03

## 2018-01-03 DIAGNOSIS — C79.51 BONE METASTASIS: ICD-10-CM

## 2018-01-03 DIAGNOSIS — C18.7 MALIGNANT NEOPLASM OF SIGMOID COLON (HCC): ICD-10-CM

## 2018-01-03 RX ORDER — CAPECITABINE 500 MG/1
TABLET, FILM COATED ORAL
Qty: 56 TABLET | Refills: 3 | Status: SHIPPED | OUTPATIENT
Start: 2018-01-03 | End: 2018-10-29 | Stop reason: SDUPTHER

## 2018-01-03 NOTE — TELEPHONE ENCOUNTER
Called patient to confirm dosing of Xeloda 1000mg PO BID x 7 days on, followed by 7 days off - Updated script for Prasanth GHOSH

## 2018-01-04 ENCOUNTER — LAB (OUTPATIENT)
Dept: LAB | Facility: HOSPITAL | Age: 58
End: 2018-01-04

## 2018-01-04 DIAGNOSIS — C79.51 BONE METASTASIS: ICD-10-CM

## 2018-01-04 DIAGNOSIS — C18.7 MALIGNANT NEOPLASM OF SIGMOID COLON (HCC): ICD-10-CM

## 2018-01-04 LAB
ALBUMIN SERPL-MCNC: 4.2 G/DL (ref 3.2–4.8)
ALBUMIN/GLOB SERPL: 1.4 G/DL (ref 1.5–2.5)
ALP SERPL-CCNC: 83 U/L (ref 25–100)
ALT SERPL W P-5'-P-CCNC: 16 U/L (ref 7–40)
ANION GAP SERPL CALCULATED.3IONS-SCNC: 9 MMOL/L (ref 3–11)
AST SERPL-CCNC: 26 U/L (ref 0–33)
BILIRUB SERPL-MCNC: 0.8 MG/DL (ref 0.3–1.2)
BILIRUB UR QL STRIP: NEGATIVE
BUN BLD-MCNC: 9 MG/DL (ref 9–23)
BUN/CREAT SERPL: 12.9 (ref 7–25)
CALCIUM SPEC-SCNC: 9 MG/DL (ref 8.7–10.4)
CEA SERPL-MCNC: 6.3 NG/ML (ref 0–2.5)
CHLORIDE SERPL-SCNC: 99 MMOL/L (ref 99–109)
CLARITY UR: CLEAR
CO2 SERPL-SCNC: 27 MMOL/L (ref 20–31)
COLOR UR: YELLOW
CREAT BLD-MCNC: 0.7 MG/DL (ref 0.6–1.3)
ERYTHROCYTE [DISTWIDTH] IN BLOOD BY AUTOMATED COUNT: 29.9 % (ref 11.3–14.5)
GFR SERPL CREATININE-BSD FRML MDRD: 117 ML/MIN/1.73
GLOBULIN UR ELPH-MCNC: 3 GM/DL
GLUCOSE BLD-MCNC: 78 MG/DL (ref 70–100)
GLUCOSE UR STRIP-MCNC: NEGATIVE MG/DL
HCT VFR BLD AUTO: 34.2 % (ref 38.9–50.9)
HGB BLD-MCNC: 10.7 G/DL (ref 13.1–17.5)
HGB UR QL STRIP.AUTO: NEGATIVE
KETONES UR QL STRIP: NEGATIVE
LEUKOCYTE ESTERASE UR QL STRIP.AUTO: NEGATIVE
LYMPHOCYTES # BLD AUTO: 1.7 10*3/MM3 (ref 0.6–4.8)
LYMPHOCYTES NFR BLD AUTO: 32.4 % (ref 24–44)
MCH RBC QN AUTO: 23.8 PG (ref 27–31)
MCHC RBC AUTO-ENTMCNC: 31.2 G/DL (ref 32–36)
MCV RBC AUTO: 76.4 FL (ref 80–99)
MONOCYTES # BLD AUTO: 0.7 10*3/MM3 (ref 0–1)
MONOCYTES NFR BLD AUTO: 12.7 % (ref 0–12)
NEUTROPHILS # BLD AUTO: 2.9 10*3/MM3 (ref 1.5–8.3)
NEUTROPHILS NFR BLD AUTO: 54.9 % (ref 41–71)
NITRITE UR QL STRIP: NEGATIVE
PH UR STRIP.AUTO: 6 [PH] (ref 5–8)
PLATELET # BLD AUTO: 185 10*3/MM3 (ref 150–450)
PMV BLD AUTO: 6.7 FL (ref 6–12)
POTASSIUM BLD-SCNC: 4.5 MMOL/L (ref 3.5–5.5)
PROT SERPL-MCNC: 7.2 G/DL (ref 5.7–8.2)
PROT UR QL STRIP: NEGATIVE
RBC # BLD AUTO: 4.48 10*6/MM3 (ref 4.2–5.76)
SODIUM BLD-SCNC: 135 MMOL/L (ref 132–146)
SP GR UR STRIP: 1 (ref 1–1.03)
UROBILINOGEN UR QL STRIP: NORMAL
WBC NRBC COR # BLD: 5.3 10*3/MM3 (ref 3.5–10.8)

## 2018-01-04 PROCEDURE — 82378 CARCINOEMBRYONIC ANTIGEN: CPT

## 2018-01-04 PROCEDURE — 85025 COMPLETE CBC W/AUTO DIFF WBC: CPT

## 2018-01-04 PROCEDURE — 36415 COLL VENOUS BLD VENIPUNCTURE: CPT

## 2018-01-04 PROCEDURE — 81003 URINALYSIS AUTO W/O SCOPE: CPT

## 2018-01-04 PROCEDURE — 80053 COMPREHEN METABOLIC PANEL: CPT

## 2018-01-05 ENCOUNTER — INFUSION (OUTPATIENT)
Dept: ONCOLOGY | Facility: HOSPITAL | Age: 58
End: 2018-01-05

## 2018-01-05 ENCOUNTER — OFFICE VISIT (OUTPATIENT)
Dept: ONCOLOGY | Facility: CLINIC | Age: 58
End: 2018-01-05

## 2018-01-05 VITALS
RESPIRATION RATE: 16 BRPM | WEIGHT: 145 LBS | TEMPERATURE: 97.5 F | DIASTOLIC BLOOD PRESSURE: 75 MMHG | BODY MASS INDEX: 23.4 KG/M2 | SYSTOLIC BLOOD PRESSURE: 135 MMHG | HEART RATE: 80 BPM

## 2018-01-05 DIAGNOSIS — C18.7 MALIGNANT NEOPLASM OF SIGMOID COLON (HCC): Primary | ICD-10-CM

## 2018-01-05 DIAGNOSIS — C79.51 BONE METASTASIS: ICD-10-CM

## 2018-01-05 PROCEDURE — 96375 TX/PRO/DX INJ NEW DRUG ADDON: CPT

## 2018-01-05 PROCEDURE — 25010000002 HEPARIN FLUSH (PORCINE) 100 UNIT/ML SOLUTION: Performed by: INTERNAL MEDICINE

## 2018-01-05 PROCEDURE — 25010000002 BEVACIZUMAB PER 10 MG: Performed by: INTERNAL MEDICINE

## 2018-01-05 PROCEDURE — 99215 OFFICE O/P EST HI 40 MIN: CPT | Performed by: INTERNAL MEDICINE

## 2018-01-05 PROCEDURE — 96417 CHEMO IV INFUS EACH ADDL SEQ: CPT

## 2018-01-05 PROCEDURE — 96415 CHEMO IV INFUSION ADDL HR: CPT

## 2018-01-05 PROCEDURE — 25010000002 DEXAMETHASONE PER 1 MG: Performed by: INTERNAL MEDICINE

## 2018-01-05 PROCEDURE — 25010000002 OXALIPLATIN PER 0.5 MG: Performed by: INTERNAL MEDICINE

## 2018-01-05 PROCEDURE — 96413 CHEMO IV INFUSION 1 HR: CPT

## 2018-01-05 PROCEDURE — 25010000002 PALONOSETRON PER 25 MCG: Performed by: INTERNAL MEDICINE

## 2018-01-05 RX ORDER — SODIUM CHLORIDE 9 MG/ML
250 INJECTION, SOLUTION INTRAVENOUS ONCE
Status: DISCONTINUED | OUTPATIENT
Start: 2018-01-05 | End: 2018-01-05 | Stop reason: HOSPADM

## 2018-01-05 RX ORDER — DEXTROSE MONOHYDRATE 50 MG/ML
250 INJECTION, SOLUTION INTRAVENOUS ONCE
Status: COMPLETED | OUTPATIENT
Start: 2018-01-05 | End: 2018-01-05

## 2018-01-05 RX ORDER — PALONOSETRON 0.05 MG/ML
0.25 INJECTION, SOLUTION INTRAVENOUS ONCE
Status: COMPLETED | OUTPATIENT
Start: 2018-01-05 | End: 2018-01-05

## 2018-01-05 RX ORDER — SODIUM CHLORIDE 0.9 % (FLUSH) 0.9 %
10 SYRINGE (ML) INJECTION AS NEEDED
Status: CANCELLED | OUTPATIENT
Start: 2018-01-05

## 2018-01-05 RX ADMIN — OXALIPLATIN 170 MG: 100 INJECTION, SOLUTION, CONCENTRATE INTRAVENOUS at 10:09

## 2018-01-05 RX ADMIN — PALONOSETRON HYDROCHLORIDE 0.25 MG: 0.25 INJECTION INTRAVENOUS at 09:42

## 2018-01-05 RX ADMIN — DEXTROSE MONOHYDRATE 250 ML: 50 INJECTION, SOLUTION INTRAVENOUS at 09:42

## 2018-01-05 RX ADMIN — BEVACIZUMAB 460 MG: 400 INJECTION, SOLUTION INTRAVENOUS at 12:21

## 2018-01-05 RX ADMIN — DEXAMETHASONE SODIUM PHOSPHATE 12 MG: 4 INJECTION, SOLUTION INTRAMUSCULAR; INTRAVENOUS at 09:43

## 2018-01-05 RX ADMIN — HEPARIN 500 UNITS: 100 SYRINGE at 12:55

## 2018-01-05 NOTE — PROGRESS NOTES
DATE OF VISIT: 1/5/2018    REASON FOR VISIT: Followup for metastatic colon cancer.      HISTORY OF PRESENT ILLNESS: The patient is a very pleasant 57 y.o. male  with past medical history significant for metastatic colon cancer diagnosed March 2007 . He is status post lower anterior resection as well as 1 cycle FOLFOX, stopped due to multiple toxicities. Final pathology showed poorly differentiated adenocarcinoma with 25 negative lymph nodes and clear surgical margins. Isolated tumor deposits was found in the pericolonic fat. Pathologic stage T2 N1c M0 stage IIIA disease.The patient has been followed by serial colonoscopies as well as CAT scans that did document pulmonary metastatic disease with left lower lobe nodule that is gradually increasing in size. Patient was doing fairly well until recently, 3 months ago, patient presented with low back pain. Patient had restaging workup that revealed hypermetabolic L2 vertebral body lesion. Patient had biopsy done on October 11, 2016 that revealed metastatic adenocarcinoma consistent of colon primary with CK 7 negative CK 20 positive CDX2 positive. Patient had next generation gene sequencing that revealed intermediate mutational load with microsatellite stability.  He started chemotherapy with Keytruda on 2/1/2017. The patient was changed to CapeOx with Avastin on 9/19/2017. He is here today for scheduled follow up visit with treatment.     SUBJECTIVE: The patient has been doing fairly well.  He was able to tolerate chemotherapy with multiple side effects.  He had constipation.  He had nausea that improves with Zofran.  He denied any skin rash.  He has minor nosebleeds.  He is eating and drinking well. He has no cough or shortness of breath.  His energy had improved.  He is recovering from an upper airway infection.    PAST MEDICAL HISTORY/SOCIAL HISTORY/FAMILY HISTORY: Unchanged from my prior documentation done on 01/17/2017.     Review of Systems   Constitutional:  Negative for activity change, appetite change, chills, fatigue, fever and unexpected weight change.   HENT: Negative for hearing loss, mouth sores, nosebleeds, sore throat and trouble swallowing.    Eyes: Negative for visual disturbance.   Respiratory: Negative for cough, chest tightness, shortness of breath and wheezing.    Cardiovascular: Negative for chest pain, palpitations and leg swelling.   Gastrointestinal: Negative for abdominal distention, abdominal pain, blood in stool, constipation, diarrhea, nausea, rectal pain and vomiting.   Endocrine: Negative for cold intolerance and heat intolerance.   Genitourinary: Negative for difficulty urinating, dysuria, frequency and urgency.   Musculoskeletal: Positive for back pain. Negative for arthralgias, gait problem, joint swelling and myalgias.   Skin: Negative for rash.   Neurological: Negative for dizziness, tremors, syncope, weakness, light-headedness, numbness and headaches.        Sleep disturbance   Hematological: Negative for adenopathy. Does not bruise/bleed easily.   Psychiatric/Behavioral: Negative for confusion, sleep disturbance and suicidal ideas. The patient is not nervous/anxious.          Current Outpatient Prescriptions:   •  capecitabine (XELODA) 500 MG chemo tablet, Take 2 tablets by mouth in the morning and 2 tablets by mouth in the evening for 7 days on, then off for 7 days., Disp: 56 tablet, Rfl: 3  •  celecoxib (CELEBREX) 200 MG capsule, Take 200 mg by mouth Daily As Needed for Mild Pain (1-3)., Disp: , Rfl:   •  Cholecalciferol (VITAMIN D3) 5000 UNITS capsule capsule, Take 5,000 Units by mouth Daily., Disp: , Rfl:   •  lactulose (CHRONULAC) 10 GM/15ML solution, Take 30 mL by mouth 3 (Three) Times a Day. PRN constipation, Disp: 240 mL, Rfl: 2  •  lidocaine-prilocaine (EMLA) 2.5-2.5 % cream, Apply  topically As Needed (45-60 minutes prior to port access.  Cover with saran/plastic wrap.)., Disp: 30 g, Rfl: 3  •  magnesium hydroxide (MILK OF  MAGNESIA) 2400 MG/10ML suspension suspension, Take 10 mL by mouth Daily., Disp: , Rfl:   •  ondansetron (ZOFRAN) 8 MG tablet, Take 1 tablet by mouth 3 (Three) Times a Day As Needed for Nausea or Vomiting., Disp: 30 tablet, Rfl: 5  •  oxyCODONE-acetaminophen (PERCOCET)  MG per tablet, Take 1 tablet by mouth Every 6 (Six) Hours As Needed for Severe Pain ., Disp: , Rfl:   •  zaleplon (SONATA) 10 MG capsule, Take 10 mg by mouth At Night As Needed., Disp: , Rfl:     PHYSICAL EXAMINATION:   /75  Pulse 80  Temp 97.5 °F (36.4 °C)  Resp 16  Wt 65.8 kg (145 lb)  BMI 23.4 kg/m2   ECOG Performance Status: 1 - Symptomatic but completely ambulatory  General Appearance:  alert, cooperative, no apparent distress and appears stated age   Neurologic/Psychiatric: A&O x 3, gait steady, appropriate affect, strength 5/5 in all muscle groups   HEENT:  Normocephalic, without obvious abnormality, mucous membranes moist   Neck: Supple, symmetrical, trachea midline, no adenopathy;  No thyromegaly, masses, or tenderness   Lungs:   Clear to auscultation bilaterally; respirations regular, even, and unlabored bilaterally   Heart:  Regular rate and rhythm, no murmurs appreciated   Abdomen:   Soft, non-tender, non-distended and no organomegaly   Lymph nodes: No cervical, supraclavicular, inguinal or axillary adenopathy noted   Extremities: Normal, atraumatic; no clubbing, cyanosis, or edema    Skin: No rashes, ulcers, or suspicious lesions noted     Lab on 01/04/2018   Component Date Value Ref Range Status   • CEA 01/04/2018 6.30* 0.00 - 2.50 ng/mL Final   • Glucose 01/04/2018 78  70 - 100 mg/dL Final   • BUN 01/04/2018 9  9 - 23 mg/dL Final   • Creatinine 01/04/2018 0.70  0.60 - 1.30 mg/dL Final   • Sodium 01/04/2018 135  132 - 146 mmol/L Final   • Potassium 01/04/2018 4.5  3.5 - 5.5 mmol/L Final   • Chloride 01/04/2018 99  99 - 109 mmol/L Final   • CO2 01/04/2018 27.0  20.0 - 31.0 mmol/L Final   • Calcium 01/04/2018 9.0  8.7 -  10.4 mg/dL Final   • Total Protein 01/04/2018 7.2  5.7 - 8.2 g/dL Final   • Albumin 01/04/2018 4.20  3.20 - 4.80 g/dL Final   • ALT (SGPT) 01/04/2018 16  7 - 40 U/L Final   • AST (SGOT) 01/04/2018 26  0 - 33 U/L Final   • Alkaline Phosphatase 01/04/2018 83  25 - 100 U/L Final   • Total Bilirubin 01/04/2018 0.8  0.3 - 1.2 mg/dL Final   • eGFR Non African Amer 01/04/2018 117  >60 mL/min/1.73 Final   • Globulin 01/04/2018 3.0  gm/dL Final   • A/G Ratio 01/04/2018 1.4* 1.5 - 2.5 g/dL Final   • BUN/Creatinine Ratio 01/04/2018 12.9  7.0 - 25.0 Final   • Anion Gap 01/04/2018 9.0  3.0 - 11.0 mmol/L Final   • Color, UA 01/04/2018 Yellow  Yellow, Straw Final   • Appearance, UA 01/04/2018 Clear  Clear Final   • pH, UA 01/04/2018 6.0  5.0 - 8.0 Final   • Specific Gravity, UA 01/04/2018 1.005  1.005 - 1.030 Final   • Glucose, UA 01/04/2018 Negative  Negative Final   • Ketones, UA 01/04/2018 Negative  Negative Final   • Bilirubin, UA 01/04/2018 Negative  Negative Final   • Blood, UA 01/04/2018 Negative  Negative Final   • Protein, UA 01/04/2018 Negative  Negative Final   • Leuk Esterase, UA 01/04/2018 Negative  Negative Final   • Nitrite, UA 01/04/2018 Negative  Negative Final   • Urobilinogen, UA 01/04/2018 0.2 E.U./dL  0.2 - 1.0 E.U./dL Final   • WBC 01/04/2018 5.30  3.50 - 10.80 10*3/mm3 Final   • RBC 01/04/2018 4.48  4.20 - 5.76 10*6/mm3 Final   • Hemoglobin 01/04/2018 10.7* 13.1 - 17.5 g/dL Final   • Hematocrit 01/04/2018 34.2* 38.9 - 50.9 % Final   • RDW 01/04/2018 29.9* 11.3 - 14.5 % Final   • MCV 01/04/2018 76.4* 80.0 - 99.0 fL Final   • MCH 01/04/2018 23.8* 27.0 - 31.0 pg Final   • MCHC 01/04/2018 31.2* 32.0 - 36.0 g/dL Final   • MPV 01/04/2018 6.7  6.0 - 12.0 fL Final   • Platelets 01/04/2018 185  150 - 450 10*3/mm3 Final   • Neutrophil % 01/04/2018 54.9  41.0 - 71.0 % Final   • Lymphocyte % 01/04/2018 32.4  24.0 - 44.0 % Final   • Monocyte % 01/04/2018 12.7* 0.0 - 12.0 % Final   • Neutrophils, Absolute 01/04/2018 2.90   1.50 - 8.30 10*3/mm3 Final   • Lymphocytes, Absolute 01/04/2018 1.70  0.60 - 4.80 10*3/mm3 Final   • Monocytes, Absolute 01/04/2018 0.70  0.00 - 1.00 10*3/mm3 Final        Nm Pet Whole Body    Result Date: 12/7/2017  Narrative: EXAMINATION: NM PET WHOLE-BODY- - 12/07/2017  INDICATION: C18.7-Malignant neoplasm of sigmoid colon.  TECHNIQUE: With fasting blood glucose level of 96 mg/dl, a total of 13.6 mCi of FDG was administered via right antecubital vein. Following appropriate delay, PET and CT images were obtained from the level of the skull vertex to the feet and fused multiplanar images reconstructed. The CT scan is an unenhanced low-dose study for reference to the PET scan only and does not constitute a standard diagnostic CT scan.  COMPARISON: Whole-body PET scan 8/31/2017.  FINDINGS: Patient history indicates history of sigmoid cancer. Previous 8/31/2017 exam report indicates increasing strongly hypermetabolic left infrahilar/left lower lobe lesion, slight progression of right perihilar lung nodule. Persistent strongly hypermetabolic destructive lumbar lesion.  3D images show no definite remaining hypermetabolic activity in the right or left chest, or upper lumbar region. No new hypermetabolic foci are seen. Normal variant skinfold activity is seen in both axillary regions, unchanged from the prior study.  Multiplanar images show no significant asymmetry of uptake in the brain. No hypermetabolic cervical mass or node is seen.  In the chest, there is a persistent masslike area in the left lung base, no longer visibly hypermetabolic. Maximum SUV previously was approximately 12.4, today only 1.5. Small hypermetabolic right lower lobe nodule seen on the prior study is no longer identified and no hypermetabolic activity is seen in this region. No new hypermetabolic nodule is appreciated.  Below the diaphragm, there is the usual heterogeneous activity in the liver. No hypermetabolic solid organ disease or adenopathy  is seen. At the level of the patient's destructive lumbar mass, maximum SUV has decreased from 19.6 to only 2.8. No new hypermetabolic focus is seen in the pelvis. No hypermetabolic bony disease is seen elsewhere.      Impression: 1. Interval resolution of hypermetabolic activity in the patient's left lung lesion. Apparent interval resolution of both PET and CT abnormality of patient's smaller right lung lesion. 2. Previously strongly hypermetabolic destructive lumbar mass is now only weakly hypermetabolic. 3. No new disease is identified elsewhere.  DICTATED:     12/07/2017 EDITED:          12/07/2017   This report was finalized on 12/7/2017 10:18 PM by DR. Eliseo Riley MD.        ASSESSMENT: The patient is a very pleasant 56 y.o. male with stage IV colon cancer    PROBLEM LIST:  1. Currently stage IV colon cancer with lung and bony metastases.  K-edwige mutant, microsatellite stable, and intermediate mutational load.  2. Status post low anterior resection done by Dr. Young 2007, [id number]  3.  Attempted adjuvant chemotherapy for 1 cycle could not tolerate secondary to multiple toxicities.  4. Biopsy-proven L2 metastases done October 11, 2016. Status post CyberKnife radiation treatment.  5.  Started Keytruda 200 mg IV every 3 weeks on February 1, 2017, status post 10 cycles of treatment  6. Disease progression documented on PET scan completed 8/31/2017.   7. Treatment changed to Xeloda/Avastin/Oxaliplatin on 9/19/2017, status post 3 cycle.   8. Status post tumor debulking at L2 done by Dr. Frazier at [facility] on April 21, 2017  9.  Treatment induced hepatitis, resolved  10.  Cancer related pain  11.  Opioid-induced constipation  12.  Treatment induced asthenia  13.  Mild depression  14.  Insomnia  15.  Chemotherapy-induced anemia    PLAN:  1. We will proceed with treatment today as planned with Oxaliplatin/Avastin cycle #6.   2. This is being given with Xeloda 1000 mg twice per day, 2 weeks on, 1 week off.   3.  We will monitor the patient's labs throughout treatment including blood counts, kidney function, CEA, thyroid function, and liver functions. We will monitor his liver enzymes that have been elevated secondary to treatment with immune-therapy.   4.  The patient will follow-up with me in 3 weeks for next cycle of treatment.  5.  Patient will continue taking over-the-counter stool softeners for constipation.  6.  I again reviewed with the patient the potential side effects from immune therapy including immune mediated reactions with pneumonitis, hepatitis, colitis, thyroiditis, and potential death.   7.  Patient tissue was submitted for the matched trial.  He did match on PETN mutation, however that arm was closed.  8.  I advised the patient to hold his alternative treatment with high dose vitamin C while he is on immunetherapy.  9. The patient will have repeat imaging done after cycle # 8 of treatment.   10. The patient will continue Zofran as needed for treatment related nausea.  11. The patient will continue oxycodone 10 mg as needed for cancer-related pain.  We'll continue Celebrex 200 mg twice a day as needed.    12.  I offered patient Ritalin 5 mg daily to see if that might help with his fatigue but he would like to hold off for now.  13.  The patient could not tolerate Remeron 15 mg daily at bedtime secondary to mental status changes.  14.  Patient will continue Zaleplon as needed for insomnia.  15.  I will transfuse 2 units of blood if his hemoglobin drops below 8.    Scribed for Kristofer Rodriguez MD by JESSEE Osborne. 1/5/2018  9:19 AM  Kristofer Rodriguez MD  1/5/2018   9:19 AM  I, Kristofer Rodriguez MD, personally performed the services described in this documentation as scribed by the above named individual in my presence, and it is both accurate and complete.  1/5/2018  9:19 AM

## 2018-01-21 ENCOUNTER — LAB (OUTPATIENT)
Dept: LAB | Facility: HOSPITAL | Age: 58
End: 2018-01-21
Attending: INTERNAL MEDICINE

## 2018-01-21 DIAGNOSIS — C18.9 METASTATIC COLON CANCER TO LIVER (HCC): Primary | ICD-10-CM

## 2018-01-21 DIAGNOSIS — C78.7 METASTATIC COLON CANCER TO LIVER (HCC): Primary | ICD-10-CM

## 2018-01-21 LAB
ALBUMIN SERPL-MCNC: 4.6 G/DL (ref 3.2–4.8)
ALBUMIN/GLOB SERPL: 1.6 G/DL (ref 1.5–2.5)
ALP SERPL-CCNC: 76 U/L (ref 25–100)
ALT SERPL W P-5'-P-CCNC: 16 U/L (ref 7–40)
ANION GAP SERPL CALCULATED.3IONS-SCNC: 6 MMOL/L (ref 3–11)
AST SERPL-CCNC: 28 U/L (ref 0–33)
BASOPHILS # BLD AUTO: 0 10*3/MM3 (ref 0–0.2)
BASOPHILS NFR BLD AUTO: 0 % (ref 0–1)
BILIRUB SERPL-MCNC: 0.7 MG/DL (ref 0.3–1.2)
BUN BLD-MCNC: 10 MG/DL (ref 9–23)
BUN/CREAT SERPL: 14.3 (ref 7–25)
CALCIUM SPEC-SCNC: 9.7 MG/DL (ref 8.7–10.4)
CEA SERPL-MCNC: 4.5 NG/ML (ref 0–2.5)
CHLORIDE SERPL-SCNC: 105 MMOL/L (ref 99–109)
CO2 SERPL-SCNC: 27 MMOL/L (ref 20–31)
CREAT BLD-MCNC: 0.7 MG/DL (ref 0.6–1.3)
DEPRECATED RDW RBC AUTO: 77.7 FL (ref 37–54)
EOSINOPHIL # BLD AUTO: 0.3 10*3/MM3 (ref 0–0.3)
EOSINOPHIL NFR BLD AUTO: 7.2 % (ref 0–3)
ERYTHROCYTE [DISTWIDTH] IN BLOOD BY AUTOMATED COUNT: 26.9 % (ref 11.3–14.5)
GFR SERPL CREATININE-BSD FRML MDRD: 117 ML/MIN/1.73
GLOBULIN UR ELPH-MCNC: 2.8 GM/DL
GLUCOSE BLD-MCNC: 100 MG/DL (ref 70–100)
HCT VFR BLD AUTO: 36.4 % (ref 38.9–50.9)
HGB BLD-MCNC: 11.8 G/DL (ref 13.1–17.5)
IMM GRANULOCYTES # BLD: 0.01 10*3/MM3 (ref 0–0.03)
IMM GRANULOCYTES NFR BLD: 0.2 % (ref 0–0.6)
LYMPHOCYTES # BLD AUTO: 1.42 10*3/MM3 (ref 0.6–4.8)
LYMPHOCYTES NFR BLD AUTO: 34.1 % (ref 24–44)
MCH RBC QN AUTO: 25.8 PG (ref 27–31)
MCHC RBC AUTO-ENTMCNC: 32.4 G/DL (ref 32–36)
MCV RBC AUTO: 79.5 FL (ref 80–99)
MONOCYTES # BLD AUTO: 0.59 10*3/MM3 (ref 0–1)
MONOCYTES NFR BLD AUTO: 14.1 % (ref 0–12)
NEUTROPHILS # BLD AUTO: 1.85 10*3/MM3 (ref 1.5–8.3)
NEUTROPHILS NFR BLD AUTO: 44.4 % (ref 41–71)
PLATELET # BLD AUTO: 158 10*3/MM3 (ref 150–450)
PMV BLD AUTO: 9 FL (ref 6–12)
POTASSIUM BLD-SCNC: 4.8 MMOL/L (ref 3.5–5.5)
PROT SERPL-MCNC: 7.4 G/DL (ref 5.7–8.2)
RBC # BLD AUTO: 4.58 10*6/MM3 (ref 4.2–5.76)
SODIUM BLD-SCNC: 138 MMOL/L (ref 132–146)
WBC NRBC COR # BLD: 4.17 10*3/MM3 (ref 3.5–10.8)

## 2018-01-21 PROCEDURE — 85025 COMPLETE CBC W/AUTO DIFF WBC: CPT

## 2018-01-21 PROCEDURE — 80053 COMPREHEN METABOLIC PANEL: CPT

## 2018-01-21 PROCEDURE — 36415 COLL VENOUS BLD VENIPUNCTURE: CPT

## 2018-01-21 PROCEDURE — 82378 CARCINOEMBRYONIC ANTIGEN: CPT

## 2018-01-22 DIAGNOSIS — C18.7 MALIGNANT NEOPLASM OF SIGMOID COLON (HCC): ICD-10-CM

## 2018-01-22 DIAGNOSIS — C79.51 BONE METASTASIS: ICD-10-CM

## 2018-01-23 ENCOUNTER — INFUSION (OUTPATIENT)
Dept: ONCOLOGY | Facility: HOSPITAL | Age: 58
End: 2018-01-23

## 2018-01-23 ENCOUNTER — OFFICE VISIT (OUTPATIENT)
Dept: ONCOLOGY | Facility: CLINIC | Age: 58
End: 2018-01-23

## 2018-01-23 ENCOUNTER — SPECIALTY PHARMACY (OUTPATIENT)
Dept: ONCOLOGY | Facility: HOSPITAL | Age: 58
End: 2018-01-23

## 2018-01-23 VITALS
HEART RATE: 74 BPM | HEIGHT: 66 IN | RESPIRATION RATE: 14 BRPM | BODY MASS INDEX: 23.14 KG/M2 | WEIGHT: 144 LBS | TEMPERATURE: 98 F | SYSTOLIC BLOOD PRESSURE: 134 MMHG | DIASTOLIC BLOOD PRESSURE: 71 MMHG

## 2018-01-23 DIAGNOSIS — C79.51 BONE METASTASIS: ICD-10-CM

## 2018-01-23 DIAGNOSIS — C18.7 MALIGNANT NEOPLASM OF SIGMOID COLON (HCC): Primary | ICD-10-CM

## 2018-01-23 DIAGNOSIS — C18.7 MALIGNANT NEOPLASM OF SIGMOID COLON (HCC): ICD-10-CM

## 2018-01-23 LAB
BILIRUB UR QL STRIP: NEGATIVE
CLARITY UR: CLEAR
COLOR UR: YELLOW
GLUCOSE UR STRIP-MCNC: NEGATIVE MG/DL
HGB UR QL STRIP.AUTO: NEGATIVE
KETONES UR QL STRIP: NEGATIVE
LEUKOCYTE ESTERASE UR QL STRIP.AUTO: NEGATIVE
NITRITE UR QL STRIP: NEGATIVE
PH UR STRIP.AUTO: 6 [PH] (ref 5–8)
PROT UR QL STRIP: NEGATIVE
SP GR UR STRIP: 1 (ref 1–1.03)
UROBILINOGEN UR QL STRIP: NORMAL

## 2018-01-23 PROCEDURE — 25010000002 BEVACIZUMAB PER 10 MG: Performed by: NURSE PRACTITIONER

## 2018-01-23 PROCEDURE — 81003 URINALYSIS AUTO W/O SCOPE: CPT

## 2018-01-23 PROCEDURE — 25010000002 PALONOSETRON PER 25 MCG: Performed by: NURSE PRACTITIONER

## 2018-01-23 PROCEDURE — 99215 OFFICE O/P EST HI 40 MIN: CPT | Performed by: INTERNAL MEDICINE

## 2018-01-23 PROCEDURE — 96415 CHEMO IV INFUSION ADDL HR: CPT

## 2018-01-23 PROCEDURE — 96417 CHEMO IV INFUS EACH ADDL SEQ: CPT

## 2018-01-23 PROCEDURE — 25010000002 OXALIPLATIN PER 0.5 MG: Performed by: NURSE PRACTITIONER

## 2018-01-23 PROCEDURE — 96375 TX/PRO/DX INJ NEW DRUG ADDON: CPT

## 2018-01-23 PROCEDURE — 96413 CHEMO IV INFUSION 1 HR: CPT

## 2018-01-23 PROCEDURE — 25010000002 HEPARIN FLUSH (PORCINE) 100 UNIT/ML SOLUTION: Performed by: INTERNAL MEDICINE

## 2018-01-23 PROCEDURE — 25010000002 DEXAMETHASONE PER 1 MG: Performed by: NURSE PRACTITIONER

## 2018-01-23 RX ORDER — PALONOSETRON 0.05 MG/ML
0.25 INJECTION, SOLUTION INTRAVENOUS ONCE
Status: COMPLETED | OUTPATIENT
Start: 2018-01-23 | End: 2018-01-23

## 2018-01-23 RX ORDER — SODIUM CHLORIDE 9 MG/ML
250 INJECTION, SOLUTION INTRAVENOUS ONCE
Status: CANCELLED | OUTPATIENT
Start: 2018-01-23

## 2018-01-23 RX ORDER — PALONOSETRON 0.05 MG/ML
0.25 INJECTION, SOLUTION INTRAVENOUS ONCE
Status: CANCELLED | OUTPATIENT
Start: 2018-01-23

## 2018-01-23 RX ORDER — SODIUM CHLORIDE 0.9 % (FLUSH) 0.9 %
10 SYRINGE (ML) INJECTION AS NEEDED
Status: CANCELLED | OUTPATIENT
Start: 2018-01-23

## 2018-01-23 RX ORDER — SODIUM CHLORIDE 0.9 % (FLUSH) 0.9 %
10 SYRINGE (ML) INJECTION AS NEEDED
Status: DISCONTINUED | OUTPATIENT
Start: 2018-01-23 | End: 2018-01-23 | Stop reason: HOSPADM

## 2018-01-23 RX ORDER — DEXTROSE MONOHYDRATE 50 MG/ML
250 INJECTION, SOLUTION INTRAVENOUS ONCE
Status: CANCELLED | OUTPATIENT
Start: 2018-01-23

## 2018-01-23 RX ADMIN — OXALIPLATIN 170 MG: 100 INJECTION, SOLUTION, CONCENTRATE INTRAVENOUS at 10:45

## 2018-01-23 RX ADMIN — HEPARIN 500 UNITS: 100 SYRINGE at 13:16

## 2018-01-23 RX ADMIN — Medication 10 ML: at 13:16

## 2018-01-23 RX ADMIN — BEVACIZUMAB 460 MG: 400 INJECTION, SOLUTION INTRAVENOUS at 12:44

## 2018-01-23 RX ADMIN — DEXAMETHASONE SODIUM PHOSPHATE 12 MG: 4 INJECTION, SOLUTION INTRAMUSCULAR; INTRAVENOUS at 10:16

## 2018-01-23 RX ADMIN — PALONOSETRON HYDROCHLORIDE 0.25 MG: 0.25 INJECTION INTRAVENOUS at 10:16

## 2018-01-23 NOTE — PROGRESS NOTES
DATE OF VISIT: 1/23/2018    REASON FOR VISIT: Followup for metastatic colon cancer.      HISTORY OF PRESENT ILLNESS: The patient is a very pleasant 57 y.o. male  with past medical history significant for metastatic colon cancer diagnosed March 2007 . He is status post lower anterior resection as well as 1 cycle FOLFOX, stopped due to multiple toxicities. Final pathology showed poorly differentiated adenocarcinoma with 25 negative lymph nodes and clear surgical margins. Isolated tumor deposits was found in the pericolonic fat. Pathologic stage T2 N1c M0 stage IIIA disease.The patient has been followed by serial colonoscopies as well as CAT scans that did document pulmonary metastatic disease with left lower lobe nodule that is gradually increasing in size. Patient was doing fairly well until recently, 3 months ago, patient presented with low back pain. Patient had restaging workup that revealed hypermetabolic L2 vertebral body lesion. Patient had biopsy done on October 11, 2016 that revealed metastatic adenocarcinoma consistent of colon primary with CK 7 negative CK 20 positive CDX2 positive. Patient had next generation gene sequencing that revealed intermediate mutational load with microsatellite stability.  He started chemotherapy with Keytruda on 2/1/2017. The patient was changed to CapeOx with Avastin on 9/19/2017. He is here today for scheduled follow up visit with treatment.     SUBJECTIVE: The patient has been doing fairly well.  He was able to tolerate chemotherapy with multiple side effects.  He had constipation.  He had nausea that improves with Zofran.  He denied any skin rash.  He has minor nosebleeds.  He is eating and drinking well. He has no cough or shortness of breath.  His energy had improved.  He is recovering from an upper airway infection.    PAST MEDICAL HISTORY/SOCIAL HISTORY/FAMILY HISTORY: Unchanged from my prior documentation done on 01/17/2017.     Review of Systems   Constitutional:  Negative for activity change, appetite change, chills, fatigue, fever and unexpected weight change.   HENT: Negative for hearing loss, mouth sores, nosebleeds, sore throat and trouble swallowing.    Eyes: Negative for visual disturbance.   Respiratory: Negative for cough, chest tightness, shortness of breath and wheezing.    Cardiovascular: Negative for chest pain, palpitations and leg swelling.   Gastrointestinal: Negative for abdominal distention, abdominal pain, blood in stool, constipation, diarrhea, nausea, rectal pain and vomiting.   Endocrine: Negative for cold intolerance and heat intolerance.   Genitourinary: Negative for difficulty urinating, dysuria, frequency and urgency.   Musculoskeletal: Positive for back pain. Negative for arthralgias, gait problem, joint swelling and myalgias.   Skin: Negative for rash.   Neurological: Negative for dizziness, tremors, syncope, weakness, light-headedness, numbness and headaches.        Sleep disturbance   Hematological: Negative for adenopathy. Does not bruise/bleed easily.   Psychiatric/Behavioral: Negative for confusion, sleep disturbance and suicidal ideas. The patient is not nervous/anxious.          Current Outpatient Prescriptions:   •  capecitabine (XELODA) 500 MG chemo tablet, Take 2 tablets by mouth in the morning and 2 tablets by mouth in the evening for 7 days on, then off for 7 days., Disp: 56 tablet, Rfl: 3  •  celecoxib (CELEBREX) 200 MG capsule, Take 200 mg by mouth Daily As Needed for Mild Pain (1-3)., Disp: , Rfl:   •  Cholecalciferol (VITAMIN D3) 5000 UNITS capsule capsule, Take 5,000 Units by mouth Daily., Disp: , Rfl:   •  lactulose (CHRONULAC) 10 GM/15ML solution, Take 30 mL by mouth 3 (Three) Times a Day. PRN constipation, Disp: 240 mL, Rfl: 2  •  lidocaine-prilocaine (EMLA) 2.5-2.5 % cream, Apply  topically As Needed (45-60 minutes prior to port access.  Cover with saran/plastic wrap.)., Disp: 30 g, Rfl: 3  •  magnesium hydroxide (MILK OF  "MAGNESIA) 2400 MG/10ML suspension suspension, Take 10 mL by mouth Daily., Disp: , Rfl:   •  ondansetron (ZOFRAN) 8 MG tablet, Take 1 tablet by mouth 3 (Three) Times a Day As Needed for Nausea or Vomiting., Disp: 30 tablet, Rfl: 5  •  oxyCODONE-acetaminophen (PERCOCET)  MG per tablet, Take 1 tablet by mouth Every 6 (Six) Hours As Needed for Severe Pain ., Disp: , Rfl:   •  zaleplon (SONATA) 10 MG capsule, Take 10 mg by mouth At Night As Needed., Disp: , Rfl:     PHYSICAL EXAMINATION:   /71  Pulse 74  Temp 98 °F (36.7 °C)  Resp 14  Ht 167.6 cm (66\")  Wt 65.3 kg (144 lb)  BMI 23.24 kg/m2   ECOG Performance Status: 1 - Symptomatic but completely ambulatory  General Appearance:  alert, cooperative, no apparent distress and appears stated age   Neurologic/Psychiatric: A&O x 3, gait steady, appropriate affect, strength 5/5 in all muscle groups   HEENT:  Normocephalic, without obvious abnormality, mucous membranes moist   Neck: Supple, symmetrical, trachea midline, no adenopathy;  No thyromegaly, masses, or tenderness   Lungs:   Clear to auscultation bilaterally; respirations regular, even, and unlabored bilaterally   Heart:  Regular rate and rhythm, no murmurs appreciated   Abdomen:   Soft, non-tender, non-distended and no organomegaly   Lymph nodes: No cervical, supraclavicular, inguinal or axillary adenopathy noted   Extremities: Normal, atraumatic; no clubbing, cyanosis, or edema    Skin: No rashes, ulcers, or suspicious lesions noted     Lab on 01/21/2018   Component Date Value Ref Range Status   • Glucose 01/21/2018 100  70 - 100 mg/dL Final   • BUN 01/21/2018 10  9 - 23 mg/dL Final   • Creatinine 01/21/2018 0.70  0.60 - 1.30 mg/dL Final   • Sodium 01/21/2018 138  132 - 146 mmol/L Final   • Potassium 01/21/2018 4.8  3.5 - 5.5 mmol/L Final   • Chloride 01/21/2018 105  99 - 109 mmol/L Final   • CO2 01/21/2018 27.0  20.0 - 31.0 mmol/L Final   • Calcium 01/21/2018 9.7  8.7 - 10.4 mg/dL Final   • Total " Protein 01/21/2018 7.4  5.7 - 8.2 g/dL Final   • Albumin 01/21/2018 4.60  3.20 - 4.80 g/dL Final   • ALT (SGPT) 01/21/2018 16  7 - 40 U/L Final   • AST (SGOT) 01/21/2018 28  0 - 33 U/L Final   • Alkaline Phosphatase 01/21/2018 76  25 - 100 U/L Final   • Total Bilirubin 01/21/2018 0.7  0.3 - 1.2 mg/dL Final   • eGFR Non African Amer 01/21/2018 117  >60 mL/min/1.73 Final   • Globulin 01/21/2018 2.8  gm/dL Final   • A/G Ratio 01/21/2018 1.6  1.5 - 2.5 g/dL Final   • BUN/Creatinine Ratio 01/21/2018 14.3  7.0 - 25.0 Final   • Anion Gap 01/21/2018 6.0  3.0 - 11.0 mmol/L Final   • CEA 01/21/2018 4.50* 0.00 - 2.50 ng/mL Final   • WBC 01/21/2018 4.17  3.50 - 10.80 10*3/mm3 Final   • RBC 01/21/2018 4.58  4.20 - 5.76 10*6/mm3 Final   • Hemoglobin 01/21/2018 11.8* 13.1 - 17.5 g/dL Final   • Hematocrit 01/21/2018 36.4* 38.9 - 50.9 % Final   • MCV 01/21/2018 79.5* 80.0 - 99.0 fL Final   • MCH 01/21/2018 25.8* 27.0 - 31.0 pg Final   • MCHC 01/21/2018 32.4  32.0 - 36.0 g/dL Final   • RDW 01/21/2018 26.9* 11.3 - 14.5 % Final   • RDW-SD 01/21/2018 77.7* 37.0 - 54.0 fl Final   • MPV 01/21/2018 9.0  6.0 - 12.0 fL Final   • Platelets 01/21/2018 158  150 - 450 10*3/mm3 Final   • Neutrophil % 01/21/2018 44.4  41.0 - 71.0 % Final   • Lymphocyte % 01/21/2018 34.1  24.0 - 44.0 % Final   • Monocyte % 01/21/2018 14.1* 0.0 - 12.0 % Final   • Eosinophil % 01/21/2018 7.2* 0.0 - 3.0 % Final   • Basophil % 01/21/2018 0.0  0.0 - 1.0 % Final   • Immature Grans % 01/21/2018 0.2  0.0 - 0.6 % Final   • Neutrophils, Absolute 01/21/2018 1.85  1.50 - 8.30 10*3/mm3 Final   • Lymphocytes, Absolute 01/21/2018 1.42  0.60 - 4.80 10*3/mm3 Final   • Monocytes, Absolute 01/21/2018 0.59  0.00 - 1.00 10*3/mm3 Final   • Eosinophils, Absolute 01/21/2018 0.30  0.00 - 0.30 10*3/mm3 Final   • Basophils, Absolute 01/21/2018 0.00  0.00 - 0.20 10*3/mm3 Final   • Immature Grans, Absolute 01/21/2018 0.01  0.00 - 0.03 10*3/mm3 Final        No results found.    ASSESSMENT: The  patient is a very pleasant 56 y.o. male with stage IV colon cancer    PROBLEM LIST:  1. Currently stage IV colon cancer with lung and bony metastases.  K-edwige mutant, microsatellite stable, and intermediate mutational load.  2. Status post low anterior resection done by Dr. Young 2007, [id number]  3.  Attempted adjuvant chemotherapy for 1 cycle could not tolerate secondary to multiple toxicities.  4. Biopsy-proven L2 metastases done October 11, 2016. Status post CyberKnife radiation treatment.  5.  Started Keytruda 200 mg IV every 3 weeks on February 1, 2017, status post 10 cycles of treatment  6. Disease progression documented on PET scan completed 8/31/2017.   7. Treatment changed to Xeloda/Avastin/Oxaliplatin on 9/19/2017, status post 6 cycle.   8. Status post tumor debulking at L2 done by Dr. Frazier at [facility] on April 21, 2017  9.  Treatment induced hepatitis, resolved  10.  Cancer related pain  11.  Opioid-induced constipation  12.  Treatment induced asthenia  13.  Mild depression  14.  Insomnia  15.  Chemotherapy-induced anemia    PLAN:  1. We will proceed with treatment today as planned with Oxaliplatin/Avastin cycle #7.   2. This is being given with Xeloda 1000 mg twice per day, 2 weeks on, 1 week off.   3. We will monitor the patient's labs throughout treatment including blood counts, kidney function, CEA, thyroid function, and liver functions. We will monitor his liver enzymes that have been elevated secondary to treatment with immune-therapy.  I explained to the patient that his CEA is nicely and gradually going down.  4.  The patient will follow-up with me in 3 weeks for next cycle of treatment.  5.  Patient will continue taking over-the-counter stool softeners for constipation.  6.  I again reviewed with the patient the potential side effects from immune therapy including immune mediated reactions with pneumonitis, hepatitis, colitis, thyroiditis, and potential death.   7.  Patient tissue was  submitted for the matched trial.  He did match on PETN mutation, however that arm was closed.  8.  I advised the patient to continue to hold his alternative treatment with high dose vitamin C while he is on immunetherapy.  9. The patient will have repeat imaging done after cycle # 8 of treatment.   10. The patient will continue Zofran as needed for treatment related nausea.  11. The patient will continue oxycodone 10 mg as needed for cancer-related pain.  We'll continue Celebrex 200 mg twice a day as needed.    12.  We offered Ritalin for treatment related fatigue, but the patient has declined at this time.   13.  Patient will continue Zaleplon as needed for insomnia.  14.  I will transfuse 2 units of blood if his hemoglobin drops below 8.    Scribed for Kristofer Rodriguez MD by JESSEE Osborne. 1/23/2018  9:16 AM  Kristofer Rodriguez MD  1/23/2018   9:16 AM  I, Kristofer Rodriguez MD, personally performed the services described in this documentation as scribed by the above named individual in my presence, and it is both accurate and complete.  1/23/2018  9:16 AM

## 2018-01-23 NOTE — PROGRESS NOTES
"Oral Chemotherapy Teaching      Patient Name/:  Marquis Esteban   1960  Oral Chemotherapy Regimen:  Capecitabine (Xeloda) 1000 mg PO x 7 days, then off for 7 days + Oxaliplatin + Avastin IV every 21 days  Date Started Medication:     Cycle 1: 17  Cycle 2: 10/3/17  Cycle 3: 10/31/17  Cycle 4: 2017  Cycle 5:17  Cycle 6:18  Cycle 7: 18    Initial Teaching Follow Up Comments     Safety     Storage instructions (away from children; away from heat/cold, sunlight, or moisture), handling - use of gloves (caregivers), washing hands after touching pills, managing waste     “How are you storing your medications?”, reminders on storage, proper handling (caregivers using gloves, washing hands, away from children, managing waste, etc.), disposal of medication with D/C or dosage change    Patient is storing his medication away from caregivers and family members, and washes his hands after he handles the medication.     Adherence      patient and/or caregiver on how to take medication, take with/without food, assess their adherence potential, stress importance of adherence, ways to manage adherence (pill boxes, phone reminders, calendars), what to do if miss a dose   “How are you taking your medication?” “How are you remembering to take your medication?”, “How many doses have you missed?”, determine reasons for non-adherence (not remembering, side effects, etc), ways to improve, overadherence? Remind patient of ways to improve/maintain adherence   Patient states he takes the medication twice daily within 30 minutes of eating a meal. Patient denies missed doses of this medication, and is compliant. Reports taking medication x 7 days followed by 7 days off, repeating. Reports currently on \"on\" week.      Side Effects/Adverse Reactions      patient on potential side effects, s/s, ways to manage, when to call MD/seek help     Determine if patient experiencing side effects, ways to manage   " Patient states that his appetite is variable, but he is trying to ensure proper nutrition.Pt reports largest issues relate to constipation with pre-medication (aloxi). Reports using some lactulose to achieve relief. Pt suggest additional education regarding side effects of premedication in future to assist with patient management.  Patient denies any other side effects.     Miscellaneous     Food interactions, DDIs, financial issues Determine if patient started any new medications since being placed on oral chemo (analyze for DDI) Patient has not started any new medications.     Additional Notes: All questions answered, aware to call with questions or concerns.

## 2018-02-11 ENCOUNTER — LAB (OUTPATIENT)
Dept: LAB | Facility: HOSPITAL | Age: 58
End: 2018-02-11
Attending: INTERNAL MEDICINE

## 2018-02-11 DIAGNOSIS — C79.51 BONE METASTASIS: ICD-10-CM

## 2018-02-11 DIAGNOSIS — C18.7 MALIGNANT NEOPLASM OF SIGMOID COLON (HCC): ICD-10-CM

## 2018-02-11 LAB
ALBUMIN SERPL-MCNC: 4.3 G/DL (ref 3.2–4.8)
ALBUMIN/GLOB SERPL: 1.4 G/DL (ref 1.5–2.5)
ALP SERPL-CCNC: 79 U/L (ref 25–100)
ALT SERPL W P-5'-P-CCNC: 20 U/L (ref 7–40)
ANION GAP SERPL CALCULATED.3IONS-SCNC: 5 MMOL/L (ref 3–11)
AST SERPL-CCNC: 29 U/L (ref 0–33)
BASOPHILS # BLD AUTO: 0.01 10*3/MM3 (ref 0–0.2)
BASOPHILS NFR BLD AUTO: 0.2 % (ref 0–1)
BILIRUB SERPL-MCNC: 1.1 MG/DL (ref 0.3–1.2)
BUN BLD-MCNC: 13 MG/DL (ref 9–23)
BUN/CREAT SERPL: 18.6 (ref 7–25)
CALCIUM SPEC-SCNC: 9.1 MG/DL (ref 8.7–10.4)
CEA SERPL-MCNC: 3.7 NG/ML (ref 0–2.5)
CHLORIDE SERPL-SCNC: 105 MMOL/L (ref 99–109)
CO2 SERPL-SCNC: 29 MMOL/L (ref 20–31)
CREAT BLD-MCNC: 0.7 MG/DL (ref 0.6–1.3)
DEPRECATED RDW RBC AUTO: 73.5 FL (ref 37–54)
EOSINOPHIL # BLD AUTO: 0.21 10*3/MM3 (ref 0–0.3)
EOSINOPHIL NFR BLD AUTO: 4.7 % (ref 0–3)
ERYTHROCYTE [DISTWIDTH] IN BLOOD BY AUTOMATED COUNT: 24.7 % (ref 11.3–14.5)
GFR SERPL CREATININE-BSD FRML MDRD: 117 ML/MIN/1.73
GLOBULIN UR ELPH-MCNC: 3.1 GM/DL
GLUCOSE BLD-MCNC: 84 MG/DL (ref 70–100)
HCT VFR BLD AUTO: 35.5 % (ref 38.9–50.9)
HGB BLD-MCNC: 11.3 G/DL (ref 13.1–17.5)
IMM GRANULOCYTES # BLD: 0 10*3/MM3 (ref 0–0.03)
IMM GRANULOCYTES NFR BLD: 0 % (ref 0–0.6)
LYMPHOCYTES # BLD AUTO: 1.54 10*3/MM3 (ref 0.6–4.8)
LYMPHOCYTES NFR BLD AUTO: 34.1 % (ref 24–44)
MCH RBC QN AUTO: 25.8 PG (ref 27–31)
MCHC RBC AUTO-ENTMCNC: 31.8 G/DL (ref 32–36)
MCV RBC AUTO: 81.1 FL (ref 80–99)
MONOCYTES # BLD AUTO: 0.82 10*3/MM3 (ref 0–1)
MONOCYTES NFR BLD AUTO: 18.2 % (ref 0–12)
NEUTROPHILS # BLD AUTO: 1.93 10*3/MM3 (ref 1.5–8.3)
NEUTROPHILS NFR BLD AUTO: 42.8 % (ref 41–71)
PLATELET # BLD AUTO: 138 10*3/MM3 (ref 150–450)
PMV BLD AUTO: 9.7 FL (ref 6–12)
POTASSIUM BLD-SCNC: 4.7 MMOL/L (ref 3.5–5.5)
PROT SERPL-MCNC: 7.4 G/DL (ref 5.7–8.2)
RBC # BLD AUTO: 4.38 10*6/MM3 (ref 4.2–5.76)
SODIUM BLD-SCNC: 139 MMOL/L (ref 132–146)
WBC NRBC COR # BLD: 4.51 10*3/MM3 (ref 3.5–10.8)

## 2018-02-11 PROCEDURE — 80053 COMPREHEN METABOLIC PANEL: CPT

## 2018-02-11 PROCEDURE — 82378 CARCINOEMBRYONIC ANTIGEN: CPT

## 2018-02-11 PROCEDURE — 85025 COMPLETE CBC W/AUTO DIFF WBC: CPT

## 2018-02-11 PROCEDURE — 36415 COLL VENOUS BLD VENIPUNCTURE: CPT

## 2018-02-12 ENCOUNTER — INFUSION (OUTPATIENT)
Dept: ONCOLOGY | Facility: HOSPITAL | Age: 58
End: 2018-02-12

## 2018-02-12 ENCOUNTER — OFFICE VISIT (OUTPATIENT)
Dept: ONCOLOGY | Facility: CLINIC | Age: 58
End: 2018-02-12

## 2018-02-12 VITALS
WEIGHT: 143 LBS | DIASTOLIC BLOOD PRESSURE: 85 MMHG | HEART RATE: 78 BPM | TEMPERATURE: 98 F | SYSTOLIC BLOOD PRESSURE: 134 MMHG | RESPIRATION RATE: 14 BRPM | BODY MASS INDEX: 23.08 KG/M2

## 2018-02-12 DIAGNOSIS — C18.7 MALIGNANT NEOPLASM OF SIGMOID COLON (HCC): Primary | ICD-10-CM

## 2018-02-12 DIAGNOSIS — C79.51 BONE METASTASIS: ICD-10-CM

## 2018-02-12 LAB
BILIRUB UR QL STRIP: NEGATIVE
CLARITY UR: CLEAR
COLOR UR: YELLOW
GLUCOSE UR STRIP-MCNC: NEGATIVE MG/DL
HGB UR QL STRIP.AUTO: NEGATIVE
KETONES UR QL STRIP: NEGATIVE
LEUKOCYTE ESTERASE UR QL STRIP.AUTO: NEGATIVE
NITRITE UR QL STRIP: NEGATIVE
PH UR STRIP.AUTO: 5 [PH] (ref 5–8)
PROT UR QL STRIP: NEGATIVE
SP GR UR STRIP: 1.01 (ref 1–1.03)
UROBILINOGEN UR QL STRIP: NORMAL

## 2018-02-12 PROCEDURE — 96375 TX/PRO/DX INJ NEW DRUG ADDON: CPT

## 2018-02-12 PROCEDURE — 81003 URINALYSIS AUTO W/O SCOPE: CPT

## 2018-02-12 PROCEDURE — 25010000002 HEPARIN FLUSH (PORCINE) 100 UNIT/ML SOLUTION: Performed by: INTERNAL MEDICINE

## 2018-02-12 PROCEDURE — 99215 OFFICE O/P EST HI 40 MIN: CPT | Performed by: INTERNAL MEDICINE

## 2018-02-12 PROCEDURE — 25010000002 OXALIPLATIN PER 0.5 MG: Performed by: NURSE PRACTITIONER

## 2018-02-12 PROCEDURE — 96417 CHEMO IV INFUS EACH ADDL SEQ: CPT

## 2018-02-12 PROCEDURE — 96374 THER/PROPH/DIAG INJ IV PUSH: CPT

## 2018-02-12 PROCEDURE — 96415 CHEMO IV INFUSION ADDL HR: CPT

## 2018-02-12 PROCEDURE — 96413 CHEMO IV INFUSION 1 HR: CPT

## 2018-02-12 PROCEDURE — 25010000002 DEXAMETHASONE PER 1 MG: Performed by: NURSE PRACTITIONER

## 2018-02-12 PROCEDURE — 25010000002 BEVACIZUMAB PER 10 MG: Performed by: NURSE PRACTITIONER

## 2018-02-12 PROCEDURE — 25010000002 PALONOSETRON PER 25 MCG: Performed by: NURSE PRACTITIONER

## 2018-02-12 RX ORDER — PALONOSETRON 0.05 MG/ML
0.25 INJECTION, SOLUTION INTRAVENOUS ONCE
Status: CANCELLED | OUTPATIENT
Start: 2018-02-13

## 2018-02-12 RX ORDER — DEXTROSE MONOHYDRATE 50 MG/ML
250 INJECTION, SOLUTION INTRAVENOUS ONCE
Status: COMPLETED | OUTPATIENT
Start: 2018-02-12 | End: 2018-02-12

## 2018-02-12 RX ORDER — SODIUM CHLORIDE 9 MG/ML
250 INJECTION, SOLUTION INTRAVENOUS ONCE
Status: CANCELLED | OUTPATIENT
Start: 2018-02-13

## 2018-02-12 RX ORDER — PALONOSETRON 0.05 MG/ML
0.25 INJECTION, SOLUTION INTRAVENOUS ONCE
Status: COMPLETED | OUTPATIENT
Start: 2018-02-12 | End: 2018-02-12

## 2018-02-12 RX ORDER — DEXTROSE MONOHYDRATE 50 MG/ML
250 INJECTION, SOLUTION INTRAVENOUS ONCE
Status: CANCELLED | OUTPATIENT
Start: 2018-02-13

## 2018-02-12 RX ORDER — SODIUM CHLORIDE 0.9 % (FLUSH) 0.9 %
10 SYRINGE (ML) INJECTION AS NEEDED
Status: CANCELLED | OUTPATIENT
Start: 2018-02-12

## 2018-02-12 RX ADMIN — HEPARIN 500 UNITS: 100 SYRINGE at 12:51

## 2018-02-12 RX ADMIN — DEXAMETHASONE SODIUM PHOSPHATE 12 MG: 4 INJECTION, SOLUTION INTRAMUSCULAR; INTRAVENOUS at 09:48

## 2018-02-12 RX ADMIN — PALONOSETRON HYDROCHLORIDE 0.25 MG: 0.25 INJECTION INTRAVENOUS at 09:48

## 2018-02-12 RX ADMIN — OXALIPLATIN 170 MG: 100 INJECTION, SOLUTION, CONCENTRATE INTRAVENOUS at 10:07

## 2018-02-12 RX ADMIN — BEVACIZUMAB 460 MG: 400 INJECTION, SOLUTION INTRAVENOUS at 12:11

## 2018-02-12 RX ADMIN — DEXTROSE MONOHYDRATE 250 ML: 50 INJECTION, SOLUTION INTRAVENOUS at 10:07

## 2018-02-12 NOTE — PROGRESS NOTES
DATE OF VISIT: 2/12/2018    REASON FOR VISIT: Followup for metastatic colon cancer.      HISTORY OF PRESENT ILLNESS: The patient is a very pleasant 58 y.o. male  with past medical history significant for metastatic colon cancer diagnosed March 2007 . He is status post lower anterior resection as well as 1 cycle FOLFOX, stopped due to multiple toxicities. Final pathology showed poorly differentiated adenocarcinoma with 25 negative lymph nodes and clear surgical margins. Isolated tumor deposits was found in the pericolonic fat. Pathologic stage T2 N1c M0 stage IIIA disease.The patient has been followed by serial colonoscopies as well as CAT scans that did document pulmonary metastatic disease with left lower lobe nodule that is gradually increasing in size. Patient was doing fairly well until recently, 3 months ago, patient presented with low back pain. Patient had restaging workup that revealed hypermetabolic L2 vertebral body lesion. Patient had biopsy done on October 11, 2016 that revealed metastatic adenocarcinoma consistent of colon primary with CK 7 negative CK 20 positive CDX2 positive. Patient had next generation gene sequencing that revealed intermediate mutational load with microsatellite stability.  He started chemotherapy with Keytruda on 2/1/2017. The patient was changed to CapeOx with Avastin on 9/19/2017.  He completed 8 cycles on February 12, 2018. He is here today for scheduled follow up visit with treatment.     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  He has been able to handle this treatment fairly well.  He has mild fatigue as well as mild neuropathy.  His pain is significantly better.  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    PAST MEDICAL HISTORY/SOCIAL HISTORY/FAMILY HISTORY: Unchanged from my prior documentation done on 01/17/2017.     Review of Systems   Constitutional: Negative for activity change, appetite change,  chills, fatigue, fever and unexpected weight change.   HENT: Negative for hearing loss, mouth sores, nosebleeds, sore throat and trouble swallowing.    Eyes: Negative for visual disturbance.   Respiratory: Negative for cough, chest tightness, shortness of breath and wheezing.    Cardiovascular: Negative for chest pain, palpitations and leg swelling.   Gastrointestinal: Negative for abdominal distention, abdominal pain, blood in stool, constipation, diarrhea, nausea, rectal pain and vomiting.   Endocrine: Negative for cold intolerance and heat intolerance.   Genitourinary: Negative for difficulty urinating, dysuria, frequency and urgency.   Musculoskeletal: Positive for back pain. Negative for arthralgias, gait problem, joint swelling and myalgias.   Skin: Negative for rash.   Neurological: Negative for dizziness, tremors, syncope, weakness, light-headedness, numbness and headaches.        Sleep disturbance   Hematological: Negative for adenopathy. Does not bruise/bleed easily.   Psychiatric/Behavioral: Negative for confusion, sleep disturbance and suicidal ideas. The patient is not nervous/anxious.          Current Outpatient Prescriptions:   •  capecitabine (XELODA) 500 MG chemo tablet, Take 2 tablets by mouth in the morning and 2 tablets by mouth in the evening for 7 days on, then off for 7 days., Disp: 56 tablet, Rfl: 3  •  celecoxib (CELEBREX) 200 MG capsule, Take 200 mg by mouth Daily As Needed for Mild Pain (1-3)., Disp: , Rfl:   •  Cholecalciferol (VITAMIN D3) 5000 UNITS capsule capsule, Take 5,000 Units by mouth Daily., Disp: , Rfl:   •  lactulose (CHRONULAC) 10 GM/15ML solution, Take 30 mL by mouth 3 (Three) Times a Day. PRN constipation, Disp: 240 mL, Rfl: 2  •  lidocaine-prilocaine (EMLA) 2.5-2.5 % cream, Apply  topically As Needed (45-60 minutes prior to port access.  Cover with saran/plastic wrap.)., Disp: 30 g, Rfl: 3  •  magnesium hydroxide (MILK OF MAGNESIA) 2400 MG/10ML suspension suspension, Take  10 mL by mouth Daily., Disp: , Rfl:   •  ondansetron (ZOFRAN) 8 MG tablet, Take 1 tablet by mouth 3 (Three) Times a Day As Needed for Nausea or Vomiting., Disp: 30 tablet, Rfl: 5  •  oxyCODONE-acetaminophen (PERCOCET)  MG per tablet, Take 1 tablet by mouth Every 6 (Six) Hours As Needed for Severe Pain ., Disp: , Rfl:   •  zaleplon (SONATA) 10 MG capsule, Take 10 mg by mouth At Night As Needed., Disp: , Rfl:     PHYSICAL EXAMINATION:   There were no vitals taken for this visit.   ECOG Performance Status: 1 - Symptomatic but completely ambulatory  General Appearance:  alert, cooperative, no apparent distress and appears stated age   Neurologic/Psychiatric: A&O x 3, gait steady, appropriate affect, strength 5/5 in all muscle groups   HEENT:  Normocephalic, without obvious abnormality, mucous membranes moist   Neck: Supple, symmetrical, trachea midline, no adenopathy;  No thyromegaly, masses, or tenderness   Lungs:   Clear to auscultation bilaterally; respirations regular, even, and unlabored bilaterally   Heart:  Regular rate and rhythm, no murmurs appreciated   Abdomen:   Soft, non-tender, non-distended and no organomegaly   Lymph nodes: No cervical, supraclavicular, inguinal or axillary adenopathy noted   Extremities: Normal, atraumatic; no clubbing, cyanosis, or edema    Skin: No rashes, ulcers, or suspicious lesions noted     Lab on 02/11/2018   Component Date Value Ref Range Status   • CEA 02/11/2018 3.70* 0.00 - 2.50 ng/mL Final   • Glucose 02/11/2018 84  70 - 100 mg/dL Final   • BUN 02/11/2018 13  9 - 23 mg/dL Final   • Creatinine 02/11/2018 0.70  0.60 - 1.30 mg/dL Final   • Sodium 02/11/2018 139  132 - 146 mmol/L Final   • Potassium 02/11/2018 4.7  3.5 - 5.5 mmol/L Final   • Chloride 02/11/2018 105  99 - 109 mmol/L Final   • CO2 02/11/2018 29.0  20.0 - 31.0 mmol/L Final   • Calcium 02/11/2018 9.1  8.7 - 10.4 mg/dL Final   • Total Protein 02/11/2018 7.4  5.7 - 8.2 g/dL Final   • Albumin 02/11/2018 4.30   3.20 - 4.80 g/dL Final   • ALT (SGPT) 02/11/2018 20  7 - 40 U/L Final   • AST (SGOT) 02/11/2018 29  0 - 33 U/L Final   • Alkaline Phosphatase 02/11/2018 79  25 - 100 U/L Final   • Total Bilirubin 02/11/2018 1.1  0.3 - 1.2 mg/dL Final   • eGFR Non African Amer 02/11/2018 117  >60 mL/min/1.73 Final   • Globulin 02/11/2018 3.1  gm/dL Final   • A/G Ratio 02/11/2018 1.4* 1.5 - 2.5 g/dL Final   • BUN/Creatinine Ratio 02/11/2018 18.6  7.0 - 25.0 Final   • Anion Gap 02/11/2018 5.0  3.0 - 11.0 mmol/L Final   • WBC 02/11/2018 4.51  3.50 - 10.80 10*3/mm3 Final   • RBC 02/11/2018 4.38  4.20 - 5.76 10*6/mm3 Final   • Hemoglobin 02/11/2018 11.3* 13.1 - 17.5 g/dL Final   • Hematocrit 02/11/2018 35.5* 38.9 - 50.9 % Final   • MCV 02/11/2018 81.1  80.0 - 99.0 fL Final   • MCH 02/11/2018 25.8* 27.0 - 31.0 pg Final   • MCHC 02/11/2018 31.8* 32.0 - 36.0 g/dL Final   • RDW 02/11/2018 24.7* 11.3 - 14.5 % Final   • RDW-SD 02/11/2018 73.5* 37.0 - 54.0 fl Final   • MPV 02/11/2018 9.7  6.0 - 12.0 fL Final   • Platelets 02/11/2018 138* 150 - 450 10*3/mm3 Final   • Neutrophil % 02/11/2018 42.8  41.0 - 71.0 % Final   • Lymphocyte % 02/11/2018 34.1  24.0 - 44.0 % Final   • Monocyte % 02/11/2018 18.2* 0.0 - 12.0 % Final   • Eosinophil % 02/11/2018 4.7* 0.0 - 3.0 % Final   • Basophil % 02/11/2018 0.2  0.0 - 1.0 % Final   • Immature Grans % 02/11/2018 0.0  0.0 - 0.6 % Final   • Neutrophils, Absolute 02/11/2018 1.93  1.50 - 8.30 10*3/mm3 Final   • Lymphocytes, Absolute 02/11/2018 1.54  0.60 - 4.80 10*3/mm3 Final   • Monocytes, Absolute 02/11/2018 0.82  0.00 - 1.00 10*3/mm3 Final   • Eosinophils, Absolute 02/11/2018 0.21  0.00 - 0.30 10*3/mm3 Final   • Basophils, Absolute 02/11/2018 0.01  0.00 - 0.20 10*3/mm3 Final   • Immature Grans, Absolute 02/11/2018 0.00  0.00 - 0.03 10*3/mm3 Final        No results found.    ASSESSMENT: The patient is a very pleasant 56 y.o. male with stage IV colon cancer    PROBLEM LIST:  1. Currently stage IV colon cancer  with lung and bony metastases.  K-edwige mutant, microsatellite stable, and intermediate mutational load.  2. Status post low anterior resection done by Dr. Young 2007, [id number]  3.  Attempted adjuvant chemotherapy for 1 cycle could not tolerate secondary to multiple toxicities.  4. Biopsy-proven L2 metastases done October 11, 2016. Status post CyberKnife radiation treatment.  5.  Started Keytruda 200 mg IV every 3 weeks on February 1, 2017, status post 10 cycles of treatment  6. Disease progression documented on PET scan completed 8/31/2017.   7. Treatment changed to Xeloda/Avastin/Oxaliplatin on 9/19/2017, status post 7 cycles.   8. Status post tumor debulking at L2 done by Dr. Frazier at [facility] on April 21, 2017  9.  Treatment induced hepatitis, resolved  10.  Cancer related pain  11.  Opioid-induced constipation  12.  Treatment induced asthenia  13.  Mild depression  14.  Insomnia  15.  Chemotherapy-induced anemia    PLAN:  1. We will proceed with treatment today as planned with Oxaliplatin/Avastin cycle #8.   2. This is being given with Xeloda 1000 mg twice per day, 2 weeks on, 1 week off.   3. We will monitor the patient's labs throughout treatment including blood counts, kidney function, CEA, thyroid function, and liver functions. We will monitor his liver enzymes that have been elevated secondary to treatment with immune-therapy.  I explained to the patient that his CEA is nicely and gradually going down.  4.  The patient will follow-up with me in 3 weeks for next cycle of treatment.  5.  Patient will continue taking over-the-counter stool softeners for constipation.  6.  I again reviewed with the patient the potential side effects from immune therapy including immune mediated reactions with pneumonitis, hepatitis, colitis, thyroiditis, and potential death.   7.  Patient tissue was submitted for the matched trial.  He did match on PETN mutation, however that arm was closed.  8.  I advised the  patient to continue to hold his alternative treatment with high dose vitamin C while he is on immunetherapy.  9. The patient will have repeat imaging done after cycle # 8 of treatment.   10. The patient will continue Zofran as needed for treatment related nausea.  11. The patient will continue oxycodone 10 mg as needed for cancer-related pain.  We'll continue Celebrex 200 mg twice a day as needed.    12.  We offered Ritalin for treatment related fatigue, but the patient has declined at this time.   13.  Patient will continue Zaleplon as needed for insomnia.  14.  I will transfuse 2 units of blood if his hemoglobin drops below 8.  15.  I will switch the patient to maintenance treatment if his next scan remains stable.  Kristofer Rodriguez MD  2/12/2018   8:40 AM

## 2018-03-04 ENCOUNTER — LAB (OUTPATIENT)
Dept: LAB | Facility: HOSPITAL | Age: 58
End: 2018-03-04

## 2018-03-04 DIAGNOSIS — C18.7 MALIGNANT NEOPLASM OF SIGMOID COLON (HCC): ICD-10-CM

## 2018-03-04 DIAGNOSIS — C79.51 BONE METASTASIS: ICD-10-CM

## 2018-03-04 LAB
ALBUMIN SERPL-MCNC: 4.6 G/DL (ref 3.2–4.8)
ALBUMIN/GLOB SERPL: 1.5 G/DL (ref 1.5–2.5)
ALP SERPL-CCNC: 81 U/L (ref 25–100)
ALT SERPL W P-5'-P-CCNC: 14 U/L (ref 7–40)
ANION GAP SERPL CALCULATED.3IONS-SCNC: 5 MMOL/L (ref 3–11)
AST SERPL-CCNC: 27 U/L (ref 0–33)
BASOPHILS # BLD AUTO: 0.01 10*3/MM3 (ref 0–0.2)
BASOPHILS NFR BLD AUTO: 0.2 % (ref 0–1)
BILIRUB SERPL-MCNC: 0.6 MG/DL (ref 0.3–1.2)
BILIRUB UR QL STRIP: NEGATIVE
BUN BLD-MCNC: 16 MG/DL (ref 9–23)
BUN/CREAT SERPL: 20 (ref 7–25)
CALCIUM SPEC-SCNC: 9.3 MG/DL (ref 8.7–10.4)
CEA SERPL-MCNC: 4 NG/ML (ref 0–2.5)
CHLORIDE SERPL-SCNC: 104 MMOL/L (ref 99–109)
CLARITY UR: CLEAR
CO2 SERPL-SCNC: 30 MMOL/L (ref 20–31)
COLOR UR: YELLOW
CREAT BLD-MCNC: 0.8 MG/DL (ref 0.6–1.3)
DEPRECATED RDW RBC AUTO: 75.7 FL (ref 37–54)
EOSINOPHIL # BLD AUTO: 0.31 10*3/MM3 (ref 0–0.3)
EOSINOPHIL NFR BLD AUTO: 7.7 % (ref 0–3)
ERYTHROCYTE [DISTWIDTH] IN BLOOD BY AUTOMATED COUNT: 25.3 % (ref 11.3–14.5)
GFR SERPL CREATININE-BSD FRML MDRD: 100 ML/MIN/1.73
GLOBULIN UR ELPH-MCNC: 3 GM/DL
GLUCOSE BLD-MCNC: 122 MG/DL (ref 70–100)
GLUCOSE UR STRIP-MCNC: NEGATIVE MG/DL
HCT VFR BLD AUTO: 35.9 % (ref 38.9–50.9)
HGB BLD-MCNC: 11.5 G/DL (ref 13.1–17.5)
HGB UR QL STRIP.AUTO: NEGATIVE
IMM GRANULOCYTES # BLD: 0.01 10*3/MM3 (ref 0–0.03)
IMM GRANULOCYTES NFR BLD: 0.2 % (ref 0–0.6)
KETONES UR QL STRIP: NEGATIVE
LEUKOCYTE ESTERASE UR QL STRIP.AUTO: NEGATIVE
LYMPHOCYTES # BLD AUTO: 1.47 10*3/MM3 (ref 0.6–4.8)
LYMPHOCYTES NFR BLD AUTO: 36.4 % (ref 24–44)
MCH RBC QN AUTO: 26.2 PG (ref 27–31)
MCHC RBC AUTO-ENTMCNC: 32 G/DL (ref 32–36)
MCV RBC AUTO: 81.8 FL (ref 80–99)
MONOCYTES # BLD AUTO: 0.57 10*3/MM3 (ref 0–1)
MONOCYTES NFR BLD AUTO: 14.1 % (ref 0–12)
NEUTROPHILS # BLD AUTO: 1.67 10*3/MM3 (ref 1.5–8.3)
NEUTROPHILS NFR BLD AUTO: 41.4 % (ref 41–71)
NITRITE UR QL STRIP: NEGATIVE
PH UR STRIP.AUTO: 7 [PH] (ref 5–8)
PLATELET # BLD AUTO: 138 10*3/MM3 (ref 150–450)
PMV BLD AUTO: 9.3 FL (ref 6–12)
POTASSIUM BLD-SCNC: 4.4 MMOL/L (ref 3.5–5.5)
PROT SERPL-MCNC: 7.6 G/DL (ref 5.7–8.2)
PROT UR QL STRIP: NEGATIVE
RBC # BLD AUTO: 4.39 10*6/MM3 (ref 4.2–5.76)
SODIUM BLD-SCNC: 139 MMOL/L (ref 132–146)
SP GR UR STRIP: <=1.005 (ref 1–1.03)
UROBILINOGEN UR QL STRIP: NORMAL
WBC NRBC COR # BLD: 4.04 10*3/MM3 (ref 3.5–10.8)

## 2018-03-04 PROCEDURE — 80053 COMPREHEN METABOLIC PANEL: CPT

## 2018-03-04 PROCEDURE — 36415 COLL VENOUS BLD VENIPUNCTURE: CPT

## 2018-03-04 PROCEDURE — 85025 COMPLETE CBC W/AUTO DIFF WBC: CPT

## 2018-03-04 PROCEDURE — 81003 URINALYSIS AUTO W/O SCOPE: CPT

## 2018-03-04 PROCEDURE — 82378 CARCINOEMBRYONIC ANTIGEN: CPT

## 2018-03-06 ENCOUNTER — OFFICE VISIT (OUTPATIENT)
Dept: ONCOLOGY | Facility: CLINIC | Age: 58
End: 2018-03-06

## 2018-03-06 ENCOUNTER — APPOINTMENT (OUTPATIENT)
Dept: ONCOLOGY | Facility: HOSPITAL | Age: 58
End: 2018-03-06

## 2018-03-06 VITALS
BODY MASS INDEX: 23.3 KG/M2 | HEART RATE: 70 BPM | DIASTOLIC BLOOD PRESSURE: 72 MMHG | HEIGHT: 66 IN | SYSTOLIC BLOOD PRESSURE: 133 MMHG | WEIGHT: 145 LBS | RESPIRATION RATE: 14 BRPM | TEMPERATURE: 97.2 F

## 2018-03-06 DIAGNOSIS — C18.7 MALIGNANT NEOPLASM OF SIGMOID COLON (HCC): Primary | ICD-10-CM

## 2018-03-06 DIAGNOSIS — C79.51 BONE METASTASIS: ICD-10-CM

## 2018-03-06 PROCEDURE — 99215 OFFICE O/P EST HI 40 MIN: CPT | Performed by: INTERNAL MEDICINE

## 2018-03-06 RX ORDER — SODIUM CHLORIDE 9 MG/ML
250 INJECTION, SOLUTION INTRAVENOUS ONCE
Status: CANCELLED | OUTPATIENT
Start: 2018-04-15

## 2018-03-06 NOTE — PROGRESS NOTES
DATE OF VISIT: 3/6/2018    REASON FOR VISIT: Followup for metastatic colon cancer.      HISTORY OF PRESENT ILLNESS: The patient is a very pleasant 58 y.o. male  with past medical history significant for metastatic colon cancer diagnosed March 2007 . He is status post lower anterior resection as well as 1 cycle FOLFOX, stopped due to multiple toxicities. Final pathology showed poorly differentiated adenocarcinoma with 25 negative lymph nodes and clear surgical margins. Isolated tumor deposits was found in the pericolonic fat. Pathologic stage T2 N1c M0 stage IIIA disease.The patient has been followed by serial colonoscopies as well as CAT scans that did document pulmonary metastatic disease with left lower lobe nodule that is gradually increasing in size. Patient was doing fairly well until recently, 3 months ago, patient presented with low back pain. Patient had restaging workup that revealed hypermetabolic L2 vertebral body lesion. Patient had biopsy done on October 11, 2016 that revealed metastatic adenocarcinoma consistent of colon primary with CK 7 negative CK 20 positive CDX2 positive. Patient had next generation gene sequencing that revealed intermediate mutational load with microsatellite stability.  He started chemotherapy with Keytruda on 2/1/2017. The patient was changed to CapeOx with Avastin on 9/19/2017.  He completed 8 cycles on February 12, 2018.  He was started on Avastin with Xeloda maintenance.  He is here today for scheduled follow up visit with treatment.     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  He still doing very well.  He is staying active.  He is scheduled for right hernia repair in few weeks.  He has mild back pain.    PAST MEDICAL HISTORY/SOCIAL HISTORY/FAMILY HISTORY: Unchanged from my prior documentation done on 01/17/2017.     Review of Systems   Constitutional: Negative for activity change, appetite change, chills, fatigue, fever and  unexpected weight change.   HENT: Negative for hearing loss, mouth sores, nosebleeds, sore throat and trouble swallowing.    Eyes: Negative for visual disturbance.   Respiratory: Negative for cough, chest tightness, shortness of breath and wheezing.    Cardiovascular: Negative for chest pain, palpitations and leg swelling.   Gastrointestinal: Negative for abdominal distention, abdominal pain, blood in stool, constipation, diarrhea, nausea, rectal pain and vomiting.   Endocrine: Negative for cold intolerance and heat intolerance.   Genitourinary: Negative for difficulty urinating, dysuria, frequency and urgency.   Musculoskeletal: Positive for back pain. Negative for arthralgias, gait problem, joint swelling and myalgias.   Skin: Negative for rash.   Neurological: Negative for dizziness, tremors, syncope, weakness, light-headedness, numbness and headaches.        Sleep disturbance   Hematological: Negative for adenopathy. Does not bruise/bleed easily.   Psychiatric/Behavioral: Negative for confusion, sleep disturbance and suicidal ideas. The patient is not nervous/anxious.          Current Outpatient Prescriptions:   •  capecitabine (XELODA) 500 MG chemo tablet, Take 2 tablets by mouth in the morning and 2 tablets by mouth in the evening for 7 days on, then off for 7 days., Disp: 56 tablet, Rfl: 3  •  celecoxib (CELEBREX) 200 MG capsule, Take 200 mg by mouth Daily As Needed for Mild Pain (1-3)., Disp: , Rfl:   •  Cholecalciferol (VITAMIN D3) 5000 UNITS capsule capsule, Take 5,000 Units by mouth Daily., Disp: , Rfl:   •  lactulose (CHRONULAC) 10 GM/15ML solution, Take 30 mL by mouth 3 (Three) Times a Day. PRN constipation, Disp: 240 mL, Rfl: 2  •  lidocaine-prilocaine (EMLA) 2.5-2.5 % cream, Apply  topically As Needed (45-60 minutes prior to port access.  Cover with saran/plastic wrap.)., Disp: 30 g, Rfl: 3  •  magnesium hydroxide (MILK OF MAGNESIA) 2400 MG/10ML suspension suspension, Take 10 mL by mouth Daily., Disp:  , Rfl:   •  ondansetron (ZOFRAN) 8 MG tablet, Take 1 tablet by mouth 3 (Three) Times a Day As Needed for Nausea or Vomiting., Disp: 30 tablet, Rfl: 5  •  zaleplon (SONATA) 10 MG capsule, Take 10 mg by mouth At Night As Needed., Disp: , Rfl:     PHYSICAL EXAMINATION:   There were no vitals taken for this visit.   ECOG Performance Status: 1 - Symptomatic but completely ambulatory  General Appearance:  alert, cooperative, no apparent distress and appears stated age   Neurologic/Psychiatric: A&O x 3, gait steady, appropriate affect, strength 5/5 in all muscle groups   HEENT:  Normocephalic, without obvious abnormality, mucous membranes moist   Neck: Supple, symmetrical, trachea midline, no adenopathy;  No thyromegaly, masses, or tenderness   Lungs:   Clear to auscultation bilaterally; respirations regular, even, and unlabored bilaterally   Heart:  Regular rate and rhythm, no murmurs appreciated   Abdomen:   Soft, non-tender, non-distended and no organomegaly   Lymph nodes: No cervical, supraclavicular, inguinal or axillary adenopathy noted   Extremities: Normal, atraumatic; no clubbing, cyanosis, or edema    Skin: No rashes, ulcers, or suspicious lesions noted     Lab on 03/04/2018   Component Date Value Ref Range Status   • CEA 03/04/2018 4.00* 0.00 - 2.50 ng/mL Final   • Glucose 03/04/2018 122* 70 - 100 mg/dL Final   • BUN 03/04/2018 16  9 - 23 mg/dL Final   • Creatinine 03/04/2018 0.80  0.60 - 1.30 mg/dL Final   • Sodium 03/04/2018 139  132 - 146 mmol/L Final   • Potassium 03/04/2018 4.4  3.5 - 5.5 mmol/L Final   • Chloride 03/04/2018 104  99 - 109 mmol/L Final   • CO2 03/04/2018 30.0  20.0 - 31.0 mmol/L Final   • Calcium 03/04/2018 9.3  8.7 - 10.4 mg/dL Final   • Total Protein 03/04/2018 7.6  5.7 - 8.2 g/dL Final   • Albumin 03/04/2018 4.60  3.20 - 4.80 g/dL Final   • ALT (SGPT) 03/04/2018 14  7 - 40 U/L Final   • AST (SGOT) 03/04/2018 27  0 - 33 U/L Final   • Alkaline Phosphatase 03/04/2018 81  25 - 100 U/L Final    • Total Bilirubin 03/04/2018 0.6  0.3 - 1.2 mg/dL Final   • eGFR Non African Amer 03/04/2018 100  >60 mL/min/1.73 Final   • Globulin 03/04/2018 3.0  gm/dL Final   • A/G Ratio 03/04/2018 1.5  1.5 - 2.5 g/dL Final   • BUN/Creatinine Ratio 03/04/2018 20.0  7.0 - 25.0 Final   • Anion Gap 03/04/2018 5.0  3.0 - 11.0 mmol/L Final   • Color, UA 03/04/2018 Yellow  Yellow, Straw Final   • Appearance, UA 03/04/2018 Clear  Clear Final   • pH, UA 03/04/2018 7.0  5.0 - 8.0 Final   • Specific Gravity, UA 03/04/2018 <=1.005  1.001 - 1.030 Final   • Glucose, UA 03/04/2018 Negative  Negative Final   • Ketones, UA 03/04/2018 Negative  Negative Final   • Bilirubin, UA 03/04/2018 Negative  Negative Final   • Blood, UA 03/04/2018 Negative  Negative Final   • Protein, UA 03/04/2018 Negative  Negative Final   • Leuk Esterase, UA 03/04/2018 Negative  Negative Final   • Nitrite, UA 03/04/2018 Negative  Negative Final   • Urobilinogen, UA 03/04/2018 0.2 E.U./dL  0.2 - 1.0 E.U./dL Final   • WBC 03/04/2018 4.04  3.50 - 10.80 10*3/mm3 Final   • RBC 03/04/2018 4.39  4.20 - 5.76 10*6/mm3 Final   • Hemoglobin 03/04/2018 11.5* 13.1 - 17.5 g/dL Final   • Hematocrit 03/04/2018 35.9* 38.9 - 50.9 % Final   • MCV 03/04/2018 81.8  80.0 - 99.0 fL Final   • MCH 03/04/2018 26.2* 27.0 - 31.0 pg Final   • MCHC 03/04/2018 32.0  32.0 - 36.0 g/dL Final   • RDW 03/04/2018 25.3* 11.3 - 14.5 % Final   • RDW-SD 03/04/2018 75.7* 37.0 - 54.0 fl Final   • MPV 03/04/2018 9.3  6.0 - 12.0 fL Final   • Platelets 03/04/2018 138* 150 - 450 10*3/mm3 Final   • Neutrophil % 03/04/2018 41.4  41.0 - 71.0 % Final   • Lymphocyte % 03/04/2018 36.4  24.0 - 44.0 % Final   • Monocyte % 03/04/2018 14.1* 0.0 - 12.0 % Final   • Eosinophil % 03/04/2018 7.7* 0.0 - 3.0 % Final   • Basophil % 03/04/2018 0.2  0.0 - 1.0 % Final   • Immature Grans % 03/04/2018 0.2  0.0 - 0.6 % Final   • Neutrophils, Absolute 03/04/2018 1.67  1.50 - 8.30 10*3/mm3 Final   • Lymphocytes, Absolute 03/04/2018 1.47   0.60 - 4.80 10*3/mm3 Final   • Monocytes, Absolute 03/04/2018 0.57  0.00 - 1.00 10*3/mm3 Final   • Eosinophils, Absolute 03/04/2018 0.31* 0.00 - 0.30 10*3/mm3 Final   • Basophils, Absolute 03/04/2018 0.01  0.00 - 0.20 10*3/mm3 Final   • Immature Grans, Absolute 03/04/2018 0.01  0.00 - 0.03 10*3/mm3 Final        No results found.    ASSESSMENT: The patient is a very pleasant 56 y.o. male with stage IV colon cancer    PROBLEM LIST:  1. Currently stage IV colon cancer with lung and bony metastases.  K-edwige mutant, microsatellite stable, and intermediate mutational load.  2. Status post low anterior resection done by Dr. Young 2007, [id number]  3.  Attempted adjuvant chemotherapy for 1 cycle could not tolerate secondary to multiple toxicities.  4. Biopsy-proven L2 metastases done October 11, 2016. Status post CyberKnife radiation treatment.  5.  Started Keytruda 200 mg IV every 3 weeks on February 1, 2017, status post 10 cycles of treatment  6. Disease progression documented on PET scan completed 8/31/2017.   7. Treatment changed to Xeloda/Avastin/Oxaliplatin on 9/19/2017, status post 7 cycles.   8. Status post tumor debulking at L2 done by Dr. Frazier at [facility] on April 21, 2017  9.  The treatment was switched to Xeloda with Avastin maintenance treatment March 6, 2018  10.  Cancer related pain  11.  Opioid-induced constipation  12.  Treatment induced asthenia  13.  Mild depression  14.  Insomnia  15.  Chemotherapy-induced anemia    PLAN:  1.  I will hold Avastin today secondary to elective surgery in the next few weeks.   2.  I will continue Xeloda 1000 mg twice per day, 2 weeks on, 1 week off.   3. We will monitor the patient's labs throughout treatment including blood counts, kidney function, CEA, thyroid function, and liver functions. We will monitor his liver enzymes that have been elevated secondary to treatment with immune-therapy.  I explained to the patient that his CEA is nicely and gradually going  down.  4.  The patient will follow-up with me in 6 weeks for next cycle of treatment, we'll resume Avastin at that point since it will be 4 weeks out from his surgery.  5.  Patient will continue taking over-the-counter stool softeners for constipation.  6.  I again reviewed with the patient the potential side effects from immune therapy including dermatitis, hepatitis, perforation, bleeding, thrombosis, and potential death.  7.  Patient tissue was submitted for the matched trial.  He did match on PETN mutation, however that arm was closed.  8.  I advised the patient to continue to hold his alternative treatment with high dose vitamin C while he is on immunetherapy.  9. The patient will have repeat imaging when he comes back from vacation mid-June.  Meanwhile will continue to monitor his CEA level which has been correlating very well with his disease activity.   10. The patient will continue Zofran as needed for treatment related nausea.  11. The patient will continue oxycodone 10 mg as needed for cancer-related pain.  We'll continue Celebrex 200 mg twice a day as needed.    12.  We offered Ritalin for treatment related fatigue, but the patient has declined at this time.   13.  Patient will continue Zaleplon as needed for insomnia.  14.  I will transfuse 2 units of blood if his hemoglobin drops below 8.  His anemia is mild and stable at this point.       Kristofer Rodriguez MD  3/6/2018   9:23 AM

## 2018-04-15 ENCOUNTER — LAB (OUTPATIENT)
Dept: LAB | Facility: HOSPITAL | Age: 58
End: 2018-04-15

## 2018-04-15 DIAGNOSIS — C79.51 BONE METASTASIS: ICD-10-CM

## 2018-04-15 DIAGNOSIS — C18.7 MALIGNANT NEOPLASM OF SIGMOID COLON (HCC): ICD-10-CM

## 2018-04-15 LAB
ALBUMIN SERPL-MCNC: 4.5 G/DL (ref 3.2–4.8)
ALBUMIN/GLOB SERPL: 1.6 G/DL (ref 1.5–2.5)
ALP SERPL-CCNC: 77 U/L (ref 25–100)
ALT SERPL W P-5'-P-CCNC: 15 U/L (ref 7–40)
ANION GAP SERPL CALCULATED.3IONS-SCNC: 5 MMOL/L (ref 3–11)
AST SERPL-CCNC: 25 U/L (ref 0–33)
BASOPHILS # BLD AUTO: 0.01 10*3/MM3 (ref 0–0.2)
BASOPHILS NFR BLD AUTO: 0.2 % (ref 0–1)
BILIRUB SERPL-MCNC: 0.9 MG/DL (ref 0.3–1.2)
BILIRUB UR QL STRIP: NEGATIVE
BUN BLD-MCNC: 8 MG/DL (ref 9–23)
BUN/CREAT SERPL: 10 (ref 7–25)
CALCIUM SPEC-SCNC: 9.3 MG/DL (ref 8.7–10.4)
CEA SERPL-MCNC: 3.4 NG/ML (ref 0–2.5)
CHLORIDE SERPL-SCNC: 103 MMOL/L (ref 99–109)
CLARITY UR: CLEAR
CO2 SERPL-SCNC: 30 MMOL/L (ref 20–31)
COLOR UR: YELLOW
CREAT BLD-MCNC: 0.8 MG/DL (ref 0.6–1.3)
DEPRECATED RDW RBC AUTO: 71.6 FL (ref 37–54)
EOSINOPHIL # BLD AUTO: 0.47 10*3/MM3 (ref 0–0.3)
EOSINOPHIL NFR BLD AUTO: 9.8 % (ref 0–3)
ERYTHROCYTE [DISTWIDTH] IN BLOOD BY AUTOMATED COUNT: 23.9 % (ref 11.3–14.5)
GFR SERPL CREATININE-BSD FRML MDRD: 100 ML/MIN/1.73
GLOBULIN UR ELPH-MCNC: 2.9 GM/DL
GLUCOSE BLD-MCNC: 92 MG/DL (ref 70–100)
GLUCOSE UR STRIP-MCNC: NEGATIVE MG/DL
HCT VFR BLD AUTO: 37.4 % (ref 38.9–50.9)
HGB BLD-MCNC: 11.8 G/DL (ref 13.1–17.5)
HGB UR QL STRIP.AUTO: NEGATIVE
IMM GRANULOCYTES # BLD: 0 10*3/MM3 (ref 0–0.03)
IMM GRANULOCYTES NFR BLD: 0 % (ref 0–0.6)
KETONES UR QL STRIP: NEGATIVE
LEUKOCYTE ESTERASE UR QL STRIP.AUTO: NEGATIVE
LYMPHOCYTES # BLD AUTO: 1.61 10*3/MM3 (ref 0.6–4.8)
LYMPHOCYTES NFR BLD AUTO: 33.7 % (ref 24–44)
MCH RBC QN AUTO: 25.8 PG (ref 27–31)
MCHC RBC AUTO-ENTMCNC: 31.6 G/DL (ref 32–36)
MCV RBC AUTO: 81.8 FL (ref 80–99)
MONOCYTES # BLD AUTO: 0.71 10*3/MM3 (ref 0–1)
MONOCYTES NFR BLD AUTO: 14.9 % (ref 0–12)
NEUTROPHILS # BLD AUTO: 1.98 10*3/MM3 (ref 1.5–8.3)
NEUTROPHILS NFR BLD AUTO: 41.4 % (ref 41–71)
NITRITE UR QL STRIP: NEGATIVE
PH UR STRIP.AUTO: 7 [PH] (ref 5–8)
PLATELET # BLD AUTO: 156 10*3/MM3 (ref 150–450)
PMV BLD AUTO: 9.7 FL (ref 6–12)
POTASSIUM BLD-SCNC: 4.9 MMOL/L (ref 3.5–5.5)
PROT SERPL-MCNC: 7.4 G/DL (ref 5.7–8.2)
PROT UR QL STRIP: NEGATIVE
RBC # BLD AUTO: 4.57 10*6/MM3 (ref 4.2–5.76)
SODIUM BLD-SCNC: 138 MMOL/L (ref 132–146)
SP GR UR STRIP: <=1.005 (ref 1–1.03)
UROBILINOGEN UR QL STRIP: NORMAL
WBC NRBC COR # BLD: 4.78 10*3/MM3 (ref 3.5–10.8)

## 2018-04-15 PROCEDURE — 85025 COMPLETE CBC W/AUTO DIFF WBC: CPT

## 2018-04-15 PROCEDURE — 80053 COMPREHEN METABOLIC PANEL: CPT

## 2018-04-15 PROCEDURE — 82378 CARCINOEMBRYONIC ANTIGEN: CPT

## 2018-04-15 PROCEDURE — 36415 COLL VENOUS BLD VENIPUNCTURE: CPT

## 2018-04-15 PROCEDURE — 81003 URINALYSIS AUTO W/O SCOPE: CPT

## 2018-04-15 RX ORDER — SODIUM CHLORIDE 9 MG/ML
250 INJECTION, SOLUTION INTRAVENOUS ONCE
Status: CANCELLED | OUTPATIENT
Start: 2018-06-12

## 2018-04-17 ENCOUNTER — INFUSION (OUTPATIENT)
Dept: ONCOLOGY | Facility: HOSPITAL | Age: 58
End: 2018-04-17

## 2018-04-17 ENCOUNTER — OFFICE VISIT (OUTPATIENT)
Dept: ONCOLOGY | Facility: CLINIC | Age: 58
End: 2018-04-17

## 2018-04-17 VITALS
DIASTOLIC BLOOD PRESSURE: 71 MMHG | OXYGEN SATURATION: 100 % | HEART RATE: 75 BPM | BODY MASS INDEX: 23.67 KG/M2 | RESPIRATION RATE: 14 BRPM | HEIGHT: 66 IN | TEMPERATURE: 97.8 F | WEIGHT: 147.3 LBS | SYSTOLIC BLOOD PRESSURE: 127 MMHG

## 2018-04-17 DIAGNOSIS — C79.51 BONE METASTASIS: ICD-10-CM

## 2018-04-17 DIAGNOSIS — C18.7 MALIGNANT NEOPLASM OF SIGMOID COLON (HCC): Primary | ICD-10-CM

## 2018-04-17 PROCEDURE — 99215 OFFICE O/P EST HI 40 MIN: CPT | Performed by: INTERNAL MEDICINE

## 2018-04-17 PROCEDURE — 25010000002 BEVACIZUMAB PER 10 MG: Performed by: NURSE PRACTITIONER

## 2018-04-17 PROCEDURE — 96413 CHEMO IV INFUSION 1 HR: CPT

## 2018-04-17 PROCEDURE — 25010000002 HEPARIN FLUSH (PORCINE) 100 UNIT/ML SOLUTION: Performed by: INTERNAL MEDICINE

## 2018-04-17 RX ORDER — SODIUM CHLORIDE 0.9 % (FLUSH) 0.9 %
10 SYRINGE (ML) INJECTION AS NEEDED
Status: CANCELLED | OUTPATIENT
Start: 2018-04-17

## 2018-04-17 RX ADMIN — BEVACIZUMAB 490 MG: 400 INJECTION, SOLUTION INTRAVENOUS at 09:53

## 2018-04-17 RX ADMIN — HEPARIN 500 UNITS: 100 SYRINGE at 10:30

## 2018-04-17 NOTE — PROGRESS NOTES
DATE OF VISIT: 4/17/2018    REASON FOR VISIT: Followup for metastatic colon cancer.      HISTORY OF PRESENT ILLNESS: The patient is a very pleasant 58 y.o. male  with past medical history significant for metastatic colon cancer diagnosed March 2007 . He is status post lower anterior resection as well as 1 cycle FOLFOX, stopped due to multiple toxicities. Final pathology showed poorly differentiated adenocarcinoma with 25 negative lymph nodes and clear surgical margins. Isolated tumor deposits was found in the pericolonic fat. Pathologic stage T2 N1c M0 stage IIIA disease.The patient has been followed by serial colonoscopies as well as CAT scans that did document pulmonary metastatic disease with left lower lobe nodule that is gradually increasing in size. Patient was doing fairly well until recently, 3 months ago, patient presented with low back pain. Patient had restaging workup that revealed hypermetabolic L2 vertebral body lesion. Patient had biopsy done on October 11, 2016 that revealed metastatic adenocarcinoma consistent of colon primary with CK 7 negative CK 20 positive CDX2 positive. Patient had next generation gene sequencing that revealed intermediate mutational load with microsatellite stability.  He started chemotherapy with Keytruda on 2/1/2017. The patient was changed to CapeOx with Avastin on 9/19/2017.  He completed 8 cycles on February 12, 2018.  He was started on Avastin with Xeloda maintenance.  He is here today for scheduled follow up visit with treatment.     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  He is recovering well from his hernia repair, he is 4 weeks after from surgery.  He has been taken Xeloda however Avastin has been on hold.    PAST MEDICAL HISTORY/SOCIAL HISTORY/FAMILY HISTORY: Unchanged from my prior documentation done on 01/17/2017.     Review of Systems   Constitutional: Negative for activity change, appetite change, chills, fatigue, fever  and unexpected weight change.   HENT: Negative for hearing loss, mouth sores, nosebleeds, sore throat and trouble swallowing.    Eyes: Negative for visual disturbance.   Respiratory: Negative for cough, chest tightness, shortness of breath and wheezing.    Cardiovascular: Negative for chest pain, palpitations and leg swelling.   Gastrointestinal: Negative for abdominal distention, abdominal pain, blood in stool, constipation, diarrhea, nausea, rectal pain and vomiting.   Endocrine: Negative for cold intolerance and heat intolerance.   Genitourinary: Negative for difficulty urinating, dysuria, frequency and urgency.   Musculoskeletal: Positive for back pain. Negative for arthralgias, gait problem, joint swelling and myalgias.   Skin: Negative for rash.   Neurological: Negative for dizziness, tremors, syncope, weakness, light-headedness, numbness and headaches.        Sleep disturbance   Hematological: Negative for adenopathy. Does not bruise/bleed easily.   Psychiatric/Behavioral: Negative for confusion, sleep disturbance and suicidal ideas. The patient is not nervous/anxious.          Current Outpatient Prescriptions:   •  capecitabine (XELODA) 500 MG chemo tablet, Take 2 tablets by mouth in the morning and 2 tablets by mouth in the evening for 7 days on, then off for 7 days., Disp: 56 tablet, Rfl: 3  •  Cholecalciferol (VITAMIN D3) 5000 UNITS capsule capsule, Take 5,000 Units by mouth Daily., Disp: , Rfl:   •  lidocaine-prilocaine (EMLA) 2.5-2.5 % cream, Apply  topically As Needed (45-60 minutes prior to port access.  Cover with saran/plastic wrap.)., Disp: 30 g, Rfl: 3  •  celecoxib (CELEBREX) 200 MG capsule, Take 200 mg by mouth Daily As Needed for Mild Pain (1-3)., Disp: , Rfl:   •  lactulose (CHRONULAC) 10 GM/15ML solution, Take 30 mL by mouth 3 (Three) Times a Day. PRN constipation, Disp: 240 mL, Rfl: 2  •  magnesium hydroxide (MILK OF MAGNESIA) 2400 MG/10ML suspension suspension, Take 10 mL by mouth Daily.,  "Disp: , Rfl:   •  ondansetron (ZOFRAN) 8 MG tablet, Take 1 tablet by mouth 3 (Three) Times a Day As Needed for Nausea or Vomiting., Disp: 30 tablet, Rfl: 5  •  zaleplon (SONATA) 10 MG capsule, Take 10 mg by mouth At Night As Needed., Disp: , Rfl:     PHYSICAL EXAMINATION:   /71   Pulse 75   Temp 97.8 °F (36.6 °C) (Temporal Artery )   Resp 14   Ht 167.6 cm (65.98\")   Wt 66.8 kg (147 lb 4.8 oz)   SpO2 100%   BMI 23.79 kg/m²    ECOG Performance Status: 1 - Symptomatic but completely ambulatory  General Appearance:  alert, cooperative, no apparent distress and appears stated age   Neurologic/Psychiatric: A&O x 3, gait steady, appropriate affect, strength 5/5 in all muscle groups   HEENT:  Normocephalic, without obvious abnormality, mucous membranes moist   Neck: Supple, symmetrical, trachea midline, no adenopathy;  No thyromegaly, masses, or tenderness   Lungs:   Clear to auscultation bilaterally; respirations regular, even, and unlabored bilaterally   Heart:  Regular rate and rhythm, no murmurs appreciated   Abdomen:   Soft, non-tender, non-distended and no organomegaly   Lymph nodes: No cervical, supraclavicular, inguinal or axillary adenopathy noted   Extremities: Normal, atraumatic; no clubbing, cyanosis, or edema    Skin: No rashes, ulcers, or suspicious lesions noted     Lab on 04/15/2018   Component Date Value Ref Range Status   • CEA 04/15/2018 3.40* 0.00 - 2.50 ng/mL Final   • Glucose 04/15/2018 92  70 - 100 mg/dL Final   • BUN 04/15/2018 8* 9 - 23 mg/dL Final   • Creatinine 04/15/2018 0.80  0.60 - 1.30 mg/dL Final   • Sodium 04/15/2018 138  132 - 146 mmol/L Final   • Potassium 04/15/2018 4.9  3.5 - 5.5 mmol/L Final   • Chloride 04/15/2018 103  99 - 109 mmol/L Final   • CO2 04/15/2018 30.0  20.0 - 31.0 mmol/L Final   • Calcium 04/15/2018 9.3  8.7 - 10.4 mg/dL Final   • Total Protein 04/15/2018 7.4  5.7 - 8.2 g/dL Final   • Albumin 04/15/2018 4.50  3.20 - 4.80 g/dL Final   • ALT (SGPT) 04/15/2018 15 "  7 - 40 U/L Final   • AST (SGOT) 04/15/2018 25  0 - 33 U/L Final   • Alkaline Phosphatase 04/15/2018 77  25 - 100 U/L Final   • Total Bilirubin 04/15/2018 0.9  0.3 - 1.2 mg/dL Final   • eGFR Non African Amer 04/15/2018 100  >60 mL/min/1.73 Final   • Globulin 04/15/2018 2.9  gm/dL Final   • A/G Ratio 04/15/2018 1.6  1.5 - 2.5 g/dL Final   • BUN/Creatinine Ratio 04/15/2018 10.0  7.0 - 25.0 Final   • Anion Gap 04/15/2018 5.0  3.0 - 11.0 mmol/L Final   • Color, UA 04/15/2018 Yellow  Yellow, Straw Final   • Appearance, UA 04/15/2018 Clear  Clear Final   • pH, UA 04/15/2018 7.0  5.0 - 8.0 Final   • Specific Gravity, UA 04/15/2018 <=1.005  1.001 - 1.030 Final   • Glucose, UA 04/15/2018 Negative  Negative Final   • Ketones, UA 04/15/2018 Negative  Negative Final   • Bilirubin, UA 04/15/2018 Negative  Negative Final   • Blood, UA 04/15/2018 Negative  Negative Final   • Protein, UA 04/15/2018 Negative  Negative Final   • Leuk Esterase, UA 04/15/2018 Negative  Negative Final   • Nitrite, UA 04/15/2018 Negative  Negative Final   • Urobilinogen, UA 04/15/2018 0.2 E.U./dL  0.2 - 1.0 E.U./dL Final   • WBC 04/15/2018 4.78  3.50 - 10.80 10*3/mm3 Final   • RBC 04/15/2018 4.57  4.20 - 5.76 10*6/mm3 Final   • Hemoglobin 04/15/2018 11.8* 13.1 - 17.5 g/dL Final   • Hematocrit 04/15/2018 37.4* 38.9 - 50.9 % Final   • MCV 04/15/2018 81.8  80.0 - 99.0 fL Final   • MCH 04/15/2018 25.8* 27.0 - 31.0 pg Final   • MCHC 04/15/2018 31.6* 32.0 - 36.0 g/dL Final   • RDW 04/15/2018 23.9* 11.3 - 14.5 % Final   • RDW-SD 04/15/2018 71.6* 37.0 - 54.0 fl Final   • MPV 04/15/2018 9.7  6.0 - 12.0 fL Final   • Platelets 04/15/2018 156  150 - 450 10*3/mm3 Final   • Neutrophil % 04/15/2018 41.4  41.0 - 71.0 % Final   • Lymphocyte % 04/15/2018 33.7  24.0 - 44.0 % Final   • Monocyte % 04/15/2018 14.9* 0.0 - 12.0 % Final   • Eosinophil % 04/15/2018 9.8* 0.0 - 3.0 % Final   • Basophil % 04/15/2018 0.2  0.0 - 1.0 % Final   • Immature Grans % 04/15/2018 0.0  0.0 -  0.6 % Final   • Neutrophils, Absolute 04/15/2018 1.98  1.50 - 8.30 10*3/mm3 Final   • Lymphocytes, Absolute 04/15/2018 1.61  0.60 - 4.80 10*3/mm3 Final   • Monocytes, Absolute 04/15/2018 0.71  0.00 - 1.00 10*3/mm3 Final   • Eosinophils, Absolute 04/15/2018 0.47* 0.00 - 0.30 10*3/mm3 Final   • Basophils, Absolute 04/15/2018 0.01  0.00 - 0.20 10*3/mm3 Final   • Immature Grans, Absolute 04/15/2018 0.00  0.00 - 0.03 10*3/mm3 Final        No results found.    ASSESSMENT: The patient is a very pleasant 56 y.o. male with stage IV colon cancer    PROBLEM LIST:  1. Currently stage IV colon cancer with lung and bony metastases.  K-edwige mutant, microsatellite stable, and intermediate mutational load.  2. Status post low anterior resection done by Dr. Young 2007, [id number]  3.  Attempted adjuvant chemotherapy for 1 cycle could not tolerate secondary to multiple toxicities.  4. Biopsy-proven L2 metastases done October 11, 2016. Status post CyberKnife radiation treatment.  5.  Started Keytruda 200 mg IV every 3 weeks on February 1, 2017, status post 10 cycles of treatment  6. Disease progression documented on PET scan completed 8/31/2017.   7. Treatment changed to Xeloda/Avastin/Oxaliplatin on 9/19/2017, status post 7 cycles.   8. Status post tumor debulking at L2 done by Dr. Frazier at [facility] on April 21, 2017  9.  The treatment was switched to Xeloda with Avastin maintenance treatment March 6, 2018  10.  Cancer related pain  11.  Opioid-induced constipation  12.  Treatment induced asthenia  13.  Mild depression  14.  Insomnia  15.  Chemotherapy-induced anemia    PLAN:  1.  I will Proceed with Avastin today as scheduled.   2.  I will continue Xeloda 1000 mg twice per day, 2 weeks on, 1 week off.   3. We will monitor the patient's labs throughout treatment including blood counts, kidney function, CEA, thyroid function, and liver functions. We will monitor his liver enzymes that have been elevated secondary to treatment  with immune-therapy.  I explained to the patient that his CEA is nicely and gradually going down.  4.  The patient will follow-up with me in 8 weeks for next cycle of treatment, patient is requesting extra time off secondary to trip to [place].  5.  Patient will continue taking over-the-counter stool softeners for constipation.  6.  I again reviewed with the patient the potential side effects from immune therapy including dermatitis, hepatitis, perforation, bleeding, thrombosis, and potential death.  7.  Patient tissue was submitted for the matched trial.  He did match on PETN mutation, however that arm was closed.  8.  I advised the patient to continue to hold his alternative treatment with high dose vitamin C while he is on immunetherapy.  9. The patient will have repeat imaging when he comes back from vacation mid-June.  Meanwhile will continue to monitor his CEA level which has been correlating very well with his disease activity.   10. The patient will continue Zofran as needed for treatment related nausea.  11. The patient will continue oxycodone 10 mg as needed for cancer-related pain.  We'll continue Celebrex 200 mg twice a day as needed.  He hasn't been needing any pain medicine lately.   12.  We offered Ritalin for treatment related fatigue, but the patient has declined at this time.   13.  Patient will continue Zaleplon as needed for insomnia.  14.  I will transfuse 2 units of blood if his hemoglobin drops below 8.  His anemia is mild and stable at this point.       Kristofer Rodriguez MD  4/17/2018   8:50 AM

## 2018-04-24 ENCOUNTER — SPECIALTY PHARMACY (OUTPATIENT)
Dept: ONCOLOGY | Facility: HOSPITAL | Age: 58
End: 2018-04-24

## 2018-04-24 NOTE — PROGRESS NOTES
Received notification from [facility] requesting refill for Xeloda. Continuation of Maintenance therapy with Xeloda 1000mg PO BID 7 days on, followed by 7 days off + IV Avastin. Called script in urgent for patient for Xeloda 1000mg PO BID x 7 days, Followed by 7 days #56 with 5 RF.

## 2018-06-11 ENCOUNTER — LAB (OUTPATIENT)
Dept: LAB | Facility: HOSPITAL | Age: 58
End: 2018-06-11

## 2018-06-11 DIAGNOSIS — C79.51 BONE METASTASIS: ICD-10-CM

## 2018-06-11 DIAGNOSIS — C18.7 MALIGNANT NEOPLASM OF SIGMOID COLON (HCC): ICD-10-CM

## 2018-06-11 LAB
ALBUMIN SERPL-MCNC: 4.24 G/DL (ref 3.2–4.8)
ALBUMIN/GLOB SERPL: 1.4 G/DL (ref 1.5–2.5)
ALP SERPL-CCNC: 79 U/L (ref 25–100)
ALT SERPL W P-5'-P-CCNC: 12 U/L (ref 7–40)
ANION GAP SERPL CALCULATED.3IONS-SCNC: 9 MMOL/L (ref 3–11)
AST SERPL-CCNC: 23 U/L (ref 0–33)
BASOPHILS # BLD AUTO: 0.01 10*3/MM3 (ref 0–0.2)
BASOPHILS NFR BLD AUTO: 0.2 % (ref 0–1)
BILIRUB SERPL-MCNC: 0.9 MG/DL (ref 0.3–1.2)
BILIRUB UR QL STRIP: NEGATIVE
BUN BLD-MCNC: 13 MG/DL (ref 9–23)
BUN/CREAT SERPL: 16 (ref 7–25)
CALCIUM SPEC-SCNC: 9.3 MG/DL (ref 8.7–10.4)
CEA SERPL-MCNC: 2.3 NG/ML (ref 0–2.5)
CHLORIDE SERPL-SCNC: 101 MMOL/L (ref 99–109)
CLARITY UR: CLEAR
CO2 SERPL-SCNC: 30 MMOL/L (ref 20–31)
COLOR UR: YELLOW
CREAT BLD-MCNC: 0.81 MG/DL (ref 0.6–1.3)
DEPRECATED RDW RBC AUTO: 71.1 FL (ref 37–54)
EOSINOPHIL # BLD AUTO: 0.66 10*3/MM3 (ref 0–0.3)
EOSINOPHIL NFR BLD AUTO: 12.7 % (ref 0–3)
ERYTHROCYTE [DISTWIDTH] IN BLOOD BY AUTOMATED COUNT: 24.6 % (ref 11.3–14.5)
GFR SERPL CREATININE-BSD FRML MDRD: 99 ML/MIN/1.73
GLOBULIN UR ELPH-MCNC: 3 GM/DL
GLUCOSE BLD-MCNC: 102 MG/DL (ref 70–100)
GLUCOSE UR STRIP-MCNC: NEGATIVE MG/DL
HCT VFR BLD AUTO: 35.7 % (ref 38.9–50.9)
HGB BLD-MCNC: 11.3 G/DL (ref 13.1–17.5)
HGB UR QL STRIP.AUTO: NEGATIVE
IMM GRANULOCYTES # BLD: 0 10*3/MM3 (ref 0–0.03)
IMM GRANULOCYTES NFR BLD: 0 % (ref 0–0.6)
KETONES UR QL STRIP: NEGATIVE
LEUKOCYTE ESTERASE UR QL STRIP.AUTO: NEGATIVE
LYMPHOCYTES # BLD AUTO: 1.76 10*3/MM3 (ref 0.6–4.8)
LYMPHOCYTES NFR BLD AUTO: 33.8 % (ref 24–44)
MCH RBC QN AUTO: 25.2 PG (ref 27–31)
MCHC RBC AUTO-ENTMCNC: 31.7 G/DL (ref 32–36)
MCV RBC AUTO: 79.5 FL (ref 80–99)
MONOCYTES # BLD AUTO: 0.61 10*3/MM3 (ref 0–1)
MONOCYTES NFR BLD AUTO: 11.7 % (ref 0–12)
NEUTROPHILS # BLD AUTO: 2.17 10*3/MM3 (ref 1.5–8.3)
NEUTROPHILS NFR BLD AUTO: 41.6 % (ref 41–71)
NITRITE UR QL STRIP: NEGATIVE
PH UR STRIP.AUTO: 7.5 [PH] (ref 5–8)
PLATELET # BLD AUTO: 148 10*3/MM3 (ref 150–450)
PMV BLD AUTO: 9.7 FL (ref 6–12)
POTASSIUM BLD-SCNC: 4.7 MMOL/L (ref 3.5–5.5)
PROT SERPL-MCNC: 7.2 G/DL (ref 5.7–8.2)
PROT UR QL STRIP: NEGATIVE
RBC # BLD AUTO: 4.49 10*6/MM3 (ref 4.2–5.76)
SODIUM BLD-SCNC: 140 MMOL/L (ref 132–146)
SP GR UR STRIP: <=1.005 (ref 1–1.03)
UROBILINOGEN UR QL STRIP: NORMAL
WBC NRBC COR # BLD: 5.21 10*3/MM3 (ref 3.5–10.8)

## 2018-06-11 PROCEDURE — 82378 CARCINOEMBRYONIC ANTIGEN: CPT

## 2018-06-11 PROCEDURE — 85025 COMPLETE CBC W/AUTO DIFF WBC: CPT

## 2018-06-11 PROCEDURE — 80053 COMPREHEN METABOLIC PANEL: CPT

## 2018-06-11 PROCEDURE — 36415 COLL VENOUS BLD VENIPUNCTURE: CPT

## 2018-06-11 PROCEDURE — 81003 URINALYSIS AUTO W/O SCOPE: CPT

## 2018-06-12 ENCOUNTER — OFFICE VISIT (OUTPATIENT)
Dept: ONCOLOGY | Facility: CLINIC | Age: 58
End: 2018-06-12

## 2018-06-12 ENCOUNTER — INFUSION (OUTPATIENT)
Dept: ONCOLOGY | Facility: HOSPITAL | Age: 58
End: 2018-06-12

## 2018-06-12 VITALS
DIASTOLIC BLOOD PRESSURE: 76 MMHG | SYSTOLIC BLOOD PRESSURE: 128 MMHG | RESPIRATION RATE: 16 BRPM | HEIGHT: 66 IN | WEIGHT: 150 LBS | BODY MASS INDEX: 24.11 KG/M2 | HEART RATE: 65 BPM | TEMPERATURE: 97.3 F

## 2018-06-12 DIAGNOSIS — C18.7 MALIGNANT NEOPLASM OF SIGMOID COLON (HCC): Primary | ICD-10-CM

## 2018-06-12 DIAGNOSIS — C79.51 BONE METASTASIS: ICD-10-CM

## 2018-06-12 PROCEDURE — 25010000002 BEVACIZUMAB PER 10 MG: Performed by: INTERNAL MEDICINE

## 2018-06-12 PROCEDURE — 99215 OFFICE O/P EST HI 40 MIN: CPT | Performed by: INTERNAL MEDICINE

## 2018-06-12 PROCEDURE — 96413 CHEMO IV INFUSION 1 HR: CPT

## 2018-06-12 PROCEDURE — 25010000002 HEPARIN FLUSH (PORCINE) 100 UNIT/ML SOLUTION: Performed by: INTERNAL MEDICINE

## 2018-06-12 RX ORDER — SODIUM CHLORIDE 0.9 % (FLUSH) 0.9 %
10 SYRINGE (ML) INJECTION AS NEEDED
Status: CANCELLED | OUTPATIENT
Start: 2018-06-12

## 2018-06-12 RX ORDER — SODIUM CHLORIDE 0.9 % (FLUSH) 0.9 %
10 SYRINGE (ML) INJECTION AS NEEDED
Status: DISCONTINUED | OUTPATIENT
Start: 2018-06-12 | End: 2018-06-12 | Stop reason: HOSPADM

## 2018-06-12 RX ORDER — SODIUM CHLORIDE 9 MG/ML
250 INJECTION, SOLUTION INTRAVENOUS ONCE
Status: COMPLETED | OUTPATIENT
Start: 2018-06-12 | End: 2018-06-12

## 2018-06-12 RX ADMIN — HEPARIN 500 UNITS: 100 SYRINGE at 11:03

## 2018-06-12 RX ADMIN — Medication 10 ML: at 11:03

## 2018-06-12 RX ADMIN — SODIUM CHLORIDE 250 ML: 9 INJECTION, SOLUTION INTRAVENOUS at 10:27

## 2018-06-12 RX ADMIN — BEVACIZUMAB 490 MG: 400 INJECTION, SOLUTION INTRAVENOUS at 10:27

## 2018-06-12 NOTE — PROGRESS NOTES
DATE OF VISIT: 6/12/2018    REASON FOR VISIT: Followup for metastatic colon cancer.      HISTORY OF PRESENT ILLNESS: The patient is a very pleasant 58 y.o. male  with past medical history significant for metastatic colon cancer diagnosed March 2007 . He is status post lower anterior resection as well as 1 cycle FOLFOX, stopped due to multiple toxicities. Final pathology showed poorly differentiated adenocarcinoma with 25 negative lymph nodes and clear surgical margins. Isolated tumor deposits was found in the pericolonic fat. Pathologic stage T2 N1c M0 stage IIIA disease.The patient has been followed by serial colonoscopies as well as CAT scans that did document pulmonary metastatic disease with left lower lobe nodule that is gradually increasing in size. Patient was doing fairly well until recently, 3 months ago, patient presented with low back pain. Patient had restaging workup that revealed hypermetabolic L2 vertebral body lesion. Patient had biopsy done on October 11, 2016 that revealed metastatic adenocarcinoma consistent of colon primary with CK 7 negative CK 20 positive CDX2 positive. Patient had next generation gene sequencing that revealed intermediate mutational load with microsatellite stability.  He started chemotherapy with Keytruda on 2/1/2017. The patient was changed to CapeOx with Avastin on 9/19/2017.  He completed 8 cycles on February 12, 2018.  He was started on Avastin with Xeloda maintenance.  He is here today for scheduled follow up visit with treatment.     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  He just came back yesterday from trip to [place].  He is complaining of skin rash hand palms and feet and soles that started 2 weeks ago.    PAST MEDICAL HISTORY/SOCIAL HISTORY/FAMILY HISTORY: Unchanged from my prior documentation done on 01/17/2017.     Review of Systems   Constitutional: Negative for activity change, appetite change, chills, fatigue, fever  and unexpected weight change.   HENT: Negative for hearing loss, mouth sores, nosebleeds, sore throat and trouble swallowing.    Eyes: Negative for visual disturbance.   Respiratory: Negative for cough, chest tightness, shortness of breath and wheezing.    Cardiovascular: Negative for chest pain, palpitations and leg swelling.   Gastrointestinal: Negative for abdominal distention, abdominal pain, blood in stool, constipation, diarrhea, nausea, rectal pain and vomiting.   Endocrine: Negative for cold intolerance and heat intolerance.   Genitourinary: Negative for difficulty urinating, dysuria, frequency and urgency.   Musculoskeletal: Negative for arthralgias, gait problem, joint swelling and myalgias.   Skin: Positive for rash.   Neurological: Negative for dizziness, tremors, syncope, weakness, light-headedness, numbness and headaches.        Sleep disturbance   Hematological: Negative for adenopathy. Does not bruise/bleed easily.   Psychiatric/Behavioral: Negative for confusion, sleep disturbance and suicidal ideas. The patient is not nervous/anxious.          Current Outpatient Prescriptions:   •  capecitabine (XELODA) 500 MG chemo tablet, Take 2 tablets by mouth in the morning and 2 tablets by mouth in the evening for 7 days on, then off for 7 days., Disp: 56 tablet, Rfl: 3  •  celecoxib (CELEBREX) 200 MG capsule, Take 200 mg by mouth Daily As Needed for Mild Pain (1-3)., Disp: , Rfl:   •  Cholecalciferol (VITAMIN D3) 5000 UNITS capsule capsule, Take 5,000 Units by mouth Daily., Disp: , Rfl:   •  lactulose (CHRONULAC) 10 GM/15ML solution, Take 30 mL by mouth 3 (Three) Times a Day. PRN constipation, Disp: 240 mL, Rfl: 2  •  lidocaine-prilocaine (EMLA) 2.5-2.5 % cream, Apply  topically As Needed (45-60 minutes prior to port access.  Cover with saran/plastic wrap.)., Disp: 30 g, Rfl: 3  •  magnesium hydroxide (MILK OF MAGNESIA) 2400 MG/10ML suspension suspension, Take 10 mL by mouth Daily., Disp: , Rfl:   •   "ondansetron (ZOFRAN) 8 MG tablet, Take 1 tablet by mouth 3 (Three) Times a Day As Needed for Nausea or Vomiting., Disp: 30 tablet, Rfl: 5  •  zaleplon (SONATA) 10 MG capsule, Take 10 mg by mouth At Night As Needed., Disp: , Rfl:     PHYSICAL EXAMINATION:   /76 Comment: LUE  Pulse 65   Temp 97.3 °F (36.3 °C) (Temporal Artery )   Resp 16   Ht 167.6 cm (66\")   Wt 68 kg (150 lb)   BMI 24.21 kg/m²    ECOG Performance Status: 1 - Symptomatic but completely ambulatory  General Appearance:  alert, cooperative, no apparent distress and appears stated age   Neurologic/Psychiatric: A&O x 3, gait steady, appropriate affect, strength 5/5 in all muscle groups   HEENT:  Normocephalic, without obvious abnormality, mucous membranes moist   Neck: Supple, symmetrical, trachea midline, no adenopathy;  No thyromegaly, masses, or tenderness   Lungs:   Clear to auscultation bilaterally; respirations regular, even, and unlabored bilaterally   Heart:  Regular rate and rhythm, no murmurs appreciated   Abdomen:   Soft, non-tender, non-distended and no organomegaly   Lymph nodes: No cervical, supraclavicular, inguinal or axillary adenopathy noted   Extremities: Normal, atraumatic; no clubbing, cyanosis, or edema    Skin: (+) rashe bottom of feet, no ulcers or suspicious lesions noted     Lab on 06/11/2018   Component Date Value Ref Range Status   • CEA 06/11/2018 2.30  0.00 - 2.50 ng/mL Final   • Glucose 06/11/2018 102* 70 - 100 mg/dL Final   • BUN 06/11/2018 13  9 - 23 mg/dL Final   • Creatinine 06/11/2018 0.81  0.60 - 1.30 mg/dL Final   • Sodium 06/11/2018 140  132 - 146 mmol/L Final   • Potassium 06/11/2018 4.7  3.5 - 5.5 mmol/L Final   • Chloride 06/11/2018 101  99 - 109 mmol/L Final   • CO2 06/11/2018 30.0  20.0 - 31.0 mmol/L Final   • Calcium 06/11/2018 9.3  8.7 - 10.4 mg/dL Final   • Total Protein 06/11/2018 7.2  5.7 - 8.2 g/dL Final   • Albumin 06/11/2018 4.24  3.20 - 4.80 g/dL Final   • ALT (SGPT) 06/11/2018 12  7 - 40 U/L " Final   • AST (SGOT) 06/11/2018 23  0 - 33 U/L Final   • Alkaline Phosphatase 06/11/2018 79  25 - 100 U/L Final   • Total Bilirubin 06/11/2018 0.9  0.3 - 1.2 mg/dL Final   • eGFR Non African Amer 06/11/2018 99  >60 mL/min/1.73 Final   • Globulin 06/11/2018 3.0  gm/dL Final   • A/G Ratio 06/11/2018 1.4* 1.5 - 2.5 g/dL Final   • BUN/Creatinine Ratio 06/11/2018 16.0  7.0 - 25.0 Final   • Anion Gap 06/11/2018 9.0  3.0 - 11.0 mmol/L Final   • Color, UA 06/11/2018 Yellow  Yellow, Straw Final   • Appearance, UA 06/11/2018 Clear  Clear Final   • pH, UA 06/11/2018 7.5  5.0 - 8.0 Final   • Specific Gravity, UA 06/11/2018 <=1.005  1.001 - 1.030 Final   • Glucose, UA 06/11/2018 Negative  Negative Final   • Ketones, UA 06/11/2018 Negative  Negative Final   • Bilirubin, UA 06/11/2018 Negative  Negative Final   • Blood, UA 06/11/2018 Negative  Negative Final   • Protein, UA 06/11/2018 Negative  Negative Final   • Leuk Esterase, UA 06/11/2018 Negative  Negative Final   • Nitrite, UA 06/11/2018 Negative  Negative Final   • Urobilinogen, UA 06/11/2018 0.2 E.U./dL  0.2 - 1.0 E.U./dL Final   • WBC 06/11/2018 5.21  3.50 - 10.80 10*3/mm3 Final   • RBC 06/11/2018 4.49  4.20 - 5.76 10*6/mm3 Final   • Hemoglobin 06/11/2018 11.3* 13.1 - 17.5 g/dL Final   • Hematocrit 06/11/2018 35.7* 38.9 - 50.9 % Final   • MCV 06/11/2018 79.5* 80.0 - 99.0 fL Final   • MCH 06/11/2018 25.2* 27.0 - 31.0 pg Final   • MCHC 06/11/2018 31.7* 32.0 - 36.0 g/dL Final   • RDW 06/11/2018 24.6* 11.3 - 14.5 % Final   • RDW-SD 06/11/2018 71.1* 37.0 - 54.0 fl Final   • MPV 06/11/2018 9.7  6.0 - 12.0 fL Final   • Platelets 06/11/2018 148* 150 - 450 10*3/mm3 Final   • Neutrophil % 06/11/2018 41.6  41.0 - 71.0 % Final   • Lymphocyte % 06/11/2018 33.8  24.0 - 44.0 % Final   • Monocyte % 06/11/2018 11.7  0.0 - 12.0 % Final   • Eosinophil % 06/11/2018 12.7* 0.0 - 3.0 % Final   • Basophil % 06/11/2018 0.2  0.0 - 1.0 % Final   • Immature Grans % 06/11/2018 0.0  0.0 - 0.6 % Final    • Neutrophils, Absolute 06/11/2018 2.17  1.50 - 8.30 10*3/mm3 Final   • Lymphocytes, Absolute 06/11/2018 1.76  0.60 - 4.80 10*3/mm3 Final   • Monocytes, Absolute 06/11/2018 0.61  0.00 - 1.00 10*3/mm3 Final   • Eosinophils, Absolute 06/11/2018 0.66* 0.00 - 0.30 10*3/mm3 Final   • Basophils, Absolute 06/11/2018 0.01  0.00 - 0.20 10*3/mm3 Final   • Immature Grans, Absolute 06/11/2018 0.00  0.00 - 0.03 10*3/mm3 Final        No results found.    ASSESSMENT: The patient is a very pleasant 56 y.o. male with stage IV colon cancer    PROBLEM LIST:  1. Currently stage IV colon cancer with lung and bony metastases.  K-edwige mutant, microsatellite stable, and intermediate mutational load.  2. Status post low anterior resection done by Dr. Young 2007, [id number]  3.  Attempted adjuvant chemotherapy for 1 cycle could not tolerate secondary to multiple toxicities.  4. Biopsy-proven L2 metastases done October 11, 2016. Status post CyberKnife radiation treatment.  5.  Started Keytruda 200 mg IV every 3 weeks on February 1, 2017, status post 10 cycles of treatment  6. Disease progression documented on PET scan completed 8/31/2017.   7. Treatment changed to Xeloda/Avastin/Oxaliplatin on 9/19/2017, status post 7 cycles.   8. Status post tumor debulking at L2 done by Dr. Frazier at [facility] on April 21, 2017  9.  The treatment was switched to Xeloda with Avastin maintenance treatment March 6, 2018  10.  Cancer related pain  11.  Opioid-induced constipation  12.  Treatment induced asthenia  13.  Mild depression  14.  Insomnia  15.  Chemotherapy-induced anemia  16.  Chemotherapy-induced dermatitis    PLAN:  1.  I will Proceed with Avastin today as scheduled.   2.  I will continue Xeloda 1000 mg twice per day, 2 weeks on, 1 week off.  I will consider doing dose reduction if his dermatitis Worse.  3. We will monitor the patient's labs throughout treatment including blood counts, kidney function, CEA, thyroid function, and liver  functions. We will monitor his liver enzymes that have been elevated secondary to treatment with immune-therapy.  I explained to the patient that his CEA is nicely and gradually going down.  4.  The patient will follow-up with me in 3 weeks with treatment.  5.  Patient will continue taking over-the-counter stool softeners for constipation.  6.  I again reviewed with the patient the potential side effects from immune therapy including dermatitis, hepatitis, perforation, bleeding, thrombosis, and potential death.  7.  Patient tissue was submitted for the matched trial.  He did match on PETN mutation, however that arm was closed.  8.  I advised the patient to continue to hold his alternative treatment with high dose vitamin C while he is on immunetherapy.  9. The patient will have repeat imaging prior to return.  10. The patient will continue Zofran as needed for treatment related nausea.  11. The patient will continue oxycodone 10 mg as needed for cancer-related pain.  We'll continue Celebrex 200 mg twice a day as needed.  He hasn't been needing any pain medicine lately.   12.  We offered Ritalin for treatment related fatigue, but the patient has declined at this time.   13.  Patient will continue Zaleplon as needed for insomnia.  14.  I will transfuse 2 units of blood if his hemoglobin drops below 8.  His anemia is mild and stable at this point.       Kristofer Rodriguez MD  6/12/2018   9:29 AM

## 2018-06-22 ENCOUNTER — HOSPITAL ENCOUNTER (OUTPATIENT)
Dept: PET IMAGING | Facility: HOSPITAL | Age: 58
Discharge: HOME | End: 2018-06-22
Attending: INTERNAL MEDICINE

## 2018-06-22 ENCOUNTER — HOSPITAL ENCOUNTER (OUTPATIENT)
Dept: PET IMAGING | Facility: HOSPITAL | Age: 58
Discharge: HOME | End: 2018-06-22
Attending: INTERNAL MEDICINE | Admitting: INTERNAL MEDICINE

## 2018-06-22 DIAGNOSIS — C79.51 BONE METASTASIS: ICD-10-CM

## 2018-06-22 DIAGNOSIS — C18.7 MALIGNANT NEOPLASM OF SIGMOID COLON (HCC): ICD-10-CM

## 2018-06-22 LAB — GLUCOSE BLDC GLUCOMTR-MCNC: 103 MG/DL (ref 70–130)

## 2018-06-22 PROCEDURE — 0 FLUDEOXYGLUCOSE F18 SOLUTION: Performed by: INTERNAL MEDICINE

## 2018-06-22 PROCEDURE — 82962 GLUCOSE BLOOD TEST: CPT

## 2018-06-22 PROCEDURE — A9552 F18 FDG: HCPCS | Performed by: INTERNAL MEDICINE

## 2018-06-22 PROCEDURE — 78816 PET IMAGE W/CT FULL BODY: CPT

## 2018-06-22 RX ADMIN — FLUDEOXYGLUCOSE F18 1 DOSE: 300 INJECTION INTRAVENOUS at 09:55

## 2018-07-04 ENCOUNTER — LAB (OUTPATIENT)
Dept: LAB | Facility: HOSPITAL | Age: 58
End: 2018-07-04

## 2018-07-04 ENCOUNTER — APPOINTMENT (OUTPATIENT)
Dept: LAB | Facility: HOSPITAL | Age: 58
End: 2018-07-04

## 2018-07-04 DIAGNOSIS — C18.7 MALIGNANT NEOPLASM OF SIGMOID COLON (HCC): ICD-10-CM

## 2018-07-04 DIAGNOSIS — C79.51 BONE METASTASIS: ICD-10-CM

## 2018-07-04 LAB
ALBUMIN SERPL-MCNC: 4.09 G/DL (ref 3.2–4.8)
ALBUMIN/GLOB SERPL: 1.4 G/DL (ref 1.5–2.5)
ALP SERPL-CCNC: 73 U/L (ref 25–100)
ALT SERPL W P-5'-P-CCNC: 13 U/L (ref 7–40)
ANION GAP SERPL CALCULATED.3IONS-SCNC: 10 MMOL/L (ref 3–11)
AST SERPL-CCNC: 24 U/L (ref 0–33)
BASOPHILS # BLD AUTO: 0.01 10*3/MM3 (ref 0–0.2)
BASOPHILS NFR BLD AUTO: 0.2 % (ref 0–1)
BILIRUB SERPL-MCNC: 0.8 MG/DL (ref 0.3–1.2)
BILIRUB UR QL STRIP: NEGATIVE
BUN BLD-MCNC: 11 MG/DL (ref 9–23)
BUN/CREAT SERPL: 13.6 (ref 7–25)
CALCIUM SPEC-SCNC: 8.6 MG/DL (ref 8.7–10.4)
CEA SERPL-MCNC: 2.6 NG/ML (ref 0–2.5)
CHLORIDE SERPL-SCNC: 101 MMOL/L (ref 99–109)
CLARITY UR: CLEAR
CO2 SERPL-SCNC: 27 MMOL/L (ref 20–31)
COLOR UR: YELLOW
CREAT BLD-MCNC: 0.81 MG/DL (ref 0.6–1.3)
DEPRECATED RDW RBC AUTO: 67.1 FL (ref 37–54)
EOSINOPHIL # BLD AUTO: 0.46 10*3/MM3 (ref 0–0.3)
EOSINOPHIL NFR BLD AUTO: 7.3 % (ref 0–3)
ERYTHROCYTE [DISTWIDTH] IN BLOOD BY AUTOMATED COUNT: 23.6 % (ref 11.3–14.5)
GFR SERPL CREATININE-BSD FRML MDRD: 119 ML/MIN/1.73
GFR SERPL CREATININE-BSD FRML MDRD: 99 ML/MIN/1.73
GLOBULIN UR ELPH-MCNC: 2.9 GM/DL
GLUCOSE BLD-MCNC: 164 MG/DL (ref 70–100)
GLUCOSE UR STRIP-MCNC: NEGATIVE MG/DL
HCT VFR BLD AUTO: 36.6 % (ref 38.9–50.9)
HGB BLD-MCNC: 11.8 G/DL (ref 13.1–17.5)
HGB UR QL STRIP.AUTO: NEGATIVE
IMM GRANULOCYTES # BLD: 0.01 10*3/MM3 (ref 0–0.03)
IMM GRANULOCYTES NFR BLD: 0.2 % (ref 0–0.6)
KETONES UR QL STRIP: NEGATIVE
LEUKOCYTE ESTERASE UR QL STRIP.AUTO: NEGATIVE
LYMPHOCYTES # BLD AUTO: 1.43 10*3/MM3 (ref 0.6–4.8)
LYMPHOCYTES NFR BLD AUTO: 22.7 % (ref 24–44)
MCH RBC QN AUTO: 25.3 PG (ref 27–31)
MCHC RBC AUTO-ENTMCNC: 32.2 G/DL (ref 32–36)
MCV RBC AUTO: 78.4 FL (ref 80–99)
MONOCYTES # BLD AUTO: 0.49 10*3/MM3 (ref 0–1)
MONOCYTES NFR BLD AUTO: 7.8 % (ref 0–12)
NEUTROPHILS # BLD AUTO: 3.9 10*3/MM3 (ref 1.5–8.3)
NEUTROPHILS NFR BLD AUTO: 61.8 % (ref 41–71)
NITRITE UR QL STRIP: NEGATIVE
PH UR STRIP.AUTO: 5.5 [PH] (ref 5–8)
PLATELET # BLD AUTO: 136 10*3/MM3 (ref 150–450)
PMV BLD AUTO: 9.6 FL (ref 6–12)
POTASSIUM BLD-SCNC: 4.3 MMOL/L (ref 3.5–5.5)
PROT SERPL-MCNC: 7 G/DL (ref 5.7–8.2)
PROT UR QL STRIP: NEGATIVE
RBC # BLD AUTO: 4.67 10*6/MM3 (ref 4.2–5.76)
SODIUM BLD-SCNC: 138 MMOL/L (ref 132–146)
SP GR UR STRIP: <=1.005 (ref 1–1.03)
UROBILINOGEN UR QL STRIP: NORMAL
WBC NRBC COR # BLD: 6.3 10*3/MM3 (ref 3.5–10.8)

## 2018-07-04 PROCEDURE — 80053 COMPREHEN METABOLIC PANEL: CPT

## 2018-07-04 PROCEDURE — 85025 COMPLETE CBC W/AUTO DIFF WBC: CPT

## 2018-07-04 PROCEDURE — 81003 URINALYSIS AUTO W/O SCOPE: CPT

## 2018-07-04 PROCEDURE — 36415 COLL VENOUS BLD VENIPUNCTURE: CPT

## 2018-07-04 PROCEDURE — 82378 CARCINOEMBRYONIC ANTIGEN: CPT

## 2018-07-04 RX ORDER — SODIUM CHLORIDE 9 MG/ML
250 INJECTION, SOLUTION INTRAVENOUS ONCE
Status: CANCELLED | OUTPATIENT
Start: 2018-07-04

## 2018-07-05 RX ORDER — AZITHROMYCIN 250 MG/1
TABLET, FILM COATED ORAL
Qty: 6 TABLET | Refills: 0 | Status: SHIPPED | OUTPATIENT
Start: 2018-07-05 | End: 2018-09-25

## 2018-07-05 RX ORDER — AZITHROMYCIN 250 MG/1
TABLET, FILM COATED ORAL
Qty: 6 TABLET | Refills: 0 | Status: SHIPPED | OUTPATIENT
Start: 2018-07-05 | End: 2018-07-05 | Stop reason: SDUPTHER

## 2018-07-05 NOTE — PROGRESS NOTES
DATE OF VISIT: 7/6/2018    REASON FOR VISIT: Followup for metastatic colon cancer.      HISTORY OF PRESENT ILLNESS: The patient is a very pleasant 58 y.o. male  with past medical history significant for metastatic colon cancer diagnosed March 2007 . He is status post lower anterior resection as well as 1 cycle FOLFOX, stopped due to multiple toxicities. Final pathology showed poorly differentiated adenocarcinoma with 25 negative lymph nodes and clear surgical margins. Isolated tumor deposits was found in the pericolonic fat. Pathologic stage T2 N1c M0 stage IIIA disease.The patient has been followed by serial colonoscopies as well as CAT scans that did document pulmonary metastatic disease with left lower lobe nodule that is gradually increasing in size. Patient was doing fairly well until recently, 3 months ago, patient presented with low back pain. Patient had restaging workup that revealed hypermetabolic L2 vertebral body lesion. Patient had biopsy done on October 11, 2016 that revealed metastatic adenocarcinoma consistent of colon primary with CK 7 negative CK 20 positive CDX2 positive. Patient had next generation gene sequencing that revealed intermediate mutational load with microsatellite stability.  He started chemotherapy with Keytruda on 2/1/2017. The patient was changed to CapeOx with Avastin on 9/19/2017.  He completed 8 cycles on February 12, 2018.  He was started on Avastin with Xeloda maintenance.  He is here today for scheduled follow up visit with treatment.     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 He is complaining of cough, no fever, he has nasal congestion. His skin rash on hand palms and feet soles is better.    PAST MEDICAL HISTORY/SOCIAL HISTORY/FAMILY HISTORY: Unchanged from my prior documentation done on 01/17/2017.     Review of Systems   Constitutional: Negative for activity change, appetite change, chills, fatigue, fever and unexpected weight change.   HENT:  Positive for congestion. Negative for hearing loss, mouth sores, nosebleeds, sore throat and trouble swallowing.    Eyes: Negative for visual disturbance.   Respiratory: Positive for cough. Negative for chest tightness, shortness of breath and wheezing.    Cardiovascular: Negative for chest pain, palpitations and leg swelling.   Gastrointestinal: Negative for abdominal distention, abdominal pain, blood in stool, constipation, diarrhea, nausea, rectal pain and vomiting.   Endocrine: Negative for cold intolerance and heat intolerance.   Genitourinary: Negative for difficulty urinating, dysuria, frequency and urgency.   Musculoskeletal: Negative for arthralgias, gait problem, joint swelling and myalgias.   Skin: Positive for rash.   Neurological: Negative for dizziness, tremors, syncope, weakness, light-headedness, numbness and headaches.        Sleep disturbance   Hematological: Negative for adenopathy. Does not bruise/bleed easily.   Psychiatric/Behavioral: Negative for confusion, sleep disturbance and suicidal ideas. The patient is not nervous/anxious.          Current Outpatient Prescriptions:   •  azithromycin (ZITHROMAX Z-VERNA) 250 MG tablet, Take 2 tablets the first day, then 1 tablet daily for 4 days., Disp: 6 tablet, Rfl: 0  •  capecitabine (XELODA) 500 MG chemo tablet, Take 2 tablets by mouth in the morning and 2 tablets by mouth in the evening for 7 days on, then off for 7 days., Disp: 56 tablet, Rfl: 3  •  Cholecalciferol (VITAMIN D3) 5000 UNITS capsule capsule, Take 5,000 Units by mouth Daily., Disp: , Rfl:   •  lidocaine-prilocaine (EMLA) 2.5-2.5 % cream, Apply  topically As Needed (45-60 minutes prior to port access.  Cover with saran/plastic wrap.)., Disp: 30 g, Rfl: 3  No current facility-administered medications for this visit.     Facility-Administered Medications Ordered in Other Visits:   •  bevacizumab (AVASTIN) 490 mg in sodium chloride 0.9 % 100 mL chemo IVPB, 7.5 mg/kg (Treatment Plan Recorded),  "Intravenous, Once, Kristofer Rodriguez MD  •  heparin flush (porcine) 100 UNIT/ML injection 500 Units, 500 Units, Intravenous, PRN, Kristofer Rodriguez MD  •  sodium chloride 0.9 % flush 10 mL, 10 mL, Intravenous, PRN, Kristofer Rodriguez MD  •  sodium chloride 0.9 % infusion 250 mL, 250 mL, Intravenous, Once, Kristofer Rodriguez MD    PHYSICAL EXAMINATION:   /75 Comment: LUE  Pulse 61   Temp 97.2 °F (36.2 °C) (Temporal Artery )   Resp 16   Ht 167.6 cm (66\")   Wt 68.9 kg (152 lb)   BMI 24.53 kg/m²    ECOG Performance Status: 1 - Symptomatic but completely ambulatory  General Appearance:  alert, cooperative, no apparent distress and appears stated age   Neurologic/Psychiatric: A&O x 3, gait steady, appropriate affect, strength 5/5 in all muscle groups   HEENT:  Normocephalic, without obvious abnormality, mucous membranes moist   Neck: Supple, symmetrical, trachea midline, no adenopathy;  No thyromegaly, masses, or tenderness   Lungs:   Clear to auscultation bilaterally; respirations regular, even, and unlabored bilaterally   Heart:  Regular rate and rhythm, no murmurs appreciated   Abdomen:   Soft, non-tender, non-distended and no organomegaly   Lymph nodes: No cervical, supraclavicular, inguinal or axillary adenopathy noted   Extremities: Normal, atraumatic; no clubbing, cyanosis, or edema    Skin: (+) rashe bottom of feet, no ulcers or suspicious lesions noted     Lab on 07/04/2018   Component Date Value Ref Range Status   • CEA 07/04/2018 2.60* 0.00 - 2.50 ng/mL Final   • Glucose 07/04/2018 164* 70 - 100 mg/dL Final   • BUN 07/04/2018 11  9 - 23 mg/dL Final   • Creatinine 07/04/2018 0.81  0.60 - 1.30 mg/dL Final   • Sodium 07/04/2018 138  132 - 146 mmol/L Final   • Potassium 07/04/2018 4.3  3.5 - 5.5 mmol/L Final   • Chloride 07/04/2018 101  99 - 109 mmol/L Final   • CO2 07/04/2018 27.0  20.0 - 31.0 mmol/L Final   • Calcium 07/04/2018 8.6* 8.7 - 10.4 mg/dL Final   • Total Protein 07/04/2018 7.0  5.7 - 8.2 g/dL Final   • " Albumin 07/04/2018 4.09  3.20 - 4.80 g/dL Final   • ALT (SGPT) 07/04/2018 13  7 - 40 U/L Final   • AST (SGOT) 07/04/2018 24  0 - 33 U/L Final   • Alkaline Phosphatase 07/04/2018 73  25 - 100 U/L Final   • Total Bilirubin 07/04/2018 0.8  0.3 - 1.2 mg/dL Final   • eGFR Non African Amer 07/04/2018 99  >60 mL/min/1.73 Final   • eGFR   Amer 07/04/2018 119  >60 mL/min/1.73 Final   • Globulin 07/04/2018 2.9  gm/dL Final   • A/G Ratio 07/04/2018 1.4* 1.5 - 2.5 g/dL Final   • BUN/Creatinine Ratio 07/04/2018 13.6  7.0 - 25.0 Final   • Anion Gap 07/04/2018 10.0  3.0 - 11.0 mmol/L Final   • Color, UA 07/04/2018 Yellow  Yellow, Straw Final   • Appearance, UA 07/04/2018 Clear  Clear Final   • pH, UA 07/04/2018 5.5  5.0 - 8.0 Final   • Specific Gravity, UA 07/04/2018 <=1.005  1.001 - 1.030 Final   • Glucose, UA 07/04/2018 Negative  Negative Final   • Ketones, UA 07/04/2018 Negative  Negative Final   • Bilirubin, UA 07/04/2018 Negative  Negative Final   • Blood, UA 07/04/2018 Negative  Negative Final   • Protein, UA 07/04/2018 Negative  Negative Final   • Leuk Esterase, UA 07/04/2018 Negative  Negative Final   • Nitrite, UA 07/04/2018 Negative  Negative Final   • Urobilinogen, UA 07/04/2018 0.2 E.U./dL  0.2 - 1.0 E.U./dL Final   • WBC 07/04/2018 6.30  3.50 - 10.80 10*3/mm3 Final   • RBC 07/04/2018 4.67  4.20 - 5.76 10*6/mm3 Final   • Hemoglobin 07/04/2018 11.8* 13.1 - 17.5 g/dL Final   • Hematocrit 07/04/2018 36.6* 38.9 - 50.9 % Final   • MCV 07/04/2018 78.4* 80.0 - 99.0 fL Final   • MCH 07/04/2018 25.3* 27.0 - 31.0 pg Final   • MCHC 07/04/2018 32.2  32.0 - 36.0 g/dL Final   • RDW 07/04/2018 23.6* 11.3 - 14.5 % Final   • RDW-SD 07/04/2018 67.1* 37.0 - 54.0 fl Final   • MPV 07/04/2018 9.6  6.0 - 12.0 fL Final   • Platelets 07/04/2018 136* 150 - 450 10*3/mm3 Final   • Neutrophil % 07/04/2018 61.8  41.0 - 71.0 % Final   • Lymphocyte % 07/04/2018 22.7* 24.0 - 44.0 % Final   • Monocyte % 07/04/2018 7.8  0.0 - 12.0 % Final   •  Eosinophil % 07/04/2018 7.3* 0.0 - 3.0 % Final   • Basophil % 07/04/2018 0.2  0.0 - 1.0 % Final   • Immature Grans % 07/04/2018 0.2  0.0 - 0.6 % Final   • Neutrophils, Absolute 07/04/2018 3.90  1.50 - 8.30 10*3/mm3 Final   • Lymphocytes, Absolute 07/04/2018 1.43  0.60 - 4.80 10*3/mm3 Final   • Monocytes, Absolute 07/04/2018 0.49  0.00 - 1.00 10*3/mm3 Final   • Eosinophils, Absolute 07/04/2018 0.46* 0.00 - 0.30 10*3/mm3 Final   • Basophils, Absolute 07/04/2018 0.01  0.00 - 0.20 10*3/mm3 Final   • Immature Grans, Absolute 07/04/2018 0.01  0.00 - 0.03 10*3/mm3 Final        No results found.    ASSESSMENT: The patient is a very pleasant 56 y.o. male with stage IV colon cancer    PROBLEM LIST:  1. Currently stage IV colon cancer with lung and bony metastases.  K-edwgie mutant, microsatellite stable, and intermediate mutational load.  2. Status post low anterior resection done by Dr. Young 2007, [id number]  3.  Attempted adjuvant chemotherapy for 1 cycle could not tolerate secondary to multiple toxicities.  4. Biopsy-proven L2 metastases done October 11, 2016. Status post CyberKnife radiation treatment.  5.  Started Keytruda 200 mg IV every 3 weeks on February 1, 2017, status post 10 cycles of treatment  6. Disease progression documented on PET scan completed 8/31/2017.   7. Treatment changed to Xeloda/Avastin/Oxaliplatin on 9/19/2017, status post 7 cycles.   8. Status post tumor debulking at L2 done by Dr. Frazier at [facility] on April 21, 2017  9.  The treatment was switched to Xeloda with Avastin maintenance treatment March 6, 2018  10.  Cancer related pain  11.  Opioid-induced constipation  12.  Treatment induced asthenia  13.  Mild depression  14.  Insomnia  15.  Chemotherapy-induced anemia  16.  Chemotherapy-induced dermatitis  17. Upper airway infection    PLAN:  1.  I will Proceed with Avastin today as scheduled.   2.  I will continue Xeloda 1000 mg twice per day, 2 weeks on, 1 week off.  I will consider doing  dose reduction if his dermatitis gets worse.  3. We will monitor the patient's labs throughout treatment including blood counts, kidney function, CEA, thyroid function, and liver functions. We will monitor his liver enzymes that have been elevated secondary to treatment with immune-therapy.   4.  The patient will follow-up with me in 6 weeks with treatment.  5.  Patient will continue taking over-the-counter stool softeners for constipation.  6.  I again reviewed with the patient the potential side effects from immune therapy including dermatitis, hepatitis, perforation, bleeding, thrombosis, and potential death.  7.  Patient tissue was submitted for the matched trial.  He did match on PETN mutation, however that arm was closed.  8.  I advised the patient to continue to hold his alternative treatment with high dose vitamin C while he is on immunetherapy.  9. The patient will have repeat imaging prior to return.  10. The patient will continue Zofran as needed for treatment related nausea.  11. The patient will continue oxycodone 10 mg as needed for cancer-related pain.  We'll continue Celebrex 200 mg twice a day as needed.  He hasn't been needing any pain medicine lately.   12.  We offered Ritalin for treatment related fatigue, but the patient has declined at this time.   13.  Patient will continue Zaleplon as needed for insomnia.  14.  I will transfuse 2 units of blood if his hemoglobin drops below 8.  His anemia is mild and stable at this point.   15. I will prescribe a Z-Pack for upper airway infection.    Kristofer Rodriguez MD  7/6/2018   9:41 AM

## 2018-07-06 ENCOUNTER — INFUSION (OUTPATIENT)
Dept: ONCOLOGY | Facility: HOSPITAL | Age: 58
End: 2018-07-06

## 2018-07-06 ENCOUNTER — OFFICE VISIT (OUTPATIENT)
Dept: ONCOLOGY | Facility: CLINIC | Age: 58
End: 2018-07-06

## 2018-07-06 VITALS
HEART RATE: 61 BPM | RESPIRATION RATE: 16 BRPM | DIASTOLIC BLOOD PRESSURE: 75 MMHG | BODY MASS INDEX: 24.43 KG/M2 | SYSTOLIC BLOOD PRESSURE: 127 MMHG | HEIGHT: 66 IN | WEIGHT: 152 LBS | TEMPERATURE: 97.2 F

## 2018-07-06 DIAGNOSIS — C18.7 MALIGNANT NEOPLASM OF SIGMOID COLON (HCC): Primary | ICD-10-CM

## 2018-07-06 DIAGNOSIS — C79.51 BONE METASTASIS: ICD-10-CM

## 2018-07-06 PROCEDURE — 25010000002 HEPARIN FLUSH (PORCINE) 100 UNIT/ML SOLUTION: Performed by: INTERNAL MEDICINE

## 2018-07-06 PROCEDURE — 25010000002 BEVACIZUMAB PER 10 MG: Performed by: INTERNAL MEDICINE

## 2018-07-06 PROCEDURE — 99215 OFFICE O/P EST HI 40 MIN: CPT | Performed by: INTERNAL MEDICINE

## 2018-07-06 PROCEDURE — 96413 CHEMO IV INFUSION 1 HR: CPT

## 2018-07-06 RX ORDER — SODIUM CHLORIDE 9 MG/ML
250 INJECTION, SOLUTION INTRAVENOUS ONCE
Status: COMPLETED | OUTPATIENT
Start: 2018-07-06 | End: 2018-07-06

## 2018-07-06 RX ORDER — SODIUM CHLORIDE 0.9 % (FLUSH) 0.9 %
10 SYRINGE (ML) INJECTION AS NEEDED
Status: DISCONTINUED | OUTPATIENT
Start: 2018-07-06 | End: 2018-07-06 | Stop reason: HOSPADM

## 2018-07-06 RX ORDER — SODIUM CHLORIDE 0.9 % (FLUSH) 0.9 %
10 SYRINGE (ML) INJECTION AS NEEDED
Status: CANCELLED | OUTPATIENT
Start: 2018-07-06

## 2018-07-06 RX ADMIN — HEPARIN 500 UNITS: 100 SYRINGE at 10:37

## 2018-07-06 RX ADMIN — Medication 10 ML: at 10:37

## 2018-07-06 RX ADMIN — SODIUM CHLORIDE 250 ML: 9 INJECTION, SOLUTION INTRAVENOUS at 09:48

## 2018-07-06 RX ADMIN — BEVACIZUMAB 490 MG: 400 INJECTION, SOLUTION INTRAVENOUS at 10:02

## 2018-08-16 ENCOUNTER — LAB (OUTPATIENT)
Dept: LAB | Facility: HOSPITAL | Age: 58
End: 2018-08-16

## 2018-08-16 DIAGNOSIS — C79.51 BONE METASTASIS: ICD-10-CM

## 2018-08-16 DIAGNOSIS — C18.7 MALIGNANT NEOPLASM OF SIGMOID COLON (HCC): ICD-10-CM

## 2018-08-16 LAB
ALBUMIN SERPL-MCNC: 4.35 G/DL (ref 3.2–4.8)
ALBUMIN/GLOB SERPL: 1.5 G/DL (ref 1.5–2.5)
ALP SERPL-CCNC: 66 U/L (ref 25–100)
ALT SERPL W P-5'-P-CCNC: 13 U/L (ref 7–40)
ANION GAP SERPL CALCULATED.3IONS-SCNC: 6 MMOL/L (ref 3–11)
AST SERPL-CCNC: 23 U/L (ref 0–33)
BASOPHILS # BLD AUTO: 0.01 10*3/MM3 (ref 0–0.2)
BASOPHILS NFR BLD AUTO: 0.2 % (ref 0–1)
BILIRUB SERPL-MCNC: 1 MG/DL (ref 0.3–1.2)
BILIRUB UR QL STRIP: NEGATIVE
BUN BLD-MCNC: 15 MG/DL (ref 9–23)
BUN/CREAT SERPL: 18.1 (ref 7–25)
CALCIUM SPEC-SCNC: 9.2 MG/DL (ref 8.7–10.4)
CEA SERPL-MCNC: 1.9 NG/ML (ref 0–2.5)
CHLORIDE SERPL-SCNC: 104 MMOL/L (ref 99–109)
CLARITY UR: CLEAR
CO2 SERPL-SCNC: 28 MMOL/L (ref 20–31)
COLOR UR: YELLOW
CREAT BLD-MCNC: 0.83 MG/DL (ref 0.6–1.3)
DEPRECATED RDW RBC AUTO: 67.7 FL (ref 37–54)
EOSINOPHIL # BLD AUTO: 0.53 10*3/MM3 (ref 0–0.3)
EOSINOPHIL NFR BLD AUTO: 9.4 % (ref 0–3)
ERYTHROCYTE [DISTWIDTH] IN BLOOD BY AUTOMATED COUNT: 24.2 % (ref 11.3–14.5)
GFR SERPL CREATININE-BSD FRML MDRD: 116 ML/MIN/1.73
GFR SERPL CREATININE-BSD FRML MDRD: 96 ML/MIN/1.73
GLOBULIN UR ELPH-MCNC: 3 GM/DL
GLUCOSE BLD-MCNC: 102 MG/DL (ref 70–100)
GLUCOSE UR STRIP-MCNC: NEGATIVE MG/DL
HCT VFR BLD AUTO: 38.4 % (ref 38.9–50.9)
HGB BLD-MCNC: 12.4 G/DL (ref 13.1–17.5)
HGB UR QL STRIP.AUTO: NEGATIVE
IMM GRANULOCYTES # BLD: 0 10*3/MM3 (ref 0–0.03)
IMM GRANULOCYTES NFR BLD: 0 % (ref 0–0.6)
KETONES UR QL STRIP: NEGATIVE
LEUKOCYTE ESTERASE UR QL STRIP.AUTO: NEGATIVE
LYMPHOCYTES # BLD AUTO: 1.97 10*3/MM3 (ref 0.6–4.8)
LYMPHOCYTES NFR BLD AUTO: 35 % (ref 24–44)
MCH RBC QN AUTO: 25.2 PG (ref 27–31)
MCHC RBC AUTO-ENTMCNC: 32.3 G/DL (ref 32–36)
MCV RBC AUTO: 78 FL (ref 80–99)
MONOCYTES # BLD AUTO: 0.42 10*3/MM3 (ref 0–1)
MONOCYTES NFR BLD AUTO: 7.5 % (ref 0–12)
NEUTROPHILS # BLD AUTO: 2.7 10*3/MM3 (ref 1.5–8.3)
NEUTROPHILS NFR BLD AUTO: 47.9 % (ref 41–71)
NITRITE UR QL STRIP: NEGATIVE
PH UR STRIP.AUTO: 7 [PH] (ref 5–8)
PLATELET # BLD AUTO: 166 10*3/MM3 (ref 150–450)
PMV BLD AUTO: 9.9 FL (ref 6–12)
POTASSIUM BLD-SCNC: 4.9 MMOL/L (ref 3.5–5.5)
PROT SERPL-MCNC: 7.3 G/DL (ref 5.7–8.2)
PROT UR QL STRIP: NEGATIVE
RBC # BLD AUTO: 4.92 10*6/MM3 (ref 4.2–5.76)
SODIUM BLD-SCNC: 138 MMOL/L (ref 132–146)
SP GR UR STRIP: 1.01 (ref 1–1.03)
UROBILINOGEN UR QL STRIP: NORMAL
WBC NRBC COR # BLD: 5.63 10*3/MM3 (ref 3.5–10.8)

## 2018-08-16 PROCEDURE — 85007 BL SMEAR W/DIFF WBC COUNT: CPT

## 2018-08-16 PROCEDURE — 85025 COMPLETE CBC W/AUTO DIFF WBC: CPT

## 2018-08-16 PROCEDURE — 80053 COMPREHEN METABOLIC PANEL: CPT

## 2018-08-16 PROCEDURE — 82378 CARCINOEMBRYONIC ANTIGEN: CPT

## 2018-08-16 PROCEDURE — 81003 URINALYSIS AUTO W/O SCOPE: CPT

## 2018-08-16 PROCEDURE — 36415 COLL VENOUS BLD VENIPUNCTURE: CPT

## 2018-08-16 NOTE — PROGRESS NOTES
DATE OF VISIT: 08/17/18    REASON FOR VISIT: Followup for metastatic colon cancer.      HISTORY OF PRESENT ILLNESS: The patient is a very pleasant 58 y.o. male with past medical history significant for metastatic colon cancer diagnosed March 2007 . He is status post lower anterior resection as well as 1 cycle FOLFOX, stopped due to multiple toxicities. Final pathology showed poorly differentiated adenocarcinoma with 25 negative lymph nodes and clear surgical margins. Isolated tumor deposits was found in the pericolonic fat. Pathologic stage T2 N1c M0 stage IIIA disease.The patient has been followed by serial colonoscopies as well as CAT scans that did document pulmonary metastatic disease with left lower lobe nodule that is gradually increasing in size. Patient was doing fairly well until recently, 3 months ago, patient presented with low back pain. Patient had restaging workup that revealed hypermetabolic L2 vertebral body lesion. Patient had biopsy done on October 11, 2016 that revealed metastatic adenocarcinoma consistent of colon primary with CK 7 negative CK 20 positive CDX2 positive. Patient had next generation gene sequencing that revealed intermediate mutational load with microsatellite stability.  He started chemotherapy with Keytruda on 2/1/2017. The patient was changed to CapeOx with Avastin on 9/19/2017.  He completed 8 cycles on February 12, 2018.  He was started on Avastin with Xeloda maintenance.  He is here today for scheduled follow up visit with treatment, cycle #4.      SUBJECTIVE: The patient is here today by himself. He has been able to tolerate treatment fairly well. He complains of some lower back pain. He denies headaches, nausea, diarrhea and constipation. His congestion has improved since his last visit.  He had reduce his Xeloda to 1000 morning 500 mg evening.    PAST MEDICAL HISTORY/SOCIAL HISTORY/FAMILY HISTORY: Reviewed by me and unchanged from my documentation done on  07/06/18.    Review of Systems   Constitutional: Negative for activity change, appetite change, chills, fatigue, fever and unexpected weight change.   HENT: Negative for hearing loss, mouth sores, nosebleeds, sore throat and trouble swallowing.    Eyes: Negative for visual disturbance.   Respiratory: Negative for cough, chest tightness, shortness of breath and wheezing.    Cardiovascular: Negative for chest pain, palpitations and leg swelling.   Gastrointestinal: Negative for abdominal distention, abdominal pain, blood in stool, constipation, diarrhea, nausea, rectal pain and vomiting.   Endocrine: Negative for cold intolerance and heat intolerance.   Genitourinary: Negative for difficulty urinating, dysuria, frequency and urgency.   Musculoskeletal: Positive for back pain. Negative for arthralgias, gait problem, joint swelling and myalgias.   Skin: Negative for rash.   Neurological: Negative for dizziness, tremors, syncope, weakness, light-headedness, numbness and headaches.   Hematological: Negative for adenopathy. Does not bruise/bleed easily.   Psychiatric/Behavioral: Negative for confusion, sleep disturbance and suicidal ideas. The patient is not nervous/anxious.          Current Outpatient Prescriptions:   •  capecitabine (XELODA) 500 MG chemo tablet, Take 2 tablets by mouth in the morning and 2 tablets by mouth in the evening for 7 days on, then off for 7 days., Disp: 56 tablet, Rfl: 3  •  Cholecalciferol (VITAMIN D3) 5000 UNITS capsule capsule, Take 5,000 Units by mouth Daily., Disp: , Rfl:   •  lidocaine-prilocaine (EMLA) 2.5-2.5 % cream, Apply  topically As Needed (45-60 minutes prior to port access.  Cover with saran/plastic wrap.)., Disp: 30 g, Rfl: 3  •  azithromycin (ZITHROMAX Z-VERNA) 250 MG tablet, Take 2 tablets the first day, then 1 tablet daily for 4 days., Disp: 6 tablet, Rfl: 0  No current facility-administered medications for this visit.     Facility-Administered Medications Ordered in Other  "Visits:   •  bevacizumab (AVASTIN) 500 mg in sodium chloride 0.9 % 100 mL chemo IVPB, 500 mg, Intravenous, Once, Kristofer Rodriguez MD    PHYSICAL EXAMINATION:   /80   Pulse 56   Temp 97.8 °F (36.6 °C) (Temporal Artery )   Resp 18   Ht 167.6 cm (65.98\")   Wt 68.5 kg (151 lb 1.6 oz)   SpO2 100%   BMI 24.40 kg/m²    ECOG Performance Status: 1 - Symptomatic but completely ambulatory  General Appearance:  alert, cooperative, no apparent distress and appears stated age   Neurologic/Psychiatric: A&O x 3, gait steady, appropriate affect, strength 5/5 in all muscle groups   HEENT:  Normocephalic, without obvious abnormality, mucous membranes moist   Neck: Supple, symmetrical, trachea midline, no adenopathy;  No thyromegaly, masses, or tenderness   Lungs:   Clear to auscultation bilaterally; respirations regular, even, and unlabored bilaterally   Heart:  Regular rate and rhythm, no murmurs appreciated   Abdomen:   Soft, non-tender, non-distended and no organomegaly   Lymph nodes: No cervical, supraclavicular, inguinal or axillary adenopathy noted   Extremities: Normal, atraumatic; no clubbing, cyanosis, or edema    Skin: No rashes, ulcers, or suspicious lesions noted     Lab on 08/16/2018   Component Date Value Ref Range Status   • CEA 08/16/2018 1.90  0.00 - 2.50 ng/mL Final   • Glucose 08/16/2018 102* 70 - 100 mg/dL Final   • BUN 08/16/2018 15  9 - 23 mg/dL Final   • Creatinine 08/16/2018 0.83  0.60 - 1.30 mg/dL Final   • Sodium 08/16/2018 138  132 - 146 mmol/L Final   • Potassium 08/16/2018 4.9  3.5 - 5.5 mmol/L Final   • Chloride 08/16/2018 104  99 - 109 mmol/L Final   • CO2 08/16/2018 28.0  20.0 - 31.0 mmol/L Final   • Calcium 08/16/2018 9.2  8.7 - 10.4 mg/dL Final   • Total Protein 08/16/2018 7.3  5.7 - 8.2 g/dL Final   • Albumin 08/16/2018 4.35  3.20 - 4.80 g/dL Final   • ALT (SGPT) 08/16/2018 13  7 - 40 U/L Final   • AST (SGOT) 08/16/2018 23  0 - 33 U/L Final   • Alkaline Phosphatase 08/16/2018 66  25 - 100 " U/L Final   • Total Bilirubin 08/16/2018 1.0  0.3 - 1.2 mg/dL Final   • eGFR Non  Amer 08/16/2018 96  >60 mL/min/1.73 Final   • eGFR   Amer 08/16/2018 116  >60 mL/min/1.73 Final   • Globulin 08/16/2018 3.0  gm/dL Final   • A/G Ratio 08/16/2018 1.5  1.5 - 2.5 g/dL Final   • BUN/Creatinine Ratio 08/16/2018 18.1  7.0 - 25.0 Final   • Anion Gap 08/16/2018 6.0  3.0 - 11.0 mmol/L Final   • Color, UA 08/16/2018 Yellow  Yellow, Straw Final   • Appearance, UA 08/16/2018 Clear  Clear Final   • pH, UA 08/16/2018 7.0  5.0 - 8.0 Final   • Specific Gravity, UA 08/16/2018 1.013  1.001 - 1.030 Final   • Glucose, UA 08/16/2018 Negative  Negative Final   • Ketones, UA 08/16/2018 Negative  Negative Final   • Bilirubin, UA 08/16/2018 Negative  Negative Final   • Blood, UA 08/16/2018 Negative  Negative Final   • Protein, UA 08/16/2018 Negative  Negative Final   • Leuk Esterase, UA 08/16/2018 Negative  Negative Final   • Nitrite, UA 08/16/2018 Negative  Negative Final   • Urobilinogen, UA 08/16/2018 0.2 E.U./dL  0.2 - 1.0 E.U./dL Final   • WBC 08/16/2018 5.63  3.50 - 10.80 10*3/mm3 Final   • RBC 08/16/2018 4.92  4.20 - 5.76 10*6/mm3 Final   • Hemoglobin 08/16/2018 12.4* 13.1 - 17.5 g/dL Final   • Hematocrit 08/16/2018 38.4* 38.9 - 50.9 % Final   • MCV 08/16/2018 78.0* 80.0 - 99.0 fL Final   • MCH 08/16/2018 25.2* 27.0 - 31.0 pg Final   • MCHC 08/16/2018 32.3  32.0 - 36.0 g/dL Final   • RDW 08/16/2018 24.2* 11.3 - 14.5 % Final   • RDW-SD 08/16/2018 67.7* 37.0 - 54.0 fl Final   • MPV 08/16/2018 9.9  6.0 - 12.0 fL Final   • Platelets 08/16/2018 166  150 - 450 10*3/mm3 Final   • Neutrophil % 08/16/2018 47.9  41.0 - 71.0 % Final   • Lymphocyte % 08/16/2018 35.0  24.0 - 44.0 % Final   • Monocyte % 08/16/2018 7.5  0.0 - 12.0 % Final   • Eosinophil % 08/16/2018 9.4* 0.0 - 3.0 % Final   • Basophil % 08/16/2018 0.2  0.0 - 1.0 % Final   • Immature Grans % 08/16/2018 0.0  0.0 - 0.6 % Final   • Neutrophils, Absolute 08/16/2018 2.70   1.50 - 8.30 10*3/mm3 Final   • Lymphocytes, Absolute 08/16/2018 1.97  0.60 - 4.80 10*3/mm3 Final   • Monocytes, Absolute 08/16/2018 0.42  0.00 - 1.00 10*3/mm3 Final   • Eosinophils, Absolute 08/16/2018 0.53* 0.00 - 0.30 10*3/mm3 Final   • Basophils, Absolute 08/16/2018 0.01  0.00 - 0.20 10*3/mm3 Final   • Immature Grans, Absolute 08/16/2018 0.00  0.00 - 0.03 10*3/mm3 Final            ASSESSMENT: The patient is a very pleasant 58 y.o. male  with stage IV colon cancer.    PROBLEM LIST:  1. Currently stage IV colon cancer with lung and bony metastases.  K-edwige mutant, microsatellite stable, and intermediate mutational load.  2. Status post low anterior resection done by Dr. Young 2007, [id number]  3.  Attempted adjuvant chemotherapy for 1 cycle could not tolerate secondary to multiple toxicities.  4. Biopsy-proven L2 metastases done October 11, 2016. Status post CyberKnife radiation treatment.  5.  Started Keytruda 200 mg IV every 3 weeks on February 1, 2017, status post 10 cycles of treatment  6. Disease progression documented on PET scan completed 8/31/2017.   7. Treatment changed to Xeloda/Avastin/Oxaliplatin on 9/19/2017, status post 7 cycles.   8. Status post tumor debulking at L2 done by Dr. Frazier at [facility] on April 21, 2017  9.  The treatment was switched to Xeloda with Avastin maintenance treatment March 6, 2018  10.  Cancer related pain  11.  Opioid-induced constipation  12.  Treatment induced asthenia  13.  Mild depression  14.  Insomnia  15.  Chemotherapy-induced anemia  16.  Chemotherapy-induced dermatitis  17. Upper airway infection    PLAN:  1.  I will Proceed with Avastin today as scheduled.   2.  I will continue Xeloda 1000 mg a.m. and 500 mg p.m. 2 weeks on, 1 week off.  This is dose reduction secondary to dermatitis.  3. We will monitor the patient's labs throughout treatment including blood counts, kidney function, CEA, thyroid function, and liver functions. We will monitor his liver  enzymes that have been elevated secondary to treatment with immune-therapy.   4.  The patient will follow-up with me in 3 weeks with treatment.  5.  Patient will continue taking over-the-counter stool softeners for constipation.  6.  I again reviewed with the patient the potential side effects from immune therapy including dermatitis, hepatitis, perforation, bleeding, thrombosis, and potential death.  7.  Patient tissue was submitted for the matched trial.  He did match on PETN mutation, however that arm was closed.  8.  I advised the patient to continue to hold his alternative treatment with high dose vitamin C while he is on immunetherapy.  9. The patient will have repeat imaging prior to return.  10. The patient will continue Zofran as needed for treatment related nausea.  11. The patient will continue oxycodone 10 mg as needed for cancer-related pain.  We'll continue Celebrex 200 mg twice a day as needed.  He hasn't been needing any pain medicine lately.   12.  We offered Ritalin for treatment related fatigue, but the patient has declined at this time.   13.  Patient will continue Zaleplon as needed for insomnia.  14.  I will transfuse 2 units of blood if his hemoglobin drops below 8.  His anemia is mild and stable at this point.   15.  I will repeat scans and of September 2018.      Kristofer Rodriguez MD  08/17/18

## 2018-08-17 ENCOUNTER — OFFICE VISIT (OUTPATIENT)
Dept: ONCOLOGY | Facility: CLINIC | Age: 58
End: 2018-08-17

## 2018-08-17 ENCOUNTER — INFUSION (OUTPATIENT)
Dept: ONCOLOGY | Facility: HOSPITAL | Age: 58
End: 2018-08-17

## 2018-08-17 VITALS
HEIGHT: 66 IN | TEMPERATURE: 97.8 F | WEIGHT: 151.1 LBS | OXYGEN SATURATION: 100 % | BODY MASS INDEX: 24.28 KG/M2 | SYSTOLIC BLOOD PRESSURE: 141 MMHG | HEART RATE: 56 BPM | DIASTOLIC BLOOD PRESSURE: 80 MMHG | RESPIRATION RATE: 18 BRPM

## 2018-08-17 VITALS — DIASTOLIC BLOOD PRESSURE: 71 MMHG | SYSTOLIC BLOOD PRESSURE: 121 MMHG

## 2018-08-17 DIAGNOSIS — C18.7 MALIGNANT NEOPLASM OF SIGMOID COLON (HCC): Primary | ICD-10-CM

## 2018-08-17 DIAGNOSIS — C79.51 BONE METASTASIS: ICD-10-CM

## 2018-08-17 PROCEDURE — 96413 CHEMO IV INFUSION 1 HR: CPT

## 2018-08-17 PROCEDURE — 25010000002 BEVACIZUMAB PER 10 MG: Performed by: INTERNAL MEDICINE

## 2018-08-17 PROCEDURE — 25010000002 HEPARIN FLUSH (PORCINE) 100 UNIT/ML SOLUTION: Performed by: INTERNAL MEDICINE

## 2018-08-17 PROCEDURE — 99214 OFFICE O/P EST MOD 30 MIN: CPT | Performed by: INTERNAL MEDICINE

## 2018-08-17 RX ORDER — SODIUM CHLORIDE 9 MG/ML
250 INJECTION, SOLUTION INTRAVENOUS ONCE
Status: CANCELLED | OUTPATIENT
Start: 2018-08-17

## 2018-08-17 RX ORDER — SODIUM CHLORIDE 0.9 % (FLUSH) 0.9 %
10 SYRINGE (ML) INJECTION AS NEEDED
Status: CANCELLED | OUTPATIENT
Start: 2018-08-17

## 2018-08-17 RX ORDER — SODIUM CHLORIDE 9 MG/ML
250 INJECTION, SOLUTION INTRAVENOUS ONCE
Status: CANCELLED | OUTPATIENT
Start: 2018-09-07

## 2018-08-17 RX ADMIN — BEVACIZUMAB 500 MG: 400 INJECTION, SOLUTION INTRAVENOUS at 09:46

## 2018-08-17 RX ADMIN — HEPARIN 500 UNITS: 100 SYRINGE at 10:25

## 2018-08-20 ENCOUNTER — DOCUMENTATION (OUTPATIENT)
Dept: ONCOLOGY | Facility: CLINIC | Age: 58
End: 2018-08-20

## 2018-09-07 ENCOUNTER — INFUSION (OUTPATIENT)
Dept: ONCOLOGY | Facility: HOSPITAL | Age: 58
End: 2018-09-07

## 2018-09-07 VITALS
BODY MASS INDEX: 24.11 KG/M2 | TEMPERATURE: 97 F | HEART RATE: 64 BPM | DIASTOLIC BLOOD PRESSURE: 71 MMHG | HEIGHT: 66 IN | WEIGHT: 150 LBS | RESPIRATION RATE: 18 BRPM | SYSTOLIC BLOOD PRESSURE: 125 MMHG

## 2018-09-07 DIAGNOSIS — C79.51 BONE METASTASIS: ICD-10-CM

## 2018-09-07 DIAGNOSIS — C18.7 MALIGNANT NEOPLASM OF SIGMOID COLON (HCC): Primary | ICD-10-CM

## 2018-09-07 LAB
ALBUMIN SERPL-MCNC: 4.63 G/DL (ref 3.2–4.8)
ALBUMIN/GLOB SERPL: 1.5 G/DL (ref 1.5–2.5)
ALP SERPL-CCNC: 69 U/L (ref 25–100)
ALT SERPL W P-5'-P-CCNC: 16 U/L (ref 7–40)
ANION GAP SERPL CALCULATED.3IONS-SCNC: 8 MMOL/L (ref 3–11)
AST SERPL-CCNC: 32 U/L (ref 0–33)
BILIRUB SERPL-MCNC: 1.1 MG/DL (ref 0.3–1.2)
BILIRUB UR QL STRIP: NEGATIVE
BUN BLD-MCNC: 12 MG/DL (ref 9–23)
BUN/CREAT SERPL: 14.1 (ref 7–25)
CALCIUM SPEC-SCNC: 9.1 MG/DL (ref 8.7–10.4)
CEA SERPL-MCNC: 2.7 NG/ML (ref 0–2.5)
CHLORIDE SERPL-SCNC: 106 MMOL/L (ref 99–109)
CLARITY UR: CLEAR
CO2 SERPL-SCNC: 23 MMOL/L (ref 20–31)
COLOR UR: YELLOW
CREAT BLD-MCNC: 0.85 MG/DL (ref 0.6–1.3)
CREAT BLDA-MCNC: 0.8 MG/DL (ref 0.6–1.3)
ERYTHROCYTE [DISTWIDTH] IN BLOOD BY AUTOMATED COUNT: 26.9 % (ref 11.3–14.5)
GFR SERPL CREATININE-BSD FRML MDRD: 113 ML/MIN/1.73
GFR SERPL CREATININE-BSD FRML MDRD: 93 ML/MIN/1.73
GLOBULIN UR ELPH-MCNC: 3.1 GM/DL
GLUCOSE BLD-MCNC: 105 MG/DL (ref 70–100)
GLUCOSE UR STRIP-MCNC: NEGATIVE MG/DL
HCT VFR BLD AUTO: 39.2 % (ref 38.9–50.9)
HGB BLD-MCNC: 13 G/DL (ref 13.1–17.5)
HGB UR QL STRIP.AUTO: NEGATIVE
KETONES UR QL STRIP: NEGATIVE
LEUKOCYTE ESTERASE UR QL STRIP.AUTO: NEGATIVE
LYMPHOCYTES # BLD AUTO: 1.5 10*3/MM3 (ref 0.6–4.8)
LYMPHOCYTES NFR BLD AUTO: 27.7 % (ref 24–44)
MCH RBC QN AUTO: 26.3 PG (ref 27–31)
MCHC RBC AUTO-ENTMCNC: 33.1 G/DL (ref 32–36)
MCV RBC AUTO: 79.5 FL (ref 80–99)
MONOCYTES # BLD AUTO: 0.4 10*3/MM3 (ref 0–1)
MONOCYTES NFR BLD AUTO: 6.7 % (ref 0–12)
NEUTROPHILS # BLD AUTO: 3.5 10*3/MM3 (ref 1.5–8.3)
NEUTROPHILS NFR BLD AUTO: 65.6 % (ref 41–71)
NITRITE UR QL STRIP: NEGATIVE
PH UR STRIP.AUTO: 7 [PH] (ref 5–8)
PLATELET # BLD AUTO: 139 10*3/MM3 (ref 150–450)
PMV BLD AUTO: 7.7 FL (ref 6–12)
POTASSIUM BLD-SCNC: 4.8 MMOL/L (ref 3.5–5.5)
PROT SERPL-MCNC: 7.7 G/DL (ref 5.7–8.2)
PROT UR QL STRIP: NEGATIVE
RBC # BLD AUTO: 4.93 10*6/MM3 (ref 4.2–5.76)
SODIUM BLD-SCNC: 137 MMOL/L (ref 132–146)
SP GR UR STRIP: 1 (ref 1–1.03)
UROBILINOGEN UR QL STRIP: NORMAL
WBC NRBC COR # BLD: 5.3 10*3/MM3 (ref 3.5–10.8)

## 2018-09-07 PROCEDURE — 25010000002 BEVACIZUMAB PER 10 MG: Performed by: INTERNAL MEDICINE

## 2018-09-07 PROCEDURE — 82565 ASSAY OF CREATININE: CPT

## 2018-09-07 PROCEDURE — 96413 CHEMO IV INFUSION 1 HR: CPT

## 2018-09-07 PROCEDURE — 82378 CARCINOEMBRYONIC ANTIGEN: CPT

## 2018-09-07 PROCEDURE — 25010000002 HEPARIN FLUSH (PORCINE) 100 UNIT/ML SOLUTION: Performed by: INTERNAL MEDICINE

## 2018-09-07 PROCEDURE — 80053 COMPREHEN METABOLIC PANEL: CPT

## 2018-09-07 PROCEDURE — 36591 DRAW BLOOD OFF VENOUS DEVICE: CPT

## 2018-09-07 PROCEDURE — 85025 COMPLETE CBC W/AUTO DIFF WBC: CPT

## 2018-09-07 PROCEDURE — 81003 URINALYSIS AUTO W/O SCOPE: CPT

## 2018-09-07 RX ORDER — SODIUM CHLORIDE 0.9 % (FLUSH) 0.9 %
10 SYRINGE (ML) INJECTION AS NEEDED
Status: CANCELLED | OUTPATIENT
Start: 2018-09-07

## 2018-09-07 RX ORDER — SODIUM CHLORIDE 9 MG/ML
250 INJECTION, SOLUTION INTRAVENOUS ONCE
Status: COMPLETED | OUTPATIENT
Start: 2018-09-07 | End: 2018-09-07

## 2018-09-07 RX ADMIN — BEVACIZUMAB 500 MG: 400 INJECTION, SOLUTION INTRAVENOUS at 10:05

## 2018-09-07 RX ADMIN — SODIUM CHLORIDE 250 ML: 9 INJECTION, SOLUTION INTRAVENOUS at 10:06

## 2018-09-07 RX ADMIN — HEPARIN 500 UNITS: 100 SYRINGE at 10:46

## 2018-09-23 ENCOUNTER — LAB (OUTPATIENT)
Dept: LAB | Facility: HOSPITAL | Age: 58
End: 2018-09-23

## 2018-09-23 DIAGNOSIS — C18.7 MALIGNANT NEOPLASM OF SIGMOID COLON (HCC): ICD-10-CM

## 2018-09-23 DIAGNOSIS — C79.51 BONE METASTASIS: ICD-10-CM

## 2018-09-23 LAB
ALBUMIN SERPL-MCNC: 4.39 G/DL (ref 3.2–4.8)
ALBUMIN/GLOB SERPL: 1.6 G/DL (ref 1.5–2.5)
ALP SERPL-CCNC: 62 U/L (ref 25–100)
ALT SERPL W P-5'-P-CCNC: 16 U/L (ref 7–40)
ANION GAP SERPL CALCULATED.3IONS-SCNC: 4 MMOL/L (ref 3–11)
AST SERPL-CCNC: 24 U/L (ref 0–33)
BASOPHILS # BLD AUTO: 0.01 10*3/MM3 (ref 0–0.2)
BASOPHILS NFR BLD AUTO: 0.2 % (ref 0–1)
BILIRUB SERPL-MCNC: 0.6 MG/DL (ref 0.3–1.2)
BILIRUB UR QL STRIP: NEGATIVE
BUN BLD-MCNC: 17 MG/DL (ref 9–23)
BUN/CREAT SERPL: 19.5 (ref 7–25)
CALCIUM SPEC-SCNC: 9.2 MG/DL (ref 8.7–10.4)
CEA SERPL-MCNC: 2.3 NG/ML (ref 0–2.5)
CHLORIDE SERPL-SCNC: 103 MMOL/L (ref 99–109)
CLARITY UR: CLEAR
CO2 SERPL-SCNC: 30 MMOL/L (ref 20–31)
COLOR UR: YELLOW
CREAT BLD-MCNC: 0.87 MG/DL (ref 0.6–1.3)
DEPRECATED RDW RBC AUTO: 65.6 FL (ref 37–54)
EOSINOPHIL # BLD AUTO: 0.85 10*3/MM3 (ref 0–0.3)
EOSINOPHIL NFR BLD AUTO: 13.9 % (ref 0–3)
ERYTHROCYTE [DISTWIDTH] IN BLOOD BY AUTOMATED COUNT: 23.3 % (ref 11.3–14.5)
GFR SERPL CREATININE-BSD FRML MDRD: 110 ML/MIN/1.73
GFR SERPL CREATININE-BSD FRML MDRD: 91 ML/MIN/1.73
GLOBULIN UR ELPH-MCNC: 2.7 GM/DL
GLUCOSE BLD-MCNC: 88 MG/DL (ref 70–100)
GLUCOSE UR STRIP-MCNC: NEGATIVE MG/DL
HCT VFR BLD AUTO: 39.7 % (ref 38.9–50.9)
HGB BLD-MCNC: 12.8 G/DL (ref 13.1–17.5)
HGB UR QL STRIP.AUTO: NEGATIVE
IMM GRANULOCYTES # BLD: 0.01 10*3/MM3 (ref 0–0.03)
IMM GRANULOCYTES NFR BLD: 0.2 % (ref 0–0.6)
KETONES UR QL STRIP: NEGATIVE
LEUKOCYTE ESTERASE UR QL STRIP.AUTO: NEGATIVE
LYMPHOCYTES # BLD AUTO: 1.78 10*3/MM3 (ref 0.6–4.8)
LYMPHOCYTES NFR BLD AUTO: 29.1 % (ref 24–44)
MCH RBC QN AUTO: 24.8 PG (ref 27–31)
MCHC RBC AUTO-ENTMCNC: 32.2 G/DL (ref 32–36)
MCV RBC AUTO: 76.8 FL (ref 80–99)
MONOCYTES # BLD AUTO: 0.28 10*3/MM3 (ref 0–1)
MONOCYTES NFR BLD AUTO: 4.6 % (ref 0–12)
NEUTROPHILS # BLD AUTO: 3.2 10*3/MM3 (ref 1.5–8.3)
NEUTROPHILS NFR BLD AUTO: 52.2 % (ref 41–71)
NITRITE UR QL STRIP: NEGATIVE
PH UR STRIP.AUTO: 7 [PH] (ref 5–8)
PLATELET # BLD AUTO: 153 10*3/MM3 (ref 150–450)
PMV BLD AUTO: 9.7 FL (ref 6–12)
POTASSIUM BLD-SCNC: 4.6 MMOL/L (ref 3.5–5.5)
PROT SERPL-MCNC: 7.1 G/DL (ref 5.7–8.2)
PROT UR QL STRIP: NEGATIVE
RBC # BLD AUTO: 5.17 10*6/MM3 (ref 4.2–5.76)
SODIUM BLD-SCNC: 137 MMOL/L (ref 132–146)
SP GR UR STRIP: <=1.005 (ref 1–1.03)
UROBILINOGEN UR QL STRIP: NORMAL
WBC NRBC COR # BLD: 6.12 10*3/MM3 (ref 3.5–10.8)

## 2018-09-23 PROCEDURE — 85025 COMPLETE CBC W/AUTO DIFF WBC: CPT

## 2018-09-23 PROCEDURE — 80053 COMPREHEN METABOLIC PANEL: CPT

## 2018-09-23 PROCEDURE — 36415 COLL VENOUS BLD VENIPUNCTURE: CPT

## 2018-09-23 PROCEDURE — 81003 URINALYSIS AUTO W/O SCOPE: CPT

## 2018-09-23 PROCEDURE — 82378 CARCINOEMBRYONIC ANTIGEN: CPT

## 2018-09-25 ENCOUNTER — OFFICE VISIT (OUTPATIENT)
Dept: ONCOLOGY | Facility: CLINIC | Age: 58
End: 2018-09-25

## 2018-09-25 ENCOUNTER — INFUSION (OUTPATIENT)
Dept: ONCOLOGY | Facility: HOSPITAL | Age: 58
End: 2018-09-25

## 2018-09-25 VITALS
DIASTOLIC BLOOD PRESSURE: 78 MMHG | TEMPERATURE: 97.3 F | BODY MASS INDEX: 24.57 KG/M2 | OXYGEN SATURATION: 100 % | HEART RATE: 52 BPM | SYSTOLIC BLOOD PRESSURE: 132 MMHG | HEIGHT: 66 IN | RESPIRATION RATE: 18 BRPM | WEIGHT: 152.9 LBS

## 2018-09-25 DIAGNOSIS — C79.51 COLON CANCER METASTASIZED TO BONE (HCC): Primary | ICD-10-CM

## 2018-09-25 DIAGNOSIS — C79.51 BONE METASTASIS: ICD-10-CM

## 2018-09-25 DIAGNOSIS — C18.7 MALIGNANT NEOPLASM OF SIGMOID COLON (HCC): Primary | ICD-10-CM

## 2018-09-25 DIAGNOSIS — C18.7 MALIGNANT NEOPLASM OF SIGMOID COLON (HCC): ICD-10-CM

## 2018-09-25 DIAGNOSIS — C18.9 COLON CANCER METASTASIZED TO BONE (HCC): Primary | ICD-10-CM

## 2018-09-25 PROCEDURE — 25010000002 HEPARIN FLUSH (PORCINE) 100 UNIT/ML SOLUTION: Performed by: INTERNAL MEDICINE

## 2018-09-25 PROCEDURE — 25010000002 BEVACIZUMAB PER 10 MG: Performed by: INTERNAL MEDICINE

## 2018-09-25 PROCEDURE — 96413 CHEMO IV INFUSION 1 HR: CPT

## 2018-09-25 PROCEDURE — 99214 OFFICE O/P EST MOD 30 MIN: CPT | Performed by: INTERNAL MEDICINE

## 2018-09-25 RX ORDER — SODIUM CHLORIDE 0.9 % (FLUSH) 0.9 %
10 SYRINGE (ML) INJECTION AS NEEDED
Status: DISCONTINUED | OUTPATIENT
Start: 2018-09-25 | End: 2018-09-25 | Stop reason: HOSPADM

## 2018-09-25 RX ORDER — SODIUM CHLORIDE 9 MG/ML
250 INJECTION, SOLUTION INTRAVENOUS ONCE
Status: CANCELLED | OUTPATIENT
Start: 2019-01-25

## 2018-09-25 RX ORDER — SODIUM CHLORIDE 9 MG/ML
250 INJECTION, SOLUTION INTRAVENOUS ONCE
Status: COMPLETED | OUTPATIENT
Start: 2018-09-25 | End: 2018-09-25

## 2018-09-25 RX ORDER — SODIUM CHLORIDE 9 MG/ML
250 INJECTION, SOLUTION INTRAVENOUS ONCE
Status: CANCELLED | OUTPATIENT
Start: 2019-03-15

## 2018-09-25 RX ORDER — SODIUM CHLORIDE 9 MG/ML
250 INJECTION, SOLUTION INTRAVENOUS ONCE
Status: CANCELLED | OUTPATIENT
Start: 2018-09-28

## 2018-09-25 RX ORDER — SODIUM CHLORIDE 9 MG/ML
250 INJECTION, SOLUTION INTRAVENOUS ONCE
Status: CANCELLED | OUTPATIENT
Start: 2018-11-26

## 2018-09-25 RX ORDER — SODIUM CHLORIDE 9 MG/ML
250 INJECTION, SOLUTION INTRAVENOUS ONCE
Status: CANCELLED | OUTPATIENT
Start: 2018-11-09

## 2018-09-25 RX ORDER — SODIUM CHLORIDE 9 MG/ML
250 INJECTION, SOLUTION INTRAVENOUS ONCE
Status: CANCELLED | OUTPATIENT
Start: 2019-01-04

## 2018-09-25 RX ORDER — SODIUM CHLORIDE 9 MG/ML
250 INJECTION, SOLUTION INTRAVENOUS ONCE
Status: CANCELLED | OUTPATIENT
Start: 2018-10-19

## 2018-09-25 RX ADMIN — SODIUM CHLORIDE 250 ML: 9 INJECTION, SOLUTION INTRAVENOUS at 10:13

## 2018-09-25 RX ADMIN — HEPARIN 500 UNITS: 100 SYRINGE at 10:51

## 2018-09-25 RX ADMIN — BEVACIZUMAB 500 MG: 400 INJECTION, SOLUTION INTRAVENOUS at 10:13

## 2018-09-25 RX ADMIN — Medication 10 ML: at 10:51

## 2018-09-25 NOTE — PROGRESS NOTES
DATE OF VISIT: 09/25/18    REASON FOR VISIT: Followup for metastatic colon cancer.      HISTORY OF PRESENT ILLNESS: The patient is a very pleasant 58 y.o. male with past medical history significant for metastatic colon cancer diagnosed March 2007 . He is status post lower anterior resection as well as 1 cycle FOLFOX, stopped due to multiple toxicities. Final pathology showed poorly differentiated adenocarcinoma with 25 negative lymph nodes and clear surgical margins. Isolated tumor deposits was found in the pericolonic fat. Pathologic stage T2 N1c M0 stage IIIA disease.The patient has been followed by serial colonoscopies as well as CAT scans that did document pulmonary metastatic disease with left lower lobe nodule that is gradually increasing in size. Patient was doing fairly well until recently, 3 months ago, patient presented with low back pain. Patient had restaging workup that revealed hypermetabolic L2 vertebral body lesion. Patient had biopsy done on October 11, 2016 that revealed metastatic adenocarcinoma consistent of colon primary with CK 7 negative CK 20 positive CDX2 positive. Patient had next generation gene sequencing that revealed intermediate mutational load with microsatellite stability.  He started chemotherapy with Keytruda on 2/1/2017. The patient was changed to CapeOx with Avastin on 9/19/2017.  He completed 8 cycles on February 12, 2018.  He was started on Avastin with Xeloda maintenance.  He is here today for scheduled follow up visit with treatment, cycle #6.       SUBJECTIVE: The patient is here today by himself. He has been able to tolerate treatment fairly well since his decrease in Xeloda. He still complains of mild lower back pain and fatigue.     PAST MEDICAL HISTORY/SOCIAL HISTORY/FAMILY HISTORY: Reviewed by me and unchanged from my documentation done on 08/17/18.    Review of Systems   Constitutional: Positive for fatigue. Negative for activity change, appetite change, chills, fever  "and unexpected weight change.   HENT: Negative for hearing loss, mouth sores, nosebleeds, sore throat and trouble swallowing.    Eyes: Negative for visual disturbance.   Respiratory: Negative for cough, chest tightness, shortness of breath and wheezing.    Cardiovascular: Negative for chest pain, palpitations and leg swelling.   Gastrointestinal: Negative for abdominal distention, abdominal pain, blood in stool, constipation, diarrhea, nausea, rectal pain and vomiting.   Endocrine: Negative for cold intolerance and heat intolerance.   Genitourinary: Negative for difficulty urinating, dysuria, frequency and urgency.   Musculoskeletal: Positive for back pain. Negative for arthralgias, gait problem, joint swelling and myalgias.   Skin: Negative for rash.   Neurological: Negative for dizziness, tremors, syncope, weakness, light-headedness, numbness and headaches.   Hematological: Negative for adenopathy. Does not bruise/bleed easily.   Psychiatric/Behavioral: Negative for confusion, sleep disturbance and suicidal ideas. The patient is not nervous/anxious.          Current Outpatient Prescriptions:   •  capecitabine (XELODA) 500 MG chemo tablet, Take 2 tablets by mouth in the morning and 2 tablets by mouth in the evening for 7 days on, then off for 7 days., Disp: 56 tablet, Rfl: 3  •  Cholecalciferol (VITAMIN D3) 5000 UNITS capsule capsule, Take 5,000 Units by mouth Daily., Disp: , Rfl:   •  lidocaine-prilocaine (EMLA) 2.5-2.5 % cream, Apply  topically As Needed (45-60 minutes prior to port access.  Cover with saran/plastic wrap.)., Disp: 30 g, Rfl: 3    PHYSICAL EXAMINATION:   /78   Pulse 52   Temp 97.3 °F (36.3 °C) (Temporal Artery )   Resp 18   Ht 167.6 cm (65.98\")   Wt 69.4 kg (152 lb 14.4 oz)   SpO2 100% Comment: RA  BMI 24.69 kg/m²    ECOG Performance Status: 1 - Symptomatic but completely ambulatory  General Appearance:  alert, cooperative, no apparent distress and appears stated age "   Neurologic/Psychiatric: A&O x 3, gait steady, appropriate affect, strength 5/5 in all muscle groups   HEENT:  Normocephalic, without obvious abnormality, mucous membranes moist   Neck: Supple, symmetrical, trachea midline, no adenopathy;  No thyromegaly, masses, or tenderness   Lungs:   Clear to auscultation bilaterally; respirations regular, even, and unlabored bilaterally   Heart:  Regular rate and rhythm, no murmurs appreciated   Abdomen:   Soft, non-tender, non-distended and no organomegaly   Lymph nodes: No cervical, supraclavicular, inguinal or axillary adenopathy noted   Extremities: Normal, atraumatic; no clubbing, cyanosis, or edema    Skin: No rashes, ulcers, or suspicious lesions noted     Lab on 09/23/2018   Component Date Value Ref Range Status   • CEA 09/23/2018 2.30  0.00 - 2.50 ng/mL Final   • Glucose 09/23/2018 88  70 - 100 mg/dL Final   • BUN 09/23/2018 17  9 - 23 mg/dL Final   • Creatinine 09/23/2018 0.87  0.60 - 1.30 mg/dL Final   • Sodium 09/23/2018 137  132 - 146 mmol/L Final   • Potassium 09/23/2018 4.6  3.5 - 5.5 mmol/L Final   • Chloride 09/23/2018 103  99 - 109 mmol/L Final   • CO2 09/23/2018 30.0  20.0 - 31.0 mmol/L Final   • Calcium 09/23/2018 9.2  8.7 - 10.4 mg/dL Final   • Total Protein 09/23/2018 7.1  5.7 - 8.2 g/dL Final   • Albumin 09/23/2018 4.39  3.20 - 4.80 g/dL Final   • ALT (SGPT) 09/23/2018 16  7 - 40 U/L Final   • AST (SGOT) 09/23/2018 24  0 - 33 U/L Final   • Alkaline Phosphatase 09/23/2018 62  25 - 100 U/L Final   • Total Bilirubin 09/23/2018 0.6  0.3 - 1.2 mg/dL Final   • eGFR Non  Amer 09/23/2018 91  >60 mL/min/1.73 Final   • eGFR   Amer 09/23/2018 110  >60 mL/min/1.73 Final   • Globulin 09/23/2018 2.7  gm/dL Final   • A/G Ratio 09/23/2018 1.6  1.5 - 2.5 g/dL Final   • BUN/Creatinine Ratio 09/23/2018 19.5  7.0 - 25.0 Final   • Anion Gap 09/23/2018 4.0  3.0 - 11.0 mmol/L Final   • Color, UA 09/23/2018 Yellow  Yellow, Straw Final   • Appearance, UA 09/23/2018  Clear  Clear Final   • pH, UA 09/23/2018 7.0  5.0 - 8.0 Final   • Specific Gravity, UA 09/23/2018 <=1.005  1.001 - 1.030 Final   • Glucose, UA 09/23/2018 Negative  Negative Final   • Ketones, UA 09/23/2018 Negative  Negative Final   • Bilirubin, UA 09/23/2018 Negative  Negative Final   • Blood, UA 09/23/2018 Negative  Negative Final   • Protein, UA 09/23/2018 Negative  Negative Final   • Leuk Esterase, UA 09/23/2018 Negative  Negative Final   • Nitrite, UA 09/23/2018 Negative  Negative Final   • Urobilinogen, UA 09/23/2018 0.2 E.U./dL  0.2 - 1.0 E.U./dL Final   • WBC 09/23/2018 6.12  3.50 - 10.80 10*3/mm3 Final   • RBC 09/23/2018 5.17  4.20 - 5.76 10*6/mm3 Final   • Hemoglobin 09/23/2018 12.8* 13.1 - 17.5 g/dL Final   • Hematocrit 09/23/2018 39.7  38.9 - 50.9 % Final   • MCV 09/23/2018 76.8* 80.0 - 99.0 fL Final   • MCH 09/23/2018 24.8* 27.0 - 31.0 pg Final   • MCHC 09/23/2018 32.2  32.0 - 36.0 g/dL Final   • RDW 09/23/2018 23.3* 11.3 - 14.5 % Final   • RDW-SD 09/23/2018 65.6* 37.0 - 54.0 fl Final   • MPV 09/23/2018 9.7  6.0 - 12.0 fL Final   • Platelets 09/23/2018 153  150 - 450 10*3/mm3 Final   • Neutrophil % 09/23/2018 52.2  41.0 - 71.0 % Final   • Lymphocyte % 09/23/2018 29.1  24.0 - 44.0 % Final   • Monocyte % 09/23/2018 4.6  0.0 - 12.0 % Final   • Eosinophil % 09/23/2018 13.9* 0.0 - 3.0 % Final   • Basophil % 09/23/2018 0.2  0.0 - 1.0 % Final   • Immature Grans % 09/23/2018 0.2  0.0 - 0.6 % Final   • Neutrophils, Absolute 09/23/2018 3.20  1.50 - 8.30 10*3/mm3 Final   • Lymphocytes, Absolute 09/23/2018 1.78  0.60 - 4.80 10*3/mm3 Final   • Monocytes, Absolute 09/23/2018 0.28  0.00 - 1.00 10*3/mm3 Final   • Eosinophils, Absolute 09/23/2018 0.85* 0.00 - 0.30 10*3/mm3 Final   • Basophils, Absolute 09/23/2018 0.01  0.00 - 0.20 10*3/mm3 Final   • Immature Grans, Absolute 09/23/2018 0.01  0.00 - 0.03 10*3/mm3 Final            ASSESSMENT: The patient is a very pleasant 58 y.o. male  with stage IV colon  cancer.    PROBLEM LIST:  1. Currently stage IV colon cancer with lung and bony metastases.  K-edwige mutant, microsatellite stable, and intermediate mutational load.  2. Status post low anterior resection done by Dr. Young 2007, [id number]  3.  Attempted adjuvant chemotherapy for 1 cycle could not tolerate secondary to multiple toxicities.  4. Biopsy-proven L2 metastases done October 11, 2016. Status post CyberKnife radiation treatment.  5.  Started Keytruda 200 mg IV every 3 weeks on February 1, 2017, status post 10 cycles of treatment  6. Disease progression documented on PET scan completed 8/31/2017.   7. Treatment changed to Xeloda/Avastin/Oxaliplatin on 9/19/2017, status post 7 cycles.   8. Status post tumor debulking at L2 done by Dr. Frazier at [facility] on April 21, 2017  9.  The treatment was switched to Xeloda with Avastin maintenance treatment March 6, 2018  10.  Cancer related pain  11.  Opioid-induced constipation  12.  Treatment induced asthenia  13.  Mild depression  14.  Insomnia  15.  Chemotherapy-induced anemia  16.  Chemotherapy-induced dermatitis  17. Upper airway infection    PLAN:  1.  I will Proceed with Avastin today as scheduled, cycle #6.   2.  I will continue Xeloda 500 mg a.m. and 500 mg p.m. 2 weeks on, 1 week off.  This is dose reduction secondary to dermatitis.  He could not tolerate any doses.  3. We will monitor the patient's labs throughout treatment including blood counts, kidney function, CEA, thyroid function, and liver functions. We will monitor his liver enzymes that have been elevated secondary to treatment with immune-therapy.   4.  The patient will follow-up with me in 3 weeks with treatment.  5.  Patient will continue taking over-the-counter stool softeners for constipation.  6.  I again reviewed with the patient the potential side effects from immune therapy including dermatitis, hepatitis, perforation, bleeding, thrombosis, and potential death.  7.  Patient tissue  was submitted for the matched trial.  He did match on PETN mutation, however that arm was closed.  8.  I advised the patient to continue to hold his alternative treatment with high dose vitamin C while he is on immunetherapy.  9. The patient will have repeat imaging prior to return.  10. The patient will continue Zofran as needed for treatment related nausea.  11. The patient will continue oxycodone 10 mg as needed for cancer-related pain.  We'll continue Celebrex 200 mg twice a day as needed.  He hasn't been needing any pain medicine lately.   12.  We offered Ritalin for treatment related fatigue, but the patient has declined at this time.   13.  Patient will continue Zaleplon as needed for insomnia.  14.  I will transfuse 2 units of blood if his hemoglobin drops below 8.  His anemia is mild and stable at this point.   15.  I will repeat scans prior to return.      Kristofer Rodriguez MD  09/25/18

## 2018-10-02 ENCOUNTER — APPOINTMENT (OUTPATIENT)
Dept: PET IMAGING | Facility: HOSPITAL | Age: 58
End: 2018-10-02
Attending: INTERNAL MEDICINE

## 2018-10-12 ENCOUNTER — HOSPITAL ENCOUNTER (OUTPATIENT)
Dept: PET IMAGING | Facility: HOSPITAL | Age: 58
Discharge: HOME | End: 2018-10-12
Attending: INTERNAL MEDICINE

## 2018-10-12 ENCOUNTER — HOSPITAL ENCOUNTER (OUTPATIENT)
Dept: PET IMAGING | Facility: HOSPITAL | Age: 58
Discharge: HOME | End: 2018-10-12
Attending: INTERNAL MEDICINE | Admitting: INTERNAL MEDICINE

## 2018-10-12 DIAGNOSIS — C18.7 MALIGNANT NEOPLASM OF SIGMOID COLON (HCC): ICD-10-CM

## 2018-10-12 LAB — GLUCOSE BLDC GLUCOMTR-MCNC: 91 MG/DL (ref 70–130)

## 2018-10-12 PROCEDURE — 0 FLUDEOXYGLUCOSE F18 SOLUTION: Performed by: INTERNAL MEDICINE

## 2018-10-12 PROCEDURE — A9552 F18 FDG: HCPCS | Performed by: INTERNAL MEDICINE

## 2018-10-12 PROCEDURE — 82962 GLUCOSE BLOOD TEST: CPT

## 2018-10-12 PROCEDURE — 78816 PET IMAGE W/CT FULL BODY: CPT

## 2018-10-12 RX ADMIN — FLUDEOXYGLUCOSE F18 1 DOSE: 300 INJECTION INTRAVENOUS at 10:10

## 2018-10-16 ENCOUNTER — OFFICE VISIT (OUTPATIENT)
Dept: ONCOLOGY | Facility: CLINIC | Age: 58
End: 2018-10-16

## 2018-10-16 ENCOUNTER — INFUSION (OUTPATIENT)
Dept: ONCOLOGY | Facility: HOSPITAL | Age: 58
End: 2018-10-16

## 2018-10-16 ENCOUNTER — LAB (OUTPATIENT)
Dept: LAB | Facility: HOSPITAL | Age: 58
End: 2018-10-16

## 2018-10-16 VITALS
BODY MASS INDEX: 24.44 KG/M2 | RESPIRATION RATE: 18 BRPM | WEIGHT: 152.1 LBS | HEART RATE: 61 BPM | TEMPERATURE: 98.2 F | OXYGEN SATURATION: 99 % | HEIGHT: 66 IN | DIASTOLIC BLOOD PRESSURE: 73 MMHG | SYSTOLIC BLOOD PRESSURE: 117 MMHG

## 2018-10-16 DIAGNOSIS — C79.51 COLON CANCER METASTASIZED TO BONE (HCC): Primary | ICD-10-CM

## 2018-10-16 DIAGNOSIS — C79.51 BONE METASTASIS: ICD-10-CM

## 2018-10-16 DIAGNOSIS — C18.7 MALIGNANT NEOPLASM OF SIGMOID COLON (HCC): ICD-10-CM

## 2018-10-16 DIAGNOSIS — C18.7 MALIGNANT NEOPLASM OF SIGMOID COLON (HCC): Primary | ICD-10-CM

## 2018-10-16 DIAGNOSIS — C18.9 COLON CANCER METASTASIZED TO BONE (HCC): Primary | ICD-10-CM

## 2018-10-16 LAB
ALBUMIN SERPL-MCNC: 4.49 G/DL (ref 3.2–4.8)
ALBUMIN/GLOB SERPL: 1.6 G/DL (ref 1.5–2.5)
ALP SERPL-CCNC: 67 U/L (ref 25–100)
ALT SERPL W P-5'-P-CCNC: 17 U/L (ref 7–40)
ANION GAP SERPL CALCULATED.3IONS-SCNC: 7 MMOL/L (ref 3–11)
AST SERPL-CCNC: 25 U/L (ref 0–33)
BILIRUB SERPL-MCNC: 0.5 MG/DL (ref 0.3–1.2)
BILIRUB UR QL STRIP: NEGATIVE
BUN BLD-MCNC: 12 MG/DL (ref 9–23)
BUN/CREAT SERPL: 14.6 (ref 7–25)
CALCIUM SPEC-SCNC: 9.3 MG/DL (ref 8.7–10.4)
CEA SERPL-MCNC: 2.2 NG/ML (ref 0–2.5)
CHLORIDE SERPL-SCNC: 102 MMOL/L (ref 99–109)
CLARITY UR: CLEAR
CO2 SERPL-SCNC: 28 MMOL/L (ref 20–31)
COLOR UR: YELLOW
CREAT BLD-MCNC: 0.82 MG/DL (ref 0.6–1.3)
CREAT BLDA-MCNC: 0.7 MG/DL (ref 0.6–1.3)
ERYTHROCYTE [DISTWIDTH] IN BLOOD BY AUTOMATED COUNT: 25.7 % (ref 11.3–14.5)
GFR SERPL CREATININE-BSD FRML MDRD: 118 ML/MIN/1.73
GFR SERPL CREATININE-BSD FRML MDRD: 97 ML/MIN/1.73
GLOBULIN UR ELPH-MCNC: 2.8 GM/DL
GLUCOSE BLD-MCNC: 112 MG/DL (ref 70–100)
GLUCOSE UR STRIP-MCNC: NEGATIVE MG/DL
HCT VFR BLD AUTO: 40.5 % (ref 38.9–50.9)
HGB BLD-MCNC: 13.3 G/DL (ref 13.1–17.5)
HGB UR QL STRIP.AUTO: NEGATIVE
KETONES UR QL STRIP: NEGATIVE
LEUKOCYTE ESTERASE UR QL STRIP.AUTO: NEGATIVE
LYMPHOCYTES # BLD AUTO: 1.3 10*3/MM3 (ref 0.6–4.8)
LYMPHOCYTES NFR BLD AUTO: 25.6 % (ref 24–44)
MCH RBC QN AUTO: 25.7 PG (ref 27–31)
MCHC RBC AUTO-ENTMCNC: 32.8 G/DL (ref 32–36)
MCV RBC AUTO: 78.2 FL (ref 80–99)
MONOCYTES # BLD AUTO: 0.3 10*3/MM3 (ref 0–1)
MONOCYTES NFR BLD AUTO: 5.6 % (ref 0–12)
NEUTROPHILS # BLD AUTO: 3.6 10*3/MM3 (ref 1.5–8.3)
NEUTROPHILS NFR BLD AUTO: 68.8 % (ref 41–71)
NITRITE UR QL STRIP: NEGATIVE
PH UR STRIP.AUTO: 7 [PH] (ref 5–8)
PLATELET # BLD AUTO: 180 10*3/MM3 (ref 150–450)
PMV BLD AUTO: 7 FL (ref 6–12)
POTASSIUM BLD-SCNC: 4.5 MMOL/L (ref 3.5–5.5)
PROT SERPL-MCNC: 7.3 G/DL (ref 5.7–8.2)
PROT UR QL STRIP: NEGATIVE
RBC # BLD AUTO: 5.17 10*6/MM3 (ref 4.2–5.76)
SODIUM BLD-SCNC: 137 MMOL/L (ref 132–146)
SP GR UR STRIP: 1 (ref 1–1.03)
UROBILINOGEN UR QL STRIP: NORMAL
WBC NRBC COR # BLD: 5.2 10*3/MM3 (ref 3.5–10.8)

## 2018-10-16 PROCEDURE — 25010000002 BEVACIZUMAB PER 10 MG: Performed by: INTERNAL MEDICINE

## 2018-10-16 PROCEDURE — 80053 COMPREHEN METABOLIC PANEL: CPT

## 2018-10-16 PROCEDURE — 90661 CCIIV3 VAC ABX FR 0.5 ML IM: CPT | Performed by: INTERNAL MEDICINE

## 2018-10-16 PROCEDURE — G0008 ADMIN INFLUENZA VIRUS VAC: HCPCS | Performed by: INTERNAL MEDICINE

## 2018-10-16 PROCEDURE — 36415 COLL VENOUS BLD VENIPUNCTURE: CPT

## 2018-10-16 PROCEDURE — 85025 COMPLETE CBC W/AUTO DIFF WBC: CPT

## 2018-10-16 PROCEDURE — 81003 URINALYSIS AUTO W/O SCOPE: CPT

## 2018-10-16 PROCEDURE — 99215 OFFICE O/P EST HI 40 MIN: CPT | Performed by: INTERNAL MEDICINE

## 2018-10-16 PROCEDURE — 82378 CARCINOEMBRYONIC ANTIGEN: CPT

## 2018-10-16 PROCEDURE — 82565 ASSAY OF CREATININE: CPT

## 2018-10-16 PROCEDURE — 96413 CHEMO IV INFUSION 1 HR: CPT

## 2018-10-16 PROCEDURE — 25010000002 INFLUENZA VAC SUBUNIT QUAD 0.5 ML SUSPENSION PREFILLED SYRINGE: Performed by: INTERNAL MEDICINE

## 2018-10-16 PROCEDURE — 96372 THER/PROPH/DIAG INJ SC/IM: CPT

## 2018-10-16 RX ORDER — SODIUM CHLORIDE 0.9 % (FLUSH) 0.9 %
10 SYRINGE (ML) INJECTION AS NEEDED
Status: DISCONTINUED | OUTPATIENT
Start: 2018-10-16 | End: 2018-10-16 | Stop reason: HOSPADM

## 2018-10-16 RX ORDER — SODIUM CHLORIDE 9 MG/ML
250 INJECTION, SOLUTION INTRAVENOUS ONCE
Status: DISCONTINUED | OUTPATIENT
Start: 2018-10-16 | End: 2018-10-16 | Stop reason: HOSPADM

## 2018-10-16 RX ORDER — SODIUM CHLORIDE 0.9 % (FLUSH) 0.9 %
10 SYRINGE (ML) INJECTION AS NEEDED
Status: CANCELLED | OUTPATIENT
Start: 2018-10-16

## 2018-10-16 RX ADMIN — BEVACIZUMAB 500 MG: 400 INJECTION, SOLUTION INTRAVENOUS at 10:21

## 2018-10-16 RX ADMIN — Medication 10 ML: at 10:54

## 2018-10-16 RX ADMIN — INFLUENZA A VIRUS A/SINGAPORE/GP1908/2015 IVR-180 (H1N1) ANTIGEN (MDCK CELL DERIVED, PROPIOLACTONE INACTIVATED), INFLUENZA A VIRUS A/NORTH CAROLINA/04/2016 (H3N2) HEMAGGLUTININ ANTIGEN (MDCK CELL DERIVED, PROPIOLACTONE INACTIVATED), INFLUENZA B VIRUS B/IOWA/06/2017 HEMAGGLUTININ ANTIGEN (MDCK CELL DERIVED, PROPIOLACTONE INACTIVATED), INFLUENZA B VIRUS B/SINGAPORE/INFTT-16-0610/2016 HEMAGGLUTININ ANTIGEN (MDCK CELL DERIVED, PROPIOLACTONE INACTIVATED) 0.5 ML: 15; 15; 15; 15 INJECTION, SUSPENSION INTRAMUSCULAR at 10:24

## 2018-10-16 RX ADMIN — HEPARIN 500 UNITS: 100 SYRINGE at 10:55

## 2018-10-16 NOTE — PROGRESS NOTES
DATE OF VISIT: 10/16/18    REASON FOR VISIT: Followup for metastatic colon cancer.      HISTORY OF PRESENT ILLNESS: The patient is a very pleasant 58 y.o. male with past medical history significant for metastatic colon cancer diagnosed March 2007 . He is status post lower anterior resection as well as 1 cycle FOLFOX, stopped due to multiple toxicities. Final pathology showed poorly differentiated adenocarcinoma with 25 negative lymph nodes and clear surgical margins. Isolated tumor deposits was found in the pericolonic fat. Pathologic stage T2 N1c M0 stage IIIA disease.The patient has been followed by serial colonoscopies as well as CAT scans that did document pulmonary metastatic disease with left lower lobe nodule that is gradually increasing in size. Patient was doing fairly well until recently, 3 months ago, patient presented with low back pain. Patient had restaging workup that revealed hypermetabolic L2 vertebral body lesion. Patient had biopsy done on October 11, 2016 that revealed metastatic adenocarcinoma consistent of colon primary with CK 7 negative CK 20 positive CDX2 positive. Patient had next generation gene sequencing that revealed intermediate mutational load with microsatellite stability.  He started chemotherapy with Keytruda on 2/1/2017. The patient was changed to CapeOx with Avastin on 9/19/2017.  He completed 8 cycles on February 12, 2018.  He was started on Avastin with Xeloda maintenance.  He is here today for scheduled follow up visit with treatment, cycle #7.       SUBJECTIVE: The patient is here today with his wife. He denies headaches, nausea and dizziness. He complains of fatigue post treatment. He has no other complaints today. He does not need any refills.     PAST MEDICAL HISTORY/SOCIAL HISTORY/FAMILY HISTORY: Reviewed by me and unchanged from my documentation done on 09/25/18.    Review of Systems   Constitutional: Positive for fatigue. Negative for activity change, appetite change,  chills, fever and unexpected weight change.   HENT: Negative for hearing loss, mouth sores, nosebleeds, sore throat and trouble swallowing.    Eyes: Negative for visual disturbance.   Respiratory: Negative for cough, chest tightness, shortness of breath and wheezing.    Cardiovascular: Negative for chest pain, palpitations and leg swelling.   Gastrointestinal: Negative for abdominal distention, abdominal pain, blood in stool, constipation, diarrhea, nausea, rectal pain and vomiting.   Endocrine: Negative for cold intolerance and heat intolerance.   Genitourinary: Negative for difficulty urinating, dysuria, frequency and urgency.   Musculoskeletal: Negative for arthralgias, gait problem, joint swelling and myalgias.   Skin: Negative for rash.   Neurological: Negative for dizziness, tremors, syncope, weakness, light-headedness, numbness and headaches.   Hematological: Negative for adenopathy. Does not bruise/bleed easily.   Psychiatric/Behavioral: Negative for confusion, sleep disturbance and suicidal ideas. The patient is not nervous/anxious.          Current Outpatient Prescriptions:   •  capecitabine (XELODA) 500 MG chemo tablet, Take 2 tablets by mouth in the morning and 2 tablets by mouth in the evening for 7 days on, then off for 7 days., Disp: 56 tablet, Rfl: 3  •  Cholecalciferol (VITAMIN D3) 5000 UNITS capsule capsule, Take 5,000 Units by mouth Daily., Disp: , Rfl:   •  lidocaine-prilocaine (EMLA) 2.5-2.5 % cream, Apply  topically As Needed (45-60 minutes prior to port access.  Cover with saran/plastic wrap.)., Disp: 30 g, Rfl: 3  No current facility-administered medications for this visit.     Facility-Administered Medications Ordered in Other Visits:   •  bevacizumab (AVASTIN) 500 mg in sodium chloride 0.9 % 100 mL chemo IVPB, 500 mg, Intravenous, Once, Kristofer Rodriguez MD  •  heparin flush (porcine) 100 UNIT/ML injection 500 Units, 500 Units, Intravenous, PRN, Kristofer Rodriguez MD  •  Influenza Vac Subunit  "Quad (FLUCELVAX) injection 0.5 mL, 0.5 mL, Intramuscular, Once, Kristofer Rodriguez MD  •  sodium chloride 0.9 % flush 10 mL, 10 mL, Intravenous, PRN, Kristofer Rodriguez MD  •  sodium chloride 0.9 % infusion 250 mL, 250 mL, Intravenous, Once, Kristofer Rodriguez MD    PHYSICAL EXAMINATION:   /73   Pulse 61   Temp 98.2 °F (36.8 °C) (Temporal Artery )   Resp 18   Ht 167.6 cm (65.98\")   Wt 69 kg (152 lb 1.6 oz)   SpO2 99% Comment: RA  BMI 24.56 kg/m²    ECOG Performance Status: 1 - Symptomatic but completely ambulatory  General Appearance:  alert, cooperative, no apparent distress and appears stated age   Neurologic/Psychiatric: A&O x 3, gait steady, appropriate affect, strength 5/5 in all muscle groups   HEENT:  Normocephalic, without obvious abnormality, mucous membranes moist   Neck: Supple, symmetrical, trachea midline, no adenopathy;  No thyromegaly, masses, or tenderness   Lungs:   Clear to auscultation bilaterally; respirations regular, even, and unlabored bilaterally   Heart:  Regular rate and rhythm, no murmurs appreciated   Abdomen:   Soft, non-tender, non-distended and no organomegaly   Lymph nodes: No cervical, supraclavicular, inguinal or axillary adenopathy noted   Extremities: Normal, atraumatic; no clubbing, cyanosis, or edema    Skin: No rashes, ulcers, or suspicious lesions noted     Infusion on 10/16/2018   Component Date Value Ref Range Status   • Creatinine 10/16/2018 0.70  0.60 - 1.30 mg/dL Final    Serial Number: [id number]:  [id number]   Lab on 10/16/2018   Component Date Value Ref Range Status   • CEA 10/16/2018 2.20  0.00 - 2.50 ng/mL Final   • Glucose 10/16/2018 112* 70 - 100 mg/dL Final   • BUN 10/16/2018 12  9 - 23 mg/dL Final   • Creatinine 10/16/2018 0.82  0.60 - 1.30 mg/dL Final   • Sodium 10/16/2018 137  132 - 146 mmol/L Final   • Potassium 10/16/2018 4.5  3.5 - 5.5 mmol/L Final   • Chloride 10/16/2018 102  99 - 109 mmol/L Final   • CO2 10/16/2018 28.0  20.0 - 31.0 mmol/L Final   • " Calcium 10/16/2018 9.3  8.7 - 10.4 mg/dL Final   • Total Protein 10/16/2018 7.3  5.7 - 8.2 g/dL Final   • Albumin 10/16/2018 4.49  3.20 - 4.80 g/dL Final   • ALT (SGPT) 10/16/2018 17  7 - 40 U/L Final   • AST (SGOT) 10/16/2018 25  0 - 33 U/L Final   • Alkaline Phosphatase 10/16/2018 67  25 - 100 U/L Final   • Total Bilirubin 10/16/2018 0.5  0.3 - 1.2 mg/dL Final   • eGFR Non African Amer 10/16/2018 97  >60 mL/min/1.73 Final   • eGFR   Amer 10/16/2018 118  >60 mL/min/1.73 Final   • Globulin 10/16/2018 2.8  gm/dL Final   • A/G Ratio 10/16/2018 1.6  1.5 - 2.5 g/dL Final   • BUN/Creatinine Ratio 10/16/2018 14.6  7.0 - 25.0 Final   • Anion Gap 10/16/2018 7.0  3.0 - 11.0 mmol/L Final   • Color, UA 10/16/2018 Yellow  Yellow, Straw Final   • Appearance, UA 10/16/2018 Clear  Clear Final   • pH, UA 10/16/2018 7.0  5.0 - 8.0 Final   • Specific Gravity, UA 10/16/2018 1.005  1.005 - 1.030 Final   • Glucose, UA 10/16/2018 Negative  Negative Final   • Ketones, UA 10/16/2018 Negative  Negative Final   • Bilirubin, UA 10/16/2018 Negative  Negative Final   • Blood, UA 10/16/2018 Negative  Negative Final   • Protein, UA 10/16/2018 Negative  Negative Final   • Leuk Esterase, UA 10/16/2018 Negative  Negative Final   • Nitrite, UA 10/16/2018 Negative  Negative Final   • Urobilinogen, UA 10/16/2018 0.2 E.U./dL  0.2 - 1.0 E.U./dL Final   • WBC 10/16/2018 5.20  3.50 - 10.80 10*3/mm3 Final   • RBC 10/16/2018 5.17  4.20 - 5.76 10*6/mm3 Final   • Hemoglobin 10/16/2018 13.3  13.1 - 17.5 g/dL Final   • Hematocrit 10/16/2018 40.5  38.9 - 50.9 % Final   • RDW 10/16/2018 25.7* 11.3 - 14.5 % Final   • MCV 10/16/2018 78.2* 80.0 - 99.0 fL Final   • MCH 10/16/2018 25.7* 27.0 - 31.0 pg Final   • MCHC 10/16/2018 32.8  32.0 - 36.0 g/dL Final   • MPV 10/16/2018 7.0  6.0 - 12.0 fL Final   • Platelets 10/16/2018 180  150 - 450 10*3/mm3 Final   • Neutrophil % 10/16/2018 68.8  41.0 - 71.0 % Final   • Lymphocyte % 10/16/2018 25.6  24.0 - 44.0 % Final   •  Monocyte % 10/16/2018 5.6  0.0 - 12.0 % Final   • Neutrophils, Absolute 10/16/2018 3.60  1.50 - 8.30 10*3/mm3 Final   • Lymphocytes, Absolute 10/16/2018 1.30  0.60 - 4.80 10*3/mm3 Final   • Monocytes, Absolute 10/16/2018 0.30  0.00 - 1.00 10*3/mm3 Final   Hospital Outpatient Visit on 10/12/2018   Component Date Value Ref Range Status   • Glucose 10/12/2018 91  70 - 130 mg/dL Final            ASSESSMENT: The patient is a very pleasant 58 y.o. male  with stage IV colon cancer.    PROBLEM LIST:  1. Currently stage IV colon cancer with lung and bony metastases.  K-edwige mutant, microsatellite stable, and intermediate mutational load.  2. Status post low anterior resection done by Dr. Young 2007, [id number]  3.  Attempted adjuvant chemotherapy for 1 cycle could not tolerate secondary to multiple toxicities.  4. Biopsy-proven L2 metastases done October 11, 2016. Status post CyberKnife radiation treatment.  5.  Started Keytruda 200 mg IV every 3 weeks on February 1, 2017, status post 10 cycles of treatment  6. Disease progression documented on PET scan completed 8/31/2017.   7. Treatment changed to Xeloda/Avastin/Oxaliplatin on 9/19/2017, status post 7 cycles.   8. Status post tumor debulking at L2 done by Dr. Frazier at [facility] on April 21, 2017  9.  The treatment was switched to Xeloda with Avastin maintenance treatment March 6, 2018  10.  Cancer related pain  11.  Opioid-induced constipation  12.  Treatment induced asthenia  13.  Mild depression  14.  Insomnia  15.  Chemotherapy-induced anemia  16.  Chemotherapy-induced dermatitis  17. Upper airway infection    PLAN:  1.  I will Proceed with Avastin today as scheduled, cycle #7.   2.  I will continue Xeloda 500 mg a.m. and 500 mg p.m. 2 weeks on, 1 week off.  This is dose reduction secondary to dermatitis.  He could not tolerate any doses.  3. We will monitor the patient's labs throughout treatment including blood counts, kidney function, CEA, thyroid function,  and liver functions. We will monitor his liver enzymes that have been elevated secondary to treatment with immune-therapy.   4.  The patient will follow-up with me in 6 weeks with treatment.  5.  Patient will continue taking over-the-counter stool softeners for constipation.  6.  I again reviewed with the patient the potential side effects from immune therapy including dermatitis, hepatitis, perforation, bleeding, thrombosis, and potential death.  7.  Patient tissue was submitted for the matched trial.  He did match on PETN mutation, however that arm was closed.  8.  I advised the patient to continue to hold his alternative treatment with high dose vitamin C while he is on immunetherapy.  9. The patient will have repeat imaging prior to return.  10. The patient will continue Zofran as needed for treatment related nausea.  11. The patient will continue oxycodone 10 mg as needed for cancer-related pain.  We'll continue Celebrex 200 mg twice a day as needed.  He hasn't been needing any pain medicine lately.   12.  We offered Ritalin for treatment related fatigue, but the patient has declined at this time.   13.  Patient will continue Zaleplon as needed for insomnia.  14.  I will transfuse 2 units of blood if his hemoglobin drops below 8.  His anemia is mild and stable at this point.   15.  I did go over the scan results with the patient and his wife, I reviewed the films myself, I went over the pictures with them, I reassured him his disease is essentially stable.  The minimal change in the left lower lung nodule could be treatment related.  I will repeat scans in 6 month which would be due April 2019.  We'll consider doing it sonner if his CEA level starts to increase or if he has new symptoms..      Kristofer Rodriguez MD  10/16/18

## 2018-10-29 ENCOUNTER — SPECIALTY PHARMACY (OUTPATIENT)
Dept: ONCOLOGY | Facility: HOSPITAL | Age: 58
End: 2018-10-29

## 2018-10-29 DIAGNOSIS — C79.51 BONE METASTASIS: ICD-10-CM

## 2018-10-29 DIAGNOSIS — C18.7 MALIGNANT NEOPLASM OF SIGMOID COLON (HCC): ICD-10-CM

## 2018-10-29 RX ORDER — CAPECITABINE 500 MG/1
TABLET, FILM COATED ORAL
Qty: 56 TABLET | Refills: 5 | Status: SHIPPED | OUTPATIENT
Start: 2018-10-29 | End: 2019-03-15 | Stop reason: SDUPTHER

## 2018-11-06 ENCOUNTER — INFUSION (OUTPATIENT)
Dept: ONCOLOGY | Facility: HOSPITAL | Age: 58
End: 2018-11-06

## 2018-11-06 VITALS
BODY MASS INDEX: 24.75 KG/M2 | SYSTOLIC BLOOD PRESSURE: 118 MMHG | DIASTOLIC BLOOD PRESSURE: 74 MMHG | WEIGHT: 154 LBS | HEART RATE: 77 BPM | RESPIRATION RATE: 16 BRPM | HEIGHT: 66 IN | TEMPERATURE: 97.9 F

## 2018-11-06 DIAGNOSIS — C18.9 COLON CANCER METASTASIZED TO BONE (HCC): ICD-10-CM

## 2018-11-06 DIAGNOSIS — C18.7 MALIGNANT NEOPLASM OF SIGMOID COLON (HCC): Primary | ICD-10-CM

## 2018-11-06 DIAGNOSIS — C79.51 COLON CANCER METASTASIZED TO BONE (HCC): ICD-10-CM

## 2018-11-06 DIAGNOSIS — C79.51 BONE METASTASIS: ICD-10-CM

## 2018-11-06 LAB
ALBUMIN SERPL-MCNC: 4.36 G/DL (ref 3.2–4.8)
ALBUMIN/GLOB SERPL: 1.5 G/DL (ref 1.5–2.5)
ALP SERPL-CCNC: 60 U/L (ref 25–100)
ALT SERPL W P-5'-P-CCNC: 12 U/L (ref 7–40)
ANION GAP SERPL CALCULATED.3IONS-SCNC: 6 MMOL/L (ref 3–11)
AST SERPL-CCNC: 23 U/L (ref 0–33)
BILIRUB SERPL-MCNC: 0.7 MG/DL (ref 0.3–1.2)
BILIRUB UR QL STRIP: NEGATIVE
BUN BLD-MCNC: 13 MG/DL (ref 9–23)
BUN/CREAT SERPL: 14.9 (ref 7–25)
CALCIUM SPEC-SCNC: 8.7 MG/DL (ref 8.7–10.4)
CEA SERPL-MCNC: 2.4 NG/ML (ref 0–2.5)
CHLORIDE SERPL-SCNC: 105 MMOL/L (ref 99–109)
CLARITY UR: CLEAR
CO2 SERPL-SCNC: 24 MMOL/L (ref 20–31)
COLOR UR: YELLOW
CREAT BLD-MCNC: 0.87 MG/DL (ref 0.6–1.3)
CREAT BLDA-MCNC: 0.8 MG/DL (ref 0.6–1.3)
ERYTHROCYTE [DISTWIDTH] IN BLOOD BY AUTOMATED COUNT: 24.8 % (ref 11.3–14.5)
GFR SERPL CREATININE-BSD FRML MDRD: 110 ML/MIN/1.73
GFR SERPL CREATININE-BSD FRML MDRD: 91 ML/MIN/1.73
GLOBULIN UR ELPH-MCNC: 2.8 GM/DL
GLUCOSE BLD-MCNC: 142 MG/DL (ref 70–100)
GLUCOSE UR STRIP-MCNC: NEGATIVE MG/DL
HCT VFR BLD AUTO: 40 % (ref 38.9–50.9)
HGB BLD-MCNC: 12.8 G/DL (ref 13.1–17.5)
HGB UR QL STRIP.AUTO: NEGATIVE
KETONES UR QL STRIP: NEGATIVE
LEUKOCYTE ESTERASE UR QL STRIP.AUTO: NEGATIVE
LYMPHOCYTES # BLD AUTO: 1.6 10*3/MM3 (ref 0.6–4.8)
LYMPHOCYTES NFR BLD AUTO: 27.3 % (ref 24–44)
MCH RBC QN AUTO: 25.4 PG (ref 27–31)
MCHC RBC AUTO-ENTMCNC: 32.1 G/DL (ref 32–36)
MCV RBC AUTO: 79 FL (ref 80–99)
MONOCYTES # BLD AUTO: 0.5 10*3/MM3 (ref 0–1)
MONOCYTES NFR BLD AUTO: 8.1 % (ref 0–12)
NEUTROPHILS # BLD AUTO: 3.7 10*3/MM3 (ref 1.5–8.3)
NEUTROPHILS NFR BLD AUTO: 64.6 % (ref 41–71)
NITRITE UR QL STRIP: NEGATIVE
PH UR STRIP.AUTO: 6.5 [PH] (ref 5–8)
PLATELET # BLD AUTO: 141 10*3/MM3 (ref 150–450)
PMV BLD AUTO: 8.1 FL (ref 6–12)
POTASSIUM BLD-SCNC: 4.4 MMOL/L (ref 3.5–5.5)
PROT SERPL-MCNC: 7.2 G/DL (ref 5.7–8.2)
PROT UR QL STRIP: NEGATIVE
RBC # BLD AUTO: 5.06 10*6/MM3 (ref 4.2–5.76)
SODIUM BLD-SCNC: 135 MMOL/L (ref 132–146)
SP GR UR STRIP: 1.01 (ref 1–1.03)
UROBILINOGEN UR QL STRIP: NORMAL
WBC NRBC COR # BLD: 5.8 10*3/MM3 (ref 3.5–10.8)

## 2018-11-06 PROCEDURE — 80053 COMPREHEN METABOLIC PANEL: CPT

## 2018-11-06 PROCEDURE — 96413 CHEMO IV INFUSION 1 HR: CPT

## 2018-11-06 PROCEDURE — 82565 ASSAY OF CREATININE: CPT

## 2018-11-06 PROCEDURE — 36591 DRAW BLOOD OFF VENOUS DEVICE: CPT

## 2018-11-06 PROCEDURE — 85025 COMPLETE CBC W/AUTO DIFF WBC: CPT

## 2018-11-06 PROCEDURE — 25010000002 BEVACIZUMAB PER 10 MG: Performed by: INTERNAL MEDICINE

## 2018-11-06 PROCEDURE — 81003 URINALYSIS AUTO W/O SCOPE: CPT

## 2018-11-06 PROCEDURE — 82378 CARCINOEMBRYONIC ANTIGEN: CPT

## 2018-11-06 RX ORDER — SODIUM CHLORIDE 0.9 % (FLUSH) 0.9 %
10 SYRINGE (ML) INJECTION AS NEEDED
Status: DISCONTINUED | OUTPATIENT
Start: 2018-11-06 | End: 2018-11-06 | Stop reason: HOSPADM

## 2018-11-06 RX ORDER — SODIUM CHLORIDE 9 MG/ML
250 INJECTION, SOLUTION INTRAVENOUS ONCE
Status: COMPLETED | OUTPATIENT
Start: 2018-11-06 | End: 2018-11-06

## 2018-11-06 RX ORDER — SODIUM CHLORIDE 0.9 % (FLUSH) 0.9 %
10 SYRINGE (ML) INJECTION AS NEEDED
Status: CANCELLED | OUTPATIENT
Start: 2018-11-06

## 2018-11-06 RX ADMIN — SODIUM CHLORIDE 250 ML: 9 INJECTION, SOLUTION INTRAVENOUS at 09:47

## 2018-11-06 RX ADMIN — Medication 10 ML: at 10:27

## 2018-11-06 RX ADMIN — BEVACIZUMAB 500 MG: 400 INJECTION, SOLUTION INTRAVENOUS at 09:47

## 2018-11-06 RX ADMIN — HEPARIN 500 UNITS: 100 SYRINGE at 10:27

## 2018-11-26 ENCOUNTER — OFFICE VISIT (OUTPATIENT)
Dept: ONCOLOGY | Facility: CLINIC | Age: 58
End: 2018-11-26

## 2018-11-26 ENCOUNTER — INFUSION (OUTPATIENT)
Dept: ONCOLOGY | Facility: HOSPITAL | Age: 58
End: 2018-11-26

## 2018-11-26 VITALS
WEIGHT: 155.9 LBS | OXYGEN SATURATION: 100 % | RESPIRATION RATE: 18 BRPM | TEMPERATURE: 97.6 F | SYSTOLIC BLOOD PRESSURE: 133 MMHG | HEART RATE: 78 BPM | DIASTOLIC BLOOD PRESSURE: 69 MMHG | BODY MASS INDEX: 25.05 KG/M2 | HEIGHT: 66 IN

## 2018-11-26 DIAGNOSIS — C79.51 BONE METASTASIS: ICD-10-CM

## 2018-11-26 DIAGNOSIS — C18.9 COLON CANCER METASTASIZED TO BONE (HCC): Primary | ICD-10-CM

## 2018-11-26 DIAGNOSIS — C79.51 COLON CANCER METASTASIZED TO BONE (HCC): Primary | ICD-10-CM

## 2018-11-26 DIAGNOSIS — C18.7 MALIGNANT NEOPLASM OF SIGMOID COLON (HCC): Primary | ICD-10-CM

## 2018-11-26 DIAGNOSIS — C18.7 MALIGNANT NEOPLASM OF SIGMOID COLON (HCC): ICD-10-CM

## 2018-11-26 LAB
ALBUMIN SERPL-MCNC: 4.55 G/DL (ref 3.2–4.8)
ALBUMIN/GLOB SERPL: 1.5 G/DL (ref 1.5–2.5)
ALP SERPL-CCNC: 62 U/L (ref 25–100)
ALT SERPL W P-5'-P-CCNC: 18 U/L (ref 7–40)
ANION GAP SERPL CALCULATED.3IONS-SCNC: 9 MMOL/L (ref 3–11)
AST SERPL-CCNC: 27 U/L (ref 0–33)
BILIRUB SERPL-MCNC: 0.6 MG/DL (ref 0.3–1.2)
BILIRUB UR QL STRIP: NEGATIVE
BUN BLD-MCNC: 12 MG/DL (ref 9–23)
BUN/CREAT SERPL: 15.6 (ref 7–25)
CALCIUM SPEC-SCNC: 9.3 MG/DL (ref 8.7–10.4)
CEA SERPL-MCNC: 2.3 NG/ML (ref 0–2.5)
CHLORIDE SERPL-SCNC: 104 MMOL/L (ref 99–109)
CLARITY UR: CLEAR
CO2 SERPL-SCNC: 27 MMOL/L (ref 20–31)
COLOR UR: YELLOW
CREAT BLD-MCNC: 0.77 MG/DL (ref 0.6–1.3)
CREAT BLDA-MCNC: 0.8 MG/DL
CREAT BLDA-MCNC: 0.8 MG/DL (ref 0.6–1.3)
ERYTHROCYTE [DISTWIDTH] IN BLOOD BY AUTOMATED COUNT: 25.4 % (ref 11.3–14.5)
GFR SERPL CREATININE-BSD FRML MDRD: 105 ML/MIN/1.73
GFR SERPL CREATININE-BSD FRML MDRD: 127 ML/MIN/1.73
GLOBULIN UR ELPH-MCNC: 3 GM/DL
GLUCOSE BLD-MCNC: 72 MG/DL (ref 70–100)
GLUCOSE UR STRIP-MCNC: NEGATIVE MG/DL
HCT VFR BLD AUTO: 40.4 % (ref 38.9–50.9)
HGB BLD-MCNC: 12.9 G/DL (ref 13.1–17.5)
HGB UR QL STRIP.AUTO: NEGATIVE
KETONES UR QL STRIP: NEGATIVE
LEUKOCYTE ESTERASE UR QL STRIP.AUTO: NEGATIVE
LYMPHOCYTES # BLD AUTO: 1.5 10*3/MM3 (ref 0.6–4.8)
LYMPHOCYTES NFR BLD AUTO: 25.3 % (ref 24–44)
MCH RBC QN AUTO: 24.8 PG (ref 27–31)
MCHC RBC AUTO-ENTMCNC: 31.8 G/DL (ref 32–36)
MCV RBC AUTO: 77.9 FL (ref 80–99)
MONOCYTES # BLD AUTO: 0.2 10*3/MM3 (ref 0–1)
MONOCYTES NFR BLD AUTO: 3.8 % (ref 0–12)
NEUTROPHILS # BLD AUTO: 4.1 10*3/MM3 (ref 1.5–8.3)
NEUTROPHILS NFR BLD AUTO: 70.9 % (ref 41–71)
NITRITE UR QL STRIP: NEGATIVE
PH UR STRIP.AUTO: 5 [PH] (ref 5–8)
PLATELET # BLD AUTO: 170 10*3/MM3 (ref 150–450)
PMV BLD AUTO: 8 FL (ref 6–12)
POTASSIUM BLD-SCNC: 4.1 MMOL/L (ref 3.5–5.5)
PROT SERPL-MCNC: 7.5 G/DL (ref 5.7–8.2)
PROT UR QL STRIP: NEGATIVE
RBC # BLD AUTO: 5.19 10*6/MM3 (ref 4.2–5.76)
SODIUM BLD-SCNC: 140 MMOL/L (ref 132–146)
SP GR UR STRIP: 1 (ref 1–1.03)
UROBILINOGEN UR QL STRIP: NORMAL
WBC NRBC COR # BLD: 5.8 10*3/MM3 (ref 3.5–10.8)

## 2018-11-26 PROCEDURE — 80053 COMPREHEN METABOLIC PANEL: CPT

## 2018-11-26 PROCEDURE — 96413 CHEMO IV INFUSION 1 HR: CPT

## 2018-11-26 PROCEDURE — 82565 ASSAY OF CREATININE: CPT

## 2018-11-26 PROCEDURE — 99215 OFFICE O/P EST HI 40 MIN: CPT | Performed by: INTERNAL MEDICINE

## 2018-11-26 PROCEDURE — 25010000002 BEVACIZUMAB 100 MG/4ML SOLUTION 4 ML VIAL: Performed by: INTERNAL MEDICINE

## 2018-11-26 PROCEDURE — 25010000002 BEVACIZUMAB PER 10 MG: Performed by: INTERNAL MEDICINE

## 2018-11-26 PROCEDURE — 82378 CARCINOEMBRYONIC ANTIGEN: CPT

## 2018-11-26 PROCEDURE — 85025 COMPLETE CBC W/AUTO DIFF WBC: CPT

## 2018-11-26 PROCEDURE — 81003 URINALYSIS AUTO W/O SCOPE: CPT

## 2018-11-26 RX ORDER — SODIUM CHLORIDE 0.9 % (FLUSH) 0.9 %
10 SYRINGE (ML) INJECTION AS NEEDED
Status: DISCONTINUED | OUTPATIENT
Start: 2018-11-26 | End: 2018-11-26 | Stop reason: HOSPADM

## 2018-11-26 RX ORDER — SODIUM CHLORIDE 9 MG/ML
250 INJECTION, SOLUTION INTRAVENOUS ONCE
Status: DISCONTINUED | OUTPATIENT
Start: 2018-11-26 | End: 2018-11-26 | Stop reason: HOSPADM

## 2018-11-26 RX ORDER — SODIUM CHLORIDE 0.9 % (FLUSH) 0.9 %
10 SYRINGE (ML) INJECTION AS NEEDED
Status: CANCELLED | OUTPATIENT
Start: 2018-11-26

## 2018-11-26 RX ADMIN — HEPARIN 500 UNITS: 100 SYRINGE at 11:18

## 2018-11-26 RX ADMIN — SODIUM CHLORIDE, PRESERVATIVE FREE 10 ML: 5 INJECTION INTRAVENOUS at 11:18

## 2018-11-26 RX ADMIN — BEVACIZUMAB 500 MG: 400 INJECTION, SOLUTION INTRAVENOUS at 10:43

## 2018-11-26 NOTE — PROGRESS NOTES
DATE OF VISIT: 10/16/18    REASON FOR VISIT: Followup for metastatic colon cancer.      HISTORY OF PRESENT ILLNESS: The patient is a very pleasant 58 y.o. male with past medical history significant for metastatic colon cancer diagnosed March 2007 . He is status post lower anterior resection as well as 1 cycle FOLFOX, stopped due to multiple toxicities. Final pathology showed poorly differentiated adenocarcinoma with 25 negative lymph nodes and clear surgical margins. Isolated tumor deposits was found in the pericolonic fat. Pathologic stage T2 N1c M0 stage IIIA disease.The patient has been followed by serial colonoscopies as well as CAT scans that did document pulmonary metastatic disease with left lower lobe nodule that is gradually increasing in size. Patient was doing fairly well until recently, 3 months ago, patient presented with low back pain. Patient had restaging workup that revealed hypermetabolic L2 vertebral body lesion. Patient had biopsy done on October 11, 2016 that revealed metastatic adenocarcinoma consistent of colon primary with CK 7 negative CK 20 positive CDX2 positive. Patient had next generation gene sequencing that revealed intermediate mutational load with microsatellite stability.  He started chemotherapy with Keytruda on 2/1/2017. The patient was changed to CapeOx with Avastin on 9/19/2017.  He completed 8 cycles on February 12, 2018.  He was started on Avastin with Xeloda maintenance.  He is here today for scheduled follow up visit with treatment, cycle #9.       SUBJECTIVE: The patient is here today by himself.  He has been able to tolerate chemotherapy fairly well.  He is down on the Xeloda 500 mg twice a day secondary to dermatitis.  He is requesting to change his Avastin therapy to 4-6 weeks secondary to travel issues.    PAST MEDICAL HISTORY/SOCIAL HISTORY/FAMILY HISTORY: Reviewed by me and unchanged from my documentation done on 09/25/18.    Review of Systems   Constitutional:  "Positive for fatigue. Negative for activity change, appetite change, chills, fever and unexpected weight change.   HENT: Negative for hearing loss, mouth sores, nosebleeds, sore throat and trouble swallowing.    Eyes: Negative for visual disturbance.   Respiratory: Negative for cough, chest tightness, shortness of breath and wheezing.    Cardiovascular: Negative for chest pain, palpitations and leg swelling.   Gastrointestinal: Negative for abdominal distention, abdominal pain, blood in stool, constipation, diarrhea, nausea, rectal pain and vomiting.   Endocrine: Negative for cold intolerance and heat intolerance.   Genitourinary: Negative for difficulty urinating, dysuria, frequency and urgency.   Musculoskeletal: Negative for arthralgias, gait problem, joint swelling and myalgias.   Skin: Negative for rash.   Neurological: Negative for dizziness, tremors, syncope, weakness, light-headedness, numbness and headaches.   Hematological: Negative for adenopathy. Does not bruise/bleed easily.   Psychiatric/Behavioral: Negative for confusion, sleep disturbance and suicidal ideas. The patient is not nervous/anxious.          Current Outpatient Medications:   •  capecitabine (XELODA) 500 MG chemo tablet, Take 2 tablets by mouth in the morning and 2 tablets by mouth in the evening for 7 days on, then off for 7 days., Disp: 56 tablet, Rfl: 5  •  Cholecalciferol (VITAMIN D3) 5000 UNITS capsule capsule, Take 5,000 Units by mouth Daily., Disp: , Rfl:   •  lidocaine-prilocaine (EMLA) 2.5-2.5 % cream, Apply  topically As Needed (45-60 minutes prior to port access.  Cover with saran/plastic wrap.)., Disp: 30 g, Rfl: 3    PHYSICAL EXAMINATION:   /69   Pulse 78   Temp 97.6 °F (36.4 °C) (Temporal)   Resp 18   Ht 167.6 cm (65.98\")   Wt 70.7 kg (155 lb 14.4 oz)   SpO2 100% Comment: RA  BMI 25.18 kg/m²    ECOG Performance Status: 1 - Symptomatic but completely ambulatory  General Appearance:  alert, cooperative, no apparent " distress and appears stated age   Neurologic/Psychiatric: A&O x 3, gait steady, appropriate affect, strength 5/5 in all muscle groups   HEENT:  Normocephalic, without obvious abnormality, mucous membranes moist   Neck: Supple, symmetrical, trachea midline, no adenopathy;  No thyromegaly, masses, or tenderness   Lungs:   Clear to auscultation bilaterally; respirations regular, even, and unlabored bilaterally   Heart:  Regular rate and rhythm, no murmurs appreciated   Abdomen:   Soft, non-tender, non-distended and no organomegaly   Lymph nodes: No cervical, supraclavicular, inguinal or axillary adenopathy noted   Extremities: Normal, atraumatic; no clubbing, cyanosis, or edema    Skin: No rashes, ulcers, or suspicious lesions noted     No visits with results within 2 Week(s) from this visit.   Latest known visit with results is:   Infusion on 11/06/2018   Component Date Value Ref Range Status   • CEA 11/06/2018 2.40  0.00 - 2.50 ng/mL Final   • Glucose 11/06/2018 142* 70 - 100 mg/dL Final   • BUN 11/06/2018 13  9 - 23 mg/dL Final   • Creatinine 11/06/2018 0.87  0.60 - 1.30 mg/dL Final   • Sodium 11/06/2018 135  132 - 146 mmol/L Final   • Potassium 11/06/2018 4.4  3.5 - 5.5 mmol/L Final   • Chloride 11/06/2018 105  99 - 109 mmol/L Final   • CO2 11/06/2018 24.0  20.0 - 31.0 mmol/L Final   • Calcium 11/06/2018 8.7  8.7 - 10.4 mg/dL Final   • Total Protein 11/06/2018 7.2  5.7 - 8.2 g/dL Final   • Albumin 11/06/2018 4.36  3.20 - 4.80 g/dL Final   • ALT (SGPT) 11/06/2018 12  7 - 40 U/L Final   • AST (SGOT) 11/06/2018 23  0 - 33 U/L Final   • Alkaline Phosphatase 11/06/2018 60  25 - 100 U/L Final   • Total Bilirubin 11/06/2018 0.7  0.3 - 1.2 mg/dL Final   • eGFR Non African Amer 11/06/2018 91  >60 mL/min/1.73 Final   • eGFR  African Amer 11/06/2018 110  >60 mL/min/1.73 Final   • Globulin 11/06/2018 2.8  gm/dL Final   • A/G Ratio 11/06/2018 1.5  1.5 - 2.5 g/dL Final   • BUN/Creatinine Ratio 11/06/2018 14.9  7.0 - 25.0 Final    • Anion Gap 11/06/2018 6.0  3.0 - 11.0 mmol/L Final   • Color, UA 11/06/2018 Yellow  Yellow, Straw Final   • Appearance, UA 11/06/2018 Clear  Clear Final   • pH, UA 11/06/2018 6.5  5.0 - 8.0 Final   • Specific Gravity, UA 11/06/2018 1.010  1.005 - 1.030 Final   • Glucose, UA 11/06/2018 Negative  Negative Final   • Ketones, UA 11/06/2018 Negative  Negative Final   • Bilirubin, UA 11/06/2018 Negative  Negative Final   • Blood, UA 11/06/2018 Negative  Negative Final   • Protein, UA 11/06/2018 Negative  Negative Final   • Leuk Esterase, UA 11/06/2018 Negative  Negative Final   • Nitrite, UA 11/06/2018 Negative  Negative Final   • Urobilinogen, UA 11/06/2018 0.2 E.U./dL  0.2 - 1.0 E.U./dL Final   • WBC 11/06/2018 5.80  3.50 - 10.80 10*3/mm3 Final   • RBC 11/06/2018 5.06  4.20 - 5.76 10*6/mm3 Final   • Hemoglobin 11/06/2018 12.8* 13.1 - 17.5 g/dL Final   • Hematocrit 11/06/2018 40.0  38.9 - 50.9 % Final   • RDW 11/06/2018 24.8* 11.3 - 14.5 % Final   • MCV 11/06/2018 79.0* 80.0 - 99.0 fL Final   • MCH 11/06/2018 25.4* 27.0 - 31.0 pg Final   • MCHC 11/06/2018 32.1  32.0 - 36.0 g/dL Final   • MPV 11/06/2018 8.1  6.0 - 12.0 fL Final   • Platelets 11/06/2018 141* 150 - 450 10*3/mm3 Final   • Neutrophil % 11/06/2018 64.6  41.0 - 71.0 % Final   • Lymphocyte % 11/06/2018 27.3  24.0 - 44.0 % Final   • Monocyte % 11/06/2018 8.1  0.0 - 12.0 % Final   • Neutrophils, Absolute 11/06/2018 3.70  1.50 - 8.30 10*3/mm3 Final   • Lymphocytes, Absolute 11/06/2018 1.60  0.60 - 4.80 10*3/mm3 Final   • Monocytes, Absolute 11/06/2018 0.50  0.00 - 1.00 10*3/mm3 Final   • Creatinine 11/06/2018 0.80  0.60 - 1.30 mg/dL Final    Serial Number: [id number]:  [id number]            ASSESSMENT: The patient is a very pleasant 58 y.o. male  with stage IV colon cancer.    PROBLEM LIST:  1. Currently stage IV colon cancer with lung and bony metastases.  K-edwige mutant, microsatellite stable, and intermediate mutational load.  2. Status post low anterior  resection done by Dr. Young 2007, [id number]  3.  Attempted adjuvant chemotherapy for 1 cycle could not tolerate secondary to multiple toxicities.  4. Biopsy-proven L2 metastases done October 11, 2016. Status post CyberKnife radiation treatment.  5.  Started Keytruda 200 mg IV every 3 weeks on February 1, 2017, status post 10 cycles of treatment  6. Disease progression documented on PET scan completed 8/31/2017.   7. Treatment changed to Xeloda/Avastin/Oxaliplatin on 9/19/2017, status post 7 cycles.   8. Status post tumor debulking at L2 done by Dr. Frazier at [facility] on April 21, 2017  9.  The treatment was switched to Xeloda with Avastin maintenance treatment March 6, 2018  10.  Cancer related pain  11.  Opioid-induced constipation  12.  Treatment induced asthenia  13.  Mild depression  14.  Insomnia  15.  Chemotherapy-induced anemia  16.  Chemotherapy-induced dermatitis  17. Upper airway infection    PLAN:  1.  I will Proceed with Avastin today as scheduled, cycle #9.   2.  I will continue Xeloda 500 mg a.m. and 500 mg p.m. 1 weeks on, 1 week off.  This is dose reduction secondary to dermatitis.  He could not tolerate any doses.  3. We will monitor the patient's labs throughout treatment including blood counts, kidney function, CEA, thyroid function, and liver functions. We will monitor his liver enzymes that have been elevated secondary to treatment with immune-therapy.   4.  The patient will follow-up with me in 6 weeks with treatment.  5.  Patient will continue taking over-the-counter stool softeners for constipation.  6.  I again reviewed with the patient the potential side effects from immune therapy including dermatitis, hepatitis, perforation, bleeding, thrombosis, and potential death.  7.  Patient tissue was submitted for the matched trial.  He did match on PETN mutation, however that arm was closed.  8.  I advised the patient to continue to hold his alternative treatment with high dose vitamin C  while he is on immunetherapy.  9. The patient will have repeat imaging prior to return.  10. The patient will continue Zofran as needed for treatment related nausea.  11. The patient will continue oxycodone 10 mg as needed for cancer-related pain.  We'll continue Celebrex 200 mg twice a day as needed.  He hasn't been needing any pain medicine lately.   12.  We offered Ritalin for treatment related fatigue, but the patient has declined at this time.   13.  Patient will continue Zaleplon as needed for insomnia.  14.  I will transfuse 2 units of blood if his hemoglobin drops below 8.  His anemia is mild and stable at this point.   15. I will repeat scans in 6 month which would be due April 2019.  We'll consider doing it sonner if his CEA level starts to increase or if he has new symptoms..      Kristofer Rodriguez MD  10/16/18

## 2018-12-24 ENCOUNTER — APPOINTMENT (OUTPATIENT)
Dept: ONCOLOGY | Facility: HOSPITAL | Age: 58
End: 2018-12-24

## 2019-01-04 ENCOUNTER — HOSPITAL ENCOUNTER (OUTPATIENT)
Dept: ONCOLOGY | Facility: HOSPITAL | Age: 59
Setting detail: INFUSION SERIES
Discharge: HOME | End: 2019-01-04

## 2019-01-04 ENCOUNTER — OFFICE VISIT (OUTPATIENT)
Dept: ONCOLOGY | Facility: CLINIC | Age: 59
End: 2019-01-04

## 2019-01-04 VITALS
RESPIRATION RATE: 12 BRPM | SYSTOLIC BLOOD PRESSURE: 126 MMHG | OXYGEN SATURATION: 98 % | TEMPERATURE: 97.4 F | WEIGHT: 157 LBS | DIASTOLIC BLOOD PRESSURE: 76 MMHG | HEART RATE: 67 BPM | HEIGHT: 66 IN | BODY MASS INDEX: 25.23 KG/M2

## 2019-01-04 DIAGNOSIS — C79.51 COLON CANCER METASTASIZED TO BONE (HCC): ICD-10-CM

## 2019-01-04 DIAGNOSIS — C18.7 MALIGNANT NEOPLASM OF SIGMOID COLON (HCC): Primary | ICD-10-CM

## 2019-01-04 DIAGNOSIS — C79.51 BONE METASTASIS: ICD-10-CM

## 2019-01-04 DIAGNOSIS — C18.9 COLON CANCER METASTASIZED TO BONE (HCC): ICD-10-CM

## 2019-01-04 LAB
ALBUMIN SERPL-MCNC: 4.66 G/DL (ref 3.2–4.8)
ALBUMIN/GLOB SERPL: 1.7 G/DL (ref 1.5–2.5)
ALP SERPL-CCNC: 73 U/L (ref 25–100)
ALT SERPL W P-5'-P-CCNC: 21 U/L (ref 7–40)
ANION GAP SERPL CALCULATED.3IONS-SCNC: 8 MMOL/L (ref 3–11)
AST SERPL-CCNC: 28 U/L (ref 0–33)
BILIRUB SERPL-MCNC: 0.8 MG/DL (ref 0.3–1.2)
BILIRUB UR QL STRIP: NEGATIVE
BUN BLD-MCNC: 13 MG/DL (ref 9–23)
BUN/CREAT SERPL: 17.3 (ref 7–25)
CALCIUM SPEC-SCNC: 9.1 MG/DL (ref 8.7–10.4)
CEA SERPL-MCNC: 2.5 NG/ML (ref 0–2.5)
CHLORIDE SERPL-SCNC: 101 MMOL/L (ref 99–109)
CLARITY UR: CLEAR
CO2 SERPL-SCNC: 27 MMOL/L (ref 20–31)
COLOR UR: YELLOW
CREAT BLD-MCNC: 0.75 MG/DL (ref 0.6–1.3)
CREAT BLDA-MCNC: 0.8 MG/DL (ref 0.6–1.3)
ERYTHROCYTE [DISTWIDTH] IN BLOOD BY AUTOMATED COUNT: 25.2 % (ref 11.3–14.5)
GFR SERPL CREATININE-BSD FRML MDRD: 108 ML/MIN/1.73
GFR SERPL CREATININE-BSD FRML MDRD: 131 ML/MIN/1.73
GLOBULIN UR ELPH-MCNC: 2.7 GM/DL
GLUCOSE BLD-MCNC: 114 MG/DL (ref 70–100)
GLUCOSE UR STRIP-MCNC: NEGATIVE MG/DL
HCT VFR BLD AUTO: 41 % (ref 38.9–50.9)
HGB BLD-MCNC: 13.1 G/DL (ref 13.1–17.5)
HGB UR QL STRIP.AUTO: NEGATIVE
KETONES UR QL STRIP: NEGATIVE
LEUKOCYTE ESTERASE UR QL STRIP.AUTO: NEGATIVE
LYMPHOCYTES # BLD AUTO: 1.8 10*3/MM3 (ref 0.6–4.8)
LYMPHOCYTES NFR BLD AUTO: 29.2 % (ref 24–44)
MCH RBC QN AUTO: 24.7 PG (ref 27–31)
MCHC RBC AUTO-ENTMCNC: 31.9 G/DL (ref 32–36)
MCV RBC AUTO: 77.4 FL (ref 80–99)
MONOCYTES # BLD AUTO: 0.4 10*3/MM3 (ref 0–1)
MONOCYTES NFR BLD AUTO: 6.8 % (ref 0–12)
NEUTROPHILS # BLD AUTO: 4 10*3/MM3 (ref 1.5–8.3)
NEUTROPHILS NFR BLD AUTO: 64 % (ref 41–71)
NITRITE UR QL STRIP: NEGATIVE
PH UR STRIP.AUTO: 7 [PH] (ref 5–8)
PLATELET # BLD AUTO: 221 10*3/MM3 (ref 150–450)
PMV BLD AUTO: 8.3 FL (ref 6–12)
POTASSIUM BLD-SCNC: 4.4 MMOL/L (ref 3.5–5.5)
PROT SERPL-MCNC: 7.4 G/DL (ref 5.7–8.2)
PROT UR QL STRIP: NEGATIVE
RBC # BLD AUTO: 5.29 10*6/MM3 (ref 4.2–5.76)
SODIUM BLD-SCNC: 136 MMOL/L (ref 132–146)
SP GR UR STRIP: 1 (ref 1–1.03)
UROBILINOGEN UR QL STRIP: NORMAL
WBC NRBC COR # BLD: 6.2 10*3/MM3 (ref 3.5–10.8)

## 2019-01-04 PROCEDURE — 81003 URINALYSIS AUTO W/O SCOPE: CPT | Performed by: INTERNAL MEDICINE

## 2019-01-04 PROCEDURE — 82378 CARCINOEMBRYONIC ANTIGEN: CPT | Performed by: INTERNAL MEDICINE

## 2019-01-04 PROCEDURE — 99215 OFFICE O/P EST HI 40 MIN: CPT | Performed by: INTERNAL MEDICINE

## 2019-01-04 PROCEDURE — 96413 CHEMO IV INFUSION 1 HR: CPT

## 2019-01-04 PROCEDURE — 25010000002 BEVACIZUMAB 100 MG/4ML SOLUTION 4 ML VIAL: Performed by: INTERNAL MEDICINE

## 2019-01-04 PROCEDURE — 80053 COMPREHEN METABOLIC PANEL: CPT | Performed by: INTERNAL MEDICINE

## 2019-01-04 PROCEDURE — 85025 COMPLETE CBC W/AUTO DIFF WBC: CPT | Performed by: INTERNAL MEDICINE

## 2019-01-04 PROCEDURE — 82565 ASSAY OF CREATININE: CPT

## 2019-01-04 PROCEDURE — 25010000002 BEVACIZUMAB PER 10 MG: Performed by: INTERNAL MEDICINE

## 2019-01-04 RX ORDER — SODIUM CHLORIDE 0.9 % (FLUSH) 0.9 %
10 SYRINGE (ML) INJECTION AS NEEDED
Status: CANCELLED | OUTPATIENT
Start: 2019-01-04

## 2019-01-04 RX ORDER — SODIUM CHLORIDE 0.9 % (FLUSH) 0.9 %
10 SYRINGE (ML) INJECTION AS NEEDED
Status: DISCONTINUED | OUTPATIENT
Start: 2019-01-04 | End: 2019-01-05 | Stop reason: HOSPADM

## 2019-01-04 RX ADMIN — SODIUM CHLORIDE, PRESERVATIVE FREE 10 ML: 5 INJECTION INTRAVENOUS at 10:59

## 2019-01-04 RX ADMIN — BEVACIZUMAB 500 MG: 400 INJECTION, SOLUTION INTRAVENOUS at 10:27

## 2019-01-04 RX ADMIN — SODIUM CHLORIDE, PRESERVATIVE FREE 500 UNITS: 5 INJECTION INTRAVENOUS at 10:59

## 2019-01-04 NOTE — PROGRESS NOTES
DATE OF VISIT: 10/16/18    REASON FOR VISIT: Followup for metastatic colon cancer.      HISTORY OF PRESENT ILLNESS: The patient is a very pleasant 58 y.o. male with past medical history significant for metastatic colon cancer diagnosed March 2007 . He is status post lower anterior resection as well as 1 cycle FOLFOX, stopped due to multiple toxicities. Final pathology showed poorly differentiated adenocarcinoma with 25 negative lymph nodes and clear surgical margins. Isolated tumor deposits was found in the pericolonic fat. Pathologic stage T2 N1c M0 stage IIIA disease.The patient has been followed by serial colonoscopies as well as CAT scans that did document pulmonary metastatic disease with left lower lobe nodule that is gradually increasing in size. Patient was doing fairly well until recently, 3 months ago, patient presented with low back pain. Patient had restaging workup that revealed hypermetabolic L2 vertebral body lesion. Patient had biopsy done on October 11, 2016 that revealed metastatic adenocarcinoma consistent of colon primary with CK 7 negative CK 20 positive CDX2 positive. Patient had next generation gene sequencing that revealed intermediate mutational load with microsatellite stability.  He started chemotherapy with Keytruda on 2/1/2017. The patient was changed to CapeOx with Avastin on 9/19/2017.  He completed 8 cycles on February 12, 2018.  He was started on Avastin with Xeloda maintenance.  He is here today for scheduled follow up visit with treatment, cycle #10.       SUBJECTIVE: The patient is here today by himself. :  All the has been able to tolerate chemotherapy fairly well.  He was to come back in 4 weeks with treatment only and he will see me in 6 weeks for the follow-up treatment.  He is going on 2 weeks vacation to [facility] in March.    PAST MEDICAL HISTORY/SOCIAL HISTORY/FAMILY HISTORY: Reviewed by me and unchanged from my documentation done on 09/25/18.    Review of Systems  "  Constitutional: Positive for fatigue. Negative for activity change, appetite change, chills, fever and unexpected weight change.   HENT: Negative for hearing loss, mouth sores, nosebleeds, sore throat and trouble swallowing.    Eyes: Negative for visual disturbance.   Respiratory: Negative for cough, chest tightness, shortness of breath and wheezing.    Cardiovascular: Negative for chest pain, palpitations and leg swelling.   Gastrointestinal: Negative for abdominal distention, abdominal pain, blood in stool, constipation, diarrhea, nausea, rectal pain and vomiting.   Endocrine: Negative for cold intolerance and heat intolerance.   Genitourinary: Negative for difficulty urinating, dysuria, frequency and urgency.   Musculoskeletal: Negative for arthralgias, gait problem, joint swelling and myalgias.   Skin: Negative for rash.   Neurological: Negative for dizziness, tremors, syncope, weakness, light-headedness, numbness and headaches.   Hematological: Negative for adenopathy. Does not bruise/bleed easily.   Psychiatric/Behavioral: Negative for confusion, sleep disturbance and suicidal ideas. The patient is not nervous/anxious.          Current Outpatient Medications:   •  capecitabine (XELODA) 500 MG chemo tablet, Take 2 tablets by mouth in the morning and 2 tablets by mouth in the evening for 7 days on, then off for 7 days., Disp: 56 tablet, Rfl: 5  •  Cholecalciferol (VITAMIN D3) 5000 UNITS capsule capsule, Take 5,000 Units by mouth Daily., Disp: , Rfl:   •  lidocaine-prilocaine (EMLA) 2.5-2.5 % cream, Apply  topically As Needed (45-60 minutes prior to port access.  Cover with saran/plastic wrap.)., Disp: 30 g, Rfl: 3    PHYSICAL EXAMINATION:   /76   Pulse 67   Temp 97.4 °F (36.3 °C) (Temporal)   Resp 12   Ht 167.6 cm (65.98\")   Wt 71.2 kg (157 lb)   SpO2 98%   BMI 25.36 kg/m²    ECOG Performance Status: 1 - Symptomatic but completely ambulatory  General Appearance:  alert, cooperative, no apparent " distress and appears stated age   Neurologic/Psychiatric: A&O x 3, gait steady, appropriate affect, strength 5/5 in all muscle groups   HEENT:  Normocephalic, without obvious abnormality, mucous membranes moist   Neck: Supple, symmetrical, trachea midline, no adenopathy;  No thyromegaly, masses, or tenderness   Lungs:   Clear to auscultation bilaterally; respirations regular, even, and unlabored bilaterally   Heart:  Regular rate and rhythm, no murmurs appreciated   Abdomen:   Soft, non-tender, non-distended and no organomegaly   Lymph nodes: No cervical, supraclavicular, inguinal or axillary adenopathy noted   Extremities: Normal, atraumatic; no clubbing, cyanosis, or edema    Skin: No rashes, ulcers, or suspicious lesions noted     No visits with results within 2 Week(s) from this visit.   Latest known visit with results is:   Infusion on 11/26/2018   Component Date Value Ref Range Status   • CEA 11/26/2018 2.30  0.00 - 2.50 ng/mL Final   • Glucose 11/26/2018 72  70 - 100 mg/dL Final   • BUN 11/26/2018 12  9 - 23 mg/dL Final   • Creatinine 11/26/2018 0.77  0.60 - 1.30 mg/dL Final   • Sodium 11/26/2018 140  132 - 146 mmol/L Final   • Potassium 11/26/2018 4.1  3.5 - 5.5 mmol/L Final   • Chloride 11/26/2018 104  99 - 109 mmol/L Final   • CO2 11/26/2018 27.0  20.0 - 31.0 mmol/L Final   • Calcium 11/26/2018 9.3  8.7 - 10.4 mg/dL Final   • Total Protein 11/26/2018 7.5  5.7 - 8.2 g/dL Final   • Albumin 11/26/2018 4.55  3.20 - 4.80 g/dL Final   • ALT (SGPT) 11/26/2018 18  7 - 40 U/L Final   • AST (SGOT) 11/26/2018 27  0 - 33 U/L Final   • Alkaline Phosphatase 11/26/2018 62  25 - 100 U/L Final   • Total Bilirubin 11/26/2018 0.6  0.3 - 1.2 mg/dL Final   • eGFR Non African Amer 11/26/2018 105  >60 mL/min/1.73 Final   • eGFR   Amer 11/26/2018 127  >60 mL/min/1.73 Final   • Globulin 11/26/2018 3.0  gm/dL Final   • A/G Ratio 11/26/2018 1.5  1.5 - 2.5 g/dL Final   • BUN/Creatinine Ratio 11/26/2018 15.6  7.0 - 25.0 Final    • Anion Gap 11/26/2018 9.0  3.0 - 11.0 mmol/L Final   • Color, UA 11/26/2018 Yellow  Yellow, Straw Final   • Appearance, UA 11/26/2018 Clear  Clear Final   • pH, UA 11/26/2018 5.0  5.0 - 8.0 Final   • Specific Gravity, UA 11/26/2018 1.005  1.005 - 1.030 Final   • Glucose, UA 11/26/2018 Negative  Negative Final   • Ketones, UA 11/26/2018 Negative  Negative Final   • Bilirubin, UA 11/26/2018 Negative  Negative Final   • Blood, UA 11/26/2018 Negative  Negative Final   • Protein, UA 11/26/2018 Negative  Negative Final   • Leuk Esterase, UA 11/26/2018 Negative  Negative Final   • Nitrite, UA 11/26/2018 Negative  Negative Final   • Urobilinogen, UA 11/26/2018 0.2 E.U./dL  0.2 - 1.0 E.U./dL Final   • WBC 11/26/2018 5.80  3.50 - 10.80 10*3/mm3 Final   • RBC 11/26/2018 5.19  4.20 - 5.76 10*6/mm3 Final   • Hemoglobin 11/26/2018 12.9* 13.1 - 17.5 g/dL Final   • Hematocrit 11/26/2018 40.4  38.9 - 50.9 % Final   • RDW 11/26/2018 25.4* 11.3 - 14.5 % Final   • MCV 11/26/2018 77.9* 80.0 - 99.0 fL Final   • MCH 11/26/2018 24.8* 27.0 - 31.0 pg Final   • MCHC 11/26/2018 31.8* 32.0 - 36.0 g/dL Final   • MPV 11/26/2018 8.0  6.0 - 12.0 fL Final   • Platelets 11/26/2018 170  150 - 450 10*3/mm3 Final   • Neutrophil % 11/26/2018 70.9  41.0 - 71.0 % Final   • Lymphocyte % 11/26/2018 25.3  24.0 - 44.0 % Final   • Monocyte % 11/26/2018 3.8  0.0 - 12.0 % Final   • Neutrophils, Absolute 11/26/2018 4.10  1.50 - 8.30 10*3/mm3 Final   • Lymphocytes, Absolute 11/26/2018 1.50  0.60 - 4.80 10*3/mm3 Final   • Monocytes, Absolute 11/26/2018 0.20  0.00 - 1.00 10*3/mm3 Final   • Creatinine 11/26/2018 0.80  mg/dL Final   • Creatinine 11/26/2018 0.80  0.60 - 1.30 mg/dL Final    Serial Number: [id number]:  [id number]            ASSESSMENT: The patient is a very pleasant 58 y.o. male  with stage IV colon cancer.    PROBLEM LIST:  1. Currently stage IV colon cancer with lung and bony metastases.  K-edwige mutant, microsatellite stable, and intermediate  mutational load.  2. Status post low anterior resection done by Dr. Young 2007, [id number]  3.  Attempted adjuvant chemotherapy for 1 cycle could not tolerate secondary to multiple toxicities.  4. Biopsy-proven L2 metastases done October 11, 2016. Status post CyberKnife radiation treatment.  5.  Started Keytruda 200 mg IV every 3 weeks on February 1, 2017, status post 10 cycles of treatment  6. Disease progression documented on PET scan completed 8/31/2017.   7. Treatment changed to Xeloda/Avastin/Oxaliplatin on 9/19/2017, status post 7 cycles.   8. Status post tumor debulking at L2 done by Dr. Frazier at [facility] on April 21, 2017  9.  The treatment was switched to Xeloda with Avastin maintenance treatment March 6, 2018  10.  Cancer related pain  11.  Opioid-induced constipation  12.  Treatment induced asthenia  13.  Mild depression  14.  Insomnia  15.  Chemotherapy-induced anemia  16.  Chemotherapy-induced dermatitis  17. Upper airway infection    PLAN:  1.  I will Proceed with Avastin today as scheduled, cycle #10.   2.  I will continue Xeloda 500 mg a.m. and 500 mg p.m. 1 weeks on, 1 week off.  This is dose reduction secondary to dermatitis.  He could not tolerate any doses.  3. We will monitor the patient's labs throughout treatment including blood counts, kidney function, CEA, thyroid function, and liver functions. We will monitor his liver enzymes that have been elevated secondary to treatment with immune-therapy.   4.  The patient will follow-up with me in 6 weeks with treatment.  5.  Patient will continue taking over-the-counter stool softeners for constipation.  6.  I again reviewed with the patient the potential side effects from immune therapy including dermatitis, hepatitis, perforation, bleeding, thrombosis, and potential death.  7.  Patient tissue was submitted for the matched trial.  He did match on PETN mutation, however that arm was closed.  8.  I advised the patient to continue to hold his  alternative treatment with high dose vitamin C while he is on immunetherapy.  9. The patient will have repeat imaging prior to return.  10. The patient will continue Zofran as needed for treatment related nausea.  11. The patient will continue oxycodone 10 mg as needed for cancer-related pain.  We'll continue Celebrex 200 mg twice a day as needed.  He hasn't been needing any pain medicine lately.   12.  We offered Ritalin for treatment related fatigue, but the patient has declined at this time.   13.  Patient will continue Zaleplon as needed for insomnia.  14.  I will transfuse 2 units of blood if his hemoglobin drops below 8.  His anemia is mild and stable at this point.   15. I will repeat scans in 6 month which would be due April 2019.  We'll consider doing it sonner if his CEA level starts to increase or if he has new symptoms.      Kristofer Rodriguez MD  10/16/18

## 2019-02-08 ENCOUNTER — HOSPITAL ENCOUNTER (OUTPATIENT)
Dept: ONCOLOGY | Facility: HOSPITAL | Age: 59
Setting detail: INFUSION SERIES
Discharge: HOME | End: 2019-02-08

## 2019-02-08 VITALS
RESPIRATION RATE: 16 BRPM | HEART RATE: 58 BPM | HEIGHT: 66 IN | TEMPERATURE: 98 F | SYSTOLIC BLOOD PRESSURE: 133 MMHG | WEIGHT: 159 LBS | DIASTOLIC BLOOD PRESSURE: 86 MMHG | BODY MASS INDEX: 25.55 KG/M2

## 2019-02-08 DIAGNOSIS — C18.7 MALIGNANT NEOPLASM OF SIGMOID COLON (HCC): Primary | ICD-10-CM

## 2019-02-08 DIAGNOSIS — C79.51 BONE METASTASIS: ICD-10-CM

## 2019-02-08 DIAGNOSIS — C79.51 COLON CANCER METASTASIZED TO BONE (HCC): ICD-10-CM

## 2019-02-08 DIAGNOSIS — C18.9 COLON CANCER METASTASIZED TO BONE (HCC): ICD-10-CM

## 2019-02-08 LAB
ALBUMIN SERPL-MCNC: 4.49 G/DL (ref 3.2–4.8)
ALBUMIN/GLOB SERPL: 1.9 G/DL (ref 1.5–2.5)
ALP SERPL-CCNC: 71 U/L (ref 25–100)
ALT SERPL W P-5'-P-CCNC: 16 U/L (ref 7–40)
ANION GAP SERPL CALCULATED.3IONS-SCNC: 6 MMOL/L (ref 3–11)
AST SERPL-CCNC: 24 U/L (ref 0–33)
BILIRUB SERPL-MCNC: 0.6 MG/DL (ref 0.3–1.2)
BILIRUB UR QL STRIP: NEGATIVE
BUN BLD-MCNC: 16 MG/DL (ref 9–23)
BUN/CREAT SERPL: 21.3 (ref 7–25)
CALCIUM SPEC-SCNC: 8.6 MG/DL (ref 8.7–10.4)
CEA SERPL-MCNC: 2.5 NG/ML (ref 0–2.5)
CHLORIDE SERPL-SCNC: 102 MMOL/L (ref 99–109)
CLARITY UR: CLEAR
CO2 SERPL-SCNC: 28 MMOL/L (ref 20–31)
COLOR UR: YELLOW
CREAT BLD-MCNC: 0.75 MG/DL (ref 0.6–1.3)
CREAT BLDA-MCNC: 0.7 MG/DL (ref 0.6–1.3)
ERYTHROCYTE [DISTWIDTH] IN BLOOD BY AUTOMATED COUNT: 24.6 % (ref 11.3–14.5)
GFR SERPL CREATININE-BSD FRML MDRD: 108 ML/MIN/1.73
GFR SERPL CREATININE-BSD FRML MDRD: 131 ML/MIN/1.73
GLOBULIN UR ELPH-MCNC: 2.4 GM/DL
GLUCOSE BLD-MCNC: 76 MG/DL (ref 70–100)
GLUCOSE UR STRIP-MCNC: NEGATIVE MG/DL
HCT VFR BLD AUTO: 39.3 % (ref 38.9–50.9)
HGB BLD-MCNC: 13 G/DL (ref 13.1–17.5)
HGB UR QL STRIP.AUTO: NEGATIVE
KETONES UR QL STRIP: NEGATIVE
LEUKOCYTE ESTERASE UR QL STRIP.AUTO: NEGATIVE
LYMPHOCYTES # BLD AUTO: 2 10*3/MM3 (ref 0.6–4.8)
LYMPHOCYTES NFR BLD AUTO: 28 % (ref 24–44)
MCH RBC QN AUTO: 25.5 PG (ref 27–31)
MCHC RBC AUTO-ENTMCNC: 33.1 G/DL (ref 32–36)
MCV RBC AUTO: 76.9 FL (ref 80–99)
MONOCYTES # BLD AUTO: 0.4 10*3/MM3 (ref 0–1)
MONOCYTES NFR BLD AUTO: 5.7 % (ref 0–12)
NEUTROPHILS # BLD AUTO: 4.7 10*3/MM3 (ref 1.5–8.3)
NEUTROPHILS NFR BLD AUTO: 66.3 % (ref 41–71)
NITRITE UR QL STRIP: NEGATIVE
PH UR STRIP.AUTO: 7.5 [PH] (ref 5–8)
PLATELET # BLD AUTO: 198 10*3/MM3 (ref 150–450)
PMV BLD AUTO: 7.5 FL (ref 6–12)
POTASSIUM BLD-SCNC: 4.3 MMOL/L (ref 3.5–5.5)
PROT SERPL-MCNC: 6.9 G/DL (ref 5.7–8.2)
PROT UR QL STRIP: NEGATIVE
RBC # BLD AUTO: 5.11 10*6/MM3 (ref 4.2–5.76)
SODIUM BLD-SCNC: 136 MMOL/L (ref 132–146)
SP GR UR STRIP: 1.01 (ref 1–1.03)
UROBILINOGEN UR QL STRIP: NORMAL
WBC NRBC COR # BLD: 7.1 10*3/MM3 (ref 3.5–10.8)

## 2019-02-08 PROCEDURE — 82565 ASSAY OF CREATININE: CPT

## 2019-02-08 PROCEDURE — 25010000002 BEVACIZUMAB PER 10 MG: Performed by: INTERNAL MEDICINE

## 2019-02-08 PROCEDURE — 96413 CHEMO IV INFUSION 1 HR: CPT

## 2019-02-08 PROCEDURE — 81003 URINALYSIS AUTO W/O SCOPE: CPT | Performed by: INTERNAL MEDICINE

## 2019-02-08 PROCEDURE — 80053 COMPREHEN METABOLIC PANEL: CPT | Performed by: INTERNAL MEDICINE

## 2019-02-08 PROCEDURE — 82378 CARCINOEMBRYONIC ANTIGEN: CPT | Performed by: INTERNAL MEDICINE

## 2019-02-08 PROCEDURE — 85025 COMPLETE CBC W/AUTO DIFF WBC: CPT | Performed by: INTERNAL MEDICINE

## 2019-02-08 PROCEDURE — 25010000002 BEVACIZUMAB 100 MG/4ML SOLUTION 4 ML VIAL: Performed by: INTERNAL MEDICINE

## 2019-02-08 RX ORDER — SODIUM CHLORIDE 9 MG/ML
250 INJECTION, SOLUTION INTRAVENOUS ONCE
Status: DISCONTINUED | OUTPATIENT
Start: 2019-02-08 | End: 2019-02-09 | Stop reason: HOSPADM

## 2019-02-08 RX ORDER — SODIUM CHLORIDE 0.9 % (FLUSH) 0.9 %
10 SYRINGE (ML) INJECTION AS NEEDED
Status: CANCELLED | OUTPATIENT
Start: 2019-02-08

## 2019-02-08 RX ORDER — SODIUM CHLORIDE 0.9 % (FLUSH) 0.9 %
10 SYRINGE (ML) INJECTION AS NEEDED
Status: DISCONTINUED | OUTPATIENT
Start: 2019-02-08 | End: 2019-02-09 | Stop reason: HOSPADM

## 2019-02-08 RX ADMIN — BEVACIZUMAB 500 MG: 400 INJECTION, SOLUTION INTRAVENOUS at 15:46

## 2019-02-08 RX ADMIN — SODIUM CHLORIDE, PRESERVATIVE FREE 500 UNITS: 5 INJECTION INTRAVENOUS at 16:25

## 2019-02-08 RX ADMIN — SODIUM CHLORIDE, PRESERVATIVE FREE 10 ML: 5 INJECTION INTRAVENOUS at 16:25

## 2019-02-20 DIAGNOSIS — C18.9 COLON CANCER METASTASIZED TO BONE (HCC): ICD-10-CM

## 2019-02-20 DIAGNOSIS — C79.51 COLON CANCER METASTASIZED TO BONE (HCC): ICD-10-CM

## 2019-02-20 DIAGNOSIS — C18.7 MALIGNANT NEOPLASM OF SIGMOID COLON (HCC): Primary | ICD-10-CM

## 2019-02-20 DIAGNOSIS — C79.51 BONE METASTASIS: ICD-10-CM

## 2019-03-14 NOTE — PROGRESS NOTES
DATE OF VISIT: 03/15/19      REASON FOR VISIT: Followup for metastatic colon cancer.      HISTORY OF PRESENT ILLNESS: The patient is a very pleasant 59 y.o. male with past medical history significant for metastatic colon cancer diagnosed March 2007 . He is status post lower anterior resection as well as 1 cycle FOLFOX, stopped due to multiple toxicities. Final pathology showed poorly differentiated adenocarcinoma with 25 negative lymph nodes and clear surgical margins. Isolated tumor deposits was found in the pericolonic fat. Pathologic stage T2 N1c M0 stage IIIA disease.The patient has been followed by serial colonoscopies as well as CAT scans that did document pulmonary metastatic disease with left lower lobe nodule that is gradually increasing in size. Patient was doing fairly well until recently, 3 months ago, patient presented with low back pain. Patient had restaging workup that revealed hypermetabolic L2 vertebral body lesion. Patient had biopsy done on October 11, 2016 that revealed metastatic adenocarcinoma consistent of colon primary with CK 7 negative CK 20 positive CDX2 positive. Patient had next generation gene sequencing that revealed intermediate mutational load with microsatellite stability.  He started chemotherapy with Keytruda on 2/1/2017. The patient was changed to CapeOx with Avastin on 9/19/2017.  He completed 8 cycles on February 12, 2018.  He was started on Avastin with Xeloda maintenance.  He is here today for scheduled follow up visit with treatment, cycle #12.       SUBJECTIVE: The patient is here today with his wife.  He has been active.  He denies any fever or chills.  No night sweats.  His skin rash has resolved with dose reduction of Xeloda.  He has mild fatigue.    PAST MEDICAL HISTORY/SOCIAL HISTORY/FAMILY HISTORY: Reviewed by me and unchanged from my documentation done on 09/25/18.    Review of Systems   Constitutional: Positive for fatigue. Negative for activity change, appetite  "change, chills, fever and unexpected weight change.   HENT: Negative for hearing loss, mouth sores, nosebleeds, sore throat and trouble swallowing.    Eyes: Negative for visual disturbance.   Respiratory: Negative for cough, chest tightness, shortness of breath and wheezing.    Cardiovascular: Negative for chest pain, palpitations and leg swelling.   Gastrointestinal: Negative for abdominal distention, abdominal pain, blood in stool, constipation, diarrhea, nausea, rectal pain and vomiting.   Endocrine: Negative for cold intolerance and heat intolerance.   Genitourinary: Negative for difficulty urinating, dysuria, frequency and urgency.   Musculoskeletal: Negative for arthralgias, gait problem, joint swelling and myalgias.   Skin: Negative for rash.   Neurological: Negative for dizziness, tremors, syncope, weakness, light-headedness, numbness and headaches.   Hematological: Negative for adenopathy. Does not bruise/bleed easily.   Psychiatric/Behavioral: Negative for confusion, sleep disturbance and suicidal ideas. The patient is not nervous/anxious.          Current Outpatient Medications:   •  capecitabine (XELODA) 500 MG chemo tablet, Take 2 tablets by mouth in the morning and 2 tablets by mouth in the evening for 7 days on, then off for 7 days., Disp: 56 tablet, Rfl: 5  •  Cholecalciferol (VITAMIN D3) 5000 UNITS capsule capsule, Take 5,000 Units by mouth Daily., Disp: , Rfl:   •  lidocaine-prilocaine (EMLA) 2.5-2.5 % cream, Apply  topically As Needed (45-60 minutes prior to port access.  Cover with saran/plastic wrap.)., Disp: 30 g, Rfl: 3    PHYSICAL EXAMINATION:   /81   Pulse 79   Temp 97.4 °F (36.3 °C) (Temporal)   Resp 12   Ht 167.6 cm (66\")   Wt 72.6 kg (160 lb)   SpO2 99%   BMI 25.82 kg/m²    ECOG Performance Status: 1 - Symptomatic but completely ambulatory  General Appearance:  alert, cooperative, no apparent distress and appears stated age   Neurologic/Psychiatric: A&O x 3, gait steady, " appropriate affect, strength 5/5 in all muscle groups   HEENT:  Normocephalic, without obvious abnormality, mucous membranes moist   Neck: Supple, symmetrical, trachea midline, no adenopathy;  No thyromegaly, masses, or tenderness   Lungs:   Clear to auscultation bilaterally; respirations regular, even, and unlabored bilaterally   Heart:  Regular rate and rhythm, no murmurs appreciated   Abdomen:   Soft, non-tender, non-distended and no organomegaly   Lymph nodes: No cervical, supraclavicular, inguinal or axillary adenopathy noted   Extremities: Normal, atraumatic; no clubbing, cyanosis, or edema    Skin: No rashes, ulcers, or suspicious lesions noted     No visits with results within 2 Week(s) from this visit.   Latest known visit with results is:   Hospital Outpatient Visit on 02/08/2019   Component Date Value Ref Range Status   • CEA 02/08/2019 2.50  0.00 - 2.50 ng/mL Final   • Glucose 02/08/2019 76  70 - 100 mg/dL Final   • BUN 02/08/2019 16  9 - 23 mg/dL Final   • Creatinine 02/08/2019 0.75  0.60 - 1.30 mg/dL Final   • Sodium 02/08/2019 136  132 - 146 mmol/L Final   • Potassium 02/08/2019 4.3  3.5 - 5.5 mmol/L Final   • Chloride 02/08/2019 102  99 - 109 mmol/L Final   • CO2 02/08/2019 28.0  20.0 - 31.0 mmol/L Final   • Calcium 02/08/2019 8.6* 8.7 - 10.4 mg/dL Final   • Total Protein 02/08/2019 6.9  5.7 - 8.2 g/dL Final   • Albumin 02/08/2019 4.49  3.20 - 4.80 g/dL Final   • ALT (SGPT) 02/08/2019 16  7 - 40 U/L Final   • AST (SGOT) 02/08/2019 24  0 - 33 U/L Final   • Alkaline Phosphatase 02/08/2019 71  25 - 100 U/L Final   • Total Bilirubin 02/08/2019 0.6  0.3 - 1.2 mg/dL Final   • eGFR Non African Amer 02/08/2019 108  >60 mL/min/1.73 Final   • eGFR   Amer 02/08/2019 131  >60 mL/min/1.73 Final   • Globulin 02/08/2019 2.4  gm/dL Final   • A/G Ratio 02/08/2019 1.9  1.5 - 2.5 g/dL Final   • BUN/Creatinine Ratio 02/08/2019 21.3  7.0 - 25.0 Final   • Anion Gap 02/08/2019 6.0  3.0 - 11.0 mmol/L Final   • Color,  UA 02/08/2019 Yellow  Yellow, Straw Final   • Appearance, UA 02/08/2019 Clear  Clear Final   • pH, UA 02/08/2019 7.5  5.0 - 8.0 Final   • Specific Gravity, UA 02/08/2019 1.010  1.005 - 1.030 Final   • Glucose, UA 02/08/2019 Negative  Negative Final   • Ketones, UA 02/08/2019 Negative  Negative Final   • Bilirubin, UA 02/08/2019 Negative  Negative Final   • Blood, UA 02/08/2019 Negative  Negative Final   • Protein, UA 02/08/2019 Negative  Negative Final   • Leuk Esterase, UA 02/08/2019 Negative  Negative Final   • Nitrite, UA 02/08/2019 Negative  Negative Final   • Urobilinogen, UA 02/08/2019 0.2 E.U./dL  0.2 - 1.0 E.U./dL Final   • WBC 02/08/2019 7.10  3.50 - 10.80 10*3/mm3 Final   • RBC 02/08/2019 5.11  4.20 - 5.76 10*6/mm3 Final   • Hemoglobin 02/08/2019 13.0* 13.1 - 17.5 g/dL Final   • Hematocrit 02/08/2019 39.3  38.9 - 50.9 % Final   • RDW 02/08/2019 24.6* 11.3 - 14.5 % Final   • MCV 02/08/2019 76.9* 80.0 - 99.0 fL Final   • MCH 02/08/2019 25.5* 27.0 - 31.0 pg Final   • MCHC 02/08/2019 33.1  32.0 - 36.0 g/dL Final   • MPV 02/08/2019 7.5  6.0 - 12.0 fL Final   • Platelets 02/08/2019 198  150 - 450 10*3/mm3 Final   • Neutrophil % 02/08/2019 66.3  41.0 - 71.0 % Final   • Lymphocyte % 02/08/2019 28.0  24.0 - 44.0 % Final   • Monocyte % 02/08/2019 5.7  0.0 - 12.0 % Final   • Neutrophils, Absolute 02/08/2019 4.70  1.50 - 8.30 10*3/mm3 Final   • Lymphocytes, Absolute 02/08/2019 2.00  0.60 - 4.80 10*3/mm3 Final   • Monocytes, Absolute 02/08/2019 0.40  0.00 - 1.00 10*3/mm3 Final   • Creatinine 02/08/2019 0.70  0.60 - 1.30 mg/dL Final    Serial Number: [id number]  [id number]            ASSESSMENT: The patient is a very pleasant 59 y.o. male  with stage IV colon cancer.    PROBLEM LIST:  1. Currently stage IV colon cancer with lung and bony metastases.  K-edwige mutant, microsatellite stable, and intermediate mutational load.  2. Status post low anterior resection done by Dr. Young 2007, [id number]  3.  Attempted adjuvant  chemotherapy for 1 cycle could not tolerate secondary to multiple toxicities.  4. Biopsy-proven L2 metastases done October 11, 2016. Status post CyberKnife radiation treatment.  5.  Started Keytruda 200 mg IV every 3 weeks on February 1, 2017, status post 10 cycles of treatment  6. Disease progression documented on PET scan completed 8/31/2017.   7. Treatment changed to Xeloda/Avastin/Oxaliplatin on 9/19/2017, status post 7 cycles.   8. Status post tumor debulking at L2 done by Dr. Frazier at [facility] on April 21, 2017  9.  The treatment was switched to Xeloda with Avastin maintenance treatment March 6, 2018  10.  Cancer related pain  11.  Opioid-induced constipation  12.  Treatment induced asthenia  13.  Mild depression  14.  Insomnia  15.  Chemotherapy-induced anemia  16.  Chemotherapy-induced dermatitis  17. Upper airway infection    PLAN:  1.  I will Proceed with Avastin today as scheduled, cycle #12.   2.  I will continue Xeloda 500 mg a.m. and 500 mg p.m. 1 weeks on, 1 week off.  This is dose reduction secondary to dermatitis.  He could not tolerate any higher doses.  3. We will monitor the patient's labs throughout treatment including blood counts, kidney function, CEA, thyroid function, and liver functions. We will monitor his liver enzymes that have been elevated secondary to treatment with immune-therapy.   4.  The patient will follow-up with me in 8 weeks with treatment.  5.  Patient will continue taking over-the-counter stool softeners for constipation.  6.  I again reviewed with the patient the potential side effects from immune therapy including dermatitis, hepatitis, perforation, bleeding, thrombosis, and potential death.  7.  Patient tissue was submitted for the matched trial.  He did match on PETN mutation, however that arm was closed.  8.  I advised the patient to continue to hold his alternative treatment with high dose vitamin C while he is on immunetherapy.  9. The patient will have  repeat imaging prior to return.We'll consider doing it sonner if his CEA level starts to increase or if he has new symptoms.   10. The patient will continue Zofran as needed for treatment related nausea.  11. The patient will continue oxycodone 10 mg as needed for cancer-related pain.  We'll continue Celebrex 200 mg twice a day as needed.  He hasn't been needing any pain medicine lately.   12.  We offered Ritalin for treatment related fatigue, but the patient has declined at this time.   13.  Patient will continue Zaleplon as needed for insomnia.  14.  I will transfuse 2 units of blood if his hemoglobin drops below 8.  His anemia is mild and stable at this point.         Kristofer Rodriguez MD  03/15/19

## 2019-03-15 ENCOUNTER — OFFICE VISIT (OUTPATIENT)
Dept: ONCOLOGY | Facility: CLINIC | Age: 59
End: 2019-03-15

## 2019-03-15 ENCOUNTER — SPECIALTY PHARMACY (OUTPATIENT)
Dept: ONCOLOGY | Facility: HOSPITAL | Age: 59
End: 2019-03-15

## 2019-03-15 ENCOUNTER — HOSPITAL ENCOUNTER (OUTPATIENT)
Dept: ONCOLOGY | Facility: HOSPITAL | Age: 59
Setting detail: INFUSION SERIES
Discharge: HOME | End: 2019-03-15

## 2019-03-15 VITALS
TEMPERATURE: 97.4 F | SYSTOLIC BLOOD PRESSURE: 124 MMHG | HEIGHT: 66 IN | HEART RATE: 79 BPM | RESPIRATION RATE: 12 BRPM | DIASTOLIC BLOOD PRESSURE: 81 MMHG | WEIGHT: 160 LBS | OXYGEN SATURATION: 99 % | BODY MASS INDEX: 25.71 KG/M2

## 2019-03-15 DIAGNOSIS — C18.7 MALIGNANT NEOPLASM OF SIGMOID COLON (HCC): Primary | ICD-10-CM

## 2019-03-15 DIAGNOSIS — C79.51 BONE METASTASIS: ICD-10-CM

## 2019-03-15 DIAGNOSIS — C79.51 COLON CANCER METASTASIZED TO BONE (HCC): ICD-10-CM

## 2019-03-15 DIAGNOSIS — C18.9 COLON CANCER METASTASIZED TO BONE (HCC): ICD-10-CM

## 2019-03-15 DIAGNOSIS — C18.7 MALIGNANT NEOPLASM OF SIGMOID COLON (HCC): ICD-10-CM

## 2019-03-15 LAB
ALBUMIN SERPL-MCNC: 4.63 G/DL (ref 3.2–4.8)
ALBUMIN/GLOB SERPL: 1.6 G/DL (ref 1.5–2.5)
ALP SERPL-CCNC: 64 U/L (ref 25–100)
ALT SERPL W P-5'-P-CCNC: 17 U/L (ref 7–40)
ANION GAP SERPL CALCULATED.3IONS-SCNC: 7 MMOL/L (ref 3–11)
AST SERPL-CCNC: 24 U/L (ref 0–33)
BILIRUB SERPL-MCNC: 0.8 MG/DL (ref 0.3–1.2)
BILIRUB UR QL STRIP: NEGATIVE
BUN BLD-MCNC: 14 MG/DL (ref 9–23)
BUN/CREAT SERPL: 17.1 (ref 7–25)
CALCIUM SPEC-SCNC: 9.2 MG/DL (ref 8.7–10.4)
CEA SERPL-MCNC: 3.4 NG/ML (ref 0–2.5)
CHLORIDE SERPL-SCNC: 97 MMOL/L (ref 99–109)
CLARITY UR: CLEAR
CO2 SERPL-SCNC: 26 MMOL/L (ref 20–31)
COLOR UR: YELLOW
CREAT BLD-MCNC: 0.82 MG/DL (ref 0.6–1.3)
CREAT BLDA-MCNC: 0.8 MG/DL (ref 0.6–1.3)
ERYTHROCYTE [DISTWIDTH] IN BLOOD BY AUTOMATED COUNT: 25.6 % (ref 11.3–14.5)
GFR SERPL CREATININE-BSD FRML MDRD: 117 ML/MIN/1.73
GFR SERPL CREATININE-BSD FRML MDRD: 97 ML/MIN/1.73
GLOBULIN UR ELPH-MCNC: 3 GM/DL
GLUCOSE BLD-MCNC: 152 MG/DL (ref 70–100)
GLUCOSE UR STRIP-MCNC: NEGATIVE MG/DL
HCT VFR BLD AUTO: 44 % (ref 38.9–50.9)
HGB BLD-MCNC: 14.1 G/DL (ref 13.1–17.5)
HGB UR QL STRIP.AUTO: NEGATIVE
KETONES UR QL STRIP: NEGATIVE
LEUKOCYTE ESTERASE UR QL STRIP.AUTO: NEGATIVE
LYMPHOCYTES # BLD AUTO: 1.8 10*3/MM3 (ref 0.6–4.8)
LYMPHOCYTES NFR BLD AUTO: 27.6 % (ref 24–44)
MCH RBC QN AUTO: 25.2 PG (ref 27–31)
MCHC RBC AUTO-ENTMCNC: 32.1 G/DL (ref 32–36)
MCV RBC AUTO: 78.5 FL (ref 80–99)
MONOCYTES # BLD AUTO: 0.3 10*3/MM3 (ref 0–1)
MONOCYTES NFR BLD AUTO: 4.7 % (ref 0–12)
NEUTROPHILS # BLD AUTO: 4.4 10*3/MM3 (ref 1.5–8.3)
NEUTROPHILS NFR BLD AUTO: 67.7 % (ref 41–71)
NITRITE UR QL STRIP: NEGATIVE
PH UR STRIP.AUTO: 8 [PH] (ref 5–8)
PLATELET # BLD AUTO: 218 10*3/MM3 (ref 150–450)
PMV BLD AUTO: 7.9 FL (ref 6–12)
POTASSIUM BLD-SCNC: 4.4 MMOL/L (ref 3.5–5.5)
PROT SERPL-MCNC: 7.6 G/DL (ref 5.7–8.2)
PROT UR QL STRIP: NEGATIVE
RBC # BLD AUTO: 5.6 10*6/MM3 (ref 4.2–5.76)
SODIUM BLD-SCNC: 130 MMOL/L (ref 132–146)
SP GR UR STRIP: 1 (ref 1–1.03)
UROBILINOGEN UR QL STRIP: NORMAL
WBC NRBC COR # BLD: 6.5 10*3/MM3 (ref 3.5–10.8)

## 2019-03-15 PROCEDURE — 80053 COMPREHEN METABOLIC PANEL: CPT | Performed by: INTERNAL MEDICINE

## 2019-03-15 PROCEDURE — 82565 ASSAY OF CREATININE: CPT

## 2019-03-15 PROCEDURE — 25010000002 BEVACIZUMAB 100 MG/4ML SOLUTION 4 ML VIAL: Performed by: INTERNAL MEDICINE

## 2019-03-15 PROCEDURE — 99215 OFFICE O/P EST HI 40 MIN: CPT | Performed by: INTERNAL MEDICINE

## 2019-03-15 PROCEDURE — 25010000002 BEVACIZUMAB PER 10 MG: Performed by: INTERNAL MEDICINE

## 2019-03-15 PROCEDURE — 82378 CARCINOEMBRYONIC ANTIGEN: CPT | Performed by: INTERNAL MEDICINE

## 2019-03-15 PROCEDURE — 85025 COMPLETE CBC W/AUTO DIFF WBC: CPT | Performed by: INTERNAL MEDICINE

## 2019-03-15 PROCEDURE — 96413 CHEMO IV INFUSION 1 HR: CPT

## 2019-03-15 PROCEDURE — 81003 URINALYSIS AUTO W/O SCOPE: CPT | Performed by: INTERNAL MEDICINE

## 2019-03-15 RX ORDER — SODIUM CHLORIDE 0.9 % (FLUSH) 0.9 %
10 SYRINGE (ML) INJECTION AS NEEDED
Status: CANCELLED | OUTPATIENT
Start: 2019-03-15

## 2019-03-15 RX ORDER — CAPECITABINE 500 MG/1
TABLET, FILM COATED ORAL
Qty: 56 TABLET | Refills: 5 | Status: SHIPPED | OUTPATIENT
Start: 2019-03-15 | End: 2019-10-04 | Stop reason: SDUPTHER

## 2019-03-15 RX ADMIN — HEPARIN 500 UNITS: 100 SYRINGE at 11:11

## 2019-03-15 RX ADMIN — BEVACIZUMAB 500 MG: 400 INJECTION, SOLUTION INTRAVENOUS at 10:36

## 2019-03-15 NOTE — PROGRESS NOTES
New insurance reported. Contacted [facility] to run test claim for Xeloda script. Per [facility], they are not contracted with patient's new insurance. Must fill through [facility]. Submitted script for Xeloda to [facility]. Contacted patient to make aware of change in filling pharmacy.

## 2019-03-22 DIAGNOSIS — C18.7 MALIGNANT NEOPLASM OF SIGMOID COLON (HCC): Primary | ICD-10-CM

## 2019-04-11 DIAGNOSIS — C18.7 MALIGNANT NEOPLASM OF SIGMOID COLON (HCC): ICD-10-CM

## 2019-04-11 DIAGNOSIS — C79.51 BONE METASTASIS: ICD-10-CM

## 2019-04-11 RX ORDER — SODIUM CHLORIDE 9 MG/ML
250 INJECTION, SOLUTION INTRAVENOUS ONCE
Status: CANCELLED | OUTPATIENT
Start: 2019-05-24

## 2019-04-11 RX ORDER — SODIUM CHLORIDE 9 MG/ML
250 INJECTION, SOLUTION INTRAVENOUS ONCE
Status: CANCELLED | OUTPATIENT
Start: 2019-06-21

## 2019-04-11 RX ORDER — SODIUM CHLORIDE 9 MG/ML
250 INJECTION, SOLUTION INTRAVENOUS ONCE
Status: CANCELLED | OUTPATIENT
Start: 2019-04-12

## 2019-04-12 ENCOUNTER — HOSPITAL ENCOUNTER (OUTPATIENT)
Dept: ONCOLOGY | Facility: HOSPITAL | Age: 59
Setting detail: INFUSION SERIES
Discharge: HOME | End: 2019-04-12

## 2019-04-12 ENCOUNTER — LAB (OUTPATIENT)
Dept: LAB | Facility: HOSPITAL | Age: 59
End: 2019-04-12

## 2019-04-12 ENCOUNTER — HOSPITAL ENCOUNTER (OUTPATIENT)
Dept: CT IMAGING | Facility: HOSPITAL | Age: 59
Discharge: HOME | End: 2019-04-12
Admitting: INTERNAL MEDICINE

## 2019-04-12 VITALS
DIASTOLIC BLOOD PRESSURE: 88 MMHG | HEIGHT: 66 IN | RESPIRATION RATE: 16 BRPM | SYSTOLIC BLOOD PRESSURE: 140 MMHG | BODY MASS INDEX: 25.88 KG/M2 | TEMPERATURE: 97.4 F | WEIGHT: 161 LBS | HEART RATE: 77 BPM

## 2019-04-12 DIAGNOSIS — C79.51 BONE METASTASIS: ICD-10-CM

## 2019-04-12 DIAGNOSIS — C18.7 MALIGNANT NEOPLASM OF SIGMOID COLON (HCC): ICD-10-CM

## 2019-04-12 DIAGNOSIS — C18.9 COLON CANCER METASTASIZED TO BONE (HCC): ICD-10-CM

## 2019-04-12 DIAGNOSIS — C79.51 COLON CANCER METASTASIZED TO BONE (HCC): ICD-10-CM

## 2019-04-12 DIAGNOSIS — C18.7 MALIGNANT NEOPLASM OF SIGMOID COLON (HCC): Primary | ICD-10-CM

## 2019-04-12 LAB
ALBUMIN SERPL-MCNC: 4.3 G/DL (ref 3.5–5.2)
ALBUMIN/GLOB SERPL: 1.2 G/DL
ALP SERPL-CCNC: 59 U/L (ref 39–117)
ALT SERPL W P-5'-P-CCNC: 14 U/L (ref 1–41)
ANION GAP SERPL CALCULATED.3IONS-SCNC: 10 MMOL/L
AST SERPL-CCNC: 22 U/L (ref 1–40)
BILIRUB SERPL-MCNC: 0.6 MG/DL (ref 0.2–1.2)
BILIRUB UR QL STRIP: NEGATIVE
BILIRUB UR QL STRIP: NEGATIVE
BUN BLD-MCNC: 10 MG/DL (ref 6–20)
BUN/CREAT SERPL: 14.9 (ref 7–25)
CALCIUM SPEC-SCNC: 9 MG/DL (ref 8.6–10.5)
CEA SERPL-MCNC: 4.71 NG/ML
CHLORIDE SERPL-SCNC: 100 MMOL/L (ref 98–107)
CLARITY UR: CLEAR
CLARITY UR: CLEAR
CO2 SERPL-SCNC: 26 MMOL/L (ref 22–29)
COLOR UR: YELLOW
COLOR UR: YELLOW
CREAT BLD-MCNC: 0.67 MG/DL (ref 0.76–1.27)
CREAT BLDA-MCNC: 0.7 MG/DL (ref 0.6–1.3)
ERYTHROCYTE [DISTWIDTH] IN BLOOD BY AUTOMATED COUNT: 24.3 % (ref 12.3–15.4)
GFR SERPL CREATININE-BSD FRML MDRD: 122 ML/MIN/1.73
GFR SERPL CREATININE-BSD FRML MDRD: 148 ML/MIN/1.73
GLOBULIN UR ELPH-MCNC: 3.7 GM/DL
GLUCOSE BLD-MCNC: 88 MG/DL (ref 65–99)
GLUCOSE UR STRIP-MCNC: NEGATIVE MG/DL
GLUCOSE UR STRIP-MCNC: NEGATIVE MG/DL
HCT VFR BLD AUTO: 41.2 % (ref 37.5–51)
HGB BLD-MCNC: 13.4 G/DL (ref 13–17.7)
HGB UR QL STRIP.AUTO: NEGATIVE
HGB UR QL STRIP.AUTO: NEGATIVE
KETONES UR QL STRIP: NEGATIVE
KETONES UR QL STRIP: NEGATIVE
LEUKOCYTE ESTERASE UR QL STRIP.AUTO: NEGATIVE
LEUKOCYTE ESTERASE UR QL STRIP.AUTO: NEGATIVE
LYMPHOCYTES # BLD AUTO: 1.9 10*3/MM3 (ref 0.7–3.1)
LYMPHOCYTES NFR BLD AUTO: 28.4 % (ref 19.6–45.3)
MCH RBC QN AUTO: 24.9 PG (ref 26.6–33)
MCHC RBC AUTO-ENTMCNC: 32.4 G/DL (ref 31.5–35.7)
MCV RBC AUTO: 77 FL (ref 79–97)
MONOCYTES # BLD AUTO: 0.4 10*3/MM3 (ref 0.1–0.9)
MONOCYTES NFR BLD AUTO: 5.7 % (ref 5–12)
NEUTROPHILS # BLD AUTO: 4.3 10*3/MM3 (ref 1.4–7)
NEUTROPHILS NFR BLD AUTO: 65.9 % (ref 42.7–76)
NITRITE UR QL STRIP: NEGATIVE
NITRITE UR QL STRIP: NEGATIVE
PH UR STRIP.AUTO: 8 [PH] (ref 5–8)
PH UR STRIP.AUTO: 8 [PH] (ref 5–8)
PLATELET # BLD AUTO: 183 10*3/MM3 (ref 140–450)
PMV BLD AUTO: 7.8 FL (ref 6–12)
POTASSIUM BLD-SCNC: 4.2 MMOL/L (ref 3.5–5.2)
PROT SERPL-MCNC: 8 G/DL (ref 6–8.5)
PROT UR QL STRIP: NEGATIVE
PROT UR QL STRIP: NEGATIVE
RBC # BLD AUTO: 5.36 10*6/MM3 (ref 4.14–5.8)
SODIUM BLD-SCNC: 136 MMOL/L (ref 136–145)
SP GR UR STRIP: 1 (ref 1–1.03)
SP GR UR STRIP: 1.01 (ref 1–1.03)
UROBILINOGEN UR QL STRIP: NORMAL
UROBILINOGEN UR QL STRIP: NORMAL
WBC NRBC COR # BLD: 6.6 10*3/MM3 (ref 3.4–10.8)

## 2019-04-12 PROCEDURE — 25010000002 IOPAMIDOL 61 % SOLUTION: Performed by: INTERNAL MEDICINE

## 2019-04-12 PROCEDURE — 81003 URINALYSIS AUTO W/O SCOPE: CPT

## 2019-04-12 PROCEDURE — 82378 CARCINOEMBRYONIC ANTIGEN: CPT | Performed by: INTERNAL MEDICINE

## 2019-04-12 PROCEDURE — 80053 COMPREHEN METABOLIC PANEL: CPT | Performed by: INTERNAL MEDICINE

## 2019-04-12 PROCEDURE — 71260 CT THORAX DX C+: CPT

## 2019-04-12 PROCEDURE — 74177 CT ABD & PELVIS W/CONTRAST: CPT

## 2019-04-12 PROCEDURE — 25010000002 BEVACIZUMAB PER 10 MG: Performed by: INTERNAL MEDICINE

## 2019-04-12 PROCEDURE — 96413 CHEMO IV INFUSION 1 HR: CPT

## 2019-04-12 PROCEDURE — 85025 COMPLETE CBC W/AUTO DIFF WBC: CPT | Performed by: INTERNAL MEDICINE

## 2019-04-12 PROCEDURE — 82565 ASSAY OF CREATININE: CPT

## 2019-04-12 PROCEDURE — 25010000002 BEVACIZUMAB 100 MG/4ML SOLUTION 4 ML VIAL: Performed by: INTERNAL MEDICINE

## 2019-04-12 PROCEDURE — 81003 URINALYSIS AUTO W/O SCOPE: CPT | Performed by: INTERNAL MEDICINE

## 2019-04-12 RX ORDER — SODIUM CHLORIDE 0.9 % (FLUSH) 0.9 %
10 SYRINGE (ML) INJECTION AS NEEDED
Status: CANCELLED | OUTPATIENT
Start: 2019-04-12

## 2019-04-12 RX ADMIN — IOPAMIDOL 95 ML: 612 INJECTION, SOLUTION INTRAVENOUS at 09:35

## 2019-04-12 RX ADMIN — HEPARIN 500 UNITS: 100 SYRINGE at 11:39

## 2019-04-12 RX ADMIN — BEVACIZUMAB 500 MG: 400 INJECTION, SOLUTION INTRAVENOUS at 11:02

## 2019-05-09 ENCOUNTER — LAB (OUTPATIENT)
Dept: LAB | Facility: HOSPITAL | Age: 59
End: 2019-05-09

## 2019-05-09 DIAGNOSIS — C18.7 MALIGNANT NEOPLASM OF SIGMOID COLON (HCC): ICD-10-CM

## 2019-05-09 DIAGNOSIS — C79.51 BONE METASTASIS: ICD-10-CM

## 2019-05-09 LAB
ALBUMIN SERPL-MCNC: 4.6 G/DL (ref 3.5–5.2)
ALBUMIN/GLOB SERPL: 1.2 G/DL
ALP SERPL-CCNC: 74 U/L (ref 39–117)
ALT SERPL W P-5'-P-CCNC: 13 U/L (ref 1–41)
ANION GAP SERPL CALCULATED.3IONS-SCNC: 12 MMOL/L
AST SERPL-CCNC: 22 U/L (ref 1–40)
BASOPHILS # BLD AUTO: 0.02 10*3/MM3 (ref 0–0.2)
BASOPHILS NFR BLD AUTO: 0.2 % (ref 0–1.5)
BILIRUB SERPL-MCNC: 0.5 MG/DL (ref 0.2–1.2)
BILIRUB UR QL STRIP: NEGATIVE
BUN BLD-MCNC: 15 MG/DL (ref 6–20)
BUN/CREAT SERPL: 22.4 (ref 7–25)
CALCIUM SPEC-SCNC: 9.4 MG/DL (ref 8.6–10.5)
CHLORIDE SERPL-SCNC: 98 MMOL/L (ref 98–107)
CHOLEST SERPL-MCNC: 244 MG/DL (ref 0–200)
CLARITY UR: CLEAR
CO2 SERPL-SCNC: 26 MMOL/L (ref 22–29)
COLOR UR: YELLOW
CREAT BLD-MCNC: 0.67 MG/DL (ref 0.76–1.27)
DEPRECATED RDW RBC AUTO: 61.3 FL (ref 37–54)
EOSINOPHIL # BLD AUTO: 0.99 10*3/MM3 (ref 0–0.4)
EOSINOPHIL NFR BLD AUTO: 12.1 % (ref 0.3–6.2)
ERYTHROCYTE [DISTWIDTH] IN BLOOD BY AUTOMATED COUNT: 22.3 % (ref 12.3–15.4)
GFR SERPL CREATININE-BSD FRML MDRD: 122 ML/MIN/1.73
GFR SERPL CREATININE-BSD FRML MDRD: 148 ML/MIN/1.73
GLOBULIN UR ELPH-MCNC: 3.9 GM/DL
GLUCOSE BLD-MCNC: 90 MG/DL (ref 65–99)
GLUCOSE UR STRIP-MCNC: NEGATIVE MG/DL
HCT VFR BLD AUTO: 43.2 % (ref 37.5–51)
HDLC SERPL-MCNC: 26 MG/DL (ref 40–60)
HGB BLD-MCNC: 13.8 G/DL (ref 13–17.7)
HGB UR QL STRIP.AUTO: NEGATIVE
IMM GRANULOCYTES # BLD AUTO: 0.02 10*3/MM3 (ref 0–0.05)
IMM GRANULOCYTES NFR BLD AUTO: 0.2 % (ref 0–0.5)
KETONES UR QL STRIP: NEGATIVE
LDLC SERPL CALC-MCNC: 150 MG/DL (ref 0–100)
LDLC/HDLC SERPL: 5.78 {RATIO}
LEUKOCYTE ESTERASE UR QL STRIP.AUTO: NEGATIVE
LYMPHOCYTES # BLD AUTO: 2.16 10*3/MM3 (ref 0.7–3.1)
LYMPHOCYTES NFR BLD AUTO: 26.4 % (ref 19.6–45.3)
MCH RBC QN AUTO: 24.3 PG (ref 26.6–33)
MCHC RBC AUTO-ENTMCNC: 31.9 G/DL (ref 31.5–35.7)
MCV RBC AUTO: 76.2 FL (ref 79–97)
MONOCYTES # BLD AUTO: 0.63 10*3/MM3 (ref 0.1–0.9)
MONOCYTES NFR BLD AUTO: 7.7 % (ref 5–12)
NEUTROPHILS # BLD AUTO: 4.38 10*3/MM3 (ref 1.7–7)
NEUTROPHILS NFR BLD AUTO: 53.6 % (ref 42.7–76)
NITRITE UR QL STRIP: NEGATIVE
PH UR STRIP.AUTO: 5 [PH] (ref 5–8)
PLAT MORPH BLD: NORMAL
PLATELET # BLD AUTO: 182 10*3/MM3 (ref 140–450)
PMV BLD AUTO: 10 FL (ref 6–12)
POTASSIUM BLD-SCNC: 4.4 MMOL/L (ref 3.5–5.2)
PROT SERPL-MCNC: 8.5 G/DL (ref 6–8.5)
PROT UR QL STRIP: NEGATIVE
RBC # BLD AUTO: 5.67 10*6/MM3 (ref 4.14–5.8)
RBC MORPH BLD: NORMAL
SODIUM BLD-SCNC: 136 MMOL/L (ref 136–145)
SP GR UR STRIP: 1.01 (ref 1–1.03)
TRIGL SERPL-MCNC: 338 MG/DL (ref 0–150)
UROBILINOGEN UR QL STRIP: NORMAL
VLDLC SERPL-MCNC: 67.6 MG/DL
WBC MORPH BLD: NORMAL
WBC NRBC COR # BLD: 8.18 10*3/MM3 (ref 3.4–10.8)

## 2019-05-09 PROCEDURE — 85025 COMPLETE CBC W/AUTO DIFF WBC: CPT

## 2019-05-09 PROCEDURE — 80061 LIPID PANEL: CPT

## 2019-05-09 PROCEDURE — 82306 VITAMIN D 25 HYDROXY: CPT

## 2019-05-09 PROCEDURE — 36415 COLL VENOUS BLD VENIPUNCTURE: CPT

## 2019-05-09 PROCEDURE — 80053 COMPREHEN METABOLIC PANEL: CPT

## 2019-05-09 PROCEDURE — 81003 URINALYSIS AUTO W/O SCOPE: CPT

## 2019-05-09 PROCEDURE — 85007 BL SMEAR W/DIFF WBC COUNT: CPT

## 2019-05-09 PROCEDURE — 82378 CARCINOEMBRYONIC ANTIGEN: CPT

## 2019-05-10 ENCOUNTER — OFFICE VISIT (OUTPATIENT)
Dept: ONCOLOGY | Facility: CLINIC | Age: 59
End: 2019-05-10

## 2019-05-10 ENCOUNTER — HOSPITAL ENCOUNTER (OUTPATIENT)
Dept: ONCOLOGY | Facility: HOSPITAL | Age: 59
Setting detail: INFUSION SERIES
Discharge: HOME | End: 2019-05-10

## 2019-05-10 VITALS
HEART RATE: 63 BPM | WEIGHT: 159 LBS | OXYGEN SATURATION: 94 % | DIASTOLIC BLOOD PRESSURE: 83 MMHG | HEIGHT: 66 IN | BODY MASS INDEX: 25.55 KG/M2 | RESPIRATION RATE: 16 BRPM | TEMPERATURE: 97.2 F | SYSTOLIC BLOOD PRESSURE: 132 MMHG

## 2019-05-10 DIAGNOSIS — C18.9 COLON CANCER METASTASIZED TO BONE (HCC): Primary | ICD-10-CM

## 2019-05-10 DIAGNOSIS — C79.51 COLON CANCER METASTASIZED TO BONE (HCC): Primary | ICD-10-CM

## 2019-05-10 DIAGNOSIS — C18.7 MALIGNANT NEOPLASM OF SIGMOID COLON (HCC): Primary | ICD-10-CM

## 2019-05-10 LAB
25(OH)D3 SERPL-MCNC: 106.2 NG/ML (ref 30–100)
CEA SERPL-MCNC: 4.37 NG/ML

## 2019-05-10 PROCEDURE — 96523 IRRIG DRUG DELIVERY DEVICE: CPT

## 2019-05-10 PROCEDURE — 99215 OFFICE O/P EST HI 40 MIN: CPT | Performed by: INTERNAL MEDICINE

## 2019-05-10 RX ORDER — SODIUM CHLORIDE 0.9 % (FLUSH) 0.9 %
10 SYRINGE (ML) INJECTION AS NEEDED
Status: CANCELLED | OUTPATIENT
Start: 2019-05-10

## 2019-05-10 RX ADMIN — HEPARIN 500 UNITS: 100 SYRINGE at 09:42

## 2019-05-10 NOTE — PROGRESS NOTES
DATE OF VISIT: 05/10/19      REASON FOR VISIT: Followup for metastatic colon cancer.      HISTORY OF PRESENT ILLNESS: The patient is a very pleasant 59 y.o. male with past medical history significant for metastatic colon cancer diagnosed March 2007 . He is status post lower anterior resection as well as 1 cycle FOLFOX, stopped due to multiple toxicities. Final pathology showed poorly differentiated adenocarcinoma with 25 negative lymph nodes and clear surgical margins. Isolated tumor deposits was found in the pericolonic fat. Pathologic stage T2 N1c M0 stage IIIA disease.The patient has been followed by serial colonoscopies as well as CAT scans that did document pulmonary metastatic disease with left lower lobe nodule that is gradually increasing in size. Patient was doing fairly well until recently, 3 months ago, patient presented with low back pain. Patient had restaging workup that revealed hypermetabolic L2 vertebral body lesion. Patient had biopsy done on October 11, 2016 that revealed metastatic adenocarcinoma consistent of colon primary with CK 7 negative CK 20 positive CDX2 positive. Patient had next generation gene sequencing that revealed intermediate mutational load with microsatellite stability.  He started chemotherapy with Keytruda on 2/1/2017. The patient was changed to CapeOx with Avastin on 9/19/2017.  He completed 8 cycles on February 12, 2018.  He was started on Avastin with Xeloda maintenance.  He is here today for scheduled follow up visit with treatment, cycle #12.       SUBJECTIVE: The patient is here today with his wife.  He is getting surgery for inguinal toes in the next few weeks.  He has mild pain bottom of his feet.  He has mild fatigue.  No back pain.    PAST MEDICAL HISTORY/SOCIAL HISTORY/FAMILY HISTORY: Reviewed by me and unchanged from my documentation done on 09/25/18.    Review of Systems   Constitutional: Positive for fatigue. Negative for activity change, appetite change, chills,  "fever and unexpected weight change.   HENT: Negative for hearing loss, mouth sores, nosebleeds, sore throat and trouble swallowing.    Eyes: Negative for visual disturbance.   Respiratory: Negative for cough, chest tightness, shortness of breath and wheezing.    Cardiovascular: Negative for chest pain, palpitations and leg swelling.   Gastrointestinal: Negative for abdominal distention, abdominal pain, blood in stool, constipation, diarrhea, nausea, rectal pain and vomiting.   Endocrine: Negative for cold intolerance and heat intolerance.   Genitourinary: Negative for difficulty urinating, dysuria, frequency and urgency.   Musculoskeletal: Negative for arthralgias, gait problem, joint swelling and myalgias.   Skin: Negative for rash.   Neurological: Negative for dizziness, tremors, syncope, weakness, light-headedness, numbness and headaches.   Hematological: Negative for adenopathy. Does not bruise/bleed easily.   Psychiatric/Behavioral: Negative for confusion, sleep disturbance and suicidal ideas. The patient is not nervous/anxious.          Current Outpatient Medications:   •  capecitabine (XELODA) 500 MG chemo tablet, Take 2 tablets by mouth in the morning and 2 tablets by mouth in the evening for 7 days on, then off for 7 days., Disp: 56 tablet, Rfl: 5  •  Cholecalciferol (VITAMIN D3) 5000 UNITS capsule capsule, Take 5,000 Units by mouth Daily., Disp: , Rfl:   •  lidocaine-prilocaine (EMLA) 2.5-2.5 % cream, Apply  topically As Needed (45-60 minutes prior to port access.  Cover with saran/plastic wrap.)., Disp: 30 g, Rfl: 3  No current facility-administered medications for this visit.     Facility-Administered Medications Ordered in Other Visits:   •  heparin flush (porcine) 100 UNIT/ML injection 500 Units, 500 Units, Intravenous, PRN, Kristofer Rodriguez MD    PHYSICAL EXAMINATION:   /83   Pulse 63   Temp 97.2 °F (36.2 °C) (Temporal)   Resp 16   Ht 167.6 cm (66\")   Wt 72.1 kg (159 lb)   SpO2 94%   BMI " 25.66 kg/m²    ECOG Performance Status: 1 - Symptomatic but completely ambulatory  General Appearance:  alert, cooperative, no apparent distress and appears stated age   Neurologic/Psychiatric: A&O x 3, gait steady, appropriate affect, strength 5/5 in all muscle groups   HEENT:  Normocephalic, without obvious abnormality, mucous membranes moist   Neck: Supple, symmetrical, trachea midline, no adenopathy;  No thyromegaly, masses, or tenderness   Lungs:   Clear to auscultation bilaterally; respirations regular, even, and unlabored bilaterally   Heart:  Regular rate and rhythm, no murmurs appreciated   Abdomen:   Soft, non-tender, non-distended and no organomegaly   Lymph nodes: No cervical, supraclavicular, inguinal or axillary adenopathy noted   Extremities: Normal, atraumatic; no clubbing, cyanosis, or edema    Skin: No rashes, ulcers, or suspicious lesions noted     Lab on 05/09/2019   Component Date Value Ref Range Status   • CEA 05/09/2019 4.37  ng/mL Final   • Glucose 05/09/2019 90  65 - 99 mg/dL Final   • BUN 05/09/2019 15  6 - 20 mg/dL Final   • Creatinine 05/09/2019 0.67* 0.76 - 1.27 mg/dL Final   • Sodium 05/09/2019 136  136 - 145 mmol/L Final   • Potassium 05/09/2019 4.4  3.5 - 5.2 mmol/L Final   • Chloride 05/09/2019 98  98 - 107 mmol/L Final   • CO2 05/09/2019 26.0  22.0 - 29.0 mmol/L Final   • Calcium 05/09/2019 9.4  8.6 - 10.5 mg/dL Final   • Total Protein 05/09/2019 8.5  6.0 - 8.5 g/dL Final   • Albumin 05/09/2019 4.60  3.50 - 5.20 g/dL Final   • ALT (SGPT) 05/09/2019 13  1 - 41 U/L Final   • AST (SGOT) 05/09/2019 22  1 - 40 U/L Final   • Alkaline Phosphatase 05/09/2019 74  39 - 117 U/L Final   • Total Bilirubin 05/09/2019 0.5  0.2 - 1.2 mg/dL Final   • eGFR Non African Amer 05/09/2019 122  >60 mL/min/1.73 Final   • eGFR   Amer 05/09/2019 148  >60 mL/min/1.73 Final   • Globulin 05/09/2019 3.9  gm/dL Final   • A/G Ratio 05/09/2019 1.2  g/dL Final   • BUN/Creatinine Ratio 05/09/2019 22.4  7.0 -  25.0 Final   • Anion Gap 05/09/2019 12.0  mmol/L Final   • Color, UA 05/09/2019 Yellow  Yellow, Straw Final   • Appearance, UA 05/09/2019 Clear  Clear Final   • pH, UA 05/09/2019 5.0  5.0 - 8.0 Final   • Specific Gravity, UA 05/09/2019 1.010  1.005 - 1.030 Final   • Glucose, UA 05/09/2019 Negative  Negative Final   • Ketones, UA 05/09/2019 Negative  Negative Final   • Bilirubin, UA 05/09/2019 Negative  Negative Final   • Blood, UA 05/09/2019 Negative  Negative Final   • Protein, UA 05/09/2019 Negative  Negative Final   • Leuk Esterase, UA 05/09/2019 Negative  Negative Final   • Nitrite, UA 05/09/2019 Negative  Negative Final   • Urobilinogen, UA 05/09/2019 0.2 E.U./dL  0.2 - 1.0 E.U./dL Final   • Total Cholesterol 05/09/2019 244* 0 - 200 mg/dL Final   • Triglycerides 05/09/2019 338* 0 - 150 mg/dL Final   • HDL Cholesterol 05/09/2019 26* 40 - 60 mg/dL Final   • LDL Cholesterol  05/09/2019 150* 0 - 100 mg/dL Final   • VLDL Cholesterol 05/09/2019 67.6  mg/dL Final   • LDL/HDL Ratio 05/09/2019 5.78   Final   • 25 Hydroxy, Vitamin D 05/09/2019 106.2* 30.0 - 100.0 ng/ml Final   • WBC 05/09/2019 8.18  3.40 - 10.80 10*3/mm3 Final   • RBC 05/09/2019 5.67  4.14 - 5.80 10*6/mm3 Final   • Hemoglobin 05/09/2019 13.8  13.0 - 17.7 g/dL Final   • Hematocrit 05/09/2019 43.2  37.5 - 51.0 % Final   • MCV 05/09/2019 76.2* 79.0 - 97.0 fL Final   • MCH 05/09/2019 24.3* 26.6 - 33.0 pg Final   • MCHC 05/09/2019 31.9  31.5 - 35.7 g/dL Final   • RDW 05/09/2019 22.3* 12.3 - 15.4 % Final   • RDW-SD 05/09/2019 61.3* 37.0 - 54.0 fl Final   • MPV 05/09/2019 10.0  6.0 - 12.0 fL Final   • Platelets 05/09/2019 182  140 - 450 10*3/mm3 Final   • Neutrophil % 05/09/2019 53.6  42.7 - 76.0 % Final   • Lymphocyte % 05/09/2019 26.4  19.6 - 45.3 % Final   • Monocyte % 05/09/2019 7.7  5.0 - 12.0 % Final   • Eosinophil % 05/09/2019 12.1* 0.3 - 6.2 % Final   • Basophil % 05/09/2019 0.2  0.0 - 1.5 % Final   • Immature Grans % 05/09/2019 0.2  0.0 - 0.5 % Final    • Neutrophils, Absolute 05/09/2019 4.38  1.70 - 7.00 10*3/mm3 Final   • Lymphocytes, Absolute 05/09/2019 2.16  0.70 - 3.10 10*3/mm3 Final   • Monocytes, Absolute 05/09/2019 0.63  0.10 - 0.90 10*3/mm3 Final   • Eosinophils, Absolute 05/09/2019 0.99* 0.00 - 0.40 10*3/mm3 Final   • Basophils, Absolute 05/09/2019 0.02  0.00 - 0.20 10*3/mm3 Final   • Immature Grans, Absolute 05/09/2019 0.02  0.00 - 0.05 10*3/mm3 Final   • RBC Morphology 05/09/2019 Normal  Normal Final   • WBC Morphology 05/09/2019 Normal  Normal Final   • Platelet Morphology 05/09/2019 Normal  Normal Final        EXAMINATION: CT CHEST W CONTRAST-, CT ABDOMEN PELVIS W CONTRAST-  04/12/2019      INDICATION: f/u scan; C18.7-Malignant neoplasm of sigmoid colon     TECHNIQUE: 5 mm post-IV contrast images through the chest and 5 mm post  IV contrast portal venous phase and delayed venous phase images through  the abdomen and pelvis.     The radiation dose reduction device was turned on for each scan per the  ALARA (As Low as Reasonably Achievable) protocol.     COMPARISON: Whole body PET/CT scan 10/12/2018     FINDINGS: Patient history indicates metastatic colon cancer, initial  diagnosis 2007, again in 2016, on chemotherapy currently. No current  symptoms. Previous PET/CT scan report from 10/12/2018 indicated mildly  increased activity and residual left lower lung mass, probably  incidental left palatine tonsil activity.     CHEST CT SCAN WITH IV CONTRAST: Patient's left lower lung mass,  including apparent areas of adjacent scarring or atelectasis appears  further decreased, previously measured as 42 x 26 mm today 35 x 19 mm.  No new pulmonary parenchymal nodule is identified. There is no pleural  effusion. Mediastinal window images show no evidence of significant  mediastinal, hilar, or axillary adenopathy.     IMPRESSION:  Further decrease in size of the patient's left lower lung  mass since 10/12/2018 exam. No new chest disease.     ABDOMEN AND PELVIS  CT SCAN WITH IV CONTRAST: There is diffuse fatty  liver change. No liver masses are identified. Spleen is not enlarged. No  significant abnormalities are seen of the pancreas, gallbladder, or  adrenal glands. Kidneys appear unremarkable except for a small right  renal mid pole cyst. No upper abdominal adenopathy, ascites, or acute  inflammatory change is seen. There is mild fecal stasis. Bowel loops are  normal in caliber.     Regarding the lower abdomen and pelvis, no adenopathy or peritoneal  disease is seen. The rectosigmoid anastomosis appears clear. Delayed  images show good contrast opacification of normal caliber renal  collecting systems, ureters, and bladder. Patchy lucent changes in the  fused lumbar vertebral bodies are unchanged, apparently chronic.  Bony  structures elsewhere appear to be intact.     IMPRESSION: No evidence of intra-abdominal or intrapelvic metastatic  disease.     D:  04/12/2019  E:  04/12/2019    ASSESSMENT: The patient is a very pleasant 59 y.o. male  with stage IV colon cancer.    PROBLEM LIST:  1. Currently stage IV colon cancer with lung and bony metastases.  K-edwige mutant, microsatellite stable, and intermediate mutational load.  2. Status post low anterior resection done by Dr. Young 2007, [id number]  3.  Attempted adjuvant chemotherapy for 1 cycle could not tolerate secondary to multiple toxicities.  4. Biopsy-proven L2 metastases done October 11, 2016. Status post CyberKnife radiation treatment.  5.  Started Keytruda 200 mg IV every 3 weeks on February 1, 2017, status post 10 cycles of treatment  6. Disease progression documented on PET scan completed 8/31/2017.   7. Treatment changed to Xeloda/Avastin/Oxaliplatin on 9/19/2017, status post 7 cycles.   8. Status post tumor debulking at L2 done by Dr. Frazier at [facility] on April 21, 2017  9.  The treatment was switched to Xeloda with Avastin maintenance treatment March 6, 2018  10.  Cancer related pain  11.   Opioid-induced constipation  12.  Treatment induced asthenia  13.  Mild depression  14.  Insomnia  15.  Chemotherapy-induced anemia  16.  Chemotherapy-induced dermatitis  17. Upper airway infection    PLAN:  1.  I will hold Avastin today secondary to planned surgery in the next 2 weeks.  2.  I will continue Xeloda 500 mg a.m. and 500 mg p.m. 1 weeks on, 1 week off.  This is dose reduction secondary to dermatitis.  He could not tolerate any higher doses.  3. We will monitor the patient's labs throughout treatment including blood counts, kidney function, CEA, thyroid function, and liver functions. We will monitor his liver enzymes that have been elevated secondary to treatment with immune-therapy.   4.  The patient will follow-up with me in 6 weeks with treatment.  We will resume treatment at that point.  5.  Patient will continue taking over-the-counter stool softeners for constipation.  6.  I again reviewed with the patient the potential side effects from immune therapy including dermatitis, hepatitis, perforation, bleeding, thrombosis, and potential death.  7.  Patient tissue was submitted for the matched trial.  He did match on PETN mutation, however that arm was closed.  8.  I advised the patient to continue to hold his alternative treatment with high dose vitamin C while he is on immunetherapy.  9.  I will go over the scan results with the patient his wife are reassured there is no evidence of progressive disease.  His lung nodule continue to trend.  His bone lesion is stable.  I will repeat scans in 4 months which would be due August 2019. We'll consider doing it sonner if his CEA level starts to increase or if he has new symptoms.   10. The patient will continue Zofran as needed for treatment related nausea.  11. The patient will continue oxycodone 10 mg as needed for cancer-related pain.  We'll continue Celebrex 200 mg twice a day as needed.  He hasn't been needing any pain medicine lately.   12.  We offered  Ritalin for treatment related fatigue, but the patient has declined at this time.   13.  Patient will continue Zaleplon as needed for insomnia.  14.  I will transfuse 2 units of blood if his hemoglobin drops below 8.  His anemia is mild and stable at this point.         Kristofer Rodriguez MD  05/10/19

## 2019-05-23 ENCOUNTER — HOSPITAL ENCOUNTER (OUTPATIENT)
Dept: ONCOLOGY | Facility: HOSPITAL | Age: 59
Setting detail: INFUSION SERIES
Discharge: HOME | End: 2019-05-23

## 2019-05-23 VITALS
HEIGHT: 66 IN | TEMPERATURE: 97.6 F | DIASTOLIC BLOOD PRESSURE: 77 MMHG | RESPIRATION RATE: 16 BRPM | HEART RATE: 75 BPM | SYSTOLIC BLOOD PRESSURE: 129 MMHG | WEIGHT: 161 LBS | BODY MASS INDEX: 25.88 KG/M2

## 2019-05-23 DIAGNOSIS — C79.51 COLON CANCER METASTASIZED TO BONE (HCC): ICD-10-CM

## 2019-05-23 DIAGNOSIS — C18.9 COLON CANCER METASTASIZED TO BONE (HCC): ICD-10-CM

## 2019-05-23 DIAGNOSIS — C79.51 BONE METASTASIS: ICD-10-CM

## 2019-05-23 DIAGNOSIS — C18.7 MALIGNANT NEOPLASM OF SIGMOID COLON (HCC): Primary | ICD-10-CM

## 2019-05-23 LAB
ALBUMIN SERPL-MCNC: 4.2 G/DL (ref 3.5–5.2)
ALBUMIN/GLOB SERPL: 1.1 G/DL
ALP SERPL-CCNC: 68 U/L (ref 39–117)
ALT SERPL W P-5'-P-CCNC: 12 U/L (ref 1–41)
ANION GAP SERPL CALCULATED.3IONS-SCNC: 11 MMOL/L
AST SERPL-CCNC: 20 U/L (ref 1–40)
BILIRUB SERPL-MCNC: 0.6 MG/DL (ref 0.2–1.2)
BILIRUB UR QL STRIP: NEGATIVE
BUN BLD-MCNC: 14 MG/DL (ref 6–20)
BUN/CREAT SERPL: 20 (ref 7–25)
CALCIUM SPEC-SCNC: 9.4 MG/DL (ref 8.6–10.5)
CEA SERPL-MCNC: 4.05 NG/ML
CHLORIDE SERPL-SCNC: 96 MMOL/L (ref 98–107)
CLARITY UR: CLEAR
CO2 SERPL-SCNC: 24 MMOL/L (ref 22–29)
COLOR UR: YELLOW
CREAT BLD-MCNC: 0.7 MG/DL (ref 0.76–1.27)
CREAT BLDA-MCNC: 0.7 MG/DL (ref 0.6–1.3)
ERYTHROCYTE [DISTWIDTH] IN BLOOD BY AUTOMATED COUNT: 23.9 % (ref 12.3–15.4)
GFR SERPL CREATININE-BSD FRML MDRD: 116 ML/MIN/1.73
GFR SERPL CREATININE-BSD FRML MDRD: 141 ML/MIN/1.73
GLOBULIN UR ELPH-MCNC: 3.8 GM/DL
GLUCOSE BLD-MCNC: 85 MG/DL (ref 65–99)
GLUCOSE UR STRIP-MCNC: NEGATIVE MG/DL
HCT VFR BLD AUTO: 38.7 % (ref 37.5–51)
HGB BLD-MCNC: 12.4 G/DL (ref 13–17.7)
HGB UR QL STRIP.AUTO: NEGATIVE
KETONES UR QL STRIP: NEGATIVE
LEUKOCYTE ESTERASE UR QL STRIP.AUTO: NEGATIVE
LYMPHOCYTES # BLD AUTO: 2.2 10*3/MM3 (ref 0.7–3.1)
LYMPHOCYTES NFR BLD AUTO: 26.6 % (ref 19.6–45.3)
MCH RBC QN AUTO: 24.5 PG (ref 26.6–33)
MCHC RBC AUTO-ENTMCNC: 32 G/DL (ref 31.5–35.7)
MCV RBC AUTO: 76.5 FL (ref 79–97)
MONOCYTES # BLD AUTO: 0.5 10*3/MM3 (ref 0.1–0.9)
MONOCYTES NFR BLD AUTO: 5.9 % (ref 5–12)
NEUTROPHILS # BLD AUTO: 5.7 10*3/MM3 (ref 1.7–7)
NEUTROPHILS NFR BLD AUTO: 67.5 % (ref 42.7–76)
NITRITE UR QL STRIP: NEGATIVE
PH UR STRIP.AUTO: 7 [PH] (ref 5–8)
PLATELET # BLD AUTO: 174 10*3/MM3 (ref 140–450)
PMV BLD AUTO: 7.8 FL (ref 6–12)
POTASSIUM BLD-SCNC: 4.3 MMOL/L (ref 3.5–5.2)
PROT SERPL-MCNC: 8 G/DL (ref 6–8.5)
PROT UR QL STRIP: NEGATIVE
RBC # BLD AUTO: 5.06 10*6/MM3 (ref 4.14–5.8)
SODIUM BLD-SCNC: 131 MMOL/L (ref 136–145)
SP GR UR STRIP: 1 (ref 1–1.03)
UROBILINOGEN UR QL STRIP: NORMAL
WBC NRBC COR # BLD: 8.4 10*3/MM3 (ref 3.4–10.8)

## 2019-05-23 PROCEDURE — 82378 CARCINOEMBRYONIC ANTIGEN: CPT | Performed by: INTERNAL MEDICINE

## 2019-05-23 PROCEDURE — 25010000002 BEVACIZUMAB 100 MG/4ML SOLUTION 4 ML VIAL: Performed by: INTERNAL MEDICINE

## 2019-05-23 PROCEDURE — 80053 COMPREHEN METABOLIC PANEL: CPT | Performed by: INTERNAL MEDICINE

## 2019-05-23 PROCEDURE — 82565 ASSAY OF CREATININE: CPT

## 2019-05-23 PROCEDURE — 85025 COMPLETE CBC W/AUTO DIFF WBC: CPT | Performed by: INTERNAL MEDICINE

## 2019-05-23 PROCEDURE — 96413 CHEMO IV INFUSION 1 HR: CPT

## 2019-05-23 PROCEDURE — 81003 URINALYSIS AUTO W/O SCOPE: CPT | Performed by: INTERNAL MEDICINE

## 2019-05-23 PROCEDURE — 25010000002 BEVACIZUMAB PER 10 MG: Performed by: INTERNAL MEDICINE

## 2019-05-23 PROCEDURE — 36591 DRAW BLOOD OFF VENOUS DEVICE: CPT

## 2019-05-23 RX ORDER — SODIUM CHLORIDE 0.9 % (FLUSH) 0.9 %
10 SYRINGE (ML) INJECTION AS NEEDED
Status: DISCONTINUED | OUTPATIENT
Start: 2019-05-23 | End: 2019-05-24 | Stop reason: HOSPADM

## 2019-05-23 RX ORDER — SODIUM CHLORIDE 0.9 % (FLUSH) 0.9 %
10 SYRINGE (ML) INJECTION AS NEEDED
Status: CANCELLED | OUTPATIENT
Start: 2019-05-23

## 2019-05-23 RX ORDER — SODIUM CHLORIDE 9 MG/ML
250 INJECTION, SOLUTION INTRAVENOUS ONCE
Status: COMPLETED | OUTPATIENT
Start: 2019-05-23 | End: 2019-05-23

## 2019-05-23 RX ADMIN — BEVACIZUMAB 500 MG: 400 INJECTION, SOLUTION INTRAVENOUS at 15:52

## 2019-05-23 RX ADMIN — SODIUM CHLORIDE 250 ML: 9 INJECTION, SOLUTION INTRAVENOUS at 15:30

## 2019-05-23 RX ADMIN — HEPARIN 500 UNITS: 100 SYRINGE at 16:35

## 2019-05-23 RX ADMIN — SODIUM CHLORIDE, PRESERVATIVE FREE 10 ML: 5 INJECTION INTRAVENOUS at 16:35

## 2019-06-21 ENCOUNTER — HOSPITAL ENCOUNTER (OUTPATIENT)
Dept: ONCOLOGY | Facility: HOSPITAL | Age: 59
Setting detail: INFUSION SERIES
Discharge: HOME | End: 2019-06-21

## 2019-06-21 ENCOUNTER — OFFICE VISIT (OUTPATIENT)
Dept: ONCOLOGY | Facility: CLINIC | Age: 59
End: 2019-06-21

## 2019-06-21 VITALS
HEART RATE: 62 BPM | HEIGHT: 66 IN | BODY MASS INDEX: 25.88 KG/M2 | RESPIRATION RATE: 12 BRPM | TEMPERATURE: 97 F | OXYGEN SATURATION: 100 % | DIASTOLIC BLOOD PRESSURE: 76 MMHG | WEIGHT: 161 LBS | SYSTOLIC BLOOD PRESSURE: 117 MMHG

## 2019-06-21 DIAGNOSIS — R91.1 SOLITARY LUNG NODULE: ICD-10-CM

## 2019-06-21 DIAGNOSIS — C79.51 COLON CANCER METASTASIZED TO BONE (HCC): ICD-10-CM

## 2019-06-21 DIAGNOSIS — C79.51 BONE METASTASIS: ICD-10-CM

## 2019-06-21 DIAGNOSIS — C18.9 COLON CANCER METASTASIZED TO BONE (HCC): ICD-10-CM

## 2019-06-21 DIAGNOSIS — C18.7 MALIGNANT NEOPLASM OF SIGMOID COLON (HCC): Primary | ICD-10-CM

## 2019-06-21 LAB
ALBUMIN SERPL-MCNC: 4.3 G/DL (ref 3.5–5.2)
ALBUMIN/GLOB SERPL: 1.2 G/DL
ALP SERPL-CCNC: 64 U/L (ref 39–117)
ALT SERPL W P-5'-P-CCNC: 13 U/L (ref 1–41)
ANION GAP SERPL CALCULATED.3IONS-SCNC: 9 MMOL/L
AST SERPL-CCNC: 21 U/L (ref 1–40)
BILIRUB SERPL-MCNC: 0.5 MG/DL (ref 0.2–1.2)
BILIRUB UR QL STRIP: NEGATIVE
BUN BLD-MCNC: 12 MG/DL (ref 6–20)
BUN/CREAT SERPL: 14 (ref 7–25)
CALCIUM SPEC-SCNC: 9.2 MG/DL (ref 8.6–10.5)
CEA SERPL-MCNC: 4.03 NG/ML
CHLORIDE SERPL-SCNC: 98 MMOL/L (ref 98–107)
CLARITY UR: CLEAR
CO2 SERPL-SCNC: 26 MMOL/L (ref 22–29)
COLOR UR: YELLOW
CREAT BLD-MCNC: 0.86 MG/DL (ref 0.76–1.27)
CREAT BLDA-MCNC: 0.7 MG/DL
CREAT BLDA-MCNC: 0.7 MG/DL (ref 0.6–1.3)
ERYTHROCYTE [DISTWIDTH] IN BLOOD BY AUTOMATED COUNT: 23.8 % (ref 12.3–15.4)
GFR SERPL CREATININE-BSD FRML MDRD: 111 ML/MIN/1.73
GFR SERPL CREATININE-BSD FRML MDRD: 92 ML/MIN/1.73
GLOBULIN UR ELPH-MCNC: 3.5 GM/DL
GLUCOSE BLD-MCNC: 129 MG/DL (ref 65–99)
GLUCOSE UR STRIP-MCNC: NEGATIVE MG/DL
HCT VFR BLD AUTO: 40.9 % (ref 37.5–51)
HGB BLD-MCNC: 13.2 G/DL (ref 13–17.7)
HGB UR QL STRIP.AUTO: NEGATIVE
KETONES UR QL STRIP: NEGATIVE
LEUKOCYTE ESTERASE UR QL STRIP.AUTO: NEGATIVE
LYMPHOCYTES # BLD AUTO: 1.8 10*3/MM3 (ref 0.7–3.1)
LYMPHOCYTES NFR BLD AUTO: 26.2 % (ref 19.6–45.3)
MCH RBC QN AUTO: 24.9 PG (ref 26.6–33)
MCHC RBC AUTO-ENTMCNC: 32.2 G/DL (ref 31.5–35.7)
MCV RBC AUTO: 77.4 FL (ref 79–97)
MONOCYTES # BLD AUTO: 0.4 10*3/MM3 (ref 0.1–0.9)
MONOCYTES NFR BLD AUTO: 5.9 % (ref 5–12)
NEUTROPHILS # BLD AUTO: 4.6 10*3/MM3 (ref 1.7–7)
NEUTROPHILS NFR BLD AUTO: 67.9 % (ref 42.7–76)
NITRITE UR QL STRIP: NEGATIVE
PH UR STRIP.AUTO: 6 [PH] (ref 5–8)
PLATELET # BLD AUTO: 179 10*3/MM3 (ref 140–450)
PMV BLD AUTO: 7.9 FL (ref 6–12)
POTASSIUM BLD-SCNC: 4.5 MMOL/L (ref 3.5–5.2)
PROT SERPL-MCNC: 7.8 G/DL (ref 6–8.5)
PROT UR QL STRIP: NEGATIVE
RBC # BLD AUTO: 5.29 10*6/MM3 (ref 4.14–5.8)
SODIUM BLD-SCNC: 133 MMOL/L (ref 136–145)
SP GR UR STRIP: 1 (ref 1–1.03)
UROBILINOGEN UR QL STRIP: NORMAL
WBC NRBC COR # BLD: 6.8 10*3/MM3 (ref 3.4–10.8)

## 2019-06-21 PROCEDURE — 80053 COMPREHEN METABOLIC PANEL: CPT

## 2019-06-21 PROCEDURE — 82565 ASSAY OF CREATININE: CPT

## 2019-06-21 PROCEDURE — 25010000002 BEVACIZUMAB 100 MG/4ML SOLUTION 4 ML VIAL: Performed by: INTERNAL MEDICINE

## 2019-06-21 PROCEDURE — 82378 CARCINOEMBRYONIC ANTIGEN: CPT

## 2019-06-21 PROCEDURE — 96413 CHEMO IV INFUSION 1 HR: CPT

## 2019-06-21 PROCEDURE — 99214 OFFICE O/P EST MOD 30 MIN: CPT | Performed by: INTERNAL MEDICINE

## 2019-06-21 PROCEDURE — 85025 COMPLETE CBC W/AUTO DIFF WBC: CPT

## 2019-06-21 PROCEDURE — 25010000002 BEVACIZUMAB PER 10 MG: Performed by: INTERNAL MEDICINE

## 2019-06-21 PROCEDURE — 81003 URINALYSIS AUTO W/O SCOPE: CPT

## 2019-06-21 RX ORDER — SODIUM CHLORIDE 0.9 % (FLUSH) 0.9 %
10 SYRINGE (ML) INJECTION AS NEEDED
Status: DISCONTINUED | OUTPATIENT
Start: 2019-06-21 | End: 2019-06-22 | Stop reason: HOSPADM

## 2019-06-21 RX ORDER — SODIUM CHLORIDE 9 MG/ML
250 INJECTION, SOLUTION INTRAVENOUS ONCE
Status: DISCONTINUED | OUTPATIENT
Start: 2019-06-21 | End: 2019-06-22 | Stop reason: HOSPADM

## 2019-06-21 RX ORDER — SODIUM CHLORIDE 0.9 % (FLUSH) 0.9 %
10 SYRINGE (ML) INJECTION AS NEEDED
Status: CANCELLED | OUTPATIENT
Start: 2019-06-21

## 2019-06-21 RX ADMIN — BEVACIZUMAB 500 MG: 400 INJECTION, SOLUTION INTRAVENOUS at 11:05

## 2019-06-21 RX ADMIN — HEPARIN 500 UNITS: 100 SYRINGE at 11:44

## 2019-06-21 RX ADMIN — SODIUM CHLORIDE, PRESERVATIVE FREE 10 ML: 5 INJECTION INTRAVENOUS at 11:44

## 2019-06-21 NOTE — PROGRESS NOTES
DATE OF VISIT: 06/21/19      REASON FOR VISIT: Followup for metastatic colon cancer.      HISTORY OF PRESENT ILLNESS: The patient is a very pleasant 59 y.o. male with past medical history significant for metastatic colon cancer diagnosed March 2007 . He is status post lower anterior resection as well as 1 cycle FOLFOX, stopped due to multiple toxicities. Final pathology showed poorly differentiated adenocarcinoma with 25 negative lymph nodes and clear surgical margins. Isolated tumor deposits was found in the pericolonic fat. Pathologic stage T2 N1c M0 stage IIIA disease.The patient has been followed by serial colonoscopies as well as CAT scans that did document pulmonary metastatic disease with left lower lobe nodule that is gradually increasing in size. Patient was doing fairly well until recently, 3 months ago, patient presented with low back pain. Patient had restaging workup that revealed hypermetabolic L2 vertebral body lesion. Patient had biopsy done on October 11, 2016 that revealed metastatic adenocarcinoma consistent of colon primary with CK 7 negative CK 20 positive CDX2 positive. Patient had next generation gene sequencing that revealed intermediate mutational load with microsatellite stability.  He started chemotherapy with Keytruda on 2/1/2017. The patient was changed to CapeOx with Avastin on 9/19/2017.  He completed 8 cycles on February 12, 2018.  He was started on Avastin with Xeloda maintenance.  He is here today for scheduled follow up visit with treatment, cycle #15.       SUBJECTIVE: The patient is here today by himself.  He has mild pain bottom of his feet.  He has mild fatigue.  No back pain.    PAST MEDICAL HISTORY/SOCIAL HISTORY/FAMILY HISTORY: Reviewed by me and unchanged from my documentation done on 09/25/18.    Review of Systems   Constitutional: Positive for fatigue. Negative for activity change, appetite change, chills, fever and unexpected weight change.   HENT: Negative for hearing  "loss, mouth sores, nosebleeds, sore throat and trouble swallowing.    Eyes: Negative for visual disturbance.   Respiratory: Negative for cough, chest tightness, shortness of breath and wheezing.    Cardiovascular: Negative for chest pain, palpitations and leg swelling.   Gastrointestinal: Negative for abdominal distention, abdominal pain, blood in stool, constipation, diarrhea, nausea, rectal pain and vomiting.   Endocrine: Negative for cold intolerance and heat intolerance.   Genitourinary: Negative for difficulty urinating, dysuria, frequency and urgency.   Musculoskeletal: Negative for arthralgias, gait problem, joint swelling and myalgias.   Skin: Negative for rash.   Neurological: Negative for dizziness, tremors, syncope, weakness, light-headedness, numbness and headaches.   Hematological: Negative for adenopathy. Does not bruise/bleed easily.   Psychiatric/Behavioral: Negative for confusion, sleep disturbance and suicidal ideas. The patient is not nervous/anxious.          Current Outpatient Medications:   •  capecitabine (XELODA) 500 MG chemo tablet, Take 2 tablets by mouth in the morning and 2 tablets by mouth in the evening for 7 days on, then off for 7 days., Disp: 56 tablet, Rfl: 5  •  Cholecalciferol (VITAMIN D3) 5000 UNITS capsule capsule, Take 5,000 Units by mouth Daily., Disp: , Rfl:   •  lidocaine-prilocaine (EMLA) 2.5-2.5 % cream, Apply  topically As Needed (45-60 minutes prior to port access.  Cover with saran/plastic wrap.)., Disp: 30 g, Rfl: 3    PHYSICAL EXAMINATION:   /76   Pulse 62   Temp 97 °F (36.1 °C) (Temporal)   Resp 12   Ht 167.6 cm (65.98\")   Wt 73 kg (161 lb)   SpO2 100%   BMI 26.00 kg/m²    ECOG Performance Status: 1 - Symptomatic but completely ambulatory  General Appearance:  alert, cooperative, no apparent distress and appears stated age   Neurologic/Psychiatric: A&O x 3, gait steady, appropriate affect, strength 5/5 in all muscle groups   HEENT:  Normocephalic, " without obvious abnormality, mucous membranes moist   Neck: Supple, symmetrical, trachea midline, no adenopathy;  No thyromegaly, masses, or tenderness   Lungs:   Clear to auscultation bilaterally; respirations regular, even, and unlabored bilaterally   Heart:  Regular rate and rhythm, no murmurs appreciated   Abdomen:   Soft, non-tender, non-distended and no organomegaly   Lymph nodes: No cervical, supraclavicular, inguinal or axillary adenopathy noted   Extremities: Normal, atraumatic; no clubbing, cyanosis, or edema    Skin: No rashes, ulcers, or suspicious lesions noted     No visits with results within 2 Week(s) from this visit.   Latest known visit with results is:   Hospital Outpatient Visit on 05/23/2019   Component Date Value Ref Range Status   • Glucose 05/23/2019 85  65 - 99 mg/dL Final   • BUN 05/23/2019 14  6 - 20 mg/dL Final   • Creatinine 05/23/2019 0.70* 0.76 - 1.27 mg/dL Final   • Sodium 05/23/2019 131* 136 - 145 mmol/L Final   • Potassium 05/23/2019 4.3  3.5 - 5.2 mmol/L Final   • Chloride 05/23/2019 96* 98 - 107 mmol/L Final   • CO2 05/23/2019 24.0  22.0 - 29.0 mmol/L Final   • Calcium 05/23/2019 9.4  8.6 - 10.5 mg/dL Final   • Total Protein 05/23/2019 8.0  6.0 - 8.5 g/dL Final   • Albumin 05/23/2019 4.20  3.50 - 5.20 g/dL Final   • ALT (SGPT) 05/23/2019 12  1 - 41 U/L Final   • AST (SGOT) 05/23/2019 20  1 - 40 U/L Final    Specimen hemolyzed.  Results may be affected.   • Alkaline Phosphatase 05/23/2019 68  39 - 117 U/L Final   • Total Bilirubin 05/23/2019 0.6  0.2 - 1.2 mg/dL Final   • eGFR Non  Amer 05/23/2019 116  >60 mL/min/1.73 Final   • eGFR   Amer 05/23/2019 141  >60 mL/min/1.73 Final   • Globulin 05/23/2019 3.8  gm/dL Final   • A/G Ratio 05/23/2019 1.1  g/dL Final   • BUN/Creatinine Ratio 05/23/2019 20.0  7.0 - 25.0 Final   • Anion Gap 05/23/2019 11.0  mmol/L Final   • WBC 05/23/2019 8.40  3.40 - 10.80 10*3/mm3 Final   • RBC 05/23/2019 5.06  4.14 - 5.80 10*6/mm3 Final   •  Hemoglobin 05/23/2019 12.4* 13.0 - 17.7 g/dL Final   • Hematocrit 05/23/2019 38.7  37.5 - 51.0 % Final   • RDW 05/23/2019 23.9* 12.3 - 15.4 % Final   • MCV 05/23/2019 76.5* 79.0 - 97.0 fL Final   • MCH 05/23/2019 24.5* 26.6 - 33.0 pg Final   • MCHC 05/23/2019 32.0  31.5 - 35.7 g/dL Final   • MPV 05/23/2019 7.8  6.0 - 12.0 fL Final   • Platelets 05/23/2019 174  140 - 450 10*3/mm3 Final   • Neutrophil % 05/23/2019 67.5  42.7 - 76.0 % Final   • Lymphocyte % 05/23/2019 26.6  19.6 - 45.3 % Final   • Monocyte % 05/23/2019 5.9  5.0 - 12.0 % Final   • Neutrophils, Absolute 05/23/2019 5.70  1.70 - 7.00 10*3/mm3 Final   • Lymphocytes, Absolute 05/23/2019 2.20  0.70 - 3.10 10*3/mm3 Final   • Monocytes, Absolute 05/23/2019 0.50  0.10 - 0.90 10*3/mm3 Final   • CEA 05/23/2019 4.05  ng/mL Final   • Color, UA 05/23/2019 Yellow  Yellow, Straw Final   • Appearance, UA 05/23/2019 Clear  Clear Final   • pH, UA 05/23/2019 7.0  5.0 - 8.0 Final   • Specific Gravity, UA 05/23/2019 1.005  1.005 - 1.030 Final   • Glucose, UA 05/23/2019 Negative  Negative Final   • Ketones, UA 05/23/2019 Negative  Negative Final   • Bilirubin, UA 05/23/2019 Negative  Negative Final   • Blood, UA 05/23/2019 Negative  Negative Final   • Protein, UA 05/23/2019 Negative  Negative Final   • Leuk Esterase, UA 05/23/2019 Negative  Negative Final   • Nitrite, UA 05/23/2019 Negative  Negative Final   • Urobilinogen, UA 05/23/2019 0.2 E.U./dL  0.2 - 1.0 E.U./dL Final   • Creatinine 05/23/2019 0.70  0.60 - 1.30 mg/dL Final    Serial Number: [id number]:  [id number]        EXAMINATION: CT CHEST W CONTRAST-, CT ABDOMEN PELVIS W CONTRAST-  04/12/2019      INDICATION: f/u scan; C18.7-Malignant neoplasm of sigmoid colon     TECHNIQUE: 5 mm post-IV contrast images through the chest and 5 mm post  IV contrast portal venous phase and delayed venous phase images through  the abdomen and pelvis.     The radiation dose reduction device was turned on for each scan per the  ALARA (As  Low as Reasonably Achievable) protocol.     COMPARISON: Whole body PET/CT scan 10/12/2018     FINDINGS: Patient history indicates metastatic colon cancer, initial  diagnosis 2007, again in 2016, on chemotherapy currently. No current  symptoms. Previous PET/CT scan report from 10/12/2018 indicated mildly  increased activity and residual left lower lung mass, probably  incidental left palatine tonsil activity.     CHEST CT SCAN WITH IV CONTRAST: Patient's left lower lung mass,  including apparent areas of adjacent scarring or atelectasis appears  further decreased, previously measured as 42 x 26 mm today 35 x 19 mm.  No new pulmonary parenchymal nodule is identified. There is no pleural  effusion. Mediastinal window images show no evidence of significant  mediastinal, hilar, or axillary adenopathy.     IMPRESSION:  Further decrease in size of the patient's left lower lung  mass since 10/12/2018 exam. No new chest disease.     ABDOMEN AND PELVIS CT SCAN WITH IV CONTRAST: There is diffuse fatty  liver change. No liver masses are identified. Spleen is not enlarged. No  significant abnormalities are seen of the pancreas, gallbladder, or  adrenal glands. Kidneys appear unremarkable except for a small right  renal mid pole cyst. No upper abdominal adenopathy, ascites, or acute  inflammatory change is seen. There is mild fecal stasis. Bowel loops are  normal in caliber.     Regarding the lower abdomen and pelvis, no adenopathy or peritoneal  disease is seen. The rectosigmoid anastomosis appears clear. Delayed  images show good contrast opacification of normal caliber renal  collecting systems, ureters, and bladder. Patchy lucent changes in the  fused lumbar vertebral bodies are unchanged, apparently chronic.  Bony  structures elsewhere appear to be intact.     IMPRESSION: No evidence of intra-abdominal or intrapelvic metastatic  disease.     D:  04/12/2019  E:  04/12/2019    ASSESSMENT: The patient is a very pleasant 59 y.o.  male  with stage IV colon cancer.    PROBLEM LIST:  1. Currently stage IV colon cancer with lung and bony metastases.  K-edwige mutant, microsatellite stable, and intermediate mutational load.  2. Status post low anterior resection done by Dr. Young 2007, [id number]  3.  Attempted adjuvant chemotherapy for 1 cycle could not tolerate secondary to multiple toxicities.  4. Biopsy-proven L2 metastases done October 11, 2016. Status post CyberKnife radiation treatment.  5.  Started Keytruda 200 mg IV every 3 weeks on February 1, 2017, status post 10 cycles of treatment  6. Disease progression documented on PET scan completed 8/31/2017.   7. Treatment changed to Xeloda/Avastin/Oxaliplatin on 9/19/2017, status post 7 cycles.   8. Status post tumor debulking at L2 done by Dr. Frazier at [facility] on April 21, 2017  9.  The treatment was switched to Xeloda with Avastin maintenance treatment March 6, 2018  10.  Cancer related pain  11.  Opioid-induced constipation  12.  Treatment induced asthenia  13.  Mild depression  14.  Insomnia  15.  Chemotherapy-induced anemia  16.  Chemotherapy-induced dermatitis  17. Upper airway infection    PLAN:  1.  I will continue Avastin once every 4 weeks.  2.  I will continue Xeloda 500 mg a.m. and 500 mg p.m. 1 weeks on, 1 week off.  This is dose reduction secondary to dermatitis.  He could not tolerate any higher doses.  3. We will monitor the patient's labs throughout treatment including blood counts, kidney function, CEA, thyroid function, and liver functions. We will monitor his liver enzymes that have been elevated secondary to treatment with immune-therapy.   4.  The patient will follow-up with me in 8 weeks with treatment.    5.  Patient will continue taking over-the-counter stool softeners for constipation.  6.  I again reviewed with the patient the potential side effects from immune therapy including dermatitis, hepatitis, perforation, bleeding, thrombosis, and potential  death.  7.  Patient tissue was submitted for the matched trial.  He did match on PETN mutation, however that arm was closed.  8.  I advised the patient to continue to hold his alternative treatment with high dose vitamin C while he is on immunetherapy.  9.  I will repeat scans in 4 months which would be due August 2019. We'll consider doing it sonner if his CEA level starts to increase or if he has new symptoms.   10. The patient will continue Zofran as needed for treatment related nausea.  11. The patient will continue oxycodone 10 mg as needed for cancer-related pain.  We'll continue Celebrex 200 mg twice a day as needed.  He hasn't been needing any pain medicine lately.   12.  We offered Ritalin for treatment related fatigue, but the patient has declined at this time.   13.  Patient will continue Zaleplon as needed for insomnia.  14.  I will transfuse 2 units of blood if his hemoglobin drops below 8.  His anemia is mild and stable at this point.     Kristofer Rodriguez MD  06/21/19

## 2019-07-19 ENCOUNTER — HOSPITAL ENCOUNTER (OUTPATIENT)
Dept: ONCOLOGY | Facility: HOSPITAL | Age: 59
Setting detail: INFUSION SERIES
Discharge: HOME | End: 2019-07-19

## 2019-07-19 VITALS
DIASTOLIC BLOOD PRESSURE: 90 MMHG | SYSTOLIC BLOOD PRESSURE: 136 MMHG | WEIGHT: 157 LBS | BODY MASS INDEX: 25.23 KG/M2 | TEMPERATURE: 97.4 F | RESPIRATION RATE: 16 BRPM | HEART RATE: 75 BPM | HEIGHT: 66 IN

## 2019-07-19 DIAGNOSIS — C18.7 MALIGNANT NEOPLASM OF SIGMOID COLON (HCC): ICD-10-CM

## 2019-07-19 DIAGNOSIS — C18.9 COLON CANCER METASTASIZED TO BONE (HCC): Primary | ICD-10-CM

## 2019-07-19 DIAGNOSIS — C79.51 COLON CANCER METASTASIZED TO BONE (HCC): Primary | ICD-10-CM

## 2019-07-19 DIAGNOSIS — C79.51 BONE METASTASIS: ICD-10-CM

## 2019-07-19 LAB
ALBUMIN SERPL-MCNC: 4.3 G/DL (ref 3.5–5.2)
ALBUMIN/GLOB SERPL: 1.1 G/DL
ALP SERPL-CCNC: 66 U/L (ref 39–117)
ALT SERPL W P-5'-P-CCNC: 15 U/L (ref 1–41)
ANION GAP SERPL CALCULATED.3IONS-SCNC: 12 MMOL/L (ref 5–15)
AST SERPL-CCNC: 25 U/L (ref 1–40)
BILIRUB SERPL-MCNC: 0.5 MG/DL (ref 0.2–1.2)
BILIRUB UR QL STRIP: NEGATIVE
BUN BLD-MCNC: 10 MG/DL (ref 6–20)
BUN/CREAT SERPL: 12.8 (ref 7–25)
CALCIUM SPEC-SCNC: 9.2 MG/DL (ref 8.6–10.5)
CEA SERPL-MCNC: 4.23 NG/ML
CHLORIDE SERPL-SCNC: 100 MMOL/L (ref 98–107)
CLARITY UR: CLEAR
CO2 SERPL-SCNC: 25 MMOL/L (ref 22–29)
COLOR UR: YELLOW
CREAT BLD-MCNC: 0.78 MG/DL (ref 0.76–1.27)
CREAT BLDA-MCNC: 0.6 MG/DL (ref 0.6–1.3)
ERYTHROCYTE [DISTWIDTH] IN BLOOD BY AUTOMATED COUNT: 24.1 % (ref 12.3–15.4)
GFR SERPL CREATININE-BSD FRML MDRD: 103 ML/MIN/1.73
GFR SERPL CREATININE-BSD FRML MDRD: 124 ML/MIN/1.73
GLOBULIN UR ELPH-MCNC: 3.9 GM/DL
GLUCOSE BLD-MCNC: 97 MG/DL (ref 65–99)
GLUCOSE UR STRIP-MCNC: NEGATIVE MG/DL
HCT VFR BLD AUTO: 42.1 % (ref 37.5–51)
HGB BLD-MCNC: 13.3 G/DL (ref 13–17.7)
HGB UR QL STRIP.AUTO: NEGATIVE
KETONES UR QL STRIP: NEGATIVE
LEUKOCYTE ESTERASE UR QL STRIP.AUTO: NEGATIVE
LYMPHOCYTES # BLD AUTO: 2 10*3/MM3 (ref 0.7–3.1)
LYMPHOCYTES NFR BLD AUTO: 30.6 % (ref 19.6–45.3)
MCH RBC QN AUTO: 24.4 PG (ref 26.6–33)
MCHC RBC AUTO-ENTMCNC: 31.5 G/DL (ref 31.5–35.7)
MCV RBC AUTO: 77.5 FL (ref 79–97)
MONOCYTES # BLD AUTO: 0.5 10*3/MM3 (ref 0.1–0.9)
MONOCYTES NFR BLD AUTO: 8.5 % (ref 5–12)
NEUTROPHILS # BLD AUTO: 3.9 10*3/MM3 (ref 1.7–7)
NEUTROPHILS NFR BLD AUTO: 60.9 % (ref 42.7–76)
NITRITE UR QL STRIP: NEGATIVE
PH UR STRIP.AUTO: 5 [PH] (ref 5–8)
PLATELET # BLD AUTO: 182 10*3/MM3 (ref 140–450)
PMV BLD AUTO: 8 FL (ref 6–12)
POTASSIUM BLD-SCNC: 4.6 MMOL/L (ref 3.5–5.2)
PROT SERPL-MCNC: 8.2 G/DL (ref 6–8.5)
PROT UR QL STRIP: NEGATIVE
RBC # BLD AUTO: 5.43 10*6/MM3 (ref 4.14–5.8)
SODIUM BLD-SCNC: 137 MMOL/L (ref 136–145)
SP GR UR STRIP: 1 (ref 1–1.03)
UROBILINOGEN UR QL STRIP: NORMAL
WBC NRBC COR # BLD: 6.4 10*3/MM3 (ref 3.4–10.8)

## 2019-07-19 PROCEDURE — 81003 URINALYSIS AUTO W/O SCOPE: CPT | Performed by: INTERNAL MEDICINE

## 2019-07-19 PROCEDURE — 25010000002 BEVACIZUMAB PER 10 MG: Performed by: NURSE PRACTITIONER

## 2019-07-19 PROCEDURE — 96413 CHEMO IV INFUSION 1 HR: CPT

## 2019-07-19 PROCEDURE — 82565 ASSAY OF CREATININE: CPT

## 2019-07-19 PROCEDURE — 85025 COMPLETE CBC W/AUTO DIFF WBC: CPT | Performed by: INTERNAL MEDICINE

## 2019-07-19 PROCEDURE — 25010000002 BEVACIZUMAB 100 MG/4ML SOLUTION 4 ML VIAL: Performed by: NURSE PRACTITIONER

## 2019-07-19 PROCEDURE — 80053 COMPREHEN METABOLIC PANEL: CPT | Performed by: INTERNAL MEDICINE

## 2019-07-19 PROCEDURE — 82378 CARCINOEMBRYONIC ANTIGEN: CPT | Performed by: INTERNAL MEDICINE

## 2019-07-19 RX ORDER — SODIUM CHLORIDE 9 MG/ML
250 INJECTION, SOLUTION INTRAVENOUS ONCE
Status: CANCELLED | OUTPATIENT
Start: 2019-07-19

## 2019-07-19 RX ORDER — SODIUM CHLORIDE 0.9 % (FLUSH) 0.9 %
10 SYRINGE (ML) INJECTION AS NEEDED
Status: CANCELLED | OUTPATIENT
Start: 2019-07-19

## 2019-07-19 RX ORDER — SODIUM CHLORIDE 0.9 % (FLUSH) 0.9 %
10 SYRINGE (ML) INJECTION AS NEEDED
Status: DISCONTINUED | OUTPATIENT
Start: 2019-07-19 | End: 2019-07-20 | Stop reason: HOSPADM

## 2019-07-19 RX ORDER — SODIUM CHLORIDE 9 MG/ML
250 INJECTION, SOLUTION INTRAVENOUS ONCE
Status: DISCONTINUED | OUTPATIENT
Start: 2019-07-19 | End: 2019-07-20 | Stop reason: HOSPADM

## 2019-07-19 RX ADMIN — HEPARIN 500 UNITS: 100 SYRINGE at 09:56

## 2019-07-19 RX ADMIN — SODIUM CHLORIDE, PRESERVATIVE FREE 10 ML: 5 INJECTION INTRAVENOUS at 09:56

## 2019-07-19 RX ADMIN — BEVACIZUMAB 500 MG: 400 INJECTION, SOLUTION INTRAVENOUS at 09:22

## 2019-08-16 ENCOUNTER — HOSPITAL ENCOUNTER (OUTPATIENT)
Dept: ONCOLOGY | Facility: HOSPITAL | Age: 59
Setting detail: INFUSION SERIES
Discharge: HOME | End: 2019-08-16

## 2019-08-16 ENCOUNTER — OFFICE VISIT (OUTPATIENT)
Dept: ONCOLOGY | Facility: CLINIC | Age: 59
End: 2019-08-16

## 2019-08-16 VITALS
DIASTOLIC BLOOD PRESSURE: 89 MMHG | BODY MASS INDEX: 25.07 KG/M2 | HEART RATE: 60 BPM | SYSTOLIC BLOOD PRESSURE: 139 MMHG | RESPIRATION RATE: 20 BRPM | WEIGHT: 156 LBS | HEIGHT: 66 IN

## 2019-08-16 DIAGNOSIS — C18.7 MALIGNANT NEOPLASM OF SIGMOID COLON (HCC): Primary | ICD-10-CM

## 2019-08-16 DIAGNOSIS — C18.9 COLON CANCER METASTASIZED TO BONE (HCC): ICD-10-CM

## 2019-08-16 DIAGNOSIS — C79.51 BONE METASTASIS: ICD-10-CM

## 2019-08-16 DIAGNOSIS — C79.51 COLON CANCER METASTASIZED TO BONE (HCC): ICD-10-CM

## 2019-08-16 LAB
ALBUMIN SERPL-MCNC: 4.4 G/DL (ref 3.5–5.2)
ALBUMIN/GLOB SERPL: 1.3 G/DL
ALP SERPL-CCNC: 71 U/L (ref 39–117)
ALT SERPL W P-5'-P-CCNC: 11 U/L (ref 1–41)
ANION GAP SERPL CALCULATED.3IONS-SCNC: 12 MMOL/L (ref 5–15)
AST SERPL-CCNC: 20 U/L (ref 1–40)
BILIRUB SERPL-MCNC: 0.6 MG/DL (ref 0.2–1.2)
BILIRUB UR QL STRIP: NEGATIVE
BUN BLD-MCNC: 10 MG/DL (ref 6–20)
BUN/CREAT SERPL: 13.7 (ref 7–25)
CALCIUM SPEC-SCNC: 9.4 MG/DL (ref 8.6–10.5)
CEA SERPL-MCNC: 4.44 NG/ML
CHLORIDE SERPL-SCNC: 99 MMOL/L (ref 98–107)
CLARITY UR: CLEAR
CO2 SERPL-SCNC: 25 MMOL/L (ref 22–29)
COLOR UR: YELLOW
CREAT BLD-MCNC: 0.73 MG/DL (ref 0.76–1.27)
CREAT BLDA-MCNC: 0.7 MG/DL (ref 0.6–1.3)
ERYTHROCYTE [DISTWIDTH] IN BLOOD BY AUTOMATED COUNT: 23.7 % (ref 12.3–15.4)
GFR SERPL CREATININE-BSD FRML MDRD: 111 ML/MIN/1.73
GFR SERPL CREATININE-BSD FRML MDRD: 134 ML/MIN/1.73
GLOBULIN UR ELPH-MCNC: 3.4 GM/DL
GLUCOSE BLD-MCNC: 103 MG/DL (ref 65–99)
GLUCOSE UR STRIP-MCNC: NEGATIVE MG/DL
HCT VFR BLD AUTO: 39.5 % (ref 37.5–51)
HGB BLD-MCNC: 13.1 G/DL (ref 13–17.7)
HGB UR QL STRIP.AUTO: NEGATIVE
KETONES UR QL STRIP: NEGATIVE
LEUKOCYTE ESTERASE UR QL STRIP.AUTO: NEGATIVE
LYMPHOCYTES # BLD AUTO: 2 10*3/MM3 (ref 0.7–3.1)
LYMPHOCYTES NFR BLD AUTO: 28.9 % (ref 19.6–45.3)
MCH RBC QN AUTO: 25.9 PG (ref 26.6–33)
MCHC RBC AUTO-ENTMCNC: 33.1 G/DL (ref 31.5–35.7)
MCV RBC AUTO: 78.1 FL (ref 79–97)
MONOCYTES # BLD AUTO: 0.1 10*3/MM3 (ref 0.1–0.9)
MONOCYTES NFR BLD AUTO: 1.9 % (ref 5–12)
NEUTROPHILS # BLD AUTO: 4.7 10*3/MM3 (ref 1.7–7)
NEUTROPHILS NFR BLD AUTO: 69.2 % (ref 42.7–76)
NITRITE UR QL STRIP: NEGATIVE
PH UR STRIP.AUTO: 6 [PH] (ref 5–8)
PLATELET # BLD AUTO: 186 10*3/MM3 (ref 140–450)
PMV BLD AUTO: 7.2 FL (ref 6–12)
POTASSIUM BLD-SCNC: 4.5 MMOL/L (ref 3.5–5.2)
PROT SERPL-MCNC: 7.8 G/DL (ref 6–8.5)
PROT UR QL STRIP: NEGATIVE
RBC # BLD AUTO: 5.06 10*6/MM3 (ref 4.14–5.8)
SODIUM BLD-SCNC: 136 MMOL/L (ref 136–145)
SP GR UR STRIP: 1 (ref 1–1.03)
UROBILINOGEN UR QL STRIP: NORMAL
WBC NRBC COR # BLD: 6.8 10*3/MM3 (ref 3.4–10.8)

## 2019-08-16 PROCEDURE — 99214 OFFICE O/P EST MOD 30 MIN: CPT | Performed by: INTERNAL MEDICINE

## 2019-08-16 PROCEDURE — 96413 CHEMO IV INFUSION 1 HR: CPT

## 2019-08-16 PROCEDURE — 81003 URINALYSIS AUTO W/O SCOPE: CPT | Performed by: INTERNAL MEDICINE

## 2019-08-16 PROCEDURE — 25010000002 BEVACIZUMAB PER 10 MG: Performed by: INTERNAL MEDICINE

## 2019-08-16 PROCEDURE — 25010000002 BEVACIZUMAB 100 MG/4ML SOLUTION 4 ML VIAL: Performed by: INTERNAL MEDICINE

## 2019-08-16 PROCEDURE — 36591 DRAW BLOOD OFF VENOUS DEVICE: CPT

## 2019-08-16 PROCEDURE — 80053 COMPREHEN METABOLIC PANEL: CPT | Performed by: INTERNAL MEDICINE

## 2019-08-16 PROCEDURE — 82565 ASSAY OF CREATININE: CPT

## 2019-08-16 PROCEDURE — 85025 COMPLETE CBC W/AUTO DIFF WBC: CPT | Performed by: INTERNAL MEDICINE

## 2019-08-16 PROCEDURE — 82378 CARCINOEMBRYONIC ANTIGEN: CPT | Performed by: INTERNAL MEDICINE

## 2019-08-16 RX ORDER — SODIUM CHLORIDE 9 MG/ML
250 INJECTION, SOLUTION INTRAVENOUS ONCE
Status: CANCELLED | OUTPATIENT
Start: 2019-09-06

## 2019-08-16 RX ORDER — SODIUM CHLORIDE 0.9 % (FLUSH) 0.9 %
10 SYRINGE (ML) INJECTION AS NEEDED
Status: DISCONTINUED | OUTPATIENT
Start: 2019-08-16 | End: 2019-08-17 | Stop reason: HOSPADM

## 2019-08-16 RX ORDER — SODIUM CHLORIDE 9 MG/ML
250 INJECTION, SOLUTION INTRAVENOUS ONCE
Status: COMPLETED | OUTPATIENT
Start: 2019-08-16 | End: 2019-08-16

## 2019-08-16 RX ORDER — SODIUM CHLORIDE 0.9 % (FLUSH) 0.9 %
10 SYRINGE (ML) INJECTION AS NEEDED
Status: CANCELLED | OUTPATIENT
Start: 2019-08-16

## 2019-08-16 RX ORDER — SODIUM CHLORIDE 9 MG/ML
250 INJECTION, SOLUTION INTRAVENOUS ONCE
Status: CANCELLED | OUTPATIENT
Start: 2019-08-16

## 2019-08-16 RX ADMIN — SODIUM CHLORIDE 250 ML: 9 INJECTION, SOLUTION INTRAVENOUS at 10:40

## 2019-08-16 RX ADMIN — HEPARIN 500 UNITS: 100 SYRINGE at 11:43

## 2019-08-16 RX ADMIN — BEVACIZUMAB 500 MG: 400 INJECTION, SOLUTION INTRAVENOUS at 10:41

## 2019-08-16 NOTE — PROGRESS NOTES
DATE OF VISIT: 08/16/19      REASON FOR VISIT: Followup for metastatic colon cancer.      HISTORY OF PRESENT ILLNESS: The patient is a very pleasant 59 y.o. male with past medical history significant for metastatic colon cancer diagnosed March 2007 . He is status post lower anterior resection as well as 1 cycle FOLFOX, stopped due to multiple toxicities. Final pathology showed poorly differentiated adenocarcinoma with 25 negative lymph nodes and clear surgical margins. Isolated tumor deposits was found in the pericolonic fat. Pathologic stage T2 N1c M0 stage IIIA disease.The patient has been followed by serial colonoscopies as well as CAT scans that did document pulmonary metastatic disease with left lower lobe nodule that is gradually increasing in size. Patient was doing fairly well until recently, 3 months ago, patient presented with low back pain. Patient had restaging workup that revealed hypermetabolic L2 vertebral body lesion. Patient had biopsy done on October 11, 2016 that revealed metastatic adenocarcinoma consistent of colon primary with CK 7 negative CK 20 positive CDX2 positive. Patient had next generation gene sequencing that revealed intermediate mutational load with microsatellite stability.  He started chemotherapy with Keytruda on 2/1/2017. The patient was changed to CapeOx with Avastin on 9/19/2017.  He completed 8 cycles on February 12, 2018.  He was started on Avastin with Xeloda maintenance.  He is here today for scheduled follow up visit with treatment, cycle #17.       SUBJECTIVE: The patient is here today by himself.  All in all he has been doing fairly well.  He denied any fever chills no night sweats.  His back pain is mild.  His skin rash is better.  He has been compliant with his cancer treatment.    PAST MEDICAL HISTORY/SOCIAL HISTORY/FAMILY HISTORY: Reviewed by me and unchanged from my documentation done on 09/25/18.    Review of Systems   Constitutional: Positive for fatigue. Negative  "for activity change, appetite change, chills, fever and unexpected weight change.   HENT: Negative for hearing loss, mouth sores, nosebleeds, sore throat and trouble swallowing.    Eyes: Negative for visual disturbance.   Respiratory: Negative for cough, chest tightness, shortness of breath and wheezing.    Cardiovascular: Negative for chest pain, palpitations and leg swelling.   Gastrointestinal: Negative for abdominal distention, abdominal pain, blood in stool, constipation, diarrhea, nausea, rectal pain and vomiting.   Endocrine: Negative for cold intolerance and heat intolerance.   Genitourinary: Negative for difficulty urinating, dysuria, frequency and urgency.   Musculoskeletal: Negative for arthralgias, gait problem, joint swelling and myalgias.   Skin: Negative for rash.   Neurological: Negative for dizziness, tremors, syncope, weakness, light-headedness, numbness and headaches.   Hematological: Negative for adenopathy. Does not bruise/bleed easily.   Psychiatric/Behavioral: Negative for confusion, sleep disturbance and suicidal ideas. The patient is not nervous/anxious.          Current Outpatient Medications:   •  capecitabine (XELODA) 500 MG chemo tablet, Take 2 tablets by mouth in the morning and 2 tablets by mouth in the evening for 7 days on, then off for 7 days., Disp: 56 tablet, Rfl: 5  •  cholecalciferol (VITAMIN D3) 69429 units capsule, Take 5,000 Units by mouth Daily., Disp: , Rfl:   •  lidocaine-prilocaine (EMLA) 2.5-2.5 % cream, Apply  topically As Needed (45-60 minutes prior to port access.  Cover with saran/plastic wrap.)., Disp: 30 g, Rfl: 3    PHYSICAL EXAMINATION:   /89   Pulse 60   Resp 20   Ht 167.6 cm (66\")   Wt 70.8 kg (156 lb)   BMI 25.18 kg/m²    ECOG Performance Status: 1 - Symptomatic but completely ambulatory  General Appearance:  alert, cooperative, no apparent distress and appears stated age   Neurologic/Psychiatric: A&O x 3, gait steady, appropriate affect, strength " 5/5 in all muscle groups   HEENT:  Normocephalic, without obvious abnormality, mucous membranes moist   Neck: Supple, symmetrical, trachea midline, no adenopathy;  No thyromegaly, masses, or tenderness   Lungs:   Clear to auscultation bilaterally; respirations regular, even, and unlabored bilaterally   Heart:  Regular rate and rhythm, no murmurs appreciated   Abdomen:   Soft, non-tender, non-distended and no organomegaly   Lymph nodes: No cervical, supraclavicular, inguinal or axillary adenopathy noted   Extremities: Normal, atraumatic; no clubbing, cyanosis, or edema    Skin: No rashes, ulcers, or suspicious lesions noted     No visits with results within 2 Week(s) from this visit.   Latest known visit with results is:   Hospital Outpatient Visit on 07/19/2019   Component Date Value Ref Range Status   • CEA 07/19/2019 4.23  ng/mL Final   • Glucose 07/19/2019 97  65 - 99 mg/dL Final   • BUN 07/19/2019 10  6 - 20 mg/dL Final   • Creatinine 07/19/2019 0.78  0.76 - 1.27 mg/dL Final   • Sodium 07/19/2019 137  136 - 145 mmol/L Final   • Potassium 07/19/2019 4.6  3.5 - 5.2 mmol/L Final   • Chloride 07/19/2019 100  98 - 107 mmol/L Final   • CO2 07/19/2019 25.0  22.0 - 29.0 mmol/L Final   • Calcium 07/19/2019 9.2  8.6 - 10.5 mg/dL Final   • Total Protein 07/19/2019 8.2  6.0 - 8.5 g/dL Final   • Albumin 07/19/2019 4.30  3.50 - 5.20 g/dL Final   • ALT (SGPT) 07/19/2019 15  1 - 41 U/L Final   • AST (SGOT) 07/19/2019 25  1 - 40 U/L Final   • Alkaline Phosphatase 07/19/2019 66  39 - 117 U/L Final   • Total Bilirubin 07/19/2019 0.5  0.2 - 1.2 mg/dL Final   • eGFR Non African Amer 07/19/2019 103  >60 mL/min/1.73 Final   • eGFR  African Amer 07/19/2019 124  >60 mL/min/1.73 Final   • Globulin 07/19/2019 3.9  gm/dL Final   • A/G Ratio 07/19/2019 1.1  g/dL Final   • BUN/Creatinine Ratio 07/19/2019 12.8  7.0 - 25.0 Final   • Anion Gap 07/19/2019 12.0  5.0 - 15.0 mmol/L Final   • Color, UA 07/19/2019 Yellow  Yellow, Straw Final   •  Appearance, UA 07/19/2019 Clear  Clear Final   • pH, UA 07/19/2019 5.0  5.0 - 8.0 Final   • Specific Gravity, UA 07/19/2019 1.005  1.005 - 1.030 Final   • Glucose, UA 07/19/2019 Negative  Negative Final   • Ketones, UA 07/19/2019 Negative  Negative Final   • Bilirubin, UA 07/19/2019 Negative  Negative Final   • Blood, UA 07/19/2019 Negative  Negative Final   • Protein, UA 07/19/2019 Negative  Negative Final   • Leuk Esterase, UA 07/19/2019 Negative  Negative Final   • Nitrite, UA 07/19/2019 Negative  Negative Final   • Urobilinogen, UA 07/19/2019 0.2 E.U./dL  0.2 - 1.0 E.U./dL Final   • WBC 07/19/2019 6.40  3.40 - 10.80 10*3/mm3 Final   • RBC 07/19/2019 5.43  4.14 - 5.80 10*6/mm3 Final   • Hemoglobin 07/19/2019 13.3  13.0 - 17.7 g/dL Final   • Hematocrit 07/19/2019 42.1  37.5 - 51.0 % Final   • RDW 07/19/2019 24.1* 12.3 - 15.4 % Final   • MCV 07/19/2019 77.5* 79.0 - 97.0 fL Final   • MCH 07/19/2019 24.4* 26.6 - 33.0 pg Final   • MCHC 07/19/2019 31.5  31.5 - 35.7 g/dL Final   • MPV 07/19/2019 8.0  6.0 - 12.0 fL Final   • Platelets 07/19/2019 182  140 - 450 10*3/mm3 Final   • Neutrophil % 07/19/2019 60.9  42.7 - 76.0 % Final   • Lymphocyte % 07/19/2019 30.6  19.6 - 45.3 % Final   • Monocyte % 07/19/2019 8.5  5.0 - 12.0 % Final   • Neutrophils, Absolute 07/19/2019 3.90  1.70 - 7.00 10*3/mm3 Final   • Lymphocytes, Absolute 07/19/2019 2.00  0.70 - 3.10 10*3/mm3 Final   • Monocytes, Absolute 07/19/2019 0.50  0.10 - 0.90 10*3/mm3 Final   • Creatinine 07/19/2019 0.60  0.60 - 1.30 mg/dL Final    Serial Number: [id number]:  [id number]        EXAMINATION: CT CHEST W CONTRAST-, CT ABDOMEN PELVIS W CONTRAST-  04/12/2019      INDICATION: f/u scan; C18.7-Malignant neoplasm of sigmoid colon     TECHNIQUE: 5 mm post-IV contrast images through the chest and 5 mm post  IV contrast portal venous phase and delayed venous phase images through  the abdomen and pelvis.     The radiation dose reduction device was turned on for each scan per  the  ALARA (As Low as Reasonably Achievable) protocol.     COMPARISON: Whole body PET/CT scan 10/12/2018     FINDINGS: Patient history indicates metastatic colon cancer, initial  diagnosis 2007, again in 2016, on chemotherapy currently. No current  symptoms. Previous PET/CT scan report from 10/12/2018 indicated mildly  increased activity and residual left lower lung mass, probably  incidental left palatine tonsil activity.     CHEST CT SCAN WITH IV CONTRAST: Patient's left lower lung mass,  including apparent areas of adjacent scarring or atelectasis appears  further decreased, previously measured as 42 x 26 mm today 35 x 19 mm.  No new pulmonary parenchymal nodule is identified. There is no pleural  effusion. Mediastinal window images show no evidence of significant  mediastinal, hilar, or axillary adenopathy.     IMPRESSION:  Further decrease in size of the patient's left lower lung  mass since 10/12/2018 exam. No new chest disease.     ABDOMEN AND PELVIS CT SCAN WITH IV CONTRAST: There is diffuse fatty  liver change. No liver masses are identified. Spleen is not enlarged. No  significant abnormalities are seen of the pancreas, gallbladder, or  adrenal glands. Kidneys appear unremarkable except for a small right  renal mid pole cyst. No upper abdominal adenopathy, ascites, or acute  inflammatory change is seen. There is mild fecal stasis. Bowel loops are  normal in caliber.     Regarding the lower abdomen and pelvis, no adenopathy or peritoneal  disease is seen. The rectosigmoid anastomosis appears clear. Delayed  images show good contrast opacification of normal caliber renal  collecting systems, ureters, and bladder. Patchy lucent changes in the  fused lumbar vertebral bodies are unchanged, apparently chronic.  Bony  structures elsewhere appear to be intact.     IMPRESSION: No evidence of intra-abdominal or intrapelvic metastatic  disease.     D:  04/12/2019  E:  04/12/2019    ASSESSMENT: The patient is a very  pleasant 59 y.o. male  with stage IV colon cancer.    PROBLEM LIST:  1. Currently stage IV colon cancer with lung and bony metastases.  K-edwige mutant, microsatellite stable, and intermediate mutational load.  2. Status post low anterior resection done by Dr. Young 2007, [id number]  3.  Attempted adjuvant chemotherapy for 1 cycle could not tolerate secondary to multiple toxicities.  4. Biopsy-proven L2 metastases done October 11, 2016. Status post CyberKnife radiation treatment.  5.  Started Keytruda 200 mg IV every 3 weeks on February 1, 2017, status post 10 cycles of treatment  6. Disease progression documented on PET scan completed 8/31/2017.   7. Treatment changed to Xeloda/Avastin/Oxaliplatin on 9/19/2017, status post 7 cycles.   8. Status post tumor debulking at L2 done by Dr. Frazier at [facility] on April 21, 2017  9.  The treatment was switched to Xeloda with Avastin maintenance treatment March 6, 2018  10.  Cancer related pain  11.  Opioid-induced constipation  12.  Treatment induced asthenia  13.  Mild depression  14.  Insomnia  15.  Chemotherapy-induced anemia  16.  Chemotherapy-induced dermatitis  17. Upper airway infection    PLAN:  1.  I will continue Avastin once every 4 weeks.  2.  I will continue Xeloda 500 mg a.m. and 500 mg p.m. 1 weeks on, 1 week off.  This is dose reduction secondary to dermatitis.  He could not tolerate any higher doses.  3. We will monitor the patient's labs throughout treatment including blood counts, kidney function, CEA, thyroid function, and liver functions. We will monitor his liver enzymes that have been elevated secondary to treatment with immune-therapy.   4.  The patient will follow-up with me in 8 weeks with treatment.    5.  Patient will continue taking over-the-counter stool softeners for constipation.  6.  I again reviewed with the patient the potential side effects from immune therapy including dermatitis, hepatitis, perforation, bleeding, thrombosis, and  potential death.  7.  Patient tissue was submitted for the matched trial.  He did match on PETN mutation, however that arm was closed.  8.  I advised the patient to continue to hold his alternative treatment with high dose vitamin C while he is on immunetherapy.  9.  I will repeat scans in 6 months which would be due  2019.  Those will be ordered prior to return.  We'll consider doing it sonner if his CEA level starts to increase or if he has new symptoms.   10. The patient will continue Zofran as needed for treatment related nausea.  11. The patient will continue oxycodone 10 mg as needed for cancer-related pain.  We'll continue Celebrex 200 mg twice a day as needed.  He hasn't been needing any pain medicine lately.   12.  We offered Ritalin for treatment related fatigue, but the patient has declined at this time.   13.  Patient will continue Zaleplon as needed for insomnia.  14.  I will transfuse 2 units of blood if his hemoglobin drops below 8.  His anemia is mild and stable at this point.     Kristofer Rodriguez MD  08/16/19

## 2019-09-06 ENCOUNTER — HOSPITAL ENCOUNTER (OUTPATIENT)
Dept: ONCOLOGY | Facility: HOSPITAL | Age: 59
Setting detail: INFUSION SERIES
Discharge: HOME | End: 2019-09-06

## 2019-09-06 VITALS
RESPIRATION RATE: 16 BRPM | DIASTOLIC BLOOD PRESSURE: 79 MMHG | TEMPERATURE: 97.8 F | WEIGHT: 157 LBS | SYSTOLIC BLOOD PRESSURE: 132 MMHG | HEIGHT: 66 IN | BODY MASS INDEX: 25.23 KG/M2 | HEART RATE: 72 BPM

## 2019-09-06 DIAGNOSIS — C18.7 MALIGNANT NEOPLASM OF SIGMOID COLON (HCC): Primary | ICD-10-CM

## 2019-09-06 DIAGNOSIS — C79.51 COLON CANCER METASTASIZED TO BONE (HCC): ICD-10-CM

## 2019-09-06 DIAGNOSIS — C18.9 COLON CANCER METASTASIZED TO BONE (HCC): ICD-10-CM

## 2019-09-06 DIAGNOSIS — C79.51 BONE METASTASIS: ICD-10-CM

## 2019-09-06 LAB
ALBUMIN SERPL-MCNC: 4.7 G/DL (ref 3.5–5.2)
ALBUMIN/GLOB SERPL: 1.2 G/DL
ALP SERPL-CCNC: 61 U/L (ref 39–117)
ALT SERPL W P-5'-P-CCNC: 15 U/L (ref 1–41)
ANION GAP SERPL CALCULATED.3IONS-SCNC: 11 MMOL/L (ref 5–15)
AST SERPL-CCNC: 24 U/L (ref 1–40)
BILIRUB SERPL-MCNC: 0.5 MG/DL (ref 0.2–1.2)
BILIRUB UR QL STRIP: NEGATIVE
BUN BLD-MCNC: 9 MG/DL (ref 6–20)
BUN/CREAT SERPL: 11.4 (ref 7–25)
CALCIUM SPEC-SCNC: 9.2 MG/DL (ref 8.6–10.5)
CEA SERPL-MCNC: 4.56 NG/ML
CHLORIDE SERPL-SCNC: 102 MMOL/L (ref 98–107)
CLARITY UR: CLEAR
CO2 SERPL-SCNC: 24 MMOL/L (ref 22–29)
COLOR UR: NORMAL
CREAT BLD-MCNC: 0.79 MG/DL (ref 0.76–1.27)
CREAT BLDA-MCNC: 0.8 MG/DL (ref 0.6–1.3)
ERYTHROCYTE [DISTWIDTH] IN BLOOD BY AUTOMATED COUNT: 24.1 % (ref 12.3–15.4)
GFR SERPL CREATININE-BSD FRML MDRD: 101 ML/MIN/1.73
GFR SERPL CREATININE-BSD FRML MDRD: 123 ML/MIN/1.73
GLOBULIN UR ELPH-MCNC: 3.8 GM/DL
GLUCOSE BLD-MCNC: 105 MG/DL (ref 65–99)
GLUCOSE UR STRIP-MCNC: NEGATIVE MG/DL
HCT VFR BLD AUTO: 43.4 % (ref 37.5–51)
HGB BLD-MCNC: 13.6 G/DL (ref 13–17.7)
HGB UR QL STRIP.AUTO: NEGATIVE
KETONES UR QL STRIP: NEGATIVE
LEUKOCYTE ESTERASE UR QL STRIP.AUTO: NEGATIVE
LYMPHOCYTES # BLD AUTO: 1.8 10*3/MM3 (ref 0.7–3.1)
LYMPHOCYTES NFR BLD AUTO: 26.6 % (ref 19.6–45.3)
MCH RBC QN AUTO: 24.8 PG (ref 26.6–33)
MCHC RBC AUTO-ENTMCNC: 31.4 G/DL (ref 31.5–35.7)
MCV RBC AUTO: 79.2 FL (ref 79–97)
MONOCYTES # BLD AUTO: 0.4 10*3/MM3 (ref 0.1–0.9)
MONOCYTES NFR BLD AUTO: 5.5 % (ref 5–12)
NEUTROPHILS # BLD AUTO: 4.6 10*3/MM3 (ref 1.7–7)
NEUTROPHILS NFR BLD AUTO: 67.9 % (ref 42.7–76)
NITRITE UR QL STRIP: NEGATIVE
PH UR STRIP.AUTO: 7 [PH] (ref 5–8)
PLATELET # BLD AUTO: 162 10*3/MM3 (ref 140–450)
PMV BLD AUTO: 7.4 FL (ref 6–12)
POTASSIUM BLD-SCNC: 4.6 MMOL/L (ref 3.5–5.2)
PROT SERPL-MCNC: 8.5 G/DL (ref 6–8.5)
PROT UR QL STRIP: NEGATIVE
RBC # BLD AUTO: 5.48 10*6/MM3 (ref 4.14–5.8)
SODIUM BLD-SCNC: 137 MMOL/L (ref 136–145)
SP GR UR STRIP: 1 (ref 1–1.03)
UROBILINOGEN UR QL STRIP: NORMAL
WBC NRBC COR # BLD: 6.8 10*3/MM3 (ref 3.4–10.8)

## 2019-09-06 PROCEDURE — 85025 COMPLETE CBC W/AUTO DIFF WBC: CPT | Performed by: INTERNAL MEDICINE

## 2019-09-06 PROCEDURE — 36591 DRAW BLOOD OFF VENOUS DEVICE: CPT

## 2019-09-06 PROCEDURE — 80053 COMPREHEN METABOLIC PANEL: CPT | Performed by: INTERNAL MEDICINE

## 2019-09-06 PROCEDURE — 96413 CHEMO IV INFUSION 1 HR: CPT

## 2019-09-06 PROCEDURE — 82378 CARCINOEMBRYONIC ANTIGEN: CPT | Performed by: INTERNAL MEDICINE

## 2019-09-06 PROCEDURE — 25010000002 BEVACIZUMAB 100 MG/4ML SOLUTION 4 ML VIAL: Performed by: INTERNAL MEDICINE

## 2019-09-06 PROCEDURE — 81003 URINALYSIS AUTO W/O SCOPE: CPT | Performed by: INTERNAL MEDICINE

## 2019-09-06 PROCEDURE — 82565 ASSAY OF CREATININE: CPT

## 2019-09-06 PROCEDURE — 25010000002 BEVACIZUMAB PER 10 MG: Performed by: INTERNAL MEDICINE

## 2019-09-06 RX ORDER — SODIUM CHLORIDE 0.9 % (FLUSH) 0.9 %
10 SYRINGE (ML) INJECTION AS NEEDED
Status: CANCELLED | OUTPATIENT
Start: 2019-09-06

## 2019-09-06 RX ORDER — SODIUM CHLORIDE 9 MG/ML
250 INJECTION, SOLUTION INTRAVENOUS ONCE
Status: COMPLETED | OUTPATIENT
Start: 2019-09-06 | End: 2019-09-06

## 2019-09-06 RX ORDER — SODIUM CHLORIDE 0.9 % (FLUSH) 0.9 %
10 SYRINGE (ML) INJECTION AS NEEDED
Status: DISCONTINUED | OUTPATIENT
Start: 2019-09-06 | End: 2019-09-07 | Stop reason: HOSPADM

## 2019-09-06 RX ADMIN — SODIUM CHLORIDE 250 ML: 9 INJECTION, SOLUTION INTRAVENOUS at 09:46

## 2019-09-06 RX ADMIN — SODIUM CHLORIDE, PRESERVATIVE FREE 10 ML: 5 INJECTION INTRAVENOUS at 10:24

## 2019-09-06 RX ADMIN — HEPARIN 500 UNITS: 100 SYRINGE at 10:24

## 2019-09-06 RX ADMIN — BEVACIZUMAB 500 MG: 400 INJECTION, SOLUTION INTRAVENOUS at 09:46

## 2019-10-04 ENCOUNTER — SPECIALTY PHARMACY (OUTPATIENT)
Dept: ONCOLOGY | Facility: HOSPITAL | Age: 59
End: 2019-10-04

## 2019-10-04 DIAGNOSIS — C18.7 MALIGNANT NEOPLASM OF SIGMOID COLON (HCC): ICD-10-CM

## 2019-10-04 DIAGNOSIS — C79.51 BONE METASTASIS: ICD-10-CM

## 2019-10-04 RX ORDER — CAPECITABINE 500 MG/1
TABLET, FILM COATED ORAL
Qty: 56 TABLET | Refills: 5 | Status: SHIPPED | OUTPATIENT
Start: 2019-10-04 | End: 2019-10-10 | Stop reason: SDUPTHER

## 2019-10-10 ENCOUNTER — SPECIALTY PHARMACY (OUTPATIENT)
Dept: ONCOLOGY | Facility: HOSPITAL | Age: 59
End: 2019-10-10

## 2019-10-10 DIAGNOSIS — C18.7 MALIGNANT NEOPLASM OF SIGMOID COLON (HCC): ICD-10-CM

## 2019-10-10 DIAGNOSIS — C79.51 BONE METASTASIS: ICD-10-CM

## 2019-10-10 RX ORDER — CAPECITABINE 500 MG/1
TABLET, FILM COATED ORAL
Qty: 56 TABLET | Refills: 5 | Status: SHIPPED | OUTPATIENT
Start: 2019-10-10 | End: 2020-02-05 | Stop reason: SDUPTHER

## 2019-10-13 ENCOUNTER — LAB (OUTPATIENT)
Dept: LAB | Facility: HOSPITAL | Age: 59
End: 2019-10-13

## 2019-10-13 ENCOUNTER — HOSPITAL ENCOUNTER (OUTPATIENT)
Dept: CT IMAGING | Facility: HOSPITAL | Age: 59
Discharge: HOME | End: 2019-10-13
Admitting: INTERNAL MEDICINE

## 2019-10-13 ENCOUNTER — TRANSCRIBE ORDERS (OUTPATIENT)
Dept: LAB | Facility: HOSPITAL | Age: 59
End: 2019-10-13

## 2019-10-13 DIAGNOSIS — C18.7 MALIGNANT NEOPLASM OF SIGMOID COLON (HCC): Primary | ICD-10-CM

## 2019-10-13 DIAGNOSIS — C18.7 MALIGNANT NEOPLASM OF SIGMOID COLON (HCC): ICD-10-CM

## 2019-10-13 DIAGNOSIS — C18.9 METASTATIC COLON CANCER TO LIVER (HCC): ICD-10-CM

## 2019-10-13 DIAGNOSIS — C78.7 METASTATIC COLON CANCER TO LIVER (HCC): ICD-10-CM

## 2019-10-13 LAB
ALBUMIN SERPL-MCNC: 4.5 G/DL (ref 3.5–5.2)
ALBUMIN/GLOB SERPL: 1.4 G/DL
ALP SERPL-CCNC: 55 U/L (ref 39–117)
ALT SERPL W P-5'-P-CCNC: 10 U/L (ref 1–41)
ANION GAP SERPL CALCULATED.3IONS-SCNC: 7 MMOL/L (ref 5–15)
AST SERPL-CCNC: 19 U/L (ref 1–40)
BACTERIA UR QL AUTO: NORMAL /HPF
BASOPHILS # BLD AUTO: 0.02 10*3/MM3 (ref 0–0.2)
BASOPHILS NFR BLD AUTO: 0.3 % (ref 0–1.5)
BILIRUB SERPL-MCNC: 0.5 MG/DL (ref 0.2–1.2)
BILIRUB UR QL STRIP: NEGATIVE
BUN BLD-MCNC: 11 MG/DL (ref 6–20)
BUN/CREAT SERPL: 15.3 (ref 7–25)
CALCIUM SPEC-SCNC: 8.6 MG/DL (ref 8.6–10.5)
CHLORIDE SERPL-SCNC: 102 MMOL/L (ref 98–107)
CLARITY UR: CLEAR
CO2 SERPL-SCNC: 27 MMOL/L (ref 22–29)
COLOR UR: YELLOW
CREAT BLD-MCNC: 0.72 MG/DL (ref 0.76–1.27)
DEPRECATED RDW RBC AUTO: 60.3 FL (ref 37–54)
EOSINOPHIL # BLD AUTO: 0.42 10*3/MM3 (ref 0–0.4)
EOSINOPHIL NFR BLD AUTO: 7.1 % (ref 0.3–6.2)
ERYTHROCYTE [DISTWIDTH] IN BLOOD BY AUTOMATED COUNT: 22.2 % (ref 12.3–15.4)
GFR SERPL CREATININE-BSD FRML MDRD: 113 ML/MIN/1.73
GFR SERPL CREATININE-BSD FRML MDRD: 136 ML/MIN/1.73
GLOBULIN UR ELPH-MCNC: 3.2 GM/DL
GLUCOSE BLD-MCNC: 119 MG/DL (ref 65–99)
GLUCOSE UR STRIP-MCNC: NEGATIVE MG/DL
HCT VFR BLD AUTO: 40.6 % (ref 37.5–51)
HGB BLD-MCNC: 12.6 G/DL (ref 13–17.7)
HGB UR QL STRIP.AUTO: NEGATIVE
HYALINE CASTS UR QL AUTO: NORMAL /LPF
IMM GRANULOCYTES # BLD AUTO: 0.01 10*3/MM3 (ref 0–0.05)
IMM GRANULOCYTES NFR BLD AUTO: 0.2 % (ref 0–0.5)
KETONES UR QL STRIP: NEGATIVE
LEUKOCYTE ESTERASE UR QL STRIP.AUTO: NEGATIVE
LYMPHOCYTES # BLD AUTO: 1.48 10*3/MM3 (ref 0.7–3.1)
LYMPHOCYTES NFR BLD AUTO: 25.1 % (ref 19.6–45.3)
MCH RBC QN AUTO: 24.1 PG (ref 26.6–33)
MCHC RBC AUTO-ENTMCNC: 31 G/DL (ref 31.5–35.7)
MCV RBC AUTO: 77.6 FL (ref 79–97)
MONOCYTES # BLD AUTO: 0.46 10*3/MM3 (ref 0.1–0.9)
MONOCYTES NFR BLD AUTO: 7.8 % (ref 5–12)
NEUTROPHILS # BLD AUTO: 3.5 10*3/MM3 (ref 1.7–7)
NEUTROPHILS NFR BLD AUTO: 59.5 % (ref 42.7–76)
NITRITE UR QL STRIP: NEGATIVE
NRBC BLD AUTO-RTO: 0 /100 WBC (ref 0–0.2)
PH UR STRIP.AUTO: 8.5 [PH] (ref 5–8)
PLATELET # BLD AUTO: 157 10*3/MM3 (ref 140–450)
PMV BLD AUTO: 10.5 FL (ref 6–12)
POTASSIUM BLD-SCNC: 5.1 MMOL/L (ref 3.5–5.2)
PROT SERPL-MCNC: 7.7 G/DL (ref 6–8.5)
PROT UR QL STRIP: NEGATIVE
RBC # BLD AUTO: 5.23 10*6/MM3 (ref 4.14–5.8)
RBC # UR: NORMAL /HPF
REF LAB TEST METHOD: NORMAL
SODIUM BLD-SCNC: 136 MMOL/L (ref 136–145)
SP GR UR STRIP: 1.01 (ref 1–1.03)
SQUAMOUS #/AREA URNS HPF: NORMAL /HPF
UROBILINOGEN UR QL STRIP: ABNORMAL
WBC NRBC COR # BLD: 5.89 10*3/MM3 (ref 3.4–10.8)
WBC UR QL AUTO: NORMAL /HPF

## 2019-10-13 PROCEDURE — 82565 ASSAY OF CREATININE: CPT

## 2019-10-13 PROCEDURE — 25010000002 IOPAMIDOL 61 % SOLUTION: Performed by: INTERNAL MEDICINE

## 2019-10-13 PROCEDURE — 74177 CT ABD & PELVIS W/CONTRAST: CPT

## 2019-10-13 PROCEDURE — 36415 COLL VENOUS BLD VENIPUNCTURE: CPT

## 2019-10-13 PROCEDURE — 81001 URINALYSIS AUTO W/SCOPE: CPT

## 2019-10-13 PROCEDURE — 71260 CT THORAX DX C+: CPT

## 2019-10-13 PROCEDURE — 85025 COMPLETE CBC W/AUTO DIFF WBC: CPT

## 2019-10-13 PROCEDURE — 80053 COMPREHEN METABOLIC PANEL: CPT

## 2019-10-13 RX ORDER — SODIUM CHLORIDE 0.9 % (FLUSH) 0.9 %
20 SYRINGE (ML) INJECTION AS NEEDED
Status: DISCONTINUED | OUTPATIENT
Start: 2019-10-13 | End: 2019-10-14 | Stop reason: HOSPADM

## 2019-10-13 RX ADMIN — IOPAMIDOL 80 ML: 612 INJECTION, SOLUTION INTRAVENOUS at 17:02

## 2019-10-13 RX ADMIN — HEPARIN 500 UNITS: 100 SYRINGE at 16:56

## 2019-10-13 RX ADMIN — Medication 20 ML: at 16:56

## 2019-10-14 ENCOUNTER — OFFICE VISIT (OUTPATIENT)
Dept: ONCOLOGY | Facility: CLINIC | Age: 59
End: 2019-10-14

## 2019-10-14 ENCOUNTER — HOSPITAL ENCOUNTER (OUTPATIENT)
Dept: ONCOLOGY | Facility: HOSPITAL | Age: 59
Setting detail: INFUSION SERIES
Discharge: HOME | End: 2019-10-14

## 2019-10-14 VITALS
DIASTOLIC BLOOD PRESSURE: 89 MMHG | HEIGHT: 66 IN | RESPIRATION RATE: 12 BRPM | WEIGHT: 157 LBS | HEART RATE: 60 BPM | OXYGEN SATURATION: 95 % | SYSTOLIC BLOOD PRESSURE: 153 MMHG | TEMPERATURE: 97 F | BODY MASS INDEX: 25.23 KG/M2

## 2019-10-14 VITALS — HEART RATE: 57 BPM | SYSTOLIC BLOOD PRESSURE: 137 MMHG | DIASTOLIC BLOOD PRESSURE: 79 MMHG

## 2019-10-14 DIAGNOSIS — C18.7 MALIGNANT NEOPLASM OF SIGMOID COLON (HCC): Primary | ICD-10-CM

## 2019-10-14 DIAGNOSIS — C79.51 BONE METASTASIS: ICD-10-CM

## 2019-10-14 LAB — CEA SERPL-MCNC: 4.45 NG/ML

## 2019-10-14 PROCEDURE — 96360 HYDRATION IV INFUSION INIT: CPT

## 2019-10-14 PROCEDURE — 99215 OFFICE O/P EST HI 40 MIN: CPT | Performed by: INTERNAL MEDICINE

## 2019-10-14 PROCEDURE — 96413 CHEMO IV INFUSION 1 HR: CPT

## 2019-10-14 PROCEDURE — 25010000002 BEVACIZUMAB PER 10 MG: Performed by: INTERNAL MEDICINE

## 2019-10-14 PROCEDURE — 82378 CARCINOEMBRYONIC ANTIGEN: CPT | Performed by: INTERNAL MEDICINE

## 2019-10-14 PROCEDURE — 36591 DRAW BLOOD OFF VENOUS DEVICE: CPT

## 2019-10-14 PROCEDURE — 25010000002 BEVACIZUMAB 100 MG/4ML SOLUTION 4 ML VIAL: Performed by: INTERNAL MEDICINE

## 2019-10-14 RX ORDER — SODIUM CHLORIDE 9 MG/ML
250 INJECTION, SOLUTION INTRAVENOUS ONCE
Status: CANCELLED | OUTPATIENT
Start: 2019-11-06

## 2019-10-14 RX ORDER — SODIUM CHLORIDE 9 MG/ML
250 INJECTION, SOLUTION INTRAVENOUS ONCE
Status: CANCELLED | OUTPATIENT
Start: 2019-12-04

## 2019-10-14 RX ORDER — SODIUM CHLORIDE 9 MG/ML
250 INJECTION, SOLUTION INTRAVENOUS ONCE
Status: COMPLETED | OUTPATIENT
Start: 2019-10-14 | End: 2019-10-14

## 2019-10-14 RX ORDER — SODIUM CHLORIDE 9 MG/ML
250 INJECTION, SOLUTION INTRAVENOUS ONCE
Status: CANCELLED | OUTPATIENT
Start: 2019-10-14

## 2019-10-14 RX ADMIN — SODIUM CHLORIDE 250 ML: 9 INJECTION, SOLUTION INTRAVENOUS at 10:05

## 2019-10-14 RX ADMIN — HEPARIN 500 UNITS: 100 SYRINGE at 10:40

## 2019-10-14 RX ADMIN — BEVACIZUMAB 500 MG: 400 INJECTION, SOLUTION INTRAVENOUS at 10:05

## 2019-10-14 NOTE — PROGRESS NOTES
DATE OF VISIT: 10/14/19      REASON FOR VISIT: Followup for metastatic colon cancer.      HISTORY OF PRESENT ILLNESS: The patient is a very pleasant 59 y.o. male with past medical history significant for metastatic colon cancer diagnosed March 2007 . He is status post lower anterior resection as well as 1 cycle FOLFOX, stopped due to multiple toxicities. Final pathology showed poorly differentiated adenocarcinoma with 25 negative lymph nodes and clear surgical margins. Isolated tumor deposits was found in the pericolonic fat. Pathologic stage T2 N1c M0 stage IIIA disease.The patient has been followed by serial colonoscopies as well as CAT scans that did document pulmonary metastatic disease with left lower lobe nodule that is gradually increasing in size. Patient was doing fairly well until recently, 3 months ago, patient presented with low back pain. Patient had restaging workup that revealed hypermetabolic L2 vertebral body lesion. Patient had biopsy done on October 11, 2016 that revealed metastatic adenocarcinoma consistent of colon primary with CK 7 negative CK 20 positive CDX2 positive. Patient had next generation gene sequencing that revealed intermediate mutational load with microsatellite stability.  He started chemotherapy with Keytruda on 2/1/2017. The patient was changed to CapeOx with Avastin on 9/19/2017.  He completed 8 cycles on February 12, 2018.  He was started on Avastin with Xeloda maintenance.  He is here today for scheduled follow up visit with treatment, cycle #19.       SUBJECTIVE: The patient is here today with his wife.  He denies any fever chills night sweats.  His complain of daytime fatigue.  His wife claims patient snores at night.    PAST MEDICAL HISTORY/SOCIAL HISTORY/FAMILY HISTORY: Reviewed by me and unchanged from my documentation done on 09/25/18.    Review of Systems   Constitutional: Positive for fatigue. Negative for activity change, appetite change, chills, fever and unexpected  "weight change.   HENT: Negative for hearing loss, mouth sores, nosebleeds, sore throat and trouble swallowing.    Eyes: Negative for visual disturbance.   Respiratory: Negative for cough, chest tightness, shortness of breath and wheezing.    Cardiovascular: Negative for chest pain, palpitations and leg swelling.   Gastrointestinal: Negative for abdominal distention, abdominal pain, blood in stool, constipation, diarrhea, nausea, rectal pain and vomiting.   Endocrine: Negative for cold intolerance and heat intolerance.   Genitourinary: Negative for difficulty urinating, dysuria, frequency and urgency.   Musculoskeletal: Negative for arthralgias, gait problem, joint swelling and myalgias.   Skin: Negative for rash.   Neurological: Negative for dizziness, tremors, syncope, weakness, light-headedness, numbness and headaches.   Hematological: Negative for adenopathy. Does not bruise/bleed easily.   Psychiatric/Behavioral: Negative for confusion, sleep disturbance and suicidal ideas. The patient is not nervous/anxious.          Current Outpatient Medications:   •  capecitabine (XELODA) 500 MG chemo tablet, Take 1 tablets by mouth in the morning and 1 tablets by mouth in the evening for 7 days on, then off for 7 days., Disp: 56 tablet, Rfl: 5  •  cholecalciferol (VITAMIN D3) 26951 units capsule, Take 5,000 Units by mouth Daily., Disp: , Rfl:   •  lidocaine-prilocaine (EMLA) 2.5-2.5 % cream, Apply  topically As Needed (45-60 minutes prior to port access.  Cover with saran/plastic wrap.)., Disp: 30 g, Rfl: 3  No current facility-administered medications for this visit.     PHYSICAL EXAMINATION:   /89   Pulse 60   Temp 97 °F (36.1 °C) (Temporal)   Resp 12   Ht 167.6 cm (66\")   Wt 71.2 kg (157 lb)   SpO2 95%   BMI 25.34 kg/m²    ECOG Performance Status: 1 - Symptomatic but completely ambulatory  General Appearance:  alert, cooperative, no apparent distress and appears stated age   Neurologic/Psychiatric: A&O x 3, " gait steady, appropriate affect, strength 5/5 in all muscle groups   HEENT:  Normocephalic, without obvious abnormality, mucous membranes moist   Neck: Supple, symmetrical, trachea midline, no adenopathy;  No thyromegaly, masses, or tenderness   Lungs:   Clear to auscultation bilaterally; respirations regular, even, and unlabored bilaterally   Heart:  Regular rate and rhythm, no murmurs appreciated   Abdomen:   Soft, non-tender, non-distended and no organomegaly   Lymph nodes: No cervical, supraclavicular, inguinal or axillary adenopathy noted   Extremities: Normal, atraumatic; no clubbing, cyanosis, or edema    Skin: No rashes, ulcers, or suspicious lesions noted     Lab on 10/13/2019   Component Date Value Ref Range Status   • Color, UA 10/13/2019 Yellow  Yellow, Straw Final   • Appearance, UA 10/13/2019 Clear  Clear Final   • pH, UA 10/13/2019 8.5* 5.0 - 8.0 Final   • Specific Gravity, UA 10/13/2019 1.013  1.001 - 1.030 Final   • Glucose, UA 10/13/2019 Negative  Negative Final   • Ketones, UA 10/13/2019 Negative  Negative Final   • Bilirubin, UA 10/13/2019 Negative  Negative Final   • Blood, UA 10/13/2019 Negative  Negative Final   • Protein, UA 10/13/2019 Negative  Negative Final   • Leuk Esterase, UA 10/13/2019 Negative  Negative Final   • Nitrite, UA 10/13/2019 Negative  Negative Final   • Urobilinogen, UA 10/13/2019 0.2 E.U./dL  0.2 - 1.0 E.U./dL Final   • RBC, UA 10/13/2019 None Seen  None Seen, 0-2 /HPF Final   • WBC, UA 10/13/2019 None Seen  None Seen, 0-2 /HPF Final   • Bacteria, UA 10/13/2019 None Seen  None Seen, Trace /HPF Final   • Squamous Epithelial Cells, UA 10/13/2019 None Seen  None Seen, 0-2 /HPF Final   • Hyaline Casts, UA 10/13/2019 None Seen  0 - 6 /LPF Final   • Methodology 10/13/2019 Automated Microscopy   Final   • Glucose 10/13/2019 119* 65 - 99 mg/dL Final   • BUN 10/13/2019 11  6 - 20 mg/dL Final   • Creatinine 10/13/2019 0.72* 0.76 - 1.27 mg/dL Final   • Sodium 10/13/2019 136  136 -  145 mmol/L Final   • Potassium 10/13/2019 5.1  3.5 - 5.2 mmol/L Final   • Chloride 10/13/2019 102  98 - 107 mmol/L Final   • CO2 10/13/2019 27.0  22.0 - 29.0 mmol/L Final   • Calcium 10/13/2019 8.6  8.6 - 10.5 mg/dL Final   • Total Protein 10/13/2019 7.7  6.0 - 8.5 g/dL Final   • Albumin 10/13/2019 4.50  3.50 - 5.20 g/dL Final   • ALT (SGPT) 10/13/2019 10  1 - 41 U/L Final   • AST (SGOT) 10/13/2019 19  1 - 40 U/L Final   • Alkaline Phosphatase 10/13/2019 55  39 - 117 U/L Final   • Total Bilirubin 10/13/2019 0.5  0.2 - 1.2 mg/dL Final   • eGFR Non African Amer 10/13/2019 113  >60 mL/min/1.73 Final   • eGFR   Amer 10/13/2019 136  >60 mL/min/1.73 Final   • Globulin 10/13/2019 3.2  gm/dL Final   • A/G Ratio 10/13/2019 1.4  g/dL Final   • BUN/Creatinine Ratio 10/13/2019 15.3  7.0 - 25.0 Final   • Anion Gap 10/13/2019 7.0  5.0 - 15.0 mmol/L Final   • WBC 10/13/2019 5.89  3.40 - 10.80 10*3/mm3 Final   • RBC 10/13/2019 5.23  4.14 - 5.80 10*6/mm3 Final   • Hemoglobin 10/13/2019 12.6* 13.0 - 17.7 g/dL Final   • Hematocrit 10/13/2019 40.6  37.5 - 51.0 % Final   • MCV 10/13/2019 77.6* 79.0 - 97.0 fL Final   • MCH 10/13/2019 24.1* 26.6 - 33.0 pg Final   • MCHC 10/13/2019 31.0* 31.5 - 35.7 g/dL Final   • RDW 10/13/2019 22.2* 12.3 - 15.4 % Final   • RDW-SD 10/13/2019 60.3* 37.0 - 54.0 fl Final   • MPV 10/13/2019 10.5  6.0 - 12.0 fL Final   • Platelets 10/13/2019 157  140 - 450 10*3/mm3 Final   • Neutrophil % 10/13/2019 59.5  42.7 - 76.0 % Final   • Lymphocyte % 10/13/2019 25.1  19.6 - 45.3 % Final   • Monocyte % 10/13/2019 7.8  5.0 - 12.0 % Final   • Eosinophil % 10/13/2019 7.1* 0.3 - 6.2 % Final   • Basophil % 10/13/2019 0.3  0.0 - 1.5 % Final   • Immature Grans % 10/13/2019 0.2  0.0 - 0.5 % Final   • Neutrophils, Absolute 10/13/2019 3.50  1.70 - 7.00 10*3/mm3 Final   • Lymphocytes, Absolute 10/13/2019 1.48  0.70 - 3.10 10*3/mm3 Final   • Monocytes, Absolute 10/13/2019 0.46  0.10 - 0.90 10*3/mm3 Final   • Eosinophils,  Absolute 10/13/2019 0.42* 0.00 - 0.40 10*3/mm3 Final   • Basophils, Absolute 10/13/2019 0.02  0.00 - 0.20 10*3/mm3 Final   • Immature Grans, Absolute 10/13/2019 0.01  0.00 - 0.05 10*3/mm3 Final   • nRBC 10/13/2019 0.0  0.0 - 0.2 /100 WBC Final      Ct Chest With Contrast    Result Date: 10/14/2019  Narrative: EXAMINATION: CT CHEST W CONTRAST-, CT ABDOMEN AND PELVIS W CONTRAST-  INDICATION: Restaging colon cancer; C18.7-Malignant neoplasm of sigmoid colon.  TECHNIQUE: 5 mm post IV contrast images through the chest and 5 mm portal venous phase and delayed venous phase images through the abdomen and pelvis.  The radiation dose reduction device was turned on for each scan per the ALARA (As Low as Reasonably Achievable) protocol.  COMPARISON: 04/12/2019 chest, abdomen and pelvis CT scans.  FINDINGS: Previous exam reports from 04/12/2019 indicated continued decrease in size of left lower lung mass since 2018.  No evidence of intraabdominal or intrapelvic metastatic disease.  CHEST CT SCAN WITH IV CONTRAST: Residual scar or nodule in the left lung base is very similar in overall shape and appearance to the prior study, but a little smaller in size, measuring at similar locations, 44 x 28 mm previously and 43 x 26 mm today. Margins of the lesion appear essentially stable in shape. Minimal associated interstitial changes are stable. There is no evidence of any new pulmonary parenchymal disease and no evidence of pleural effusion.  Mediastinal and images show no evidence of adenopathy, no pericardial or pleural effusion. Bony structures appear to be intact.      Impression: Stable to slightly further decreased size of patient's residual left lower lung scar/mass. No new or progressive chest disease is identified.  CT SCAN OF THE ABDOMEN AND PELVIS WITH IV CONTRAST: There is mild diffuse fatty liver change. Spleen is not enlarged. No significant abnormalities are seen of the pancreas, gallbladder, or adrenal glands. Small  right renal cysts are stable. Kidneys elsewhere appear unremarkable. No upper abdominal adenopathy, ascites or acute inflammatory change is seen. Stomach is somewhat distended with food and fluid, perhaps simply due to a recent meal. There is no evidence to suggest gastric outlet obstruction. Large and small bowel loops are normal in caliber and normal in appearance. No peritoneal disease is appreciated.  Regarding the lower abdomen and pelvis, there is a suggestion of mild fecal stasis overall, although similar findings are present on the prior study. The area of the patient's sigmoid-rectal anastomosis appears clear of mass or inflammatory change. No intrapelvic adenopathy is seen. No inguinal adenopathy is identified. Delayed images show contrast opacification of normal caliber renal collecting systems, ureters, and normal contrast opacification of the bladder. Note is again made of patient's [id number] posterior fusion, lucent and sclerotic changes of these vertebral bodies appear stable. No new bony disease is seen elsewhere.  IMPRESSION: Stable CT scan of the abdomen and pelvis, including patient's posterior lumbar fusion, and chronic lucent sclerotic changes of T12, L1 and L2. No evidence of new intraabdominal disease or new bony disease is seen.  D:  10/13/2019 E:  10/14/2019      Ct Abdomen Pelvis With Contrast    Result Date: 10/14/2019  Narrative: EXAMINATION: CT CHEST W CONTRAST-, CT ABDOMEN AND PELVIS W CONTRAST-  INDICATION: Restaging colon cancer; C18.7-Malignant neoplasm of sigmoid colon.  TECHNIQUE: 5 mm post IV contrast images through the chest and 5 mm portal venous phase and delayed venous phase images through the abdomen and pelvis.  The radiation dose reduction device was turned on for each scan per the ALARA (As Low as Reasonably Achievable) protocol.  COMPARISON: 04/12/2019 chest, abdomen and pelvis CT scans.  FINDINGS: Previous exam reports from 04/12/2019 indicated continued decrease in size  of left lower lung mass since 2018.  No evidence of intraabdominal or intrapelvic metastatic disease.  CHEST CT SCAN WITH IV CONTRAST: Residual scar or nodule in the left lung base is very similar in overall shape and appearance to the prior study, but a little smaller in size, measuring at similar locations, 44 x 28 mm previously and 43 x 26 mm today. Margins of the lesion appear essentially stable in shape. Minimal associated interstitial changes are stable. There is no evidence of any new pulmonary parenchymal disease and no evidence of pleural effusion.  Mediastinal and images show no evidence of adenopathy, no pericardial or pleural effusion. Bony structures appear to be intact.      Impression: Stable to slightly further decreased size of patient's residual left lower lung scar/mass. No new or progressive chest disease is identified.  CT SCAN OF THE ABDOMEN AND PELVIS WITH IV CONTRAST: There is mild diffuse fatty liver change. Spleen is not enlarged. No significant abnormalities are seen of the pancreas, gallbladder, or adrenal glands. Small right renal cysts are stable. Kidneys elsewhere appear unremarkable. No upper abdominal adenopathy, ascites or acute inflammatory change is seen. Stomach is somewhat distended with food and fluid, perhaps simply due to a recent meal. There is no evidence to suggest gastric outlet obstruction. Large and small bowel loops are normal in caliber and normal in appearance. No peritoneal disease is appreciated.  Regarding the lower abdomen and pelvis, there is a suggestion of mild fecal stasis overall, although similar findings are present on the prior study. The area of the patient's sigmoid-rectal anastomosis appears clear of mass or inflammatory change. No intrapelvic adenopathy is seen. No inguinal adenopathy is identified. Delayed images show contrast opacification of normal caliber renal collecting systems, ureters, and normal contrast opacification of the bladder. Note is  again made of patient's [id number] posterior fusion, lucent and sclerotic changes of these vertebral bodies appear stable. No new bony disease is seen elsewhere.  IMPRESSION: Stable CT scan of the abdomen and pelvis, including patient's posterior lumbar fusion, and chronic lucent sclerotic changes of T12, L1 and L2. No evidence of new intraabdominal disease or new bony disease is seen.  D:  10/13/2019 E:  10/14/2019    (  ASSESSMENT: The patient is a very pleasant 59 y.o. male  with stage IV colon cancer.    PROBLEM LIST:  1. Currently stage IV colon cancer with lung and bony metastases.  K-edwige mutant, microsatellite stable, and intermediate mutational load.  2. Status post low anterior resection done by Dr. Young 2007, [id number]  3.  Attempted adjuvant chemotherapy for 1 cycle could not tolerate secondary to multiple toxicities.  4. Biopsy-proven L2 metastases done October 11, 2016. Status post CyberKnife radiation treatment.  5.  Started Keytruda 200 mg IV every 3 weeks on February 1, 2017, status post 10 cycles of treatment  6. Disease progression documented on PET scan completed 8/31/2017.   7. Treatment changed to Xeloda/Avastin/Oxaliplatin on 9/19/2017, status post 7 cycles.   8. Status post tumor debulking at L2 done by Dr. Frazier at [facility] on April 21, 2017  9.  The treatment was switched to Xeloda with Avastin maintenance treatment March 6, 2018  10.  Cancer related pain  11.  Opioid-induced constipation  12.  Treatment induced asthenia  13.  Mild depression  14.  Insomnia  15.  Chemotherapy-induced anemia  16.  Chemotherapy-induced dermatitis  17.  Sleep apnea    PLAN:  1.  I will continue Avastin once every 4 weeks.  2.  I will continue Xeloda 500 mg a.m. and 500 mg p.m. 1 weeks on, 1 week off.  This is dose reduction secondary to dermatitis.  He could not tolerate any higher doses.  3. We will monitor the patient's labs throughout treatment including blood counts, kidney function, CEA, thyroid  function, and liver functions. We will monitor his liver enzymes that have been elevated secondary to treatment with immune-therapy.   4.  The patient will follow-up with me in 8 weeks with treatment.    5.  Patient will continue taking over-the-counter stool softeners for constipation.  6.  I again reviewed with the patient the potential side effects from immune therapy including dermatitis, hepatitis, perforation, bleeding, thrombosis, and potential death.  7.  Patient tissue was submitted for the matched trial.  He did match on PETN mutation, however that arm was closed.  8.  I advised the patient to continue to hold his alternative treatment with high dose vitamin C while he is on immunetherapy.  9.  I did go over the scan results with the patient reassured his disease essentially stable.  I will repeat scans in 6 months which would be due April 2020.  We'll consider doing it sonner if his CEA level starts to increase or if he has new symptoms.   10. The patient will continue Zofran as needed for treatment related nausea.  11. The patient will continue oxycodone 10 mg as needed for cancer-related pain.  We'll continue Celebrex 200 mg twice a day as needed.  He hasn't been needing any pain medicine lately.   12.  We offered Ritalin for treatment related fatigue, but the patient has declined at this time.   13.  Patient will continue Zaleplon as needed for insomnia.  14.  I will refer patient to see pulmonary for a sleep study.     Kristofer Rodriguez MD  10/14/19

## 2019-10-16 LAB — CREAT BLDA-MCNC: 0.7 MG/DL (ref 0.6–1.3)

## 2019-10-21 ENCOUNTER — TELEPHONE (OUTPATIENT)
Dept: ONCOLOGY | Facility: CLINIC | Age: 59
End: 2019-10-21

## 2019-10-23 NOTE — TELEPHONE ENCOUNTER
After looking over forms, patient had sent only the forms that the patient fills out not the physicians part.  I called patient to tell him and he stated he would need to call me back.

## 2019-10-25 NOTE — TELEPHONE ENCOUNTER
Tracie Castellanos RegSched Rep  [facility]   Phone Number: [phone]             PATIENT CALLED TO ANSWER QUESTIONS ABOUT DISABILITY PAPERS. PATIENT CALL BACK NUMBER [phone]      Returned patient call.  Explained to patient we only received the patients part of the form.  Patient will get physicians statement and get to us.  I requested patient either fax or drop off forms.  Patient stated understanding.

## 2019-11-04 NOTE — TELEPHONE ENCOUNTER
11/4/2019         RE: Precious Roy  [address] [facility] [address]        Dear Colleague,    Thank you for referring your patient, Precious Roy, to the [facility] ALLERGY. Please see a copy of my visit note below.    Atopy Screen (Placed 11/04/19 )    No Substance Readings (15 min) Evaluation   POS Histamine 1mg/ml -    NEG NaCl 0.9% -      No Substance Readings (15 min) Evaluation   1 Alternaria alternata (tenuis)  -    2 Cladosporium herbarum -    3 Aspergillus fumigatus -    4 Penicillium notatum -    5 Dermatophagoides pteronyssinus -    6 Dermatophagoides farinae -    7 Dog epithelium (canis spp) -    8 Cat hair (martin catus) -    9 Cockroach   (Blatella americana & germanica) -    10 Grass mix midwest   (Katey, Orchard, Redtop, Jonas) -    11 Endy grass (sorghum halepense) -    12 Weed mix   (common Cocklebur, Lamb s quarters, rough redroot Pigweed, Dock/Sorrel) -    13 Mug wort (artemisia vulgare) -    14 Ragweed giant/short (ambrosia spp) -    15 English Plantain (plantago lanceolata) -    16 Tree mix 1 (Pecan, Maple BHR, Oak RVW, american [place], black [place]) -    17 Red cedar (juniperus virginia) -    18 Tree mix 2   (white Zia, river/red Birch, black [place], common [place], american Elm) -    19 Box elder/Maple mix (acer spp) -    20 [facility] (carya ovata) -       -      Conclusion:    [facility] Allergy Note    Allergy Clinic  [facility]  [address] [place] [id number]    Encounter Date: Nov 4, 2019    CC:  Chief Complaint   Patient presents with     Allergy Testing Followup     patch testing day #1 and atopty        History of Present Illness:  Ms. Precious Roy is a 54 year old female who presents in consultation today for dermatitis of the scalp. She was referred by Dr. Torrez. She last saw Dr. Torrez 7/23/19 for a focused exam of the face. At the time she was to use Lidex to the scalp BID for  ----- Message from Yanique Wiley sent at 3/7/2017  4:26 PM EST -----  Regarding: BADIN-CEA  Contact: [phone]  Patient wants to know if he is supposed to get a CEA drawn as well if so put order in. Please advise patient.   1 week for flares. She was to avoid other hair products, and use Ketaconazole shampoo 3x weekly.    Pt reports that she has itching of the scalp. Onset was 6 years ago, and it has gotten gradually worse. She states that it itches daily. States that she has not used leave-in hair products or hair spray, and used ketacozanole shampoo for about a week. Noted itching improved somewhat, but no significant improvement.    She reports having itching of the palmars and volars. Also states that she has psoriasis-like symptoms of the elbows. Denies any changes on the nails.  States that she has not colored her hair for about 4 weeks now.    Notes that she has rhinitis with irritated and red eyes in the fall. No asthma. Notes her mother has allergic rhino-conjunctivitis, seasonal. Her brother has severe psoriasis clinically diagnosed. No metal allergies.    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 For hobbies, she knits and does alcohol ink painting. She notes that the alcohol ink painting takes place once every couple of months.    She takes Effexor and a multivitamin.      Past Medical History:   Patient Active Problem List   Diagnosis     CARDIOVASCULAR SCREENING; LDL GOAL LESS THAN 160     Dry eye syndrome     Hx of LASIK     Psoriasis     Seasonal allergies     Foot joint pain     Choroidal nevus of right eye     Fibrocystic change of breast, right-next mammo due in 2016-see note here     Lateral epicondylitis of left elbow     BMI 40.0-44.9, adult (H)     Basal cell carcinoma of left lower eyelid     History of nonmelanoma skin cancer     History of actinic keratosis     Past Medical History:   Diagnosis Date     Basal cell carcinoma of left lower eyelid 5/15/2017     Hx of LASIK 2005     Kidney stones      Lateral epicondylitis of left elbow 2016     Past Surgical History:   Procedure Laterality Date     BIOPSY  2017      SECTION       COLONOSCOPY        CRYOTHERAPY, CERVICAL       CYSTOSCOPY, LITHOTRIPSY, COMBINED       ENHANCE LASER REFRACTIVE BILATERAL EXISTING PT IN PARAMETERS       HYSTEROSCOPY,ABLATION ENDOMETRIUM       LASIK BILATERAL      10+ years ago      REPAIR MOHS Left 5/22/2017    Procedure: REPAIR MOHS;  Left lower eyelid mohs reconstruction;  Surgeon: Jerrica Trujillo MD;  Location:  OR       Social History:  Patient reports that she has never smoked. She has never used smokeless tobacco. She reports current alcohol use of about 0.8 standard drinks of alcohol per week. She reports that she does not use drugs.    Family History:  Family History   Problem Relation Age of Onset     Cancer Mother      Other Cancer Mother      Cancer Father      Hypertension Father      Coronary Artery Disease Father      Prostate Cancer Father      Other Cancer Father      Cerebrovascular Disease Maternal Grandmother      Diabetes Sister      Diabetes Sister      Diabetes Sister      Squamous cell carcinoma Sister      Depression Brother      Depression Sister      Depression Son      Mental Illness Brother      Thyroid Disease No family hx of      Glaucoma No family hx of      Macular Degeneration No family hx of        Medications:  Current Outpatient Medications   Medication Sig Dispense Refill     acetaminophen 500 MG CAPS Take 1 tablet by mouth as needed       ADVIL OR AS NEEDED       carboxymethylcellulose (REFRESH PLUS) 0.5 % SOLN Place 1 drop into both eyes 2 times daily.       clobetasol (TEMOVATE) 0.05 % cream Apply topically 2 times daily as needed To psoriasis 60 g 1     doxycycline monohydrate (MONODOX) 100 MG capsule Take 1 capsule (100 mg) by mouth 2 times daily for 6 weeks. 90 capsule 0     doxycycline ROSACEA (ORACEA) 40 MG DR capsule Take once daily with food 90 capsule 0     fluocinonide (LIDEX) 0.05 % external solution Apply twice daily to scalp for itching and redness on scalp for up to 1 week 60 mL 1     ketoconazole (NIZORAL) 2 % external  shampoo Use three times weekly to scalp for flares of itching and redness 100 mL 6     Ketoprofen POWD Apply dime size amount topically to painful areas (choose 2-3 per application) 3 times daily as needed. Ketoprofen 10% in PLO gel 120 g 8     lidocaine (XYLOCAINE) 5 % ointment Apply dime size amount topically to painful areas (choose 2-3 per application) 3 times daily as needed. 70.88 g 8     metroNIDAZOLE (METROCREAM) 0.75 % external cream Apply to rash on face once daily 45 g 11     MULTI-VITAMIN OR TABS 1 TABLET DAILY       pimecrolimus (ELIDEL) 1 % external cream Apply twice daily as needed 30 g 0     venlafaxine (EFFEXOR-XR) 150 MG 24 hr capsule Take 1 capsule (150 mg) by mouth daily 30 capsule 8       No Known Allergies    Review of Systems:  -As per HPI  -Constitutional: Otherwise feeling well today, in usual state of health.  -Skin: As above in HPI. No additional skin concerns.    Physical exam:  Vitals: There were no vitals taken for this visit.  GEN: This is a well developed, well-nourished female in no acute distress, in a pleasant mood.    SKIN: Focused examination of the volars and plantars bilaterally, and scalp was performed.  -R palmar and R heel, some hyperkeratosis with not much redness  -Round hyperkeratotic in the middle of the plantar pedis, left  -Scalp, diffuse erythema with some desquamation   -No other lesions of concern on areas examined.     Order for PATCH TESTS    [] Outpatient  [] Inpatient: [place]..../ Bed ....      Skin Atopy (atopic dermatitis) [] Yes   [] No  Rhinitis/Sinusitis:   [x] Yes   [] No  Allergic Asthma:   [] Yes   [x] No  Food Allergy:   [] Yes   [x] No  Leg ulcers:   [] Yes   [x] No  Hand eczema:   [x] Yes   [] No   Leading hand:   [x] R   [] L       [] Ambidextrous                        Reason for tests (suspected allergy): Diffuse erythema with some desquamation of the scalp   Known previous allergies: seasonal rhinitis    Standardized panels  [x] Standard panel (40  tests)  [x] Preservatives & Antimicrobials (31 tests)  [x] Emulsifiers & Additives (25 tests)   [] Perfumes/Flavours & Plants (25 tests)  [x] Hairdresser panel (12 tests)  [] Rubber Chemicals (22 tests)  [x] Plastics (26 tests)  [] Colorants/Dyes/Food additives (20 tests)  [] Metals (implants/dental) (24 tests)  [] Local anaesthetics/NSAIDs (13 tests)  [] Antibiotics & Antimycotics (14 tests)   [] Corticosteroids (15 tests)   [] Photopatch test (62 tests)   [] others: ...      [] Patient's own products: ...    DO NOT test if chemical or biological identity is unknown!     always ask from patient the product information and safety sheets (MSDS)   [x] Atopy screen prick test (Atopic predisposition): ...    [] Patient needs consultation with Allergy team (changes of tests may apply)  [] Tests discussed with Allergy team (can have direct appointment for patch tests)    RESULTS & EVALUATION of PATCH TESTS    Patch test readings after     [x] 2 days, [] 3 days [x] 4 days, [] 5 days,    Applied patch tests with results (import here the list of patch tests):  Order documented by: SEBASTIAN BABIN LPN  Order reviewed by:  Physician:   Date/time of application:11-4-2019  Localization of application: back     STANDARD Series         No Substance 2 days 4 days remarks   1 Sean Mix [C] - -    2 Colophony - -    3  2-Mercaptobenzothiazole  - -     4 Methylisothiazolinone - -    5 Carba Mix - -    6 Thiuram Mix [A] - -    7 Bisphenol A Epoxy Resin - -    8 H-Xviz-Ioudajgbpjm-Formaldehyde Resin - -    9 Mercapto Mix [A] - -    10 Black Rubber Mix- PPD [B] - -    11 Potassium Dichromate  -  -    12 Balsam of [place] (Myroxylon Pereirae Resin) - -    13 Nickel Sulphate Hexahydrate - -    14 Mixed Dialkyl Thiourea - -    15 Paraben Mix [B] - -    16 Methyldibromo Glutaronitrile - -    17 Fragrance Mix - -    18 2-Bromo-2-Nitropropane-1,3-Diol (Bronopol) - -    19 Lyral - -    20 Tixocortol-21- Pivalate - -    21 Brielleiyl  Urea (Germall II) - -    22 Methyl Methacrylate - -    23 Cobalt (II) Chloride Hexahydrate - -    24 Fragrance Mix II  - -    25 Compositae Mix - -    26 Benzoyl Peroxide - -    27 Bacitracin - -    28 Formaldehyde - -    29 Methylchloroisothiazolinone / Methylisothiazolinone - -    30 Corticosteroid Mix - -    31 Sodium Lauryl Sulfate - -    32 Lanolin Alcohol - -    33 Turpentine - -    34 Cetylstearylalcohol - -    35 Chlorhexidine Dicluconate - -    36 Budenoside - -    37 Imidazolidinyl Urea  - -    38 Ethyl-2 Cyanoacrylate - -    39 Quaternium 15 (Dowicil 200) - -    40 Decyl Glucoside - -      EMULSIFIERS & ADDITIVES        No Substance 2 days 4 days remarks   41 Polyethylene Glycol-400 - -    42 Cocamidopropyl Betaine - -    43 Amerchol L101 NA NA    44 Propylene Glycol - -    45 Triethanolamine - -    46 Sorbitane Sesquiolate - -    47 Isopropylmyristate - -    48 Polysorbate 80  - -    49 Amidoamine   (Stearamidopropyl Dimethylamine) - -    50 Oleamidopropyl Dimethylamine - -    51 Lauryl Glucoside - -    52 Coconut Diethanolamide  - -    53 2-Hydroxy-4-Methoxy Benzophenone (Oxybenzone) - -    54 Benzophenone-4 (Sulisobenzon) - -    55 Propolis - -    56 Dexpanthenol - -    57 Carboxymethyl Cellulose Sodium - -    58 Abitol - -    59 Tert-Butylhydroquinone - -    60 Benzyl Salicylate - -     Antioxidant      61 Dodecyl Gallate - -    62 Butylhydroxyanisole (BHA) - -    63 Butylhydroxytoluene (BHT) - -    64 Di-Alpha-Tocopherol (Vit E) - -    65 Propyl Gallate - -      PRESERVATIVES & ANTIMICROBIALS         No Substance 2 days 4 days remarks   66  1,2-Benzisothiazoline-3-One, Sodium Salt - -    67  1,3,5-Octavio (2-Hydroxyethyl) - Hexahydrotriazine (Grotan BK) - -    68 1-Veyikosptahlo-0-Nitro-1, 3-Propanediol - -    69  3, 4, 4' - Triclocarban - -    70 4 - Chloro - 3 - Cresol - -    71 4 - Chloro - 4 - Xylenol (PCMX) - -    72 7-Ethylbicyclooxazolidine ([facility] XO2082) - -    73 Benzalkonium Chloride - -     74 Benzyl Alcohol - -    75 Cetalkonium Chloride - -    76 Cetylpyrimidine Chloride  - -    77 Chloroacetamide - -    78 DMDM Hydantoin - -    79 Glutaraldehyde NA NA    80 Triclosan - -    81 Glyoxal Trimeric Dihydrate - -    82 Iodopropynyl Butylcarbamate - -    83 Octylisothiazoline - -    84 Iodoform - -    85 (Nitrobutyl) Morpholine/(Ethylnitro-Trimethylene) Dimorpholine (Bioban P 1487) - -    86 Phenoxyethanol - -    87 Phenyl Salicylate - -    88 Povidone Iodine - -    89 Sodium Benzoate - -    90 Sodium Disulfite - -    91 Sorbic Acid - -    92 Thimerosal - -     Parabens      93 Butyl-P-Hydroxybenzoate - -    94 Ethyl-P-Hydroxybenzoate - -    95 Methyl-P-Hydroxybenzoate - -    96 Propyl-P-Hydroxybenzoate - -      HAIRDRESSER Series         No Substance 2 days 4 days remarks   97 P-Phenylenediamin  -  -    98 P-Toluenediamine Sulphate  -  -    99 Ammonium Thioglycolate - -    100 Ammonium Persulfate - -    101 Resorcinol - -    102 3-Aminophenol - -    103 P-Aminophenol - -    104 Glyceryl Monothioglycolate - -    105 Zinc Pyrithione  - -    106 Pyrogallol - -    107 P-Aminodiphenylamine - -    108 Dimethylaminopropylamin (DMAPA) - -    PLASTICS         No Substance 2 days 4 days remarks    Acrylates - -    109 2-Hydroxyethyl Methacrylate (HEMA) - -    110 1,4-Butandioldimethacrylate (BUDMA) - -    111  2-Ethylhexyl Acrylate - -    112 Bisphenol-A-Dimethacrylate  - -    113 Diurethane-Dimethacrylate - -    114 Ethyleneglycoldimethacrylate (EGDMA) - -    115 Pentaerythritoltriacrylate (JESSICA) - -    116 Triethylene Glycol Dimethacrylate (TEGDMA) - -     Synthetic material/additives       117 S-Chlb-Yqviuwunawt - -    118 Tricresyl Phosphate - -    119 9-Apxz-Atqyrxdgoktnx - -    120 Bis (2-Ethylhexyl) Phthalate - -    121 Dibutylphthalate - -    122 Dimethylphthalate - -    123 Toluene-2,4-Diisocyanate - -    124 Diphenylmethane-4,4''-Diisocyanate - -     EPOXY RESIN SYSTEMS       Reactive Solvents - -   "  125 Cresyl Glycidyl Ether - -    126 Butyl Glycidyl Ether - -    127 Phenyl Glycidyl Ether - -    128 1,4-Butanediol Diglycidyl Ether - -    129 1,6-Hexanediole Diglycidyl Ether - -     Hardener / Accelerator - -    130 Ethylenediamine Dihydrochloride - -    131 Triethylenetetramine - -    132 Diethylenetriamine - -    133 Isophorone Diamine (IPD) - -    134 N,N-Dimethyl-P-Toluidine - -    PATIENTS OWN PRODUCTS         No Substance Conc  % solv 2 days 4 days remarks   136 [place]esta Vol foam        137 Bamboo smooth hair spray        138 Kenra Vol conditioner        139 Whip IT          Patients own products:  è DO NOT test if chemical or biological identity is unknown!   - always ask from patient the product information and safety sheets and consult with the physician   - check neutral pH with pH indicator paper (do not test pH below 5 or over 8 or consult with physician)  - leave-on cosmetics can be tested \"as is\"  - rinse-off products have to be diluted with water, buffer, olive oil or paraffin (discuss with physician)  à remember:   - non-specific toxic/corrosive or biological reactions can occur  - tests with patients own products are not standardized and test conditions are not optimized for patch tests. Whenever possible test with standardized test series and correlate use of product with the result of the patch tests  - if not sure if compounds can be tested under occlusion in patch tests consider open application tests      Results of patch tests:                         Interpretation:    - Negative                    A    = Allergic      (+) Erythema    TI   = Toxic/irritant   + E + Infiltration    RaP = Relevance at Present     ++ E/I + Papulovesicle   Rpr  = Relevance Previously     +++ E/I/P + Blister     nR   = No Relevance       Atopy Screen (Placed 11/04/19 )    No Substance Readings (15 min) Evaluation   POS Histamine 1mg/ml ++    NEG NaCl 0.9% - Slight dermographism     No Substance Readings (15 " min) Evaluation   1 Alternaria alternata (tenuis)  -    2 Cladosporium herbarum -    3 Aspergillus fumigatus -    4 Penicillium notatum -    5 Dermatophagoides pteronyssinus -    6 Dermatophagoides farinae -    7 Dog epithelium (canis spp) -    8 Cat hair (martin catus) -    9 Cockroach   (Blatella americana & germanica) -    10 Grass mix midwest   (Katey, Orchard, [place], Jonas) -    11 Endy grass (sorghum halepense) -    12 Weed mix   (common Cocklebur, Lamb s quarters, rough redroot Pigweed, Dock/Sorrel) -    13 Mug wort (artemisia vulgare) -    14 Ragweed giant/short (ambrosia spp) -    15 English Plantain (plantago lanceolata) -    16 Tree mix 1 (Pecan, Maple BHR, Oak RVW, american [place], black [place]) -    17 Red cedar (juniperus virginia) -    18 Tree mix 2   (white Zia, river/red Birch, black [place], common [place], american Elm) -    19 Box elder/Maple mix (acer spp) -    20 [place] shagbark (carya ovata) -       -      Conclusion: no signs for extrinsic atopic predisposition    [] No relevant allergic reaction observed    [] Allergic reaction diagnosed against: see later      Interpretation/ remarks:   See later    [] Patient information given   [] ACDS CAMP information's (# ....) to following compounds: .....   [] General information's to following compounds: ......    ==> final Diagnosis:    >> Diffuse erythema with some desquamation of the scalp  Chronic dermatitis with hyperkeratosis (no erythema) on palmae and plantae     DDx atopic dermatitis with predisposition for dry, hypersensitive skin and irritant dermatitis   Allergic contact dermatitis  Psoriasis     >> suspicion for atopic predisposition with rhinitis in fall = probably non-allergic because atopy screen prick test negative     ==> Treatment prescribed/Plan:  See later      These conclusions are made at the best of one's knowledge and belief based on the provided evidence such as patient's history and allergy test results and they  can change over time or can be incomplete because of missing information's.      Prescribed plan/Treatment:      CC Referred Self, MD  No address on file on close of this encounter.    I spent a total of 30 minutes face to face with Precious Roy during today s office visit. Over 50% of this time was spent discussing the test results, counseling the patient and/or coordinating care.  Please see Assessment and Plan for details.  This excludes any time spent performing prick and patch tests                Patient had 136 patch tests placed this visit.    Standard panel (40 tests)    Preservatives & Antimicrobials (30 tests)    Emulsifiers & Additives (24 tests)     Hairdresser panel (12 tests)    Plastics (26 tests)    Personal Products (4 tests)     Patient had 22 prick tests placed this visit.    Control Tests (2 tests)    Standard Atopic Panel (20 tests)      Again, thank you for allowing me to participate in the care of your patient.        Sincerely,        Arthur Valencia MD

## 2019-11-06 ENCOUNTER — HOSPITAL ENCOUNTER (OUTPATIENT)
Dept: ONCOLOGY | Facility: HOSPITAL | Age: 59
Setting detail: INFUSION SERIES
Discharge: HOME | End: 2019-11-06

## 2019-11-06 VITALS
RESPIRATION RATE: 16 BRPM | WEIGHT: 155 LBS | TEMPERATURE: 97.9 F | DIASTOLIC BLOOD PRESSURE: 82 MMHG | HEART RATE: 87 BPM | BODY MASS INDEX: 24.91 KG/M2 | HEIGHT: 66 IN | SYSTOLIC BLOOD PRESSURE: 137 MMHG

## 2019-11-06 DIAGNOSIS — C18.7 MALIGNANT NEOPLASM OF SIGMOID COLON (HCC): Primary | ICD-10-CM

## 2019-11-06 DIAGNOSIS — C79.51 COLON CANCER METASTASIZED TO BONE (HCC): ICD-10-CM

## 2019-11-06 DIAGNOSIS — C79.51 BONE METASTASIS: ICD-10-CM

## 2019-11-06 DIAGNOSIS — R91.1 SOLITARY LUNG NODULE: ICD-10-CM

## 2019-11-06 DIAGNOSIS — C18.9 COLON CANCER METASTASIZED TO BONE (HCC): ICD-10-CM

## 2019-11-06 PROBLEM — Z23 NEED FOR INFLUENZA VACCINATION: Status: ACTIVE | Noted: 2019-11-06

## 2019-11-06 LAB
ALBUMIN SERPL-MCNC: 4.4 G/DL (ref 3.5–5.2)
ALBUMIN/GLOB SERPL: 1.1 G/DL
ALP SERPL-CCNC: 63 U/L (ref 39–117)
ALT SERPL W P-5'-P-CCNC: 13 U/L (ref 1–41)
ANION GAP SERPL CALCULATED.3IONS-SCNC: 12 MMOL/L (ref 5–15)
AST SERPL-CCNC: 21 U/L (ref 1–40)
BILIRUB SERPL-MCNC: 0.7 MG/DL (ref 0.2–1.2)
BILIRUB UR QL STRIP: NEGATIVE
BUN BLD-MCNC: 10 MG/DL (ref 6–20)
BUN/CREAT SERPL: 13.3 (ref 7–25)
CALCIUM SPEC-SCNC: 9.5 MG/DL (ref 8.6–10.5)
CEA SERPL-MCNC: 4.36 NG/ML
CHLORIDE SERPL-SCNC: 99 MMOL/L (ref 98–107)
CLARITY UR: CLEAR
CO2 SERPL-SCNC: 25 MMOL/L (ref 22–29)
COLOR UR: YELLOW
CREAT BLD-MCNC: 0.75 MG/DL (ref 0.76–1.27)
CREAT BLDA-MCNC: 0.7 MG/DL (ref 0.6–1.3)
CREAT SERPL-MCNC: 0.7 MG/DL
ERYTHROCYTE [DISTWIDTH] IN BLOOD BY AUTOMATED COUNT: 24 % (ref 12.3–15.4)
GFR SERPL CREATININE-BSD FRML MDRD: 107 ML/MIN/1.73
GFR SERPL CREATININE-BSD FRML MDRD: 130 ML/MIN/1.73
GLOBULIN UR ELPH-MCNC: 4.1 GM/DL
GLUCOSE BLD-MCNC: 116 MG/DL (ref 65–99)
GLUCOSE UR STRIP-MCNC: NEGATIVE MG/DL
HCT VFR BLD AUTO: 43.4 % (ref 37.5–51)
HGB BLD-MCNC: 13.8 G/DL (ref 13–17.7)
HGB UR QL STRIP.AUTO: NEGATIVE
KETONES UR QL STRIP: NEGATIVE
LEUKOCYTE ESTERASE UR QL STRIP.AUTO: NEGATIVE
LYMPHOCYTES # BLD AUTO: 1.9 10*3/MM3 (ref 0.7–3.1)
LYMPHOCYTES NFR BLD AUTO: 26.3 % (ref 19.6–45.3)
MCH RBC QN AUTO: 25.2 PG (ref 26.6–33)
MCHC RBC AUTO-ENTMCNC: 31.7 G/DL (ref 31.5–35.7)
MCV RBC AUTO: 79.7 FL (ref 79–97)
MONOCYTES # BLD AUTO: 0.7 10*3/MM3 (ref 0.1–0.9)
MONOCYTES NFR BLD AUTO: 9.7 % (ref 5–12)
NEUTROPHILS # BLD AUTO: 4.5 10*3/MM3 (ref 1.7–7)
NEUTROPHILS NFR BLD AUTO: 64 % (ref 42.7–76)
NITRITE UR QL STRIP: NEGATIVE
PH UR STRIP.AUTO: 6.5 [PH] (ref 5–8)
PLATELET # BLD AUTO: 179 10*3/MM3 (ref 140–450)
PMV BLD AUTO: 7.1 FL (ref 6–12)
POTASSIUM BLD-SCNC: 4.3 MMOL/L (ref 3.5–5.2)
PROT SERPL-MCNC: 8.5 G/DL (ref 6–8.5)
PROT UR QL STRIP: NEGATIVE
RBC # BLD AUTO: 5.45 10*6/MM3 (ref 4.14–5.8)
SODIUM BLD-SCNC: 136 MMOL/L (ref 136–145)
SP GR UR STRIP: 1 (ref 1–1.03)
UROBILINOGEN UR QL STRIP: NORMAL
WBC NRBC COR # BLD: 7.1 10*3/MM3 (ref 3.4–10.8)

## 2019-11-06 PROCEDURE — 81003 URINALYSIS AUTO W/O SCOPE: CPT | Performed by: INTERNAL MEDICINE

## 2019-11-06 PROCEDURE — 25010000002 BEVACIZUMAB 100 MG/4ML SOLUTION 4 ML VIAL: Performed by: INTERNAL MEDICINE

## 2019-11-06 PROCEDURE — 90674 CCIIV4 VAC NO PRSV 0.5 ML IM: CPT | Performed by: NURSE PRACTITIONER

## 2019-11-06 PROCEDURE — 82378 CARCINOEMBRYONIC ANTIGEN: CPT | Performed by: INTERNAL MEDICINE

## 2019-11-06 PROCEDURE — 96413 CHEMO IV INFUSION 1 HR: CPT

## 2019-11-06 PROCEDURE — 25010000002 BEVACIZUMAB PER 10 MG: Performed by: INTERNAL MEDICINE

## 2019-11-06 PROCEDURE — 25010000002 INFLUENZA VAC SUBUNIT QUAD 0.5 ML SUSPENSION PREFILLED SYRINGE: Performed by: NURSE PRACTITIONER

## 2019-11-06 PROCEDURE — G0008 ADMIN INFLUENZA VIRUS VAC: HCPCS | Performed by: NURSE PRACTITIONER

## 2019-11-06 PROCEDURE — 80053 COMPREHEN METABOLIC PANEL: CPT | Performed by: INTERNAL MEDICINE

## 2019-11-06 PROCEDURE — 82565 ASSAY OF CREATININE: CPT

## 2019-11-06 PROCEDURE — 85025 COMPLETE CBC W/AUTO DIFF WBC: CPT | Performed by: INTERNAL MEDICINE

## 2019-11-06 RX ORDER — SODIUM CHLORIDE 9 MG/ML
250 INJECTION, SOLUTION INTRAVENOUS ONCE
Status: DISCONTINUED | OUTPATIENT
Start: 2019-11-06 | End: 2019-11-07 | Stop reason: HOSPADM

## 2019-11-06 RX ORDER — SODIUM CHLORIDE 0.9 % (FLUSH) 0.9 %
10 SYRINGE (ML) INJECTION AS NEEDED
Status: CANCELLED | OUTPATIENT
Start: 2019-11-06

## 2019-11-06 RX ORDER — SODIUM CHLORIDE 0.9 % (FLUSH) 0.9 %
20 SYRINGE (ML) INJECTION AS NEEDED
Status: CANCELLED | OUTPATIENT
Start: 2019-11-06

## 2019-11-06 RX ADMIN — BEVACIZUMAB 500 MG: 400 INJECTION, SOLUTION INTRAVENOUS at 09:49

## 2019-11-06 RX ADMIN — HEPARIN 500 UNITS: 100 SYRINGE at 10:30

## 2019-11-06 RX ADMIN — INFLUENZA A VIRUS A/IDAHO/07/2018 (H1N1) ANTIGEN (MDCK CELL DERIVED, PROPIOLACTONE INACTIVATED, INFLUENZA A VIRUS A/INDIANA/08/2018 (H3N2) ANTIGEN (MDCK CELL DERIVED, PROPIOLACTONE INACTIVATED), INFLUENZA B VIRUS B/SINGAPORE/INFTT-16-0610/2016 ANTIGEN (MDCK CELL DERIVED, PROPIOLACTONE INACTIVATED), INFLUENZA B VIRUS B/IOWA/06/2017 ANTIGEN (MDCK CELL DERIVED, PROPIOLACTONE INACTIVATED) 0.5 ML: 15; 15; 15; 15 INJECTION, SUSPENSION INTRAMUSCULAR at 12:10

## 2019-11-06 NOTE — ADDENDUM NOTE
Encounter addended by: Deanna Andre RN on: 11/6/2019 12:06 PM   Actions taken: Immunization record saved, Order list changed

## 2019-11-06 NOTE — ADDENDUM NOTE
Encounter addended by: Yomaira Shah RN on: 11/6/2019 12:46 PM   Actions taken: MAR administration accepted, Immunization record saved

## 2019-12-01 NOTE — PROGRESS NOTES
DATE OF VISIT: 12/02/19      REASON FOR VISIT: Followup for metastatic colon cancer.      HISTORY OF PRESENT ILLNESS: The patient is a very pleasant 59 y.o. male with past medical history significant for metastatic colon cancer diagnosed March 2007 . He is status post lower anterior resection as well as 1 cycle FOLFOX, stopped due to multiple toxicities. Final pathology showed poorly differentiated adenocarcinoma with 25 negative lymph nodes and clear surgical margins. Isolated tumor deposits was found in the pericolonic fat. Pathologic stage T2 N1c M0 stage IIIA disease.The patient has been followed by serial colonoscopies as well as CAT scans that did document pulmonary metastatic disease with left lower lobe nodule that is gradually increasing in size. Patient was doing fairly well until recently, 3 months ago, patient presented with low back pain. Patient had restaging workup that revealed hypermetabolic L2 vertebral body lesion. Patient had biopsy done on October 11, 2016 that revealed metastatic adenocarcinoma consistent of colon primary with CK 7 negative CK 20 positive CDX2 positive. Patient had next generation gene sequencing that revealed intermediate mutational load with microsatellite stability.  He started chemotherapy with Keytruda on 2/1/2017. The patient was changed to CapeOx with Avastin on 9/19/2017.  He completed 8 cycles on February 12, 2018.  He was started on Avastin with Xeloda maintenance.  He is here today for scheduled follow up visit with treatment, cycle #21.       SUBJECTIVE: The patient is here today with his wife.  All in all he is doing fairly well.  He denies any fever chills.  He is scheduled for CAT scan lumbar spine in February to evaluate the need for another surgery.    PAST MEDICAL HISTORY/SOCIAL HISTORY/FAMILY HISTORY: Reviewed by me and unchanged from my documentation done on 09/25/18.    Review of Systems   Constitutional: Positive for fatigue. Negative for activity change,  "appetite change, chills, fever and unexpected weight change.   HENT: Negative for hearing loss, mouth sores, nosebleeds, sore throat and trouble swallowing.    Eyes: Negative for visual disturbance.   Respiratory: Negative for cough, chest tightness, shortness of breath and wheezing.    Cardiovascular: Negative for chest pain, palpitations and leg swelling.   Gastrointestinal: Negative for abdominal distention, abdominal pain, blood in stool, constipation, diarrhea, nausea, rectal pain and vomiting.   Endocrine: Negative for cold intolerance and heat intolerance.   Genitourinary: Negative for difficulty urinating, dysuria, frequency and urgency.   Musculoskeletal: Negative for arthralgias, gait problem, joint swelling and myalgias.   Skin: Negative for rash.   Neurological: Negative for dizziness, tremors, syncope, weakness, light-headedness, numbness and headaches.   Hematological: Negative for adenopathy. Does not bruise/bleed easily.   Psychiatric/Behavioral: Negative for confusion, sleep disturbance and suicidal ideas. The patient is not nervous/anxious.          Current Outpatient Medications:   •  capecitabine (XELODA) 500 MG chemo tablet, Take 1 tablets by mouth in the morning and 1 tablets by mouth in the evening for 7 days on, then off for 7 days., Disp: 56 tablet, Rfl: 5  •  cholecalciferol (VITAMIN D3) 22471 units capsule, Take 5,000 Units by mouth Daily., Disp: , Rfl:   •  lidocaine-prilocaine (EMLA) 2.5-2.5 % cream, Apply  topically As Needed (45-60 minutes prior to port access.  Cover with saran/plastic wrap.)., Disp: 30 g, Rfl: 3    PHYSICAL EXAMINATION:   /88   Pulse 72   Temp 97.9 °F (36.6 °C) (Temporal)   Resp 12   Ht 167.6 cm (66\")   Wt 72.1 kg (159 lb)   SpO2 99%   BMI 25.66 kg/m²    ECOG Performance Status: 1 - Symptomatic but completely ambulatory  General Appearance:  alert, cooperative, no apparent distress and appears stated age   Neurologic/Psychiatric: A&O x 3, gait steady, " appropriate affect, strength 5/5 in all muscle groups   HEENT:  Normocephalic, without obvious abnormality, mucous membranes moist   Neck: Supple, symmetrical, trachea midline, no adenopathy;  No thyromegaly, masses, or tenderness   Lungs:   Clear to auscultation bilaterally; respirations regular, even, and unlabored bilaterally   Heart:  Regular rate and rhythm, no murmurs appreciated   Abdomen:   Soft, non-tender, non-distended and no organomegaly   Lymph nodes: No cervical, supraclavicular, inguinal or axillary adenopathy noted   Extremities: Normal, atraumatic; no clubbing, cyanosis, or edema    Skin: No rashes, ulcers, or suspicious lesions noted     No visits with results within 2 Week(s) from this visit.   Latest known visit with results is:   Hospital Outpatient Visit on 11/06/2019   Component Date Value Ref Range Status   • CEA 11/06/2019 4.36  ng/mL Final   • Glucose 11/06/2019 116* 65 - 99 mg/dL Final   • BUN 11/06/2019 10  6 - 20 mg/dL Final   • Creatinine 11/06/2019 0.75* 0.76 - 1.27 mg/dL Final   • Sodium 11/06/2019 136  136 - 145 mmol/L Final   • Potassium 11/06/2019 4.3  3.5 - 5.2 mmol/L Final   • Chloride 11/06/2019 99  98 - 107 mmol/L Final   • CO2 11/06/2019 25.0  22.0 - 29.0 mmol/L Final   • Calcium 11/06/2019 9.5  8.6 - 10.5 mg/dL Final   • Total Protein 11/06/2019 8.5  6.0 - 8.5 g/dL Final   • Albumin 11/06/2019 4.40  3.50 - 5.20 g/dL Final   • ALT (SGPT) 11/06/2019 13  1 - 41 U/L Final   • AST (SGOT) 11/06/2019 21  1 - 40 U/L Final   • Alkaline Phosphatase 11/06/2019 63  39 - 117 U/L Final   • Total Bilirubin 11/06/2019 0.7  0.2 - 1.2 mg/dL Final   • eGFR Non African Amer 11/06/2019 107  >60 mL/min/1.73 Final   • eGFR  African Amer 11/06/2019 130  >60 mL/min/1.73 Final   • Globulin 11/06/2019 4.1  gm/dL Final   • A/G Ratio 11/06/2019 1.1  g/dL Final   • BUN/Creatinine Ratio 11/06/2019 13.3  7.0 - 25.0 Final   • Anion Gap 11/06/2019 12.0  5.0 - 15.0 mmol/L Final   • Color, UA 11/06/2019 Yellow   Yellow, Straw Final   • Appearance, UA 11/06/2019 Clear  Clear Final   • pH, UA 11/06/2019 6.5  5.0 - 8.0 Final   • Specific Gravity, UA 11/06/2019 1.005  1.005 - 1.030 Final   • Glucose, UA 11/06/2019 Negative  Negative Final   • Ketones, UA 11/06/2019 Negative  Negative Final   • Bilirubin, UA 11/06/2019 Negative  Negative Final   • Blood, UA 11/06/2019 Negative  Negative Final   • Protein, UA 11/06/2019 Negative  Negative Final   • Leuk Esterase, UA 11/06/2019 Negative  Negative Final   • Nitrite, UA 11/06/2019 Negative  Negative Final   • Urobilinogen, UA 11/06/2019 0.2 E.U./dL  0.2 - 1.0 E.U./dL Final   • WBC 11/06/2019 7.10  3.40 - 10.80 10*3/mm3 Final   • RBC 11/06/2019 5.45  4.14 - 5.80 10*6/mm3 Final   • Hemoglobin 11/06/2019 13.8  13.0 - 17.7 g/dL Final   • Hematocrit 11/06/2019 43.4  37.5 - 51.0 % Final   • RDW 11/06/2019 24.0* 12.3 - 15.4 % Final   • MCV 11/06/2019 79.7  79.0 - 97.0 fL Final   • MCH 11/06/2019 25.2* 26.6 - 33.0 pg Final   • MCHC 11/06/2019 31.7  31.5 - 35.7 g/dL Final   • MPV 11/06/2019 7.1  6.0 - 12.0 fL Final   • Platelets 11/06/2019 179  140 - 450 10*3/mm3 Final   • Neutrophil % 11/06/2019 64.0  42.7 - 76.0 % Final   • Lymphocyte % 11/06/2019 26.3  19.6 - 45.3 % Final   • Monocyte % 11/06/2019 9.7  5.0 - 12.0 % Final   • Neutrophils, Absolute 11/06/2019 4.50  1.70 - 7.00 10*3/mm3 Final   • Lymphocytes, Absolute 11/06/2019 1.90  0.70 - 3.10 10*3/mm3 Final   • Monocytes, Absolute 11/06/2019 0.70  0.10 - 0.90 10*3/mm3 Final   • Creatinine 11/06/2019 0.7  mg/dL Final   • Creatinine 11/06/2019 0.70  0.60 - 1.30 mg/dL Final    Serial Number: [id number]:  [id number]      No results found.(  ASSESSMENT: The patient is a very pleasant 59 y.o. male  with stage IV colon cancer.    PROBLEM LIST:  1. Currently stage IV colon cancer with lung and bony metastases.  K-edwige mutant, microsatellite stable, and intermediate mutational load.  2. Status post low anterior resection done by Dr. Young 2007,  [id number]  3.  Attempted adjuvant chemotherapy for 1 cycle could not tolerate secondary to multiple toxicities.  4. Biopsy-proven L2 metastases done October 11, 2016. Status post CyberKnife radiation treatment.  5.  Started Keytruda 200 mg IV every 3 weeks on February 1, 2017, status post 10 cycles of treatment  6. Disease progression documented on PET scan completed 8/31/2017.   7. Treatment changed to Xeloda/Avastin/Oxaliplatin on 9/19/2017, status post 7 cycles.   8. Status post tumor debulking at L2 done by Dr. Frazier at [facility] on April 21, 2017  9.  The treatment was switched to Xeloda with Avastin maintenance treatment March 6, 2018  10.  Cancer related pain  11.  Opioid-induced constipation  12.  Treatment induced asthenia  13.  Mild depression  14.  Insomnia  15.  Chemotherapy-induced anemia  16.  Chemotherapy-induced dermatitis  17.  Sleep apnea    PLAN:  1.  I will continue Avastin once every 4 weeks.  2.  I will continue Xeloda 500 mg a.m. and 500 mg p.m. 1 weeks on, 1 week off.  This is dose reduction secondary to dermatitis.  He could not tolerate any higher doses.  3. We will monitor the patient's labs throughout treatment including blood counts, kidney function, CEA, thyroid function, and liver functions. We will monitor his liver enzymes that have been elevated secondary to treatment with immune-therapy.   4.  The patient will follow-up with me in 8 weeks with treatment.    5.  Patient will continue taking over-the-counter stool softeners for constipation.  6.  I again reviewed with the patient the potential side effects from immune therapy including dermatitis, hepatitis, perforation, bleeding, thrombosis, and potential death.  7.  Patient tissue was submitted for the matched trial.  He did match on PETN mutation, however that arm was closed.  8.  I advised the patient to continue to hold his alternative treatment with high dose vitamin C while he is on immunetherapy.  9.  I did go over  the scan results with the patient reassured his disease essentially stable.  I will repeat scans in 6 months which would be due April 2020.  We'll consider doing it sonner if his CEA level starts to increase or if he has new symptoms.   10. The patient will continue Zofran as needed for treatment related nausea.  11. The patient will continue oxycodone 10 mg as needed for cancer-related pain.  We'll continue Celebrex 200 mg twice a day as needed.  He hasn't been needing any pain medicine lately.   12.  We offered Ritalin for treatment related fatigue, but the patient has declined at this time.   13.  Patient will continue Zaleplon as needed for insomnia.     Kristofer Rodriguez MD  12/02/19

## 2019-12-02 ENCOUNTER — OFFICE VISIT (OUTPATIENT)
Dept: ONCOLOGY | Facility: CLINIC | Age: 59
End: 2019-12-02

## 2019-12-02 ENCOUNTER — HOSPITAL ENCOUNTER (OUTPATIENT)
Dept: ONCOLOGY | Facility: HOSPITAL | Age: 59
Setting detail: INFUSION SERIES
Discharge: HOME | End: 2019-12-02

## 2019-12-02 VITALS
HEART RATE: 72 BPM | BODY MASS INDEX: 25.55 KG/M2 | HEIGHT: 66 IN | DIASTOLIC BLOOD PRESSURE: 88 MMHG | WEIGHT: 159 LBS | TEMPERATURE: 97.9 F | RESPIRATION RATE: 12 BRPM | SYSTOLIC BLOOD PRESSURE: 138 MMHG | OXYGEN SATURATION: 99 %

## 2019-12-02 DIAGNOSIS — C79.51 BONE METASTASIS: ICD-10-CM

## 2019-12-02 DIAGNOSIS — C18.9 COLON CANCER METASTASIZED TO BONE (HCC): Primary | ICD-10-CM

## 2019-12-02 DIAGNOSIS — C79.51 COLON CANCER METASTASIZED TO BONE (HCC): ICD-10-CM

## 2019-12-02 DIAGNOSIS — C18.9 COLON CANCER METASTASIZED TO BONE (HCC): ICD-10-CM

## 2019-12-02 DIAGNOSIS — C18.7 MALIGNANT NEOPLASM OF SIGMOID COLON (HCC): Primary | ICD-10-CM

## 2019-12-02 DIAGNOSIS — R91.1 SOLITARY LUNG NODULE: ICD-10-CM

## 2019-12-02 DIAGNOSIS — C18.7 MALIGNANT NEOPLASM OF SIGMOID COLON (HCC): ICD-10-CM

## 2019-12-02 DIAGNOSIS — C79.51 COLON CANCER METASTASIZED TO BONE (HCC): Primary | ICD-10-CM

## 2019-12-02 LAB
ALBUMIN SERPL-MCNC: 4.4 G/DL (ref 3.5–5.2)
ALBUMIN/GLOB SERPL: 1.1 G/DL
ALP SERPL-CCNC: 64 U/L (ref 39–117)
ALT SERPL W P-5'-P-CCNC: 15 U/L (ref 1–41)
ANION GAP SERPL CALCULATED.3IONS-SCNC: 11 MMOL/L (ref 5–15)
AST SERPL-CCNC: 23 U/L (ref 1–40)
BILIRUB SERPL-MCNC: 0.4 MG/DL (ref 0.2–1.2)
BILIRUB UR QL STRIP: NEGATIVE
BUN BLD-MCNC: 10 MG/DL (ref 6–20)
BUN/CREAT SERPL: 14.3 (ref 7–25)
CALCIUM SPEC-SCNC: 9.8 MG/DL (ref 8.6–10.5)
CEA SERPL-MCNC: 5.41 NG/ML
CHLORIDE SERPL-SCNC: 99 MMOL/L (ref 98–107)
CLARITY UR: CLEAR
CO2 SERPL-SCNC: 27 MMOL/L (ref 22–29)
COLOR UR: YELLOW
CREAT BLD-MCNC: 0.7 MG/DL (ref 0.76–1.27)
CREAT BLDA-MCNC: 0.8 MG/DL (ref 0.6–1.3)
ERYTHROCYTE [DISTWIDTH] IN BLOOD BY AUTOMATED COUNT: 24.1 % (ref 12.3–15.4)
GFR SERPL CREATININE-BSD FRML MDRD: 116 ML/MIN/1.73
GFR SERPL CREATININE-BSD FRML MDRD: 141 ML/MIN/1.73
GLOBULIN UR ELPH-MCNC: 3.9 GM/DL
GLUCOSE BLD-MCNC: 123 MG/DL (ref 65–99)
GLUCOSE UR STRIP-MCNC: NEGATIVE MG/DL
HCT VFR BLD AUTO: 41.1 % (ref 37.5–51)
HGB BLD-MCNC: 13.5 G/DL (ref 13–17.7)
HGB UR QL STRIP.AUTO: NEGATIVE
KETONES UR QL STRIP: NEGATIVE
LEUKOCYTE ESTERASE UR QL STRIP.AUTO: NEGATIVE
LYMPHOCYTES # BLD AUTO: 1.5 10*3/MM3 (ref 0.7–3.1)
LYMPHOCYTES NFR BLD AUTO: 24.2 % (ref 19.6–45.3)
MCH RBC QN AUTO: 25.5 PG (ref 26.6–33)
MCHC RBC AUTO-ENTMCNC: 32.8 G/DL (ref 31.5–35.7)
MCV RBC AUTO: 77.9 FL (ref 79–97)
MONOCYTES # BLD AUTO: 0.6 10*3/MM3 (ref 0.1–0.9)
MONOCYTES NFR BLD AUTO: 8.8 % (ref 5–12)
NEUTROPHILS # BLD AUTO: 4.2 10*3/MM3 (ref 1.7–7)
NEUTROPHILS NFR BLD AUTO: 67 % (ref 42.7–76)
NITRITE UR QL STRIP: NEGATIVE
PH UR STRIP.AUTO: 6 [PH] (ref 5–8)
PLATELET # BLD AUTO: 186 10*3/MM3 (ref 140–450)
PMV BLD AUTO: 7.2 FL (ref 6–12)
POTASSIUM BLD-SCNC: 4.6 MMOL/L (ref 3.5–5.2)
PROT SERPL-MCNC: 8.3 G/DL (ref 6–8.5)
PROT UR QL STRIP: NEGATIVE
RBC # BLD AUTO: 5.27 10*6/MM3 (ref 4.14–5.8)
SODIUM BLD-SCNC: 137 MMOL/L (ref 136–145)
SP GR UR STRIP: 1.01 (ref 1–1.03)
UROBILINOGEN UR QL STRIP: NORMAL
WBC NRBC COR # BLD: 6.3 10*3/MM3 (ref 3.4–10.8)

## 2019-12-02 PROCEDURE — 25010000002 BEVACIZUMAB 100 MG/4ML SOLUTION 4 ML VIAL: Performed by: INTERNAL MEDICINE

## 2019-12-02 PROCEDURE — 81003 URINALYSIS AUTO W/O SCOPE: CPT | Performed by: INTERNAL MEDICINE

## 2019-12-02 PROCEDURE — 96413 CHEMO IV INFUSION 1 HR: CPT

## 2019-12-02 PROCEDURE — 82378 CARCINOEMBRYONIC ANTIGEN: CPT | Performed by: INTERNAL MEDICINE

## 2019-12-02 PROCEDURE — 82565 ASSAY OF CREATININE: CPT

## 2019-12-02 PROCEDURE — 25010000002 BEVACIZUMAB PER 10 MG: Performed by: INTERNAL MEDICINE

## 2019-12-02 PROCEDURE — 85025 COMPLETE CBC W/AUTO DIFF WBC: CPT | Performed by: INTERNAL MEDICINE

## 2019-12-02 PROCEDURE — 99214 OFFICE O/P EST MOD 30 MIN: CPT | Performed by: INTERNAL MEDICINE

## 2019-12-02 PROCEDURE — 80053 COMPREHEN METABOLIC PANEL: CPT | Performed by: INTERNAL MEDICINE

## 2019-12-02 RX ORDER — LIDOCAINE AND PRILOCAINE 25; 25 MG/G; MG/G
CREAM TOPICAL AS NEEDED
Qty: 30 G | Refills: 5 | Status: SHIPPED | OUTPATIENT
Start: 2019-12-02 | End: 2019-12-02 | Stop reason: SDUPTHER

## 2019-12-02 RX ORDER — LIDOCAINE AND PRILOCAINE 25; 25 MG/G; MG/G
CREAM TOPICAL AS NEEDED
Qty: 30 G | Refills: 5 | Status: SHIPPED | OUTPATIENT
Start: 2019-12-02 | End: 2020-01-01

## 2019-12-02 RX ORDER — SODIUM CHLORIDE 0.9 % (FLUSH) 0.9 %
10 SYRINGE (ML) INJECTION AS NEEDED
Status: CANCELLED | OUTPATIENT
Start: 2019-12-02

## 2019-12-02 RX ORDER — SODIUM CHLORIDE 0.9 % (FLUSH) 0.9 %
20 SYRINGE (ML) INJECTION AS NEEDED
Status: CANCELLED | OUTPATIENT
Start: 2019-12-02

## 2019-12-02 RX ORDER — SODIUM CHLORIDE 9 MG/ML
250 INJECTION, SOLUTION INTRAVENOUS ONCE
Status: CANCELLED | OUTPATIENT
Start: 2020-01-07

## 2019-12-02 RX ADMIN — HEPARIN 500 UNITS: 100 SYRINGE at 11:00

## 2019-12-02 RX ADMIN — BEVACIZUMAB 500 MG: 400 INJECTION, SOLUTION INTRAVENOUS at 10:28

## 2019-12-02 NOTE — TELEPHONE ENCOUNTER
[facility] needs refill for lidocaine cream. Stated that they had the refills but they , so needs to be put in again.

## 2020-01-01 ENCOUNTER — LAB (OUTPATIENT)
Dept: LAB | Facility: HOSPITAL | Age: 60
End: 2020-01-01

## 2020-01-01 ENCOUNTER — APPOINTMENT (OUTPATIENT)
Dept: GENERAL RADIOLOGY | Facility: HOSPITAL | Age: 60
End: 2020-01-01

## 2020-01-01 ENCOUNTER — TELEPHONE (OUTPATIENT)
Dept: ONCOLOGY | Facility: CLINIC | Age: 60
End: 2020-01-01

## 2020-01-01 ENCOUNTER — HOSPITAL ENCOUNTER (OUTPATIENT)
Dept: ONCOLOGY | Facility: HOSPITAL | Age: 60
Setting detail: INFUSION SERIES
Discharge: HOME | End: 2020-09-15

## 2020-01-01 ENCOUNTER — SPECIALTY PHARMACY (OUTPATIENT)
Dept: ONCOLOGY | Facility: HOSPITAL | Age: 60
End: 2020-01-01

## 2020-01-01 ENCOUNTER — OFFICE VISIT (OUTPATIENT)
Dept: ONCOLOGY | Facility: CLINIC | Age: 60
End: 2020-01-01

## 2020-01-01 ENCOUNTER — HOSPITAL ENCOUNTER (OUTPATIENT)
Dept: PET IMAGING | Facility: HOSPITAL | Age: 60
Discharge: HOME | End: 2020-09-11

## 2020-01-01 ENCOUNTER — DOCUMENTATION (OUTPATIENT)
Dept: NUTRITION | Facility: HOSPITAL | Age: 60
End: 2020-01-01

## 2020-01-01 ENCOUNTER — READMISSION MANAGEMENT (OUTPATIENT)
Dept: CALL CENTER | Facility: HOSPITAL | Age: 60
End: 2020-01-01

## 2020-01-01 ENCOUNTER — APPOINTMENT (OUTPATIENT)
Dept: ONCOLOGY | Facility: HOSPITAL | Age: 60
End: 2020-01-01

## 2020-01-01 ENCOUNTER — HOSPITAL ENCOUNTER (OUTPATIENT)
Dept: PET IMAGING | Facility: HOSPITAL | Age: 60
Discharge: HOME | End: 2020-05-01
Admitting: INTERNAL MEDICINE

## 2020-01-01 ENCOUNTER — TELEMEDICINE (OUTPATIENT)
Dept: ONCOLOGY | Facility: CLINIC | Age: 60
End: 2020-01-01

## 2020-01-01 ENCOUNTER — HOSPITAL ENCOUNTER (OUTPATIENT)
Dept: ONCOLOGY | Facility: HOSPITAL | Age: 60
Discharge: HOME | End: 2020-07-14
Admitting: INTERNAL MEDICINE

## 2020-01-01 ENCOUNTER — INFUSION (OUTPATIENT)
Dept: ONCOLOGY | Facility: HOSPITAL | Age: 60
End: 2020-01-01

## 2020-01-01 ENCOUNTER — HOSPITAL ENCOUNTER (OUTPATIENT)
Dept: ONCOLOGY | Facility: HOSPITAL | Age: 60
Setting detail: INFUSION SERIES
Discharge: HOME | End: 2020-06-19

## 2020-01-01 ENCOUNTER — HOSPITAL ENCOUNTER (OUTPATIENT)
Dept: ONCOLOGY | Facility: HOSPITAL | Age: 60
Setting detail: INFUSION SERIES
Discharge: HOME | End: 2020-08-04

## 2020-01-01 ENCOUNTER — EDUCATION (OUTPATIENT)
Dept: ONCOLOGY | Facility: HOSPITAL | Age: 60
End: 2020-01-01

## 2020-01-01 ENCOUNTER — NURSE NAVIGATOR (OUTPATIENT)
Dept: ONCOLOGY | Facility: CLINIC | Age: 60
End: 2020-01-01

## 2020-01-01 ENCOUNTER — HOSPITAL ENCOUNTER (OUTPATIENT)
Dept: PET IMAGING | Facility: HOSPITAL | Age: 60
Discharge: HOME | End: 2020-05-01

## 2020-01-01 ENCOUNTER — HOSPITAL ENCOUNTER (OUTPATIENT)
Dept: ONCOLOGY | Facility: HOSPITAL | Age: 60
Discharge: HOME | End: 2020-06-19

## 2020-01-01 ENCOUNTER — HOSPITAL ENCOUNTER (INPATIENT)
Facility: HOSPITAL | Age: 60
LOS: 2 days | Discharge: HOME | End: 2020-11-30
Attending: EMERGENCY MEDICINE | Admitting: FAMILY MEDICINE

## 2020-01-01 ENCOUNTER — HOSPITAL ENCOUNTER (OUTPATIENT)
Dept: ONCOLOGY | Facility: HOSPITAL | Age: 60
Setting detail: INFUSION SERIES
Discharge: HOME | End: 2020-06-01

## 2020-01-01 ENCOUNTER — HOSPITAL ENCOUNTER (OUTPATIENT)
Dept: ONCOLOGY | Facility: HOSPITAL | Age: 60
Setting detail: INFUSION SERIES
Discharge: HOME | End: 2020-08-25

## 2020-01-01 ENCOUNTER — HOSPITAL ENCOUNTER (OUTPATIENT)
Dept: ONCOLOGY | Facility: HOSPITAL | Age: 60
Setting detail: INFUSION SERIES
Discharge: HOME | End: 2020-10-06

## 2020-01-01 VITALS — DIASTOLIC BLOOD PRESSURE: 82 MMHG | SYSTOLIC BLOOD PRESSURE: 132 MMHG

## 2020-01-01 VITALS
BODY MASS INDEX: 23.46 KG/M2 | HEART RATE: 111 BPM | WEIGHT: 146 LBS | TEMPERATURE: 97.8 F | OXYGEN SATURATION: 93 % | SYSTOLIC BLOOD PRESSURE: 126 MMHG | HEIGHT: 66 IN | DIASTOLIC BLOOD PRESSURE: 77 MMHG

## 2020-01-01 VITALS
HEIGHT: 66 IN | WEIGHT: 152 LBS | OXYGEN SATURATION: 98 % | HEART RATE: 66 BPM | RESPIRATION RATE: 16 BRPM | SYSTOLIC BLOOD PRESSURE: 161 MMHG | TEMPERATURE: 97.8 F | DIASTOLIC BLOOD PRESSURE: 95 MMHG | BODY MASS INDEX: 24.43 KG/M2

## 2020-01-01 VITALS
HEART RATE: 73 BPM | DIASTOLIC BLOOD PRESSURE: 89 MMHG | HEIGHT: 66 IN | OXYGEN SATURATION: 100 % | BODY MASS INDEX: 24.11 KG/M2 | RESPIRATION RATE: 12 BRPM | WEIGHT: 150 LBS | TEMPERATURE: 98 F | SYSTOLIC BLOOD PRESSURE: 145 MMHG

## 2020-01-01 VITALS
DIASTOLIC BLOOD PRESSURE: 83 MMHG | HEIGHT: 66 IN | RESPIRATION RATE: 18 BRPM | TEMPERATURE: 97.6 F | SYSTOLIC BLOOD PRESSURE: 127 MMHG | WEIGHT: 153 LBS | HEART RATE: 58 BPM | BODY MASS INDEX: 24.59 KG/M2 | OXYGEN SATURATION: 98 %

## 2020-01-01 VITALS
WEIGHT: 157 LBS | HEART RATE: 89 BPM | RESPIRATION RATE: 16 BRPM | OXYGEN SATURATION: 98 % | TEMPERATURE: 97.7 F | WEIGHT: 156 LBS | BODY MASS INDEX: 25.07 KG/M2 | HEIGHT: 66 IN | DIASTOLIC BLOOD PRESSURE: 97 MMHG | OXYGEN SATURATION: 98 % | BODY MASS INDEX: 25.23 KG/M2 | HEIGHT: 66 IN | SYSTOLIC BLOOD PRESSURE: 162 MMHG | SYSTOLIC BLOOD PRESSURE: 166 MMHG | TEMPERATURE: 97.1 F | HEART RATE: 61 BPM | RESPIRATION RATE: 16 BRPM | DIASTOLIC BLOOD PRESSURE: 93 MMHG

## 2020-01-01 VITALS
TEMPERATURE: 97.5 F | OXYGEN SATURATION: 99 % | HEART RATE: 72 BPM | RESPIRATION RATE: 16 BRPM | BODY MASS INDEX: 24.43 KG/M2 | WEIGHT: 152 LBS | HEIGHT: 66 IN | SYSTOLIC BLOOD PRESSURE: 146 MMHG | DIASTOLIC BLOOD PRESSURE: 90 MMHG

## 2020-01-01 VITALS
SYSTOLIC BLOOD PRESSURE: 138 MMHG | WEIGHT: 147.6 LBS | DIASTOLIC BLOOD PRESSURE: 92 MMHG | BODY MASS INDEX: 23.72 KG/M2 | OXYGEN SATURATION: 96 % | RESPIRATION RATE: 18 BRPM | TEMPERATURE: 97.8 F | HEART RATE: 99 BPM | HEIGHT: 66 IN

## 2020-01-01 VITALS — SYSTOLIC BLOOD PRESSURE: 159 MMHG | DIASTOLIC BLOOD PRESSURE: 86 MMHG

## 2020-01-01 VITALS
WEIGHT: 151 LBS | BODY MASS INDEX: 24.27 KG/M2 | SYSTOLIC BLOOD PRESSURE: 151 MMHG | HEIGHT: 66 IN | TEMPERATURE: 98.4 F | HEART RATE: 72 BPM | DIASTOLIC BLOOD PRESSURE: 99 MMHG | OXYGEN SATURATION: 99 % | RESPIRATION RATE: 16 BRPM

## 2020-01-01 VITALS
HEIGHT: 66 IN | TEMPERATURE: 97.6 F | RESPIRATION RATE: 18 BRPM | DIASTOLIC BLOOD PRESSURE: 81 MMHG | WEIGHT: 149 LBS | BODY MASS INDEX: 23.95 KG/M2 | HEART RATE: 87 BPM | SYSTOLIC BLOOD PRESSURE: 126 MMHG

## 2020-01-01 VITALS — SYSTOLIC BLOOD PRESSURE: 118 MMHG | DIASTOLIC BLOOD PRESSURE: 74 MMHG | HEART RATE: 73 BPM

## 2020-01-01 VITALS — DIASTOLIC BLOOD PRESSURE: 86 MMHG | SYSTOLIC BLOOD PRESSURE: 139 MMHG | HEART RATE: 59 BPM

## 2020-01-01 VITALS
SYSTOLIC BLOOD PRESSURE: 140 MMHG | RESPIRATION RATE: 16 BRPM | TEMPERATURE: 97.5 F | HEART RATE: 58 BPM | DIASTOLIC BLOOD PRESSURE: 85 MMHG

## 2020-01-01 VITALS — SYSTOLIC BLOOD PRESSURE: 149 MMHG | DIASTOLIC BLOOD PRESSURE: 92 MMHG

## 2020-01-01 DIAGNOSIS — C79.51 COLON CANCER METASTASIZED TO BONE (HCC): Primary | ICD-10-CM

## 2020-01-01 DIAGNOSIS — C18.7 MALIGNANT NEOPLASM OF SIGMOID COLON (HCC): Primary | ICD-10-CM

## 2020-01-01 DIAGNOSIS — C79.51 BONE METASTASIS: ICD-10-CM

## 2020-01-01 DIAGNOSIS — C18.9 COLON CANCER METASTASIZED TO BONE (HCC): Primary | ICD-10-CM

## 2020-01-01 DIAGNOSIS — C18.7 MALIGNANT NEOPLASM OF SIGMOID COLON (HCC): ICD-10-CM

## 2020-01-01 DIAGNOSIS — C18.9 COLON CANCER METASTASIZED TO BONE (HCC): ICD-10-CM

## 2020-01-01 DIAGNOSIS — C79.51 COLON CANCER METASTASIZED TO BONE (HCC): ICD-10-CM

## 2020-01-01 DIAGNOSIS — R91.1 SOLITARY LUNG NODULE: ICD-10-CM

## 2020-01-01 DIAGNOSIS — G97.82 POSTOPERATIVE CSF LEAK: ICD-10-CM

## 2020-01-01 DIAGNOSIS — R50.9 FEBRILE ILLNESS: ICD-10-CM

## 2020-01-01 DIAGNOSIS — G96.00 POSTOPERATIVE CSF LEAK: ICD-10-CM

## 2020-01-01 DIAGNOSIS — F33.0 MILD EPISODE OF RECURRENT MAJOR DEPRESSIVE DISORDER (HCC): ICD-10-CM

## 2020-01-01 LAB
ALBUMIN SERPL-MCNC: 3.3 G/DL (ref 3.5–5.2)
ALBUMIN SERPL-MCNC: 3.8 G/DL (ref 3.5–5.2)
ALBUMIN SERPL-MCNC: 3.8 G/DL (ref 3.5–5.2)
ALBUMIN SERPL-MCNC: 4 G/DL (ref 3.5–5.2)
ALBUMIN SERPL-MCNC: 4.1 G/DL (ref 3.5–5.2)
ALBUMIN SERPL-MCNC: 4.2 G/DL (ref 3.5–5.2)
ALBUMIN SERPL-MCNC: 4.4 G/DL (ref 3.5–5.2)
ALBUMIN SERPL-MCNC: 4.5 G/DL (ref 3.5–5.2)
ALBUMIN SERPL-MCNC: 4.5 G/DL (ref 3.5–5.2)
ALBUMIN SERPL-MCNC: 4.7 G/DL (ref 3.5–5.2)
ALBUMIN SERPL-MCNC: 4.8 G/DL (ref 3.5–5.2)
ALBUMIN SERPL-MCNC: 4.8 G/DL (ref 3.5–5.2)
ALBUMIN/GLOB SERPL: 1 G/DL
ALBUMIN/GLOB SERPL: 1.1 G/DL
ALBUMIN/GLOB SERPL: 1.2 G/DL
ALBUMIN/GLOB SERPL: 1.3 G/DL
ALBUMIN/GLOB SERPL: 1.4 G/DL
ALBUMIN/GLOB SERPL: 1.6 G/DL
ALP SERPL-CCNC: 62 U/L (ref 39–117)
ALP SERPL-CCNC: 67 U/L (ref 39–117)
ALP SERPL-CCNC: 68 U/L (ref 39–117)
ALP SERPL-CCNC: 70 U/L (ref 39–117)
ALP SERPL-CCNC: 72 U/L (ref 39–117)
ALP SERPL-CCNC: 76 U/L (ref 39–117)
ALP SERPL-CCNC: 78 U/L (ref 39–117)
ALP SERPL-CCNC: 78 U/L (ref 39–117)
ALP SERPL-CCNC: 79 U/L (ref 39–117)
ALP SERPL-CCNC: 81 U/L (ref 39–117)
ALP SERPL-CCNC: 85 U/L (ref 39–117)
ALP SERPL-CCNC: 86 U/L (ref 39–117)
ALT SERPL W P-5'-P-CCNC: 10 U/L (ref 1–41)
ALT SERPL W P-5'-P-CCNC: 11 U/L (ref 1–41)
ALT SERPL W P-5'-P-CCNC: 12 U/L (ref 1–41)
ALT SERPL W P-5'-P-CCNC: 13 U/L (ref 1–41)
ALT SERPL W P-5'-P-CCNC: 14 U/L (ref 1–41)
ALT SERPL W P-5'-P-CCNC: 14 U/L (ref 1–41)
ALT SERPL W P-5'-P-CCNC: 17 U/L (ref 1–41)
ALT SERPL W P-5'-P-CCNC: 17 U/L (ref 1–41)
ALT SERPL W P-5'-P-CCNC: 18 U/L (ref 1–41)
ALT SERPL W P-5'-P-CCNC: 18 U/L (ref 1–41)
ALT SERPL W P-5'-P-CCNC: 19 U/L (ref 1–41)
ALT SERPL W P-5'-P-CCNC: 23 U/L (ref 1–41)
ANION GAP SERPL CALCULATED.3IONS-SCNC: 10 MMOL/L (ref 5–15)
ANION GAP SERPL CALCULATED.3IONS-SCNC: 11 MMOL/L (ref 5–15)
ANION GAP SERPL CALCULATED.3IONS-SCNC: 11 MMOL/L (ref 5–15)
ANION GAP SERPL CALCULATED.3IONS-SCNC: 12 MMOL/L (ref 5–15)
ANION GAP SERPL CALCULATED.3IONS-SCNC: 12 MMOL/L (ref 5–15)
ANION GAP SERPL CALCULATED.3IONS-SCNC: 14 MMOL/L (ref 5–15)
ANION GAP SERPL CALCULATED.3IONS-SCNC: 15 MMOL/L (ref 5–15)
ANION GAP SERPL CALCULATED.3IONS-SCNC: 8 MMOL/L (ref 5–15)
ANION GAP SERPL CALCULATED.3IONS-SCNC: 8 MMOL/L (ref 5–15)
ANION GAP SERPL CALCULATED.3IONS-SCNC: 9 MMOL/L (ref 5–15)
ANISOCYTOSIS BLD QL: NORMAL
AST SERPL-CCNC: 16 U/L (ref 1–40)
AST SERPL-CCNC: 17 U/L (ref 1–40)
AST SERPL-CCNC: 18 U/L (ref 1–40)
AST SERPL-CCNC: 20 U/L (ref 1–40)
AST SERPL-CCNC: 21 U/L (ref 1–40)
AST SERPL-CCNC: 23 U/L (ref 1–40)
AST SERPL-CCNC: 25 U/L (ref 1–40)
AST SERPL-CCNC: 27 U/L (ref 1–40)
AST SERPL-CCNC: 27 U/L (ref 1–40)
AST SERPL-CCNC: 29 U/L (ref 1–40)
B PARAPERT DNA SPEC QL NAA+PROBE: NOT DETECTED
B PERT DNA SPEC QL NAA+PROBE: NOT DETECTED
BACTERIA SPEC AEROBE CULT: ABNORMAL
BACTERIA SPEC AEROBE CULT: NORMAL
BASOPHILS # BLD AUTO: 0.01 10*3/MM3 (ref 0–0.2)
BASOPHILS # BLD AUTO: 0.02 10*3/MM3 (ref 0–0.2)
BASOPHILS # BLD AUTO: 0.02 10*3/MM3 (ref 0–0.2)
BASOPHILS NFR BLD AUTO: 0.1 % (ref 0–1.5)
BASOPHILS NFR BLD AUTO: 0.1 % (ref 0–1.5)
BASOPHILS NFR BLD AUTO: 0.2 % (ref 0–1.5)
BASOPHILS NFR BLD AUTO: 0.3 % (ref 0–1.5)
BASOPHILS NFR BLD AUTO: 0.3 % (ref 0–1.5)
BILIRUB SERPL-MCNC: 0.3 MG/DL (ref 0–1.2)
BILIRUB SERPL-MCNC: 0.4 MG/DL (ref 0.2–1.2)
BILIRUB SERPL-MCNC: 0.4 MG/DL (ref 0–1.2)
BILIRUB SERPL-MCNC: 0.5 MG/DL (ref 0.2–1.2)
BILIRUB SERPL-MCNC: 0.5 MG/DL (ref 0–1.2)
BILIRUB SERPL-MCNC: 0.6 MG/DL (ref 0–1.2)
BILIRUB SERPL-MCNC: 1 MG/DL (ref 0.2–1.2)
BILIRUB SERPL-MCNC: 1 MG/DL (ref 0–1.2)
BILIRUB UR QL STRIP: NEGATIVE
BUN BLD-MCNC: 10 MG/DL (ref 6–20)
BUN BLD-MCNC: 12 MG/DL (ref 6–20)
BUN BLD-MCNC: 16 MG/DL (ref 6–20)
BUN SERPL-MCNC: 10 MG/DL (ref 6–20)
BUN SERPL-MCNC: 10 MG/DL (ref 6–20)
BUN SERPL-MCNC: 12 MG/DL (ref 6–20)
BUN SERPL-MCNC: 12 MG/DL (ref 6–20)
BUN SERPL-MCNC: 15 MG/DL (ref 6–20)
BUN SERPL-MCNC: 5 MG/DL (ref 6–20)
BUN SERPL-MCNC: 6 MG/DL (ref 6–20)
BUN SERPL-MCNC: 7 MG/DL (ref 6–20)
BUN SERPL-MCNC: 8 MG/DL (ref 6–20)
BUN/CREAT SERPL: 10.9 (ref 7–25)
BUN/CREAT SERPL: 11.7 (ref 7–25)
BUN/CREAT SERPL: 12.8 (ref 7–25)
BUN/CREAT SERPL: 13.9 (ref 7–25)
BUN/CREAT SERPL: 15.6 (ref 7–25)
BUN/CREAT SERPL: 16.3 (ref 7–25)
BUN/CREAT SERPL: 17.9 (ref 7–25)
BUN/CREAT SERPL: 18.2 (ref 7–25)
BUN/CREAT SERPL: 19.7 (ref 7–25)
BUN/CREAT SERPL: 20.8 (ref 7–25)
BUN/CREAT SERPL: 21.6 (ref 7–25)
BUN/CREAT SERPL: 23.8 (ref 7–25)
C PNEUM DNA NPH QL NAA+NON-PROBE: NOT DETECTED
CALCIUM SPEC-SCNC: 10 MG/DL (ref 8.6–10.5)
CALCIUM SPEC-SCNC: 8.7 MG/DL (ref 8.6–10.5)
CALCIUM SPEC-SCNC: 8.8 MG/DL (ref 8.6–10.5)
CALCIUM SPEC-SCNC: 9.3 MG/DL (ref 8.6–10.5)
CALCIUM SPEC-SCNC: 9.4 MG/DL (ref 8.6–10.5)
CALCIUM SPEC-SCNC: 9.5 MG/DL (ref 8.6–10.5)
CALCIUM SPEC-SCNC: 9.7 MG/DL (ref 8.6–10.5)
CALCIUM SPEC-SCNC: 9.8 MG/DL (ref 8.6–10.5)
CEA SERPL-MCNC: 11.4 NG/ML
CEA SERPL-MCNC: 12.5 NG/ML
CEA SERPL-MCNC: 14.1 NG/ML
CEA SERPL-MCNC: 5.09 NG/ML
CEA SERPL-MCNC: 6.39 NG/ML
CHLORIDE SERPL-SCNC: 100 MMOL/L (ref 98–107)
CHLORIDE SERPL-SCNC: 101 MMOL/L (ref 98–107)
CHLORIDE SERPL-SCNC: 92 MMOL/L (ref 98–107)
CHLORIDE SERPL-SCNC: 96 MMOL/L (ref 98–107)
CHLORIDE SERPL-SCNC: 97 MMOL/L (ref 98–107)
CHLORIDE SERPL-SCNC: 98 MMOL/L (ref 98–107)
CHLORIDE SERPL-SCNC: 99 MMOL/L (ref 98–107)
CHLORIDE SERPL-SCNC: 99 MMOL/L (ref 98–107)
CLARITY UR: CLEAR
CO2 SERPL-SCNC: 20 MMOL/L (ref 22–29)
CO2 SERPL-SCNC: 24 MMOL/L (ref 22–29)
CO2 SERPL-SCNC: 25 MMOL/L (ref 22–29)
CO2 SERPL-SCNC: 26 MMOL/L (ref 22–29)
CO2 SERPL-SCNC: 27 MMOL/L (ref 22–29)
CO2 SERPL-SCNC: 28 MMOL/L (ref 22–29)
CO2 SERPL-SCNC: 29 MMOL/L (ref 22–29)
COLOR UR: YELLOW
CREAT BLD-MCNC: 0.55 MG/DL (ref 0.76–1.27)
CREAT BLD-MCNC: 0.61 MG/DL (ref 0.76–1.27)
CREAT BLD-MCNC: 0.74 MG/DL (ref 0.76–1.27)
CREAT BLDA-MCNC: 0.6 MG/DL
CREAT BLDA-MCNC: 0.6 MG/DL (ref 0.6–1.3)
CREAT SERPL-MCNC: 0.46 MG/DL (ref 0.76–1.27)
CREAT SERPL-MCNC: 0.47 MG/DL (ref 0.76–1.27)
CREAT SERPL-MCNC: 0.48 MG/DL (ref 0.76–1.27)
CREAT SERPL-MCNC: 0.49 MG/DL (ref 0.76–1.27)
CREAT SERPL-MCNC: 0.6 MG/DL
CREAT SERPL-MCNC: 0.6 MG/DL (ref 0.76–1.27)
CREAT SERPL-MCNC: 0.63 MG/DL (ref 0.76–1.27)
CREAT SERPL-MCNC: 0.67 MG/DL (ref 0.76–1.27)
CREAT SERPL-MCNC: 0.72 MG/DL (ref 0.76–1.27)
CREAT SERPL-MCNC: 0.77 MG/DL (ref 0.76–1.27)
CRP SERPL-MCNC: 3.98 MG/DL (ref 0–0.5)
CRP SERPL-MCNC: 4.24 MG/DL (ref 0–0.5)
D-LACTATE SERPL-SCNC: 1.1 MMOL/L (ref 0.5–2)
D-LACTATE SERPL-SCNC: 2.2 MMOL/L (ref 0.5–2)
DEPRECATED RDW RBC AUTO: 60.3 FL (ref 37–54)
DEPRECATED RDW RBC AUTO: 61.1 FL (ref 37–54)
DEPRECATED RDW RBC AUTO: 61.1 FL (ref 37–54)
DEPRECATED RDW RBC AUTO: 61.5 FL (ref 37–54)
DEPRECATED RDW RBC AUTO: 61.8 FL (ref 37–54)
DEPRECATED RDW RBC AUTO: 70.7 FL (ref 37–54)
DEPRECATED RDW RBC AUTO: 71.5 FL (ref 37–54)
EOSINOPHIL # BLD AUTO: 0.06 10*3/MM3 (ref 0–0.4)
EOSINOPHIL # BLD AUTO: 0.06 10*3/MM3 (ref 0–0.4)
EOSINOPHIL # BLD AUTO: 0.41 10*3/MM3 (ref 0–0.4)
EOSINOPHIL # BLD AUTO: 0.43 10*3/MM3 (ref 0–0.4)
EOSINOPHIL # BLD AUTO: 0.45 10*3/MM3 (ref 0–0.4)
EOSINOPHIL NFR BLD AUTO: 0.6 % (ref 0.3–6.2)
EOSINOPHIL NFR BLD AUTO: 0.8 % (ref 0.3–6.2)
EOSINOPHIL NFR BLD AUTO: 6.9 % (ref 0.3–6.2)
EOSINOPHIL NFR BLD AUTO: 6.9 % (ref 0.3–6.2)
EOSINOPHIL NFR BLD AUTO: 9 % (ref 0.3–6.2)
ERYTHROCYTE [DISTWIDTH] IN BLOOD BY AUTOMATED COUNT: 21.2 % (ref 12.3–15.4)
ERYTHROCYTE [DISTWIDTH] IN BLOOD BY AUTOMATED COUNT: 21.7 % (ref 12.3–15.4)
ERYTHROCYTE [DISTWIDTH] IN BLOOD BY AUTOMATED COUNT: 21.9 % (ref 12.3–15.4)
ERYTHROCYTE [DISTWIDTH] IN BLOOD BY AUTOMATED COUNT: 21.9 % (ref 12.3–15.4)
ERYTHROCYTE [DISTWIDTH] IN BLOOD BY AUTOMATED COUNT: 22 % (ref 12.3–15.4)
ERYTHROCYTE [DISTWIDTH] IN BLOOD BY AUTOMATED COUNT: 22 % (ref 12.3–15.4)
ERYTHROCYTE [DISTWIDTH] IN BLOOD BY AUTOMATED COUNT: 22.2 % (ref 12.3–15.4)
ERYTHROCYTE [DISTWIDTH] IN BLOOD BY AUTOMATED COUNT: 24.6 % (ref 12.3–15.4)
ERYTHROCYTE [DISTWIDTH] IN BLOOD BY AUTOMATED COUNT: 24.7 % (ref 12.3–15.4)
ERYTHROCYTE [DISTWIDTH] IN BLOOD BY AUTOMATED COUNT: 25.2 % (ref 12.3–15.4)
ERYTHROCYTE [DISTWIDTH] IN BLOOD BY AUTOMATED COUNT: 25.6 % (ref 12.3–15.4)
ERYTHROCYTE [DISTWIDTH] IN BLOOD BY AUTOMATED COUNT: 27.9 % (ref 12.3–15.4)
ERYTHROCYTE [SEDIMENTATION RATE] IN BLOOD: 128 MM/HR (ref 0–20)
ERYTHROCYTE [SEDIMENTATION RATE] IN BLOOD: >130 MM/HR (ref 0–20)
FLUAV H1 2009 PAND RNA NPH QL NAA+PROBE: NOT DETECTED
FLUAV H1 HA GENE NPH QL NAA+PROBE: NOT DETECTED
FLUAV H3 RNA NPH QL NAA+PROBE: NOT DETECTED
FLUAV SUBTYP SPEC NAA+PROBE: NOT DETECTED
FLUBV RNA ISLT QL NAA+PROBE: NOT DETECTED
GFR SERPL CREATININE-BSD FRML MDRD: 104 ML/MIN/1.73
GFR SERPL CREATININE-BSD FRML MDRD: 109 ML/MIN/1.73
GFR SERPL CREATININE-BSD FRML MDRD: 112 ML/MIN/1.73
GFR SERPL CREATININE-BSD FRML MDRD: 122 ML/MIN/1.73
GFR SERPL CREATININE-BSD FRML MDRD: 126 ML/MIN/1.73
GFR SERPL CREATININE-BSD FRML MDRD: 131 ML/MIN/1.73
GFR SERPL CREATININE-BSD FRML MDRD: 132 ML/MIN/1.73
GFR SERPL CREATININE-BSD FRML MDRD: 136 ML/MIN/1.73
GFR SERPL CREATININE-BSD FRML MDRD: 136 ML/MIN/1.73
GFR SERPL CREATININE-BSD FRML MDRD: 138 ML/MIN/1.73
GFR SERPL CREATININE-BSD FRML MDRD: 148 ML/MIN/1.73
GFR SERPL CREATININE-BSD FRML MDRD: >150 ML/MIN/1.73
GLOBULIN UR ELPH-MCNC: 3 GM/DL
GLOBULIN UR ELPH-MCNC: 3.1 GM/DL
GLOBULIN UR ELPH-MCNC: 3.3 GM/DL
GLOBULIN UR ELPH-MCNC: 3.3 GM/DL
GLOBULIN UR ELPH-MCNC: 3.4 GM/DL
GLOBULIN UR ELPH-MCNC: 3.4 GM/DL
GLOBULIN UR ELPH-MCNC: 3.5 GM/DL
GLOBULIN UR ELPH-MCNC: 3.6 GM/DL
GLOBULIN UR ELPH-MCNC: 3.6 GM/DL
GLOBULIN UR ELPH-MCNC: 3.9 GM/DL
GLOBULIN UR ELPH-MCNC: 3.9 GM/DL
GLOBULIN UR ELPH-MCNC: 4.1 GM/DL
GLUCOSE BLD-MCNC: 106 MG/DL (ref 65–99)
GLUCOSE BLD-MCNC: 107 MG/DL (ref 65–99)
GLUCOSE BLD-MCNC: 93 MG/DL (ref 65–99)
GLUCOSE BLDC GLUCOMTR-MCNC: 107 MG/DL (ref 70–130)
GLUCOSE BLDC GLUCOMTR-MCNC: 109 MG/DL (ref 70–130)
GLUCOSE SERPL-MCNC: 101 MG/DL (ref 65–99)
GLUCOSE SERPL-MCNC: 116 MG/DL (ref 65–99)
GLUCOSE SERPL-MCNC: 134 MG/DL (ref 65–99)
GLUCOSE SERPL-MCNC: 136 MG/DL (ref 65–99)
GLUCOSE SERPL-MCNC: 154 MG/DL (ref 65–99)
GLUCOSE SERPL-MCNC: 171 MG/DL (ref 65–99)
GLUCOSE SERPL-MCNC: 193 MG/DL (ref 65–99)
GLUCOSE SERPL-MCNC: 85 MG/DL (ref 65–99)
GLUCOSE SERPL-MCNC: 98 MG/DL (ref 65–99)
GLUCOSE UR STRIP-MCNC: NEGATIVE MG/DL
GRAM STN SPEC: ABNORMAL
HADV DNA SPEC NAA+PROBE: NOT DETECTED
HCOV 229E RNA SPEC QL NAA+PROBE: NOT DETECTED
HCOV HKU1 RNA SPEC QL NAA+PROBE: NOT DETECTED
HCOV NL63 RNA SPEC QL NAA+PROBE: NOT DETECTED
HCOV OC43 RNA SPEC QL NAA+PROBE: NOT DETECTED
HCT VFR BLD AUTO: 26.5 % (ref 37.5–51)
HCT VFR BLD AUTO: 29.6 % (ref 37.5–51)
HCT VFR BLD AUTO: 29.6 % (ref 37.5–51)
HCT VFR BLD AUTO: 32.4 % (ref 37.5–51)
HCT VFR BLD AUTO: 37.2 % (ref 37.5–51)
HCT VFR BLD AUTO: 39.3 % (ref 37.5–51)
HCT VFR BLD AUTO: 39.6 % (ref 37.5–51)
HCT VFR BLD AUTO: 39.7 % (ref 37.5–51)
HCT VFR BLD AUTO: 40 % (ref 37.5–51)
HCT VFR BLD AUTO: 40.3 % (ref 37.5–51)
HCT VFR BLD AUTO: 40.7 % (ref 37.5–51)
HCT VFR BLD AUTO: 42.3 % (ref 37.5–51)
HGB BLD-MCNC: 10.3 G/DL (ref 13–17.7)
HGB BLD-MCNC: 11.4 G/DL (ref 13–17.7)
HGB BLD-MCNC: 12.1 G/DL (ref 13–17.7)
HGB BLD-MCNC: 12.3 G/DL (ref 13–17.7)
HGB BLD-MCNC: 12.3 G/DL (ref 13–17.7)
HGB BLD-MCNC: 12.4 G/DL (ref 13–17.7)
HGB BLD-MCNC: 12.5 G/DL (ref 13–17.7)
HGB BLD-MCNC: 12.6 G/DL (ref 13–17.7)
HGB BLD-MCNC: 12.9 G/DL (ref 13–17.7)
HGB BLD-MCNC: 8.2 G/DL (ref 13–17.7)
HGB BLD-MCNC: 9.2 G/DL (ref 13–17.7)
HGB BLD-MCNC: 9.2 G/DL (ref 13–17.7)
HGB UR QL STRIP.AUTO: NEGATIVE
HMPV RNA NPH QL NAA+NON-PROBE: NOT DETECTED
HOLD SPECIMEN: NORMAL
HOLD SPECIMEN: NORMAL
HPIV1 RNA SPEC QL NAA+PROBE: NOT DETECTED
HPIV2 RNA SPEC QL NAA+PROBE: NOT DETECTED
HPIV3 RNA NPH QL NAA+PROBE: NOT DETECTED
HPIV4 P GENE NPH QL NAA+PROBE: NOT DETECTED
IMM GRANULOCYTES # BLD AUTO: 0 10*3/MM3 (ref 0–0.05)
IMM GRANULOCYTES # BLD AUTO: 0.02 10*3/MM3 (ref 0–0.05)
IMM GRANULOCYTES # BLD AUTO: 0.02 10*3/MM3 (ref 0–0.05)
IMM GRANULOCYTES # BLD AUTO: 0.08 10*3/MM3 (ref 0–0.05)
IMM GRANULOCYTES # BLD AUTO: 0.09 10*3/MM3 (ref 0–0.05)
IMM GRANULOCYTES NFR BLD AUTO: 0 % (ref 0–0.5)
IMM GRANULOCYTES NFR BLD AUTO: 0.3 % (ref 0–0.5)
IMM GRANULOCYTES NFR BLD AUTO: 0.3 % (ref 0–0.5)
IMM GRANULOCYTES NFR BLD AUTO: 0.9 % (ref 0–0.5)
IMM GRANULOCYTES NFR BLD AUTO: 1.3 % (ref 0–0.5)
KETONES UR QL STRIP: NEGATIVE
LACTATE HOLD SPECIMEN: NORMAL
LEUKOCYTE ESTERASE UR QL STRIP.AUTO: NEGATIVE
LYMPHOCYTES # BLD AUTO: 0.93 10*3/MM3 (ref 0.7–3.1)
LYMPHOCYTES # BLD AUTO: 1.24 10*3/MM3 (ref 0.7–3.1)
LYMPHOCYTES # BLD AUTO: 1.39 10*3/MM3 (ref 0.7–3.1)
LYMPHOCYTES # BLD AUTO: 1.4 10*3/MM3 (ref 0.7–3.1)
LYMPHOCYTES # BLD AUTO: 1.46 10*3/MM3 (ref 0.7–3.1)
LYMPHOCYTES # BLD AUTO: 1.54 10*3/MM3 (ref 0.7–3.1)
LYMPHOCYTES # BLD AUTO: 1.6 10*3/MM3 (ref 0.7–3.1)
LYMPHOCYTES # BLD AUTO: 1.6 10*3/MM3 (ref 0.7–3.1)
LYMPHOCYTES # BLD AUTO: 1.8 10*3/MM3 (ref 0.7–3.1)
LYMPHOCYTES # BLD AUTO: 2.3 10*3/MM3 (ref 0.7–3.1)
LYMPHOCYTES NFR BLD AUTO: 13.1 % (ref 19.6–45.3)
LYMPHOCYTES NFR BLD AUTO: 13.3 % (ref 19.6–45.3)
LYMPHOCYTES NFR BLD AUTO: 20.5 % (ref 19.6–45.3)
LYMPHOCYTES NFR BLD AUTO: 21.4 % (ref 19.6–45.3)
LYMPHOCYTES NFR BLD AUTO: 22.4 % (ref 19.6–45.3)
LYMPHOCYTES NFR BLD AUTO: 24.8 % (ref 19.6–45.3)
LYMPHOCYTES NFR BLD AUTO: 29 % (ref 19.6–45.3)
LYMPHOCYTES NFR BLD AUTO: 29.3 % (ref 19.6–45.3)
LYMPHOCYTES NFR BLD AUTO: 31.9 % (ref 19.6–45.3)
LYMPHOCYTES NFR BLD AUTO: 33.6 % (ref 19.6–45.3)
M PNEUMO IGG SER IA-ACNC: NOT DETECTED
MAGNESIUM SERPL-MCNC: 1.9 MG/DL (ref 1.6–2.6)
MAGNESIUM SERPL-MCNC: 2 MG/DL (ref 1.6–2.6)
MAGNESIUM SERPL-MCNC: 2 MG/DL (ref 1.6–2.6)
MCH RBC QN AUTO: 22.6 PG (ref 26.6–33)
MCH RBC QN AUTO: 22.7 PG (ref 26.6–33)
MCH RBC QN AUTO: 23.2 PG (ref 26.6–33)
MCH RBC QN AUTO: 23.3 PG (ref 26.6–33)
MCH RBC QN AUTO: 24 PG (ref 26.6–33)
MCH RBC QN AUTO: 24.1 PG (ref 26.6–33)
MCH RBC QN AUTO: 24.2 PG (ref 26.6–33)
MCH RBC QN AUTO: 24.3 PG (ref 26.6–33)
MCH RBC QN AUTO: 24.4 PG (ref 26.6–33)
MCH RBC QN AUTO: 24.5 PG (ref 26.6–33)
MCH RBC QN AUTO: 24.9 PG (ref 26.6–33)
MCH RBC QN AUTO: 25.3 PG (ref 26.6–33)
MCHC RBC AUTO-ENTMCNC: 30.4 G/DL (ref 31.5–35.7)
MCHC RBC AUTO-ENTMCNC: 30.5 G/DL (ref 31.5–35.7)
MCHC RBC AUTO-ENTMCNC: 30.6 G/DL (ref 31.5–35.7)
MCHC RBC AUTO-ENTMCNC: 30.6 G/DL (ref 31.5–35.7)
MCHC RBC AUTO-ENTMCNC: 30.7 G/DL (ref 31.5–35.7)
MCHC RBC AUTO-ENTMCNC: 30.9 G/DL (ref 31.5–35.7)
MCHC RBC AUTO-ENTMCNC: 31.1 G/DL (ref 31.5–35.7)
MCHC RBC AUTO-ENTMCNC: 31.1 G/DL (ref 31.5–35.7)
MCHC RBC AUTO-ENTMCNC: 31.3 G/DL (ref 31.5–35.7)
MCHC RBC AUTO-ENTMCNC: 31.4 G/DL (ref 31.5–35.7)
MCHC RBC AUTO-ENTMCNC: 31.6 G/DL (ref 31.5–35.7)
MCHC RBC AUTO-ENTMCNC: 31.8 G/DL (ref 31.5–35.7)
MCV RBC AUTO: 73.8 FL (ref 79–97)
MCV RBC AUTO: 74.7 FL (ref 79–97)
MCV RBC AUTO: 75.9 FL (ref 79–97)
MCV RBC AUTO: 76 FL (ref 79–97)
MCV RBC AUTO: 76.4 FL (ref 79–97)
MCV RBC AUTO: 77.9 FL (ref 79–97)
MCV RBC AUTO: 77.9 FL (ref 79–97)
MCV RBC AUTO: 78 FL (ref 79–97)
MCV RBC AUTO: 78.3 FL (ref 79–97)
MCV RBC AUTO: 78.5 FL (ref 79–97)
MCV RBC AUTO: 80.2 FL (ref 79–97)
MCV RBC AUTO: 80.3 FL (ref 79–97)
MICROCYTES BLD QL: NORMAL
MICROCYTES BLD QL: NORMAL
MONOCYTES # BLD AUTO: 0.4 10*3/MM3 (ref 0.1–0.9)
MONOCYTES # BLD AUTO: 0.4 10*3/MM3 (ref 0.1–0.9)
MONOCYTES # BLD AUTO: 0.5 10*3/MM3 (ref 0.1–0.9)
MONOCYTES # BLD AUTO: 0.5 10*3/MM3 (ref 0.1–0.9)
MONOCYTES # BLD AUTO: 0.51 10*3/MM3 (ref 0.1–0.9)
MONOCYTES # BLD AUTO: 0.58 10*3/MM3 (ref 0.1–0.9)
MONOCYTES # BLD AUTO: 0.78 10*3/MM3 (ref 0.1–0.9)
MONOCYTES # BLD AUTO: 0.84 10*3/MM3 (ref 0.1–0.9)
MONOCYTES # BLD AUTO: 0.89 10*3/MM3 (ref 0.1–0.9)
MONOCYTES # BLD AUTO: 1.1 10*3/MM3 (ref 0.1–0.9)
MONOCYTES NFR BLD AUTO: 10.1 % (ref 5–12)
MONOCYTES NFR BLD AUTO: 11.1 % (ref 5–12)
MONOCYTES NFR BLD AUTO: 11.8 % (ref 5–12)
MONOCYTES NFR BLD AUTO: 12.6 % (ref 5–12)
MONOCYTES NFR BLD AUTO: 4.9 % (ref 5–12)
MONOCYTES NFR BLD AUTO: 8.1 % (ref 5–12)
MONOCYTES NFR BLD AUTO: 8.1 % (ref 5–12)
MONOCYTES NFR BLD AUTO: 8.9 % (ref 5–12)
MONOCYTES NFR BLD AUTO: 9.3 % (ref 5–12)
MONOCYTES NFR BLD AUTO: 9.6 % (ref 5–12)
NEUTROPHILS # BLD AUTO: 4.04 10*3/MM3 (ref 1.7–7)
NEUTROPHILS # BLD AUTO: 5.7 10*3/MM3 (ref 1.7–7)
NEUTROPHILS # BLD AUTO: 7 10*3/MM3 (ref 1.7–7)
NEUTROPHILS NFR BLD AUTO: 2.19 10*3/MM3 (ref 1.7–7)
NEUTROPHILS NFR BLD AUTO: 2.3 10*3/MM3 (ref 1.7–7)
NEUTROPHILS NFR BLD AUTO: 3.41 10*3/MM3 (ref 1.7–7)
NEUTROPHILS NFR BLD AUTO: 3.5 10*3/MM3 (ref 1.7–7)
NEUTROPHILS NFR BLD AUTO: 3.8 10*3/MM3 (ref 1.7–7)
NEUTROPHILS NFR BLD AUTO: 47.8 % (ref 42.7–76)
NEUTROPHILS NFR BLD AUTO: 5.18 10*3/MM3 (ref 1.7–7)
NEUTROPHILS NFR BLD AUTO: 55.1 % (ref 42.7–76)
NEUTROPHILS NFR BLD AUTO: 57.1 % (ref 42.7–76)
NEUTROPHILS NFR BLD AUTO: 62.2 % (ref 42.7–76)
NEUTROPHILS NFR BLD AUTO: 62.6 % (ref 42.7–76)
NEUTROPHILS NFR BLD AUTO: 62.9 % (ref 42.7–76)
NEUTROPHILS NFR BLD AUTO: 67.5 % (ref 42.7–76)
NEUTROPHILS NFR BLD AUTO: 7.02 10*3/MM3 (ref 1.7–7)
NEUTROPHILS NFR BLD AUTO: 72.9 % (ref 42.7–76)
NEUTROPHILS NFR BLD AUTO: 74.6 % (ref 42.7–76)
NEUTROPHILS NFR BLD AUTO: 75.5 % (ref 42.7–76)
NITRITE UR QL STRIP: NEGATIVE
NRBC BLD AUTO-RTO: 0 /100 WBC (ref 0–0.2)
PH UR STRIP.AUTO: 6 [PH] (ref 5–8)
PH UR STRIP.AUTO: 6.5 [PH] (ref 5–8)
PH UR STRIP.AUTO: 7.5 [PH] (ref 5–8)
PH UR STRIP.AUTO: 7.5 [PH] (ref 5–8)
PH UR STRIP.AUTO: 8 [PH] (ref 5–8)
PH UR STRIP.AUTO: 8.5 [PH] (ref 5–8)
PLAT MORPH BLD: NORMAL
PLATELET # BLD AUTO: 169 10*3/MM3 (ref 140–450)
PLATELET # BLD AUTO: 171 10*3/MM3 (ref 140–450)
PLATELET # BLD AUTO: 179 10*3/MM3 (ref 140–450)
PLATELET # BLD AUTO: 193 10*3/MM3 (ref 140–450)
PLATELET # BLD AUTO: 193 10*3/MM3 (ref 140–450)
PLATELET # BLD AUTO: 194 10*3/MM3 (ref 140–450)
PLATELET # BLD AUTO: 204 10*3/MM3 (ref 140–450)
PLATELET # BLD AUTO: 220 10*3/MM3 (ref 140–450)
PLATELET # BLD AUTO: 222 10*3/MM3 (ref 140–450)
PLATELET # BLD AUTO: 234 10*3/MM3 (ref 140–450)
PLATELET # BLD AUTO: 263 10*3/MM3 (ref 140–450)
PLATELET # BLD AUTO: 358 10*3/MM3 (ref 140–450)
PMV BLD AUTO: 10 FL (ref 6–12)
PMV BLD AUTO: 10.4 FL (ref 6–12)
PMV BLD AUTO: 6.6 FL (ref 6–12)
PMV BLD AUTO: 7 FL (ref 6–12)
PMV BLD AUTO: 7.2 FL (ref 6–12)
PMV BLD AUTO: 7.2 FL (ref 6–12)
PMV BLD AUTO: 7.4 FL (ref 6–12)
PMV BLD AUTO: 9 FL (ref 6–12)
PMV BLD AUTO: 9 FL (ref 6–12)
PMV BLD AUTO: 9.1 FL (ref 6–12)
PMV BLD AUTO: 9.2 FL (ref 6–12)
PMV BLD AUTO: 9.7 FL (ref 6–12)
POTASSIUM BLD-SCNC: 4.3 MMOL/L (ref 3.5–5.2)
POTASSIUM BLD-SCNC: 4.8 MMOL/L (ref 3.5–5.2)
POTASSIUM BLD-SCNC: 4.9 MMOL/L (ref 3.5–5.2)
POTASSIUM SERPL-SCNC: 3.3 MMOL/L (ref 3.5–5.2)
POTASSIUM SERPL-SCNC: 3.9 MMOL/L (ref 3.5–5.2)
POTASSIUM SERPL-SCNC: 3.9 MMOL/L (ref 3.5–5.2)
POTASSIUM SERPL-SCNC: 4.2 MMOL/L (ref 3.5–5.2)
POTASSIUM SERPL-SCNC: 4.3 MMOL/L (ref 3.5–5.2)
POTASSIUM SERPL-SCNC: 4.3 MMOL/L (ref 3.5–5.2)
POTASSIUM SERPL-SCNC: 4.4 MMOL/L (ref 3.5–5.2)
POTASSIUM SERPL-SCNC: 4.4 MMOL/L (ref 3.5–5.2)
POTASSIUM SERPL-SCNC: 4.6 MMOL/L (ref 3.5–5.2)
PROCALCITONIN SERPL-MCNC: 0.11 NG/ML (ref 0–0.25)
PROCALCITONIN SERPL-MCNC: 0.12 NG/ML (ref 0–0.25)
PROT SERPL-MCNC: 6.4 G/DL (ref 6–8.5)
PROT SERPL-MCNC: 7.1 G/DL (ref 6–8.5)
PROT SERPL-MCNC: 7.3 G/DL (ref 6–8.5)
PROT SERPL-MCNC: 7.5 G/DL (ref 6–8.5)
PROT SERPL-MCNC: 7.8 G/DL (ref 6–8.5)
PROT SERPL-MCNC: 7.9 G/DL (ref 6–8.5)
PROT SERPL-MCNC: 8.1 G/DL (ref 6–8.5)
PROT SERPL-MCNC: 8.1 G/DL (ref 6–8.5)
PROT SERPL-MCNC: 8.3 G/DL (ref 6–8.5)
PROT SERPL-MCNC: 8.9 G/DL (ref 6–8.5)
PROT UR QL STRIP: ABNORMAL
PROT UR QL STRIP: NEGATIVE
RBC # BLD AUTO: 3.4 10*6/MM3 (ref 4.14–5.8)
RBC # BLD AUTO: 3.78 10*6/MM3 (ref 4.14–5.8)
RBC # BLD AUTO: 3.8 10*6/MM3 (ref 4.14–5.8)
RBC # BLD AUTO: 4.13 10*6/MM3 (ref 4.14–5.8)
RBC # BLD AUTO: 4.94 10*6/MM3 (ref 4.14–5.8)
RBC # BLD AUTO: 5.04 10*6/MM3 (ref 4.14–5.8)
RBC # BLD AUTO: 5.04 10*6/MM3 (ref 4.14–5.8)
RBC # BLD AUTO: 5.27 10*6/MM3 (ref 4.14–5.8)
RBC # BLD AUTO: 5.27 10*6/MM3 (ref 4.14–5.8)
RBC # BLD AUTO: 5.28 10*6/MM3 (ref 4.14–5.8)
RBC # BLD AUTO: 5.32 10*6/MM3 (ref 4.14–5.8)
RBC # BLD AUTO: 5.35 10*6/MM3 (ref 4.14–5.8)
RHINOVIRUS RNA SPEC NAA+PROBE: NOT DETECTED
RSV RNA NPH QL NAA+NON-PROBE: NOT DETECTED
SARS-COV-2 RNA NPH QL NAA+NON-PROBE: NOT DETECTED
SODIUM BLD-SCNC: 133 MMOL/L (ref 136–145)
SODIUM BLD-SCNC: 134 MMOL/L (ref 136–145)
SODIUM BLD-SCNC: 137 MMOL/L (ref 136–145)
SODIUM SERPL-SCNC: 130 MMOL/L (ref 136–145)
SODIUM SERPL-SCNC: 133 MMOL/L (ref 136–145)
SODIUM SERPL-SCNC: 135 MMOL/L (ref 136–145)
SODIUM SERPL-SCNC: 136 MMOL/L (ref 136–145)
SODIUM SERPL-SCNC: 136 MMOL/L (ref 136–145)
SODIUM SERPL-SCNC: 137 MMOL/L (ref 136–145)
SODIUM SERPL-SCNC: 138 MMOL/L (ref 136–145)
SP GR UR STRIP: 1 (ref 1–1.03)
SP GR UR STRIP: 1.01 (ref 1–1.03)
UROBILINOGEN UR QL STRIP: ABNORMAL
UROBILINOGEN UR QL STRIP: NORMAL
VANCOMYCIN TROUGH SERPL-MCNC: 9.5 MCG/ML (ref 5–20)
WBC # BLD AUTO: 4.1 10*3/MM3 (ref 3.4–10.8)
WBC # BLD AUTO: 4.58 10*3/MM3 (ref 3.4–10.8)
WBC # BLD AUTO: 5.6 10*3/MM3 (ref 3.4–10.8)
WBC # BLD AUTO: 6.1 10*3/MM3 (ref 3.4–10.8)
WBC # BLD AUTO: 6.2 10*3/MM3 (ref 3.4–10.8)
WBC # BLD AUTO: 6.82 10*3/MM3 (ref 3.4–10.8)
WBC # BLD AUTO: 7.11 10*3/MM3 (ref 3.4–10.8)
WBC # BLD AUTO: 8.01 10*3/MM3 (ref 3.4–10.8)
WBC # BLD AUTO: 9.3 10*3/MM3 (ref 3.4–10.8)
WBC MORPH BLD: NORMAL
WBC NRBC COR # BLD: 10.4 10*3/MM3 (ref 3.4–10.8)
WBC NRBC COR # BLD: 6.5 10*3/MM3 (ref 3.4–10.8)
WBC NRBC COR # BLD: 7.6 10*3/MM3 (ref 3.4–10.8)
WHOLE BLOOD HOLD SPECIMEN: NORMAL
WHOLE BLOOD HOLD SPECIMEN: NORMAL

## 2020-01-01 PROCEDURE — 25010000002 BEVACIZUMAB-AWWB 400 MG/16ML SOLUTION 16 ML VIAL: Performed by: INTERNAL MEDICINE

## 2020-01-01 PROCEDURE — 82565 ASSAY OF CREATININE: CPT

## 2020-01-01 PROCEDURE — 25010000002 CEFTRIAXONE PER 250 MG: Performed by: HOSPITALIST

## 2020-01-01 PROCEDURE — 36415 COLL VENOUS BLD VENIPUNCTURE: CPT

## 2020-01-01 PROCEDURE — 83605 ASSAY OF LACTIC ACID: CPT | Performed by: EMERGENCY MEDICINE

## 2020-01-01 PROCEDURE — 81003 URINALYSIS AUTO W/O SCOPE: CPT | Performed by: EMERGENCY MEDICINE

## 2020-01-01 PROCEDURE — 85007 BL SMEAR W/DIFF WBC COUNT: CPT | Performed by: EMERGENCY MEDICINE

## 2020-01-01 PROCEDURE — 81003 URINALYSIS AUTO W/O SCOPE: CPT

## 2020-01-01 PROCEDURE — 82378 CARCINOEMBRYONIC ANTIGEN: CPT

## 2020-01-01 PROCEDURE — 25010000002 OXALIPLATIN PER 0.5 MG: Performed by: INTERNAL MEDICINE

## 2020-01-01 PROCEDURE — 25010000002 DEXAMETHASONE SODIUM PHOSPHATE 20 MG/5ML SOLUTION: Performed by: INTERNAL MEDICINE

## 2020-01-01 PROCEDURE — 25010000002 BEVACIZUMAB-AWWB 100 MG/4ML SOLUTION 4 ML VIAL: Performed by: INTERNAL MEDICINE

## 2020-01-01 PROCEDURE — 80053 COMPREHEN METABOLIC PANEL: CPT | Performed by: INTERNAL MEDICINE

## 2020-01-01 PROCEDURE — 78815 PET IMAGE W/CT SKULL-THIGH: CPT

## 2020-01-01 PROCEDURE — 96415 CHEMO IV INFUSION ADDL HR: CPT

## 2020-01-01 PROCEDURE — 85025 COMPLETE CBC W/AUTO DIFF WBC: CPT | Performed by: EMERGENCY MEDICINE

## 2020-01-01 PROCEDURE — 99284 EMERGENCY DEPT VISIT MOD MDM: CPT

## 2020-01-01 PROCEDURE — 85027 COMPLETE CBC AUTOMATED: CPT | Performed by: FAMILY MEDICINE

## 2020-01-01 PROCEDURE — 84145 PROCALCITONIN (PCT): CPT | Performed by: NURSE PRACTITIONER

## 2020-01-01 PROCEDURE — 80202 ASSAY OF VANCOMYCIN: CPT

## 2020-01-01 PROCEDURE — 25010000002 DEXAMETHASONE PER 1 MG: Performed by: INTERNAL MEDICINE

## 2020-01-01 PROCEDURE — 80053 COMPREHEN METABOLIC PANEL: CPT

## 2020-01-01 PROCEDURE — 85025 COMPLETE CBC W/AUTO DIFF WBC: CPT

## 2020-01-01 PROCEDURE — 85025 COMPLETE CBC W/AUTO DIFF WBC: CPT | Performed by: INTERNAL MEDICINE

## 2020-01-01 PROCEDURE — A9552 F18 FDG: HCPCS | Performed by: INTERNAL MEDICINE

## 2020-01-01 PROCEDURE — 25010000002 PALONOSETRON 0.25 MG/5ML SOLUTION PREFILLED SYRINGE: Performed by: INTERNAL MEDICINE

## 2020-01-01 PROCEDURE — 25010000003 HEPARIN LOCK FLUSH PER 10 UNITS: Performed by: INTERNAL MEDICINE

## 2020-01-01 PROCEDURE — 81003 URINALYSIS AUTO W/O SCOPE: CPT | Performed by: INTERNAL MEDICINE

## 2020-01-01 PROCEDURE — 85007 BL SMEAR W/DIFF WBC COUNT: CPT

## 2020-01-01 PROCEDURE — 83735 ASSAY OF MAGNESIUM: CPT | Performed by: NURSE PRACTITIONER

## 2020-01-01 PROCEDURE — 85025 COMPLETE CBC W/AUTO DIFF WBC: CPT | Performed by: NURSE PRACTITIONER

## 2020-01-01 PROCEDURE — 87070 CULTURE OTHR SPECIMN AEROBIC: CPT | Performed by: EMERGENCY MEDICINE

## 2020-01-01 PROCEDURE — 80053 COMPREHEN METABOLIC PANEL: CPT | Performed by: EMERGENCY MEDICINE

## 2020-01-01 PROCEDURE — G0378 HOSPITAL OBSERVATION PER HR: HCPCS

## 2020-01-01 PROCEDURE — 99215 OFFICE O/P EST HI 40 MIN: CPT | Performed by: INTERNAL MEDICINE

## 2020-01-01 PROCEDURE — 99214 OFFICE O/P EST MOD 30 MIN: CPT | Performed by: INTERNAL MEDICINE

## 2020-01-01 PROCEDURE — 25010000002 HYDROMORPHONE 1 MG/ML SOLUTION: Performed by: HOSPITALIST

## 2020-01-01 PROCEDURE — 82378 CARCINOEMBRYONIC ANTIGEN: CPT | Performed by: INTERNAL MEDICINE

## 2020-01-01 PROCEDURE — 96413 CHEMO IV INFUSION 1 HR: CPT

## 2020-01-01 PROCEDURE — 96523 IRRIG DRUG DELIVERY DEVICE: CPT

## 2020-01-01 PROCEDURE — 84145 PROCALCITONIN (PCT): CPT | Performed by: EMERGENCY MEDICINE

## 2020-01-01 PROCEDURE — 85652 RBC SED RATE AUTOMATED: CPT | Performed by: FAMILY MEDICINE

## 2020-01-01 PROCEDURE — 87186 SC STD MICRODIL/AGAR DIL: CPT | Performed by: EMERGENCY MEDICINE

## 2020-01-01 PROCEDURE — 96417 CHEMO IV INFUS EACH ADDL SEQ: CPT

## 2020-01-01 PROCEDURE — 25010000003 HEPARIN LOCK FLUSH PER 10 UNITS: Performed by: FAMILY MEDICINE

## 2020-01-01 PROCEDURE — 87040 BLOOD CULTURE FOR BACTERIA: CPT | Performed by: HOSPITALIST

## 2020-01-01 PROCEDURE — 86140 C-REACTIVE PROTEIN: CPT | Performed by: FAMILY MEDICINE

## 2020-01-01 PROCEDURE — 99423 OL DIG E/M SVC 21+ MIN: CPT | Performed by: INTERNAL MEDICINE

## 2020-01-01 PROCEDURE — 80053 COMPREHEN METABOLIC PANEL: CPT | Performed by: NURSE PRACTITIONER

## 2020-01-01 PROCEDURE — 87040 BLOOD CULTURE FOR BACTERIA: CPT | Performed by: NURSE PRACTITIONER

## 2020-01-01 PROCEDURE — 87077 CULTURE AEROBIC IDENTIFY: CPT | Performed by: EMERGENCY MEDICINE

## 2020-01-01 PROCEDURE — 99223 1ST HOSP IP/OBS HIGH 75: CPT | Performed by: INTERNAL MEDICINE

## 2020-01-01 PROCEDURE — 96375 TX/PRO/DX INJ NEW DRUG ADDON: CPT

## 2020-01-01 PROCEDURE — 82962 GLUCOSE BLOOD TEST: CPT

## 2020-01-01 PROCEDURE — 99232 SBSQ HOSP IP/OBS MODERATE 35: CPT | Performed by: FAMILY MEDICINE

## 2020-01-01 PROCEDURE — 0202U NFCT DS 22 TRGT SARS-COV-2: CPT | Performed by: EMERGENCY MEDICINE

## 2020-01-01 PROCEDURE — 80053 COMPREHEN METABOLIC PANEL: CPT | Performed by: FAMILY MEDICINE

## 2020-01-01 PROCEDURE — 36591 DRAW BLOOD OFF VENOUS DEVICE: CPT

## 2020-01-01 PROCEDURE — 25010000002 DEXAMETHASONE SODIUM PHOSPHATE 120 MG/30ML SOLUTION: Performed by: INTERNAL MEDICINE

## 2020-01-01 PROCEDURE — 97161 PT EVAL LOW COMPLEX 20 MIN: CPT | Performed by: PHYSICAL THERAPIST

## 2020-01-01 PROCEDURE — 83735 ASSAY OF MAGNESIUM: CPT | Performed by: FAMILY MEDICINE

## 2020-01-01 PROCEDURE — 99223 1ST HOSP IP/OBS HIGH 75: CPT | Performed by: HOSPITALIST

## 2020-01-01 PROCEDURE — 99239 HOSP IP/OBS DSCHRG MGMT >30: CPT | Performed by: NURSE PRACTITIONER

## 2020-01-01 PROCEDURE — 25010000002 VANCOMYCIN 10 G RECONSTITUTED SOLUTION

## 2020-01-01 PROCEDURE — 99441 PR PHYS/QHP TELEPHONE EVALUATION 5-10 MIN: CPT | Performed by: INTERNAL MEDICINE

## 2020-01-01 PROCEDURE — 99221 1ST HOSP IP/OBS SF/LOW 40: CPT | Performed by: NEUROLOGICAL SURGERY

## 2020-01-01 PROCEDURE — 0 FLUDEOXYGLUCOSE F18 SOLUTION: Performed by: INTERNAL MEDICINE

## 2020-01-01 PROCEDURE — 71045 X-RAY EXAM CHEST 1 VIEW: CPT

## 2020-01-01 PROCEDURE — 87205 SMEAR GRAM STAIN: CPT | Performed by: EMERGENCY MEDICINE

## 2020-01-01 PROCEDURE — 86140 C-REACTIVE PROTEIN: CPT | Performed by: EMERGENCY MEDICINE

## 2020-01-01 PROCEDURE — 25010000002 VANCOMYCIN: Performed by: EMERGENCY MEDICINE

## 2020-01-01 PROCEDURE — 25010000002 CEFTRIAXONE PER 250 MG: Performed by: EMERGENCY MEDICINE

## 2020-01-01 PROCEDURE — 85652 RBC SED RATE AUTOMATED: CPT | Performed by: EMERGENCY MEDICINE

## 2020-01-01 PROCEDURE — 25010000002 VANCOMYCIN

## 2020-01-01 RX ORDER — DIPHENHYDRAMINE HYDROCHLORIDE 50 MG/ML
50 INJECTION INTRAMUSCULAR; INTRAVENOUS AS NEEDED
Status: CANCELLED | OUTPATIENT
Start: 2020-01-01

## 2020-01-01 RX ORDER — SODIUM CHLORIDE 0.9 % (FLUSH) 0.9 %
20 SYRINGE (ML) INJECTION AS NEEDED
Status: CANCELLED | OUTPATIENT
Start: 2020-01-01

## 2020-01-01 RX ORDER — HYDROMORPHONE HYDROCHLORIDE 1 MG/ML
0.5 INJECTION, SOLUTION INTRAMUSCULAR; INTRAVENOUS; SUBCUTANEOUS
Status: DISCONTINUED | OUTPATIENT
Start: 2020-01-01 | End: 2020-01-01

## 2020-01-01 RX ORDER — CYCLOBENZAPRINE HCL 10 MG
TABLET ORAL EVERY 8 HOURS SCHEDULED
COMMUNITY
End: 2020-01-01

## 2020-01-01 RX ORDER — OXYCODONE HYDROCHLORIDE 15 MG/1
15 TABLET ORAL EVERY 12 HOURS
Status: DISCONTINUED | OUTPATIENT
Start: 2020-01-01 | End: 2020-01-01

## 2020-01-01 RX ORDER — PALONOSETRON 0.05 MG/ML
0.25 INJECTION, SOLUTION INTRAVENOUS ONCE
Status: CANCELLED | OUTPATIENT
Start: 2020-01-01

## 2020-01-01 RX ORDER — LACTULOSE 10 G/15ML
10-20 SOLUTION ORAL 3 TIMES DAILY PRN
Qty: 480 ML | Refills: 5 | Status: SHIPPED | OUTPATIENT
Start: 2020-01-01 | End: 2020-01-01

## 2020-01-01 RX ORDER — HEPARIN SODIUM (PORCINE) LOCK FLUSH IV SOLN 100 UNIT/ML 100 UNIT/ML
500 SOLUTION INTRAVENOUS AS NEEDED
Status: CANCELLED | OUTPATIENT
Start: 2020-01-01

## 2020-01-01 RX ORDER — NALOXONE HCL 0.4 MG/ML
0.4 VIAL (ML) INJECTION
Status: DISCONTINUED | OUTPATIENT
Start: 2020-01-01 | End: 2020-01-01

## 2020-01-01 RX ORDER — SODIUM CHLORIDE 9 MG/ML
250 INJECTION, SOLUTION INTRAVENOUS ONCE
Status: CANCELLED | OUTPATIENT
Start: 2020-01-01

## 2020-01-01 RX ORDER — FAMOTIDINE 10 MG/ML
20 INJECTION, SOLUTION INTRAVENOUS AS NEEDED
Status: CANCELLED | OUTPATIENT
Start: 2020-01-01

## 2020-01-01 RX ORDER — DIPHENOXYLATE HYDROCHLORIDE AND ATROPINE SULFATE 2.5; .025 MG/1; MG/1
1 TABLET ORAL EVERY 6 HOURS PRN
Qty: 30 TABLET | Refills: 5 | OUTPATIENT
Start: 2020-01-01 | End: 2020-01-01 | Stop reason: SDUPTHER

## 2020-01-01 RX ORDER — SODIUM CHLORIDE 0.9 % (FLUSH) 0.9 %
10 SYRINGE (ML) INJECTION AS NEEDED
Status: DISCONTINUED | OUTPATIENT
Start: 2020-01-01 | End: 2020-01-01 | Stop reason: HOSPADM

## 2020-01-01 RX ORDER — PALONOSETRON 0.05 MG/ML
0.25 INJECTION, SOLUTION INTRAVENOUS ONCE
Status: COMPLETED | OUTPATIENT
Start: 2020-01-01 | End: 2020-01-01

## 2020-01-01 RX ORDER — SODIUM CHLORIDE 9 MG/ML
250 INJECTION, SOLUTION INTRAVENOUS ONCE
Status: DISCONTINUED | OUTPATIENT
Start: 2020-01-01 | End: 2020-01-01 | Stop reason: HOSPADM

## 2020-01-01 RX ORDER — SODIUM CHLORIDE 0.9 % (FLUSH) 0.9 %
10 SYRINGE (ML) INJECTION AS NEEDED
Status: CANCELLED | OUTPATIENT
Start: 2020-01-01

## 2020-01-01 RX ORDER — HEPARIN SODIUM (PORCINE) LOCK FLUSH IV SOLN 100 UNIT/ML 100 UNIT/ML
500 SOLUTION INTRAVENOUS AS NEEDED
Status: DISCONTINUED | OUTPATIENT
Start: 2020-01-01 | End: 2020-01-01 | Stop reason: HOSPADM

## 2020-01-01 RX ORDER — DIPHENHYDRAMINE HYDROCHLORIDE 50 MG/ML
50 INJECTION INTRAMUSCULAR; INTRAVENOUS AS NEEDED
Status: DISCONTINUED | OUTPATIENT
Start: 2020-01-01 | End: 2020-01-01 | Stop reason: HOSPADM

## 2020-01-01 RX ORDER — HEPARIN SODIUM (PORCINE) LOCK FLUSH IV SOLN 100 UNIT/ML 100 UNIT/ML
5 SOLUTION INTRAVENOUS AS NEEDED
Status: DISCONTINUED | OUTPATIENT
Start: 2020-01-01 | End: 2020-01-01 | Stop reason: HOSPADM

## 2020-01-01 RX ORDER — ACETAMINOPHEN 325 MG/1
650 TABLET ORAL EVERY 4 HOURS PRN
Status: DISCONTINUED | OUTPATIENT
Start: 2020-01-01 | End: 2020-01-01 | Stop reason: HOSPADM

## 2020-01-01 RX ORDER — DEXTROSE MONOHYDRATE 50 MG/ML
250 INJECTION, SOLUTION INTRAVENOUS ONCE
Status: COMPLETED | OUTPATIENT
Start: 2020-01-01 | End: 2020-01-01

## 2020-01-01 RX ORDER — DEXTROSE MONOHYDRATE 50 MG/ML
250 INJECTION, SOLUTION INTRAVENOUS ONCE
Status: CANCELLED | OUTPATIENT
Start: 2020-01-01

## 2020-01-01 RX ORDER — HYDROCHLOROTHIAZIDE 25 MG/1
25 TABLET ORAL DAILY
Qty: 30 TABLET | Refills: 2 | Status: SHIPPED | OUTPATIENT
Start: 2020-01-01 | End: 2020-01-01

## 2020-01-01 RX ORDER — LEVOFLOXACIN 750 MG/1
750 TABLET ORAL DAILY
Qty: 7 TABLET | Refills: 0 | Status: SHIPPED | OUTPATIENT
Start: 2020-01-01

## 2020-01-01 RX ORDER — OXYCODONE AND ACETAMINOPHEN 7.5; 325 MG/1; MG/1
1 TABLET ORAL EVERY 8 HOURS PRN
Qty: 90 TABLET | Refills: 0 | Status: SHIPPED | OUTPATIENT
Start: 2020-01-01 | End: 2020-01-01

## 2020-01-01 RX ORDER — ONDANSETRON HYDROCHLORIDE 8 MG/1
8 TABLET, FILM COATED ORAL 3 TIMES DAILY PRN
Qty: 30 TABLET | Refills: 5 | Status: SHIPPED | OUTPATIENT
Start: 2020-01-01 | End: 2020-01-01

## 2020-01-01 RX ORDER — SODIUM CHLORIDE 9 MG/ML
125 INJECTION, SOLUTION INTRAVENOUS CONTINUOUS
Status: DISCONTINUED | OUTPATIENT
Start: 2020-01-01 | End: 2020-01-01

## 2020-01-01 RX ORDER — CELECOXIB 200 MG/1
200 CAPSULE ORAL DAILY
Status: DISCONTINUED | OUTPATIENT
Start: 2020-01-01 | End: 2020-01-01

## 2020-01-01 RX ORDER — AMOXICILLIN 250 MG
2 CAPSULE ORAL 2 TIMES DAILY
Status: DISCONTINUED | OUTPATIENT
Start: 2020-01-01 | End: 2020-01-01 | Stop reason: HOSPADM

## 2020-01-01 RX ORDER — POTASSIUM CHLORIDE 1.5 G/1.77G
40 POWDER, FOR SOLUTION ORAL AS NEEDED
Status: DISCONTINUED | OUTPATIENT
Start: 2020-01-01 | End: 2020-01-01 | Stop reason: HOSPADM

## 2020-01-01 RX ORDER — L.ACID,PARA/B.BIFIDUM/S.THERM 8B CELL
1 CAPSULE ORAL DAILY
Status: DISCONTINUED | OUTPATIENT
Start: 2020-01-01 | End: 2020-01-01 | Stop reason: HOSPADM

## 2020-01-01 RX ORDER — AMLODIPINE BESYLATE 5 MG/1
5 TABLET ORAL 2 TIMES DAILY
Status: DISCONTINUED | OUTPATIENT
Start: 2020-01-01 | End: 2020-01-01 | Stop reason: HOSPADM

## 2020-01-01 RX ORDER — GABAPENTIN 300 MG/1
600 CAPSULE ORAL EVERY 6 HOURS
Status: DISCONTINUED | OUTPATIENT
Start: 2020-01-01 | End: 2020-01-01

## 2020-01-01 RX ORDER — SODIUM CHLORIDE 9 MG/ML
250 INJECTION, SOLUTION INTRAVENOUS ONCE
Status: COMPLETED | OUTPATIENT
Start: 2020-01-01 | End: 2020-01-01

## 2020-01-01 RX ORDER — ASCORBIC ACID 125 MG
125 TABLET,CHEWABLE ORAL 2 TIMES DAILY
COMMUNITY

## 2020-01-01 RX ORDER — NALOXONE HCL 0.4 MG/ML
0.4 VIAL (ML) INJECTION
Status: DISCONTINUED | OUTPATIENT
Start: 2020-01-01 | End: 2020-01-01 | Stop reason: HOSPADM

## 2020-01-01 RX ORDER — SACCHAROMYCES BOULARDII 250 MG
250 CAPSULE ORAL DAILY
COMMUNITY

## 2020-01-01 RX ORDER — DEXAMETHASONE 4 MG/1
4 TABLET ORAL EVERY 8 HOURS
Qty: 90 TABLET | Refills: 0 | Status: SHIPPED | OUTPATIENT
Start: 2020-01-01 | End: 2020-01-01

## 2020-01-01 RX ORDER — DEXTROSE MONOHYDRATE 50 MG/ML
250 INJECTION, SOLUTION INTRAVENOUS ONCE
Status: DISCONTINUED | OUTPATIENT
Start: 2020-01-01 | End: 2020-01-01 | Stop reason: HOSPADM

## 2020-01-01 RX ORDER — OXYCODONE HYDROCHLORIDE 30 MG/1
30 TABLET, FILM COATED, EXTENDED RELEASE ORAL EVERY 12 HOURS SCHEDULED
Qty: 60 TABLET | Refills: 0 | Status: ON HOLD | OUTPATIENT
Start: 2020-01-01 | End: 2020-01-01 | Stop reason: SDUPTHER

## 2020-01-01 RX ORDER — ONDANSETRON 4 MG/1
4 TABLET, FILM COATED ORAL EVERY 6 HOURS PRN
Status: DISCONTINUED | OUTPATIENT
Start: 2020-01-01 | End: 2020-01-01 | Stop reason: HOSPADM

## 2020-01-01 RX ORDER — CELECOXIB 200 MG/1
200 CAPSULE ORAL DAILY
COMMUNITY
End: 2020-01-01

## 2020-01-01 RX ORDER — CEPHALEXIN 500 MG/1
500 CAPSULE ORAL 4 TIMES DAILY
COMMUNITY
End: 2020-01-01 | Stop reason: HOSPADM

## 2020-01-01 RX ORDER — FENTANYL 12 UG/H
1 PATCH TRANSDERMAL
Status: DISCONTINUED | OUTPATIENT
Start: 2020-01-01 | End: 2020-01-01

## 2020-01-01 RX ORDER — OXYCODONE AND ACETAMINOPHEN 7.5; 325 MG/1; MG/1
1 TABLET ORAL EVERY 8 HOURS PRN
Qty: 90 TABLET | Refills: 0 | Status: SHIPPED | OUTPATIENT
Start: 2020-01-01 | End: 2020-01-01 | Stop reason: SDUPTHER

## 2020-01-01 RX ORDER — CAPECITABINE 500 MG/1
1000 TABLET, FILM COATED ORAL 2 TIMES DAILY
Qty: 56 TABLET | Refills: 5 | Status: SHIPPED | OUTPATIENT
Start: 2020-01-01 | End: 2020-01-01

## 2020-01-01 RX ORDER — MAGNESIUM SULFATE HEPTAHYDRATE 40 MG/ML
2 INJECTION, SOLUTION INTRAVENOUS AS NEEDED
Status: DISCONTINUED | OUTPATIENT
Start: 2020-01-01 | End: 2020-01-01 | Stop reason: HOSPADM

## 2020-01-01 RX ORDER — CALCIUM CARBONATE 750 MG/1
750 TABLET, CHEWABLE ORAL 2 TIMES DAILY PRN
Status: DISCONTINUED | OUTPATIENT
Start: 2020-01-01 | End: 2020-01-01 | Stop reason: HOSPADM

## 2020-01-01 RX ORDER — GABAPENTIN 300 MG/1
900 CAPSULE ORAL EVERY 8 HOURS SCHEDULED
Status: DISCONTINUED | OUTPATIENT
Start: 2020-01-01 | End: 2020-01-01 | Stop reason: HOSPADM

## 2020-01-01 RX ORDER — GABAPENTIN 300 MG/1
CAPSULE ORAL
COMMUNITY
End: 2020-01-01

## 2020-01-01 RX ORDER — GABAPENTIN 600 MG/1
900 TABLET ORAL 3 TIMES DAILY
COMMUNITY
Start: 2020-01-01

## 2020-01-01 RX ORDER — AMOXICILLIN 250 MG
2 CAPSULE ORAL 2 TIMES DAILY
Start: 2020-01-01

## 2020-01-01 RX ORDER — ACETAMINOPHEN 160 MG/5ML
650 SOLUTION ORAL EVERY 4 HOURS PRN
Status: DISCONTINUED | OUTPATIENT
Start: 2020-01-01 | End: 2020-01-01 | Stop reason: HOSPADM

## 2020-01-01 RX ORDER — OXYCODONE AND ACETAMINOPHEN 10; 325 MG/1; MG/1
1 TABLET ORAL EVERY 6 HOURS PRN
Qty: 120 TABLET | Refills: 0 | Status: SHIPPED | OUTPATIENT
Start: 2020-01-01

## 2020-01-01 RX ORDER — OXYCODONE HYDROCHLORIDE 30 MG/1
30 TABLET, FILM COATED, EXTENDED RELEASE ORAL EVERY 12 HOURS SCHEDULED
Qty: 60 TABLET | Refills: 0 | Status: SHIPPED | OUTPATIENT
Start: 2020-01-01

## 2020-01-01 RX ORDER — OXYCODONE AND ACETAMINOPHEN 10; 325 MG/1; MG/1
1 TABLET ORAL EVERY 6 HOURS PRN
Status: DISCONTINUED | OUTPATIENT
Start: 2020-01-01 | End: 2020-01-01 | Stop reason: HOSPADM

## 2020-01-01 RX ORDER — SODIUM CHLORIDE 0.9 % (FLUSH) 0.9 %
20 SYRINGE (ML) INJECTION AS NEEDED
Status: DISCONTINUED | OUTPATIENT
Start: 2020-01-01 | End: 2020-01-01 | Stop reason: HOSPADM

## 2020-01-01 RX ORDER — FENTANYL 12 UG/H
1 PATCH TRANSDERMAL
Qty: 10 PATCH | Refills: 0 | Status: SHIPPED | OUTPATIENT
Start: 2020-01-01 | End: 2020-01-01

## 2020-01-01 RX ORDER — CAPECITABINE 500 MG/1
500 TABLET, FILM COATED ORAL 2 TIMES DAILY
Qty: 28 TABLET | Refills: 7 | Status: SHIPPED | OUTPATIENT
Start: 2020-01-01 | End: 2020-01-01 | Stop reason: SDUPTHER

## 2020-01-01 RX ORDER — SODIUM CHLORIDE 9 MG/ML
100 INJECTION, SOLUTION INTRAVENOUS CONTINUOUS
Status: DISCONTINUED | OUTPATIENT
Start: 2020-01-01 | End: 2020-01-01

## 2020-01-01 RX ORDER — POTASSIUM CHLORIDE 7.45 MG/ML
10 INJECTION INTRAVENOUS
Status: DISCONTINUED | OUTPATIENT
Start: 2020-01-01 | End: 2020-01-01 | Stop reason: HOSPADM

## 2020-01-01 RX ORDER — ACETAMINOPHEN 650 MG/1
650 SUPPOSITORY RECTAL EVERY 4 HOURS PRN
Status: DISCONTINUED | OUTPATIENT
Start: 2020-01-01 | End: 2020-01-01 | Stop reason: HOSPADM

## 2020-01-01 RX ORDER — OXYCODONE HYDROCHLORIDE 15 MG/1
30 TABLET, FILM COATED, EXTENDED RELEASE ORAL EVERY 12 HOURS SCHEDULED
Status: DISCONTINUED | OUTPATIENT
Start: 2020-01-01 | End: 2020-01-01 | Stop reason: HOSPADM

## 2020-01-01 RX ORDER — ONDANSETRON 2 MG/ML
4 INJECTION INTRAMUSCULAR; INTRAVENOUS EVERY 6 HOURS PRN
Status: DISCONTINUED | OUTPATIENT
Start: 2020-01-01 | End: 2020-01-01 | Stop reason: HOSPADM

## 2020-01-01 RX ORDER — OXYCODONE AND ACETAMINOPHEN 7.5; 325 MG/1; MG/1
1 TABLET ORAL EVERY 6 HOURS PRN
COMMUNITY
End: 2020-01-01 | Stop reason: SDUPTHER

## 2020-01-01 RX ORDER — DIPHENOXYLATE HYDROCHLORIDE AND ATROPINE SULFATE 2.5; .025 MG/1; MG/1
1 TABLET ORAL EVERY 6 HOURS PRN
Qty: 30 TABLET | Refills: 5 | OUTPATIENT
Start: 2020-01-01 | End: 2020-01-01

## 2020-01-01 RX ORDER — OXYCODONE HYDROCHLORIDE 30 MG/1
30 TABLET ORAL 2 TIMES DAILY PRN
Qty: 60 TABLET | Refills: 0 | Status: CANCELLED | OUTPATIENT
Start: 2020-01-01

## 2020-01-01 RX ORDER — ONDANSETRON HYDROCHLORIDE 8 MG/1
8 TABLET, FILM COATED ORAL 3 TIMES DAILY PRN
Qty: 30 TABLET | Refills: 5 | Status: SHIPPED | OUTPATIENT
Start: 2020-01-01 | End: 2020-01-01 | Stop reason: SDUPTHER

## 2020-01-01 RX ORDER — LEVOFLOXACIN 750 MG/1
750 TABLET ORAL DAILY
Qty: 7 TABLET | Refills: 0 | Status: SHIPPED | OUTPATIENT
Start: 2020-01-01 | End: 2020-01-01 | Stop reason: SDUPTHER

## 2020-01-01 RX ORDER — PREDNISONE 10 MG/1
10 TABLET ORAL TAKE AS DIRECTED
Qty: 150 TABLET | Refills: 0 | Status: SHIPPED | OUTPATIENT
Start: 2020-01-01 | End: 2020-01-01

## 2020-01-01 RX ORDER — SODIUM CHLORIDE 0.9 % (FLUSH) 0.9 %
10 SYRINGE (ML) INJECTION EVERY 12 HOURS SCHEDULED
Status: DISCONTINUED | OUTPATIENT
Start: 2020-01-01 | End: 2020-01-01 | Stop reason: HOSPADM

## 2020-01-01 RX ORDER — ACETAMINOPHEN 500 MG
500 TABLET ORAL EVERY 6 HOURS PRN
COMMUNITY

## 2020-01-01 RX ORDER — FAMOTIDINE 10 MG/ML
20 INJECTION, SOLUTION INTRAVENOUS AS NEEDED
Status: DISCONTINUED | OUTPATIENT
Start: 2020-01-01 | End: 2020-01-01 | Stop reason: HOSPADM

## 2020-01-01 RX ORDER — MULTIPLE VITAMINS W/ MINERALS TAB 9MG-400MCG
1 TAB ORAL DAILY
Status: DISCONTINUED | OUTPATIENT
Start: 2020-01-01 | End: 2020-01-01 | Stop reason: HOSPADM

## 2020-01-01 RX ORDER — LEVOFLOXACIN 750 MG/1
750 TABLET ORAL ONCE
Status: COMPLETED | OUTPATIENT
Start: 2020-01-01 | End: 2020-01-01

## 2020-01-01 RX ORDER — AMLODIPINE BESYLATE 5 MG/1
5 TABLET ORAL 2 TIMES DAILY
COMMUNITY

## 2020-01-01 RX ORDER — POLYETHYLENE GLYCOL 3350 17 G/17G
17 POWDER, FOR SOLUTION ORAL DAILY PRN
Start: 2020-01-01

## 2020-01-01 RX ORDER — BISACODYL 10 MG
10 SUPPOSITORY, RECTAL RECTAL DAILY PRN
Status: DISCONTINUED | OUTPATIENT
Start: 2020-01-01 | End: 2020-01-01 | Stop reason: HOSPADM

## 2020-01-01 RX ORDER — MAGNESIUM SULFATE HEPTAHYDRATE 40 MG/ML
4 INJECTION, SOLUTION INTRAVENOUS AS NEEDED
Status: DISCONTINUED | OUTPATIENT
Start: 2020-01-01 | End: 2020-01-01 | Stop reason: HOSPADM

## 2020-01-01 RX ORDER — FENTANYL 12 UG/H
1 PATCH TRANSDERMAL
Qty: 10 PATCH | Refills: 0 | Status: SHIPPED | OUTPATIENT
Start: 2020-01-01 | End: 2020-01-01 | Stop reason: SDUPTHER

## 2020-01-01 RX ORDER — POTASSIUM CHLORIDE 750 MG/1
40 CAPSULE, EXTENDED RELEASE ORAL AS NEEDED
Status: DISCONTINUED | OUTPATIENT
Start: 2020-01-01 | End: 2020-01-01 | Stop reason: HOSPADM

## 2020-01-01 RX ORDER — CAPECITABINE 500 MG/1
1000 TABLET, FILM COATED ORAL 2 TIMES DAILY
Status: DISCONTINUED | OUTPATIENT
Start: 2020-01-01 | End: 2020-01-01

## 2020-01-01 RX ORDER — HYDROMORPHONE HYDROCHLORIDE 2 MG/1
TABLET ORAL
Qty: 240 TABLET | Refills: 0 | Status: SHIPPED | OUTPATIENT
Start: 2020-01-01

## 2020-01-01 RX ORDER — LACTULOSE 10 G/15ML
SOLUTION ORAL
COMMUNITY
Start: 2020-01-01 | End: 2020-01-01 | Stop reason: SDUPTHER

## 2020-01-01 RX ORDER — OXYCODONE HYDROCHLORIDE 15 MG/1
TABLET ORAL
COMMUNITY
Start: 2020-01-01 | End: 2020-01-01

## 2020-01-01 RX ORDER — POLYETHYLENE GLYCOL 3350 17 G/17G
17 POWDER, FOR SOLUTION ORAL DAILY PRN
Status: DISCONTINUED | OUTPATIENT
Start: 2020-01-01 | End: 2020-01-01 | Stop reason: HOSPADM

## 2020-01-01 RX ORDER — FAMOTIDINE 20 MG/1
40 TABLET, FILM COATED ORAL DAILY
Status: DISCONTINUED | OUTPATIENT
Start: 2020-01-01 | End: 2020-01-01 | Stop reason: HOSPADM

## 2020-01-01 RX ADMIN — VANCOMYCIN HYDROCHLORIDE 1500 MG: 100 INJECTION, POWDER, LYOPHILIZED, FOR SOLUTION INTRAVENOUS at 16:37

## 2020-01-01 RX ADMIN — GABAPENTIN 900 MG: 300 CAPSULE ORAL at 22:34

## 2020-01-01 RX ADMIN — OXYCODONE HYDROCHLORIDE 15 MG: 15 TABLET, FILM COATED, EXTENDED RELEASE ORAL at 23:00

## 2020-01-01 RX ADMIN — VANCOMYCIN HYDROCHLORIDE 1250 MG: 10 INJECTION, POWDER, LYOPHILIZED, FOR SOLUTION INTRAVENOUS at 18:55

## 2020-01-01 RX ADMIN — HEPARIN 500 UNITS: 100 SYRINGE at 13:45

## 2020-01-01 RX ADMIN — GABAPENTIN 900 MG: 300 CAPSULE ORAL at 20:14

## 2020-01-01 RX ADMIN — OXYCODONE HYDROCHLORIDE AND ACETAMINOPHEN 1 TABLET: 10; 325 TABLET ORAL at 09:14

## 2020-01-01 RX ADMIN — DEXAMETHASONE SODIUM PHOSPHATE 12 MG: 4 INJECTION, SOLUTION INTRA-ARTICULAR; INTRALESIONAL; INTRAMUSCULAR; INTRAVENOUS; SOFT TISSUE at 10:28

## 2020-01-01 RX ADMIN — AMLODIPINE BESYLATE 5 MG: 5 TABLET ORAL at 08:39

## 2020-01-01 RX ADMIN — GABAPENTIN 900 MG: 300 CAPSULE ORAL at 21:22

## 2020-01-01 RX ADMIN — AMLODIPINE BESYLATE 5 MG: 5 TABLET ORAL at 09:30

## 2020-01-01 RX ADMIN — HEPARIN 500 UNITS: 100 SYRINGE at 10:40

## 2020-01-01 RX ADMIN — GABAPENTIN 900 MG: 300 CAPSULE ORAL at 05:42

## 2020-01-01 RX ADMIN — PALONOSETRON 0.25 MG: 0.25 INJECTION, SOLUTION INTRAVENOUS at 09:36

## 2020-01-01 RX ADMIN — DEXAMETHASONE SODIUM PHOSPHATE 12 MG: 4 INJECTION, SOLUTION INTRAMUSCULAR; INTRAVENOUS at 09:30

## 2020-01-01 RX ADMIN — BEVACIZUMAB-AWWB 500 MG: 400 INJECTION, SOLUTION INTRAVENOUS at 10:50

## 2020-01-01 RX ADMIN — MULTIPLE VITAMINS W/ MINERALS TAB 1 TABLET: TAB at 09:14

## 2020-01-01 RX ADMIN — DEXAMETHASONE SODIUM PHOSPHATE 12 MG: 4 INJECTION, SOLUTION INTRAMUSCULAR; INTRAVENOUS at 10:20

## 2020-01-01 RX ADMIN — HYDROMORPHONE HYDROCHLORIDE 1 MG: 1 INJECTION, SOLUTION INTRAMUSCULAR; INTRAVENOUS; SUBCUTANEOUS at 05:57

## 2020-01-01 RX ADMIN — OXYCODONE HYDROCHLORIDE AND ACETAMINOPHEN 1 TABLET: 10; 325 TABLET ORAL at 16:55

## 2020-01-01 RX ADMIN — SODIUM CHLORIDE, PRESERVATIVE FREE 10 ML: 5 INJECTION INTRAVENOUS at 20:15

## 2020-01-01 RX ADMIN — HEPARIN 500 UNITS: 100 SYRINGE at 14:05

## 2020-01-01 RX ADMIN — SODIUM CHLORIDE, PRESERVATIVE FREE 10 ML: 5 INJECTION INTRAVENOUS at 13:33

## 2020-01-01 RX ADMIN — FAMOTIDINE 40 MG: 20 TABLET, FILM COATED ORAL at 09:30

## 2020-01-01 RX ADMIN — PALONOSETRON 0.25 MG: 0.25 INJECTION, SOLUTION INTRAVENOUS at 10:30

## 2020-01-01 RX ADMIN — OXALIPLATIN 135 MG: 5 INJECTION, SOLUTION, CONCENTRATE INTRAVENOUS at 11:55

## 2020-01-01 RX ADMIN — OXYCODONE HYDROCHLORIDE 30 MG: 15 TABLET, FILM COATED, EXTENDED RELEASE ORAL at 08:39

## 2020-01-01 RX ADMIN — SODIUM CHLORIDE, PRESERVATIVE FREE 10 ML: 5 INJECTION INTRAVENOUS at 11:29

## 2020-01-01 RX ADMIN — HYDROMORPHONE HYDROCHLORIDE 1 MG: 1 INJECTION, SOLUTION INTRAMUSCULAR; INTRAVENOUS; SUBCUTANEOUS at 21:48

## 2020-01-01 RX ADMIN — SODIUM CHLORIDE, PRESERVATIVE FREE 10 ML: 5 INJECTION INTRAVENOUS at 08:39

## 2020-01-01 RX ADMIN — HEPARIN 500 UNITS: 100 SYRINGE at 14:00

## 2020-01-01 RX ADMIN — FAMOTIDINE 40 MG: 20 TABLET, FILM COATED ORAL at 08:39

## 2020-01-01 RX ADMIN — GABAPENTIN 900 MG: 300 CAPSULE ORAL at 13:02

## 2020-01-01 RX ADMIN — SODIUM CHLORIDE 250 ML: 9 INJECTION, SOLUTION INTRAVENOUS at 10:30

## 2020-01-01 RX ADMIN — SODIUM CHLORIDE 250 ML: 9 INJECTION, SOLUTION INTRAVENOUS at 09:30

## 2020-01-01 RX ADMIN — DEXAMETHASONE SODIUM PHOSPHATE 12 MG: 4 INJECTION, SOLUTION INTRA-ARTICULAR; INTRALESIONAL; INTRAMUSCULAR; INTRAVENOUS; SOFT TISSUE at 10:31

## 2020-01-01 RX ADMIN — DOCUSATE SODIUM 50 MG AND SENNOSIDES 8.6 MG 2 TABLET: 8.6; 5 TABLET, FILM COATED ORAL at 20:05

## 2020-01-01 RX ADMIN — OXALIPLATIN 180 MG: 5 INJECTION, SOLUTION, CONCENTRATE INTRAVENOUS at 11:47

## 2020-01-01 RX ADMIN — DEXTROSE MONOHYDRATE 250 ML: 50 INJECTION, SOLUTION INTRAVENOUS at 11:53

## 2020-01-01 RX ADMIN — VANCOMYCIN HYDROCHLORIDE 1250 MG: 10 INJECTION, POWDER, LYOPHILIZED, FOR SOLUTION INTRAVENOUS at 18:06

## 2020-01-01 RX ADMIN — MAGNESIUM OXIDE TAB 400 MG (241.3 MG ELEMENTAL MG) 400 MG: 400 (241.3 MG) TAB at 13:02

## 2020-01-01 RX ADMIN — OXALIPLATIN 135 MG: 5 INJECTION, SOLUTION INTRAVENOUS at 11:39

## 2020-01-01 RX ADMIN — OXYCODONE HYDROCHLORIDE AND ACETAMINOPHEN 1 TABLET: 10; 325 TABLET ORAL at 05:52

## 2020-01-01 RX ADMIN — DOCUSATE SODIUM 50 MG AND SENNOSIDES 8.6 MG 2 TABLET: 8.6; 5 TABLET, FILM COATED ORAL at 22:06

## 2020-01-01 RX ADMIN — OXYCODONE HYDROCHLORIDE 30 MG: 15 TABLET, FILM COATED, EXTENDED RELEASE ORAL at 09:30

## 2020-01-01 RX ADMIN — SODIUM CHLORIDE 250 ML: 9 INJECTION, SOLUTION INTRAVENOUS at 10:10

## 2020-01-01 RX ADMIN — SODIUM CHLORIDE, PRESERVATIVE FREE 10 ML: 5 INJECTION INTRAVENOUS at 04:52

## 2020-01-01 RX ADMIN — OXYCODONE HYDROCHLORIDE 15 MG: 15 TABLET, FILM COATED, EXTENDED RELEASE ORAL at 22:33

## 2020-01-01 RX ADMIN — DEXTROSE MONOHYDRATE 250 ML: 50 INJECTION, SOLUTION INTRAVENOUS at 11:46

## 2020-01-01 RX ADMIN — FLUDEOXYGLUCOSE F18 1 DOSE: 300 INJECTION INTRAVENOUS at 09:24

## 2020-01-01 RX ADMIN — DEXTROSE MONOHYDRATE 250 ML: 50 INJECTION, SOLUTION INTRAVENOUS at 11:38

## 2020-01-01 RX ADMIN — FLUDEOXYGLUCOSE F18 1 DOSE: 300 INJECTION INTRAVENOUS at 09:49

## 2020-01-01 RX ADMIN — CEFTRIAXONE SODIUM 2 G: 2 INJECTION, POWDER, FOR SOLUTION INTRAMUSCULAR; INTRAVENOUS at 16:56

## 2020-01-01 RX ADMIN — CEFTRIAXONE SODIUM 2 G: 2 INJECTION, POWDER, FOR SOLUTION INTRAMUSCULAR; INTRAVENOUS at 18:19

## 2020-01-01 RX ADMIN — GABAPENTIN 900 MG: 300 CAPSULE ORAL at 05:29

## 2020-01-01 RX ADMIN — DOCUSATE SODIUM 50 MG AND SENNOSIDES 8.6 MG 2 TABLET: 8.6; 5 TABLET, FILM COATED ORAL at 08:38

## 2020-01-01 RX ADMIN — DOCUSATE SODIUM 50 MG AND SENNOSIDES 8.6 MG 2 TABLET: 8.6; 5 TABLET, FILM COATED ORAL at 20:14

## 2020-01-01 RX ADMIN — Medication 1 CAPSULE: at 09:14

## 2020-01-01 RX ADMIN — POTASSIUM CHLORIDE 40 MEQ: 10 CAPSULE, COATED, EXTENDED RELEASE ORAL at 16:55

## 2020-01-01 RX ADMIN — SODIUM CHLORIDE, PRESERVATIVE FREE 20 ML: 5 INJECTION INTRAVENOUS at 14:02

## 2020-01-01 RX ADMIN — Medication 1 CAPSULE: at 09:30

## 2020-01-01 RX ADMIN — OXYCODONE HYDROCHLORIDE AND ACETAMINOPHEN 1 TABLET: 10; 325 TABLET ORAL at 16:34

## 2020-01-01 RX ADMIN — AMLODIPINE BESYLATE 5 MG: 5 TABLET ORAL at 20:14

## 2020-01-01 RX ADMIN — DOCUSATE SODIUM 50 MG AND SENNOSIDES 8.6 MG 2 TABLET: 8.6; 5 TABLET, FILM COATED ORAL at 09:30

## 2020-01-01 RX ADMIN — VANCOMYCIN HYDROCHLORIDE 1500 MG: 100 INJECTION, POWDER, LYOPHILIZED, FOR SOLUTION INTRAVENOUS at 05:42

## 2020-01-01 RX ADMIN — DOCUSATE SODIUM 50 MG AND SENNOSIDES 8.6 MG 2 TABLET: 8.6; 5 TABLET, FILM COATED ORAL at 09:14

## 2020-01-01 RX ADMIN — HEPARIN 500 UNITS: 100 SYRINGE at 11:29

## 2020-01-01 RX ADMIN — HEPARIN 500 UNITS: 100 SYRINGE at 14:02

## 2020-01-01 RX ADMIN — GABAPENTIN 900 MG: 300 CAPSULE ORAL at 06:10

## 2020-01-01 RX ADMIN — HYDROMORPHONE HYDROCHLORIDE 1 MG: 1 INJECTION, SOLUTION INTRAMUSCULAR; INTRAVENOUS; SUBCUTANEOUS at 04:52

## 2020-01-01 RX ADMIN — OXYCODONE HYDROCHLORIDE 30 MG: 15 TABLET, FILM COATED, EXTENDED RELEASE ORAL at 20:14

## 2020-01-01 RX ADMIN — CEFTRIAXONE SODIUM 2 G: 2 INJECTION, POWDER, FOR SOLUTION INTRAMUSCULAR; INTRAVENOUS at 16:37

## 2020-01-01 RX ADMIN — HYDROMORPHONE HYDROCHLORIDE 1 MG: 1 INJECTION, SOLUTION INTRAMUSCULAR; INTRAVENOUS; SUBCUTANEOUS at 22:35

## 2020-01-01 RX ADMIN — VANCOMYCIN HYDROCHLORIDE 1250 MG: 10 INJECTION, POWDER, LYOPHILIZED, FOR SOLUTION INTRAVENOUS at 06:06

## 2020-01-01 RX ADMIN — PALONOSETRON 0.25 MG: 0.25 INJECTION, SOLUTION INTRAVENOUS at 09:32

## 2020-01-01 RX ADMIN — AMLODIPINE BESYLATE 5 MG: 5 TABLET ORAL at 20:05

## 2020-01-01 RX ADMIN — SODIUM CHLORIDE, PRESERVATIVE FREE 10 ML: 5 INJECTION INTRAVENOUS at 09:30

## 2020-01-01 RX ADMIN — VANCOMYCIN HYDROCHLORIDE 1500 MG: 100 INJECTION, POWDER, LYOPHILIZED, FOR SOLUTION INTRAVENOUS at 06:10

## 2020-01-01 RX ADMIN — HEPARIN 500 UNITS: 100 SYRINGE at 13:33

## 2020-01-01 RX ADMIN — HEPARIN 500 UNITS: 100 SYRINGE at 13:15

## 2020-01-01 RX ADMIN — SODIUM CHLORIDE 250 ML: 9 INJECTION, SOLUTION INTRAVENOUS at 10:26

## 2020-01-01 RX ADMIN — OXALIPLATIN 180 MG: 5 INJECTION, SOLUTION, CONCENTRATE INTRAVENOUS at 11:55

## 2020-01-01 RX ADMIN — SODIUM CHLORIDE 1000 ML: 9 INJECTION, SOLUTION INTRAVENOUS at 17:36

## 2020-01-01 RX ADMIN — DEXTROSE MONOHYDRATE 250 ML: 50 INJECTION, SOLUTION INTRAVENOUS at 11:57

## 2020-01-01 RX ADMIN — OXYCODONE HYDROCHLORIDE AND ACETAMINOPHEN 1 TABLET: 10; 325 TABLET ORAL at 11:11

## 2020-01-01 RX ADMIN — PALONOSETRON 0.25 MG: 0.25 INJECTION, SOLUTION INTRAVENOUS at 10:10

## 2020-01-01 RX ADMIN — AMLODIPINE BESYLATE 5 MG: 5 TABLET ORAL at 22:06

## 2020-01-01 RX ADMIN — PALONOSETRON 0.25 MG: 0.25 INJECTION, SOLUTION INTRAVENOUS at 10:20

## 2020-01-01 RX ADMIN — GABAPENTIN 900 MG: 300 CAPSULE ORAL at 13:39

## 2020-01-01 RX ADMIN — Medication 1 CAPSULE: at 08:39

## 2020-01-01 RX ADMIN — AMLODIPINE BESYLATE 5 MG: 5 TABLET ORAL at 09:15

## 2020-01-01 RX ADMIN — GABAPENTIN 900 MG: 300 CAPSULE ORAL at 13:22

## 2020-01-01 RX ADMIN — DEXAMETHASONE SODIUM PHOSPHATE 12 MG: 4 INJECTION, SOLUTION INTRAMUSCULAR; INTRAVENOUS at 10:14

## 2020-01-01 RX ADMIN — BEVACIZUMAB-AWWB 500 MG: 400 INJECTION, SOLUTION INTRAVENOUS at 10:33

## 2020-01-01 RX ADMIN — HYDROMORPHONE HYDROCHLORIDE 1 MG: 1 INJECTION, SOLUTION INTRAMUSCULAR; INTRAVENOUS; SUBCUTANEOUS at 20:15

## 2020-01-01 RX ADMIN — MULTIPLE VITAMINS W/ MINERALS TAB 1 TABLET: TAB at 09:30

## 2020-01-01 RX ADMIN — PALONOSETRON 0.25 MG: 0.25 INJECTION, SOLUTION INTRAVENOUS at 10:27

## 2020-01-01 RX ADMIN — OXALIPLATIN 135 MG: 5 INJECTION, SOLUTION INTRAVENOUS at 11:24

## 2020-01-01 RX ADMIN — LEVOFLOXACIN 750 MG: 750 TABLET, FILM COATED ORAL at 13:40

## 2020-01-01 RX ADMIN — BEVACIZUMAB-AWWB 500 MG: 400 INJECTION, SOLUTION INTRAVENOUS at 11:12

## 2020-01-01 RX ADMIN — DEXAMETHASONE SODIUM PHOSPHATE 12 MG: 4 INJECTION, SOLUTION INTRA-ARTICULAR; INTRALESIONAL; INTRAMUSCULAR; INTRAVENOUS; SOFT TISSUE at 09:40

## 2020-01-01 RX ADMIN — MULTIPLE VITAMINS W/ MINERALS TAB 1 TABLET: TAB at 08:38

## 2020-01-01 RX ADMIN — HYDROMORPHONE HYDROCHLORIDE 1 MG: 1 INJECTION, SOLUTION INTRAMUSCULAR; INTRAVENOUS; SUBCUTANEOUS at 22:05

## 2020-01-01 RX ADMIN — FAMOTIDINE 40 MG: 20 TABLET, FILM COATED ORAL at 09:14

## 2020-01-01 RX ADMIN — SODIUM CHLORIDE 125 ML/HR: 9 INJECTION, SOLUTION INTRAVENOUS at 19:45

## 2020-01-01 RX ADMIN — OXALIPLATIN 135 MG: 5 INJECTION, SOLUTION, CONCENTRATE INTRAVENOUS at 10:59

## 2020-01-01 RX ADMIN — BEVACIZUMAB-AWWB 500 MG: 400 INJECTION, SOLUTION INTRAVENOUS at 10:01

## 2020-01-01 RX ADMIN — OXYCODONE HYDROCHLORIDE 30 MG: 15 TABLET, FILM COATED, EXTENDED RELEASE ORAL at 09:14

## 2020-01-01 RX ADMIN — OXYCODONE HYDROCHLORIDE 30 MG: 15 TABLET, FILM COATED, EXTENDED RELEASE ORAL at 20:05

## 2020-01-01 RX ADMIN — BEVACIZUMAB-AWWB 500 MG: 400 INJECTION, SOLUTION INTRAVENOUS at 10:15

## 2020-01-01 RX ADMIN — HYDROMORPHONE HYDROCHLORIDE 1 MG: 1 INJECTION, SOLUTION INTRAMUSCULAR; INTRAVENOUS; SUBCUTANEOUS at 05:17

## 2020-01-01 RX ADMIN — POTASSIUM CHLORIDE 40 MEQ: 10 CAPSULE, COATED, EXTENDED RELEASE ORAL at 11:19

## 2020-01-07 ENCOUNTER — HOSPITAL ENCOUNTER (OUTPATIENT)
Dept: ONCOLOGY | Facility: HOSPITAL | Age: 60
Setting detail: INFUSION SERIES
Discharge: HOME | End: 2020-01-07

## 2020-01-07 VITALS
DIASTOLIC BLOOD PRESSURE: 89 MMHG | HEART RATE: 64 BPM | RESPIRATION RATE: 20 BRPM | WEIGHT: 158 LBS | SYSTOLIC BLOOD PRESSURE: 126 MMHG | BODY MASS INDEX: 25.39 KG/M2 | TEMPERATURE: 96.8 F | HEIGHT: 66 IN

## 2020-01-07 DIAGNOSIS — C18.9 COLON CANCER METASTASIZED TO BONE (HCC): ICD-10-CM

## 2020-01-07 DIAGNOSIS — C79.51 BONE METASTASIS: ICD-10-CM

## 2020-01-07 DIAGNOSIS — C79.51 COLON CANCER METASTASIZED TO BONE (HCC): ICD-10-CM

## 2020-01-07 DIAGNOSIS — R91.1 SOLITARY LUNG NODULE: ICD-10-CM

## 2020-01-07 DIAGNOSIS — C18.7 MALIGNANT NEOPLASM OF SIGMOID COLON (HCC): Primary | ICD-10-CM

## 2020-01-07 LAB
ALBUMIN SERPL-MCNC: 4.4 G/DL (ref 3.5–5.2)
ALBUMIN/GLOB SERPL: 1.2 G/DL
ALP SERPL-CCNC: 59 U/L (ref 39–117)
ALT SERPL W P-5'-P-CCNC: 13 U/L (ref 1–41)
ANION GAP SERPL CALCULATED.3IONS-SCNC: 12 MMOL/L (ref 5–15)
AST SERPL-CCNC: 24 U/L (ref 1–40)
BILIRUB SERPL-MCNC: 0.6 MG/DL (ref 0.2–1.2)
BILIRUB UR QL STRIP: NEGATIVE
BUN BLD-MCNC: 8 MG/DL (ref 6–20)
BUN/CREAT SERPL: 11.1 (ref 7–25)
CALCIUM SPEC-SCNC: 8.9 MG/DL (ref 8.6–10.5)
CEA SERPL-MCNC: 5.55 NG/ML
CHLORIDE SERPL-SCNC: 100 MMOL/L (ref 98–107)
CLARITY UR: CLEAR
CO2 SERPL-SCNC: 22 MMOL/L (ref 22–29)
COLOR UR: YELLOW
CREAT BLD-MCNC: 0.72 MG/DL (ref 0.76–1.27)
CREAT BLDA-MCNC: 0.7 MG/DL (ref 0.6–1.3)
CREAT SERPL-MCNC: 0.7 MG/DL
ERYTHROCYTE [DISTWIDTH] IN BLOOD BY AUTOMATED COUNT: 24.7 % (ref 12.3–15.4)
GFR SERPL CREATININE-BSD FRML MDRD: 113 ML/MIN/1.73
GFR SERPL CREATININE-BSD FRML MDRD: 136 ML/MIN/1.73
GLOBULIN UR ELPH-MCNC: 3.6 GM/DL
GLUCOSE BLD-MCNC: 102 MG/DL (ref 65–99)
GLUCOSE UR STRIP-MCNC: NEGATIVE MG/DL
HCT VFR BLD AUTO: 41 % (ref 37.5–51)
HGB BLD-MCNC: 13 G/DL (ref 13–17.7)
HGB UR QL STRIP.AUTO: NEGATIVE
KETONES UR QL STRIP: NEGATIVE
LEUKOCYTE ESTERASE UR QL STRIP.AUTO: NEGATIVE
LYMPHOCYTES # BLD AUTO: 1.6 10*3/MM3 (ref 0.7–3.1)
LYMPHOCYTES NFR BLD AUTO: 29.3 % (ref 19.6–45.3)
MCH RBC QN AUTO: 25.2 PG (ref 26.6–33)
MCHC RBC AUTO-ENTMCNC: 31.7 G/DL (ref 31.5–35.7)
MCV RBC AUTO: 79.6 FL (ref 79–97)
MONOCYTES # BLD AUTO: 0.2 10*3/MM3 (ref 0.1–0.9)
MONOCYTES NFR BLD AUTO: 3 % (ref 5–12)
NEUTROPHILS # BLD AUTO: 3.8 10*3/MM3 (ref 1.7–7)
NEUTROPHILS NFR BLD AUTO: 67.7 % (ref 42.7–76)
NITRITE UR QL STRIP: NEGATIVE
PH UR STRIP.AUTO: 6 [PH] (ref 5–8)
PLATELET # BLD AUTO: 160 10*3/MM3 (ref 140–450)
PMV BLD AUTO: 7.5 FL (ref 6–12)
POTASSIUM BLD-SCNC: 4.1 MMOL/L (ref 3.5–5.2)
PROT SERPL-MCNC: 8 G/DL (ref 6–8.5)
PROT UR QL STRIP: NEGATIVE
RBC # BLD AUTO: 5.14 10*6/MM3 (ref 4.14–5.8)
SODIUM BLD-SCNC: 134 MMOL/L (ref 136–145)
SP GR UR STRIP: 1 (ref 1–1.03)
UROBILINOGEN UR QL STRIP: NORMAL
WBC NRBC COR # BLD: 5.6 10*3/MM3 (ref 3.4–10.8)

## 2020-01-07 PROCEDURE — 81003 URINALYSIS AUTO W/O SCOPE: CPT | Performed by: INTERNAL MEDICINE

## 2020-01-07 PROCEDURE — 85025 COMPLETE CBC W/AUTO DIFF WBC: CPT | Performed by: INTERNAL MEDICINE

## 2020-01-07 PROCEDURE — 25010000002 BEVACIZUMAB PER 10 MG: Performed by: INTERNAL MEDICINE

## 2020-01-07 PROCEDURE — 82378 CARCINOEMBRYONIC ANTIGEN: CPT | Performed by: INTERNAL MEDICINE

## 2020-01-07 PROCEDURE — 96413 CHEMO IV INFUSION 1 HR: CPT

## 2020-01-07 PROCEDURE — 80053 COMPREHEN METABOLIC PANEL: CPT | Performed by: INTERNAL MEDICINE

## 2020-01-07 PROCEDURE — 82565 ASSAY OF CREATININE: CPT

## 2020-01-07 PROCEDURE — 25010000002 BEVACIZUMAB 100 MG/4ML SOLUTION 4 ML VIAL: Performed by: INTERNAL MEDICINE

## 2020-01-07 RX ORDER — SODIUM CHLORIDE 0.9 % (FLUSH) 0.9 %
20 SYRINGE (ML) INJECTION AS NEEDED
Status: CANCELLED | OUTPATIENT
Start: 2020-01-07

## 2020-01-07 RX ORDER — HEPARIN SODIUM (PORCINE) LOCK FLUSH IV SOLN 100 UNIT/ML 100 UNIT/ML
500 SOLUTION INTRAVENOUS AS NEEDED
Status: CANCELLED | OUTPATIENT
Start: 2020-01-07

## 2020-01-07 RX ORDER — SODIUM CHLORIDE 0.9 % (FLUSH) 0.9 %
10 SYRINGE (ML) INJECTION AS NEEDED
Status: CANCELLED | OUTPATIENT
Start: 2020-01-07

## 2020-01-07 RX ORDER — SODIUM CHLORIDE 9 MG/ML
250 INJECTION, SOLUTION INTRAVENOUS ONCE
Status: DISCONTINUED | OUTPATIENT
Start: 2020-01-07 | End: 2020-01-08 | Stop reason: HOSPADM

## 2020-01-07 RX ADMIN — HEPARIN 500 UNITS: 100 SYRINGE at 10:57

## 2020-01-07 RX ADMIN — BEVACIZUMAB 500 MG: 400 INJECTION, SOLUTION INTRAVENOUS at 10:19

## 2020-02-05 ENCOUNTER — HOSPITAL ENCOUNTER (OUTPATIENT)
Dept: ONCOLOGY | Facility: HOSPITAL | Age: 60
Setting detail: INFUSION SERIES
Discharge: HOME | End: 2020-02-05

## 2020-02-05 ENCOUNTER — SPECIALTY PHARMACY (OUTPATIENT)
Dept: ONCOLOGY | Facility: HOSPITAL | Age: 60
End: 2020-02-05

## 2020-02-05 VITALS
SYSTOLIC BLOOD PRESSURE: 140 MMHG | BODY MASS INDEX: 25.71 KG/M2 | HEART RATE: 81 BPM | WEIGHT: 160 LBS | RESPIRATION RATE: 18 BRPM | TEMPERATURE: 97.3 F | HEIGHT: 66 IN | DIASTOLIC BLOOD PRESSURE: 79 MMHG

## 2020-02-05 DIAGNOSIS — C18.9 COLON CANCER METASTASIZED TO BONE (HCC): Primary | ICD-10-CM

## 2020-02-05 DIAGNOSIS — C79.51 BONE METASTASIS: ICD-10-CM

## 2020-02-05 DIAGNOSIS — C18.7 MALIGNANT NEOPLASM OF SIGMOID COLON (HCC): ICD-10-CM

## 2020-02-05 DIAGNOSIS — C79.51 COLON CANCER METASTASIZED TO BONE (HCC): Primary | ICD-10-CM

## 2020-02-05 DIAGNOSIS — R91.1 SOLITARY LUNG NODULE: ICD-10-CM

## 2020-02-05 LAB
ALBUMIN SERPL-MCNC: 4.5 G/DL (ref 3.5–5.2)
ALBUMIN/GLOB SERPL: 1.3 G/DL
ALP SERPL-CCNC: 59 U/L (ref 39–117)
ALT SERPL W P-5'-P-CCNC: 14 U/L (ref 1–41)
ANION GAP SERPL CALCULATED.3IONS-SCNC: 9 MMOL/L (ref 5–15)
AST SERPL-CCNC: 21 U/L (ref 1–40)
BILIRUB SERPL-MCNC: 0.4 MG/DL (ref 0.2–1.2)
BILIRUB UR QL STRIP: NEGATIVE
BUN BLD-MCNC: 11 MG/DL (ref 6–20)
BUN/CREAT SERPL: 16.2 (ref 7–25)
CALCIUM SPEC-SCNC: 9 MG/DL (ref 8.6–10.5)
CEA SERPL-MCNC: 5.23 NG/ML
CHLORIDE SERPL-SCNC: 103 MMOL/L (ref 98–107)
CLARITY UR: CLEAR
CO2 SERPL-SCNC: 25 MMOL/L (ref 22–29)
COLOR UR: YELLOW
CREAT BLD-MCNC: 0.68 MG/DL (ref 0.76–1.27)
CREAT BLDA-MCNC: 0.6 MG/DL
ERYTHROCYTE [DISTWIDTH] IN BLOOD BY AUTOMATED COUNT: 24.2 % (ref 12.3–15.4)
GFR SERPL CREATININE-BSD FRML MDRD: 120 ML/MIN/1.73
GFR SERPL CREATININE-BSD FRML MDRD: 146 ML/MIN/1.73
GLOBULIN UR ELPH-MCNC: 3.6 GM/DL
GLUCOSE BLD-MCNC: 105 MG/DL (ref 65–99)
GLUCOSE UR STRIP-MCNC: NEGATIVE MG/DL
HCT VFR BLD AUTO: 40.5 % (ref 37.5–51)
HGB BLD-MCNC: 12.7 G/DL (ref 13–17.7)
HGB UR QL STRIP.AUTO: NEGATIVE
KETONES UR QL STRIP: NEGATIVE
LEUKOCYTE ESTERASE UR QL STRIP.AUTO: NEGATIVE
LYMPHOCYTES # BLD AUTO: 1.7 10*3/MM3 (ref 0.7–3.1)
LYMPHOCYTES NFR BLD AUTO: 28.9 % (ref 19.6–45.3)
MCH RBC QN AUTO: 24.3 PG (ref 26.6–33)
MCHC RBC AUTO-ENTMCNC: 31.4 G/DL (ref 31.5–35.7)
MCV RBC AUTO: 77.4 FL (ref 79–97)
MONOCYTES # BLD AUTO: 0.3 10*3/MM3 (ref 0.1–0.9)
MONOCYTES NFR BLD AUTO: 5.9 % (ref 5–12)
NEUTROPHILS # BLD AUTO: 3.8 10*3/MM3 (ref 1.7–7)
NEUTROPHILS NFR BLD AUTO: 65.2 % (ref 42.7–76)
NITRITE UR QL STRIP: NEGATIVE
PH UR STRIP.AUTO: 6 [PH] (ref 5–8)
PLATELET # BLD AUTO: 166 10*3/MM3 (ref 140–450)
PMV BLD AUTO: 7.8 FL (ref 6–12)
POTASSIUM BLD-SCNC: 4.3 MMOL/L (ref 3.5–5.2)
PROT SERPL-MCNC: 8.1 G/DL (ref 6–8.5)
PROT UR QL STRIP: NEGATIVE
RBC # BLD AUTO: 5.23 10*6/MM3 (ref 4.14–5.8)
SODIUM BLD-SCNC: 137 MMOL/L (ref 136–145)
SP GR UR STRIP: 1 (ref 1–1.03)
UROBILINOGEN UR QL STRIP: NORMAL
WBC NRBC COR # BLD: 5.8 10*3/MM3 (ref 3.4–10.8)

## 2020-02-05 PROCEDURE — 80053 COMPREHEN METABOLIC PANEL: CPT | Performed by: INTERNAL MEDICINE

## 2020-02-05 PROCEDURE — 25010000002 BEVACIZUMAB PER 10 MG: Performed by: INTERNAL MEDICINE

## 2020-02-05 PROCEDURE — 96413 CHEMO IV INFUSION 1 HR: CPT

## 2020-02-05 PROCEDURE — 81003 URINALYSIS AUTO W/O SCOPE: CPT | Performed by: INTERNAL MEDICINE

## 2020-02-05 PROCEDURE — 85025 COMPLETE CBC W/AUTO DIFF WBC: CPT | Performed by: INTERNAL MEDICINE

## 2020-02-05 PROCEDURE — 25010000002 BEVACIZUMAB 100 MG/4ML SOLUTION 4 ML VIAL: Performed by: INTERNAL MEDICINE

## 2020-02-05 PROCEDURE — 82565 ASSAY OF CREATININE: CPT

## 2020-02-05 PROCEDURE — 82378 CARCINOEMBRYONIC ANTIGEN: CPT | Performed by: INTERNAL MEDICINE

## 2020-02-05 PROCEDURE — 25010000003 HEPARIN LOCK FLUCH PER 10 UNITS: Performed by: INTERNAL MEDICINE

## 2020-02-05 RX ORDER — SODIUM CHLORIDE 9 MG/ML
250 INJECTION, SOLUTION INTRAVENOUS ONCE
Status: CANCELLED | OUTPATIENT
Start: 2020-01-01

## 2020-02-05 RX ORDER — SODIUM CHLORIDE 9 MG/ML
250 INJECTION, SOLUTION INTRAVENOUS ONCE
Status: CANCELLED | OUTPATIENT
Start: 2020-03-04

## 2020-02-05 RX ORDER — CAPECITABINE 500 MG/1
TABLET, FILM COATED ORAL
Qty: 56 TABLET | Refills: 3 | Status: SHIPPED | OUTPATIENT
Start: 2020-02-05 | End: 2020-01-01 | Stop reason: ALTCHOICE

## 2020-02-05 RX ORDER — SODIUM CHLORIDE 9 MG/ML
250 INJECTION, SOLUTION INTRAVENOUS ONCE
Status: COMPLETED | OUTPATIENT
Start: 2020-02-05 | End: 2020-02-05

## 2020-02-05 RX ORDER — SODIUM CHLORIDE 0.9 % (FLUSH) 0.9 %
20 SYRINGE (ML) INJECTION AS NEEDED
Status: CANCELLED | OUTPATIENT
Start: 2020-02-05

## 2020-02-05 RX ORDER — HEPARIN SODIUM (PORCINE) LOCK FLUSH IV SOLN 100 UNIT/ML 100 UNIT/ML
500 SOLUTION INTRAVENOUS AS NEEDED
Status: DISCONTINUED | OUTPATIENT
Start: 2020-02-05 | End: 2020-02-06 | Stop reason: HOSPADM

## 2020-02-05 RX ORDER — HEPARIN SODIUM (PORCINE) LOCK FLUSH IV SOLN 100 UNIT/ML 100 UNIT/ML
500 SOLUTION INTRAVENOUS AS NEEDED
Status: CANCELLED | OUTPATIENT
Start: 2020-02-05

## 2020-02-05 RX ORDER — SODIUM CHLORIDE 9 MG/ML
250 INJECTION, SOLUTION INTRAVENOUS ONCE
Status: CANCELLED | OUTPATIENT
Start: 2020-02-05

## 2020-02-05 RX ORDER — SODIUM CHLORIDE 0.9 % (FLUSH) 0.9 %
10 SYRINGE (ML) INJECTION AS NEEDED
Status: CANCELLED | OUTPATIENT
Start: 2020-02-05

## 2020-02-05 RX ADMIN — SODIUM CHLORIDE 250 ML: 9 INJECTION, SOLUTION INTRAVENOUS at 10:48

## 2020-02-05 RX ADMIN — HEPARIN 500 UNITS: 100 SYRINGE at 11:19

## 2020-02-05 RX ADMIN — BEVACIZUMAB 500 MG: 400 INJECTION, SOLUTION INTRAVENOUS at 10:48

## 2020-02-05 NOTE — PROGRESS NOTES
Oral Chemotherapy Teaching      Patient Name/:  Marquis Esteban   1960  Oral Chemotherapy Regimen:  Xeloda 500mg PO BID x 7 days, followed by 7 days off + IV Avastin every 28 days    Additional Notes: Patient in infusion this morning, requesting refill of Xeloda to be submitted to [facility] ([facility]) for next refill of Xeloda.  Reviewed most recent oncology office visit note dated 2020. Plan for continuation of Xeloda with no dose adjustments. Released script for Xeloda 500mg PO BID x 7days, followed by 7 days off #56 with 3 RF to [facility] for continuation of treatment.

## 2020-02-12 LAB — CREAT BLDA-MCNC: 0.6 MG/DL (ref 0.6–1.3)

## 2020-02-29 ENCOUNTER — LAB (OUTPATIENT)
Dept: LAB | Facility: HOSPITAL | Age: 60
End: 2020-02-29

## 2020-02-29 DIAGNOSIS — C79.51 BONE METASTASIS: ICD-10-CM

## 2020-02-29 DIAGNOSIS — C18.7 MALIGNANT NEOPLASM OF SIGMOID COLON (HCC): ICD-10-CM

## 2020-02-29 LAB
ALBUMIN SERPL-MCNC: 4.3 G/DL (ref 3.5–5.2)
ALBUMIN/GLOB SERPL: 1.2 G/DL
ALP SERPL-CCNC: 63 U/L (ref 39–117)
ALT SERPL W P-5'-P-CCNC: 14 U/L (ref 1–41)
ANION GAP SERPL CALCULATED.3IONS-SCNC: 10 MMOL/L (ref 5–15)
AST SERPL-CCNC: 23 U/L (ref 1–40)
BASOPHILS # BLD AUTO: 0.01 10*3/MM3 (ref 0–0.2)
BASOPHILS NFR BLD AUTO: 0.1 % (ref 0–1.5)
BILIRUB SERPL-MCNC: 0.4 MG/DL (ref 0.2–1.2)
BILIRUB UR QL STRIP: NEGATIVE
BUN BLD-MCNC: 17 MG/DL (ref 6–20)
BUN/CREAT SERPL: 23.6 (ref 7–25)
CALCIUM SPEC-SCNC: 9 MG/DL (ref 8.6–10.5)
CEA SERPL-MCNC: 5.5 NG/ML
CHLORIDE SERPL-SCNC: 95 MMOL/L (ref 98–107)
CLARITY UR: CLEAR
CO2 SERPL-SCNC: 28 MMOL/L (ref 22–29)
COLOR UR: YELLOW
CREAT BLD-MCNC: 0.72 MG/DL (ref 0.76–1.27)
DEPRECATED RDW RBC AUTO: 61.2 FL (ref 37–54)
EOSINOPHIL # BLD AUTO: 0.64 10*3/MM3 (ref 0–0.4)
EOSINOPHIL NFR BLD AUTO: 8.8 % (ref 0.3–6.2)
ERYTHROCYTE [DISTWIDTH] IN BLOOD BY AUTOMATED COUNT: 22.5 % (ref 12.3–15.4)
GFR SERPL CREATININE-BSD FRML MDRD: 112 ML/MIN/1.73
GFR SERPL CREATININE-BSD FRML MDRD: 136 ML/MIN/1.73
GLOBULIN UR ELPH-MCNC: 3.6 GM/DL
GLUCOSE BLD-MCNC: 128 MG/DL (ref 65–99)
GLUCOSE UR STRIP-MCNC: NEGATIVE MG/DL
HCT VFR BLD AUTO: 41.8 % (ref 37.5–51)
HGB BLD-MCNC: 13.4 G/DL (ref 13–17.7)
HGB UR QL STRIP.AUTO: NEGATIVE
IMM GRANULOCYTES # BLD AUTO: 0.01 10*3/MM3 (ref 0–0.05)
IMM GRANULOCYTES NFR BLD AUTO: 0.1 % (ref 0–0.5)
KETONES UR QL STRIP: NEGATIVE
LEUKOCYTE ESTERASE UR QL STRIP.AUTO: NEGATIVE
LYMPHOCYTES # BLD AUTO: 1.97 10*3/MM3 (ref 0.7–3.1)
LYMPHOCYTES NFR BLD AUTO: 27.1 % (ref 19.6–45.3)
MCH RBC QN AUTO: 25 PG (ref 26.6–33)
MCHC RBC AUTO-ENTMCNC: 32.1 G/DL (ref 31.5–35.7)
MCV RBC AUTO: 78.1 FL (ref 79–97)
MONOCYTES # BLD AUTO: 0.55 10*3/MM3 (ref 0.1–0.9)
MONOCYTES NFR BLD AUTO: 7.6 % (ref 5–12)
NEUTROPHILS # BLD AUTO: 4.1 10*3/MM3 (ref 1.7–7)
NEUTROPHILS NFR BLD AUTO: 56.3 % (ref 42.7–76)
NITRITE UR QL STRIP: NEGATIVE
NRBC BLD AUTO-RTO: 0 /100 WBC (ref 0–0.2)
PH UR STRIP.AUTO: 7.5 [PH] (ref 5–8)
PLATELET # BLD AUTO: 177 10*3/MM3 (ref 140–450)
PMV BLD AUTO: 10.1 FL (ref 6–12)
POTASSIUM BLD-SCNC: 4.7 MMOL/L (ref 3.5–5.2)
PROT SERPL-MCNC: 7.9 G/DL (ref 6–8.5)
PROT UR QL STRIP: NEGATIVE
RBC # BLD AUTO: 5.35 10*6/MM3 (ref 4.14–5.8)
SODIUM BLD-SCNC: 133 MMOL/L (ref 136–145)
SP GR UR STRIP: <=1.005 (ref 1–1.03)
UROBILINOGEN UR QL STRIP: NORMAL
WBC NRBC COR # BLD: 7.28 10*3/MM3 (ref 3.4–10.8)

## 2020-02-29 PROCEDURE — 81003 URINALYSIS AUTO W/O SCOPE: CPT

## 2020-02-29 PROCEDURE — 82378 CARCINOEMBRYONIC ANTIGEN: CPT

## 2020-02-29 PROCEDURE — 36415 COLL VENOUS BLD VENIPUNCTURE: CPT

## 2020-02-29 PROCEDURE — 85025 COMPLETE CBC W/AUTO DIFF WBC: CPT

## 2020-02-29 PROCEDURE — 80053 COMPREHEN METABOLIC PANEL: CPT

## 2020-03-02 ENCOUNTER — OFFICE VISIT (OUTPATIENT)
Dept: ONCOLOGY | Facility: CLINIC | Age: 60
End: 2020-03-02

## 2020-03-02 VITALS
WEIGHT: 163 LBS | DIASTOLIC BLOOD PRESSURE: 81 MMHG | TEMPERATURE: 97.1 F | BODY MASS INDEX: 26.2 KG/M2 | RESPIRATION RATE: 16 BRPM | HEIGHT: 66 IN | SYSTOLIC BLOOD PRESSURE: 127 MMHG | HEART RATE: 67 BPM | OXYGEN SATURATION: 96 %

## 2020-03-02 DIAGNOSIS — C18.7 MALIGNANT NEOPLASM OF SIGMOID COLON (HCC): Primary | ICD-10-CM

## 2020-03-02 PROCEDURE — 99215 OFFICE O/P EST HI 40 MIN: CPT | Performed by: INTERNAL MEDICINE

## 2020-03-02 RX ORDER — CELECOXIB 200 MG/1
200 CAPSULE ORAL DAILY
COMMUNITY
End: 2020-01-01

## 2020-03-02 RX ORDER — OXYCODONE HYDROCHLORIDE AND ACETAMINOPHEN 5; 325 MG/1; MG/1
1 TABLET ORAL EVERY 6 HOURS PRN
Qty: 120 TABLET | Refills: 0 | Status: SHIPPED | OUTPATIENT
Start: 2020-03-02 | End: 2020-01-01

## 2020-03-02 RX ORDER — LACTULOSE 10 G/15ML
20 SOLUTION ORAL 3 TIMES DAILY
Qty: 240 ML | Refills: 5 | Status: SHIPPED | OUTPATIENT
Start: 2020-03-02 | End: 2020-01-01

## 2020-03-02 RX ORDER — ACETAMINOPHEN 500 MG
TABLET ORAL
COMMUNITY
End: 2020-01-01

## 2020-03-02 NOTE — PROGRESS NOTES
DATE OF VISIT: 03/02/20      REASON FOR VISIT: Followup for metastatic colon cancer.      HISTORY OF PRESENT ILLNESS: The patient is a very pleasant 60 y.o. male with past medical history significant for metastatic colon cancer diagnosed March 2007 . He is status post lower anterior resection as well as 1 cycle FOLFOX, stopped due to multiple toxicities. Final pathology showed poorly differentiated adenocarcinoma with 25 negative lymph nodes and clear surgical margins. Isolated tumor deposits was found in the pericolonic fat. Pathologic stage T2 N1c M0 stage IIIA disease.The patient has been followed by serial colonoscopies as well as CAT scans that did document pulmonary metastatic disease with left lower lobe nodule that is gradually increasing in size. Patient was doing fairly well until recently, 3 months ago, patient presented with low back pain. Patient had restaging workup that revealed hypermetabolic L2 vertebral body lesion. Patient had biopsy done on October 11, 2016 that revealed metastatic adenocarcinoma consistent of colon primary with CK 7 negative CK 20 positive CDX2 positive. Patient had next generation gene sequencing that revealed intermediate mutational load with microsatellite stability.  He started chemotherapy with Keytruda on 2/1/2017. The patient was changed to CapeOx with Avastin on 9/19/2017.  He completed 8 cycles on February 12, 2018.  He was started on Avastin with Xeloda maintenance.  He is here today for scheduled follow up visit with treatment, cycle #21.       SUBJECTIVE: The patient is here today with his wife.  He is complaining of fatigue.  Has been having progressive low back pain.  MRI thoracic and lumbar spine done on February 28, 2020 revealed local recurrent tumor at L2 extending down to L3.  The patient has met with his neurosurgeon Dr. Frazier.  Is been trying to take naproxen twice a day with no improvement in his symptoms.    PAST MEDICAL HISTORY/SOCIAL HISTORY/FAMILY  HISTORY: Reviewed by me and unchanged from my documentation done on 09/25/18.    Review of Systems   Constitutional: Positive for fatigue. Negative for activity change, appetite change, chills, fever and unexpected weight change.   HENT: Negative for hearing loss, mouth sores, nosebleeds, sore throat and trouble swallowing.    Eyes: Negative for visual disturbance.   Respiratory: Negative for cough, chest tightness, shortness of breath and wheezing.    Cardiovascular: Negative for chest pain, palpitations and leg swelling.   Gastrointestinal: Negative for abdominal distention, abdominal pain, blood in stool, constipation, diarrhea, nausea, rectal pain and vomiting.   Endocrine: Negative for cold intolerance and heat intolerance.   Genitourinary: Negative for difficulty urinating, dysuria, frequency and urgency.   Musculoskeletal: Negative for arthralgias, gait problem, joint swelling and myalgias.   Skin: Negative for rash.   Neurological: Negative for dizziness, tremors, syncope, weakness, light-headedness, numbness and headaches.   Hematological: Negative for adenopathy. Does not bruise/bleed easily.   Psychiatric/Behavioral: Negative for confusion, sleep disturbance and suicidal ideas. The patient is not nervous/anxious.          Current Outpatient Medications:   •  celecoxib (CeleBREX) 200 MG capsule, Take 200 mg by mouth Daily., Disp: , Rfl:   •  acetaminophen (TYLENOL) 500 MG tablet, Tylenol Extra Strength 500 mg tablet, Disp: , Rfl:   •  capecitabine (XELODA) 500 MG chemo tablet, Take 1 tablets by mouth in the morning and 1 tablets by mouth in the evening for 7 days on, then off for 7 days., Disp: 56 tablet, Rfl: 3  •  cholecalciferol (VITAMIN D3) 43697 units capsule, Take 5,000 Units by mouth Daily., Disp: , Rfl:   •  lidocaine-prilocaine (EMLA) 2.5-2.5 % cream, Apply  topically to the appropriate area as directed As Needed (45-60 minutes prior to port access.  Cover with saran/plastic wrap.)., Disp: 30 g,  "Rfl: 5    PHYSICAL EXAMINATION:   /81   Pulse 67   Temp 97.1 °F (36.2 °C) (Temporal)   Resp 16   Ht 167.6 cm (65.98\")   Wt 73.9 kg (163 lb)   SpO2 96%   BMI 26.32 kg/m²    ECOG Performance Status: 1 - Symptomatic but completely ambulatory  General Appearance:  alert, cooperative, no apparent distress and appears stated age   Neurologic/Psychiatric: A&O x 3, gait steady, appropriate affect, strength 5/5 in all muscle groups   HEENT:  Normocephalic, without obvious abnormality, mucous membranes moist   Neck: Supple, symmetrical, trachea midline, no adenopathy;  No thyromegaly, masses, or tenderness   Lungs:   Clear to auscultation bilaterally; respirations regular, even, and unlabored bilaterally   Heart:  Regular rate and rhythm, no murmurs appreciated   Abdomen:   Soft, non-tender, non-distended and no organomegaly   Lymph nodes: No cervical, supraclavicular, inguinal or axillary adenopathy noted   Extremities: Normal, atraumatic; no clubbing, cyanosis, or edema    Skin: No rashes, ulcers, or suspicious lesions noted     Lab on 02/29/2020   Component Date Value Ref Range Status   • CEA 02/29/2020 5.50  ng/mL Final   • Glucose 02/29/2020 128* 65 - 99 mg/dL Final   • BUN 02/29/2020 17  6 - 20 mg/dL Final   • Creatinine 02/29/2020 0.72* 0.76 - 1.27 mg/dL Final   • Sodium 02/29/2020 133* 136 - 145 mmol/L Final   • Potassium 02/29/2020 4.7  3.5 - 5.2 mmol/L Final   • Chloride 02/29/2020 95* 98 - 107 mmol/L Final   • CO2 02/29/2020 28.0  22.0 - 29.0 mmol/L Final   • Calcium 02/29/2020 9.0  8.6 - 10.5 mg/dL Final   • Total Protein 02/29/2020 7.9  6.0 - 8.5 g/dL Final   • Albumin 02/29/2020 4.30  3.50 - 5.20 g/dL Final   • ALT (SGPT) 02/29/2020 14  1 - 41 U/L Final   • AST (SGOT) 02/29/2020 23  1 - 40 U/L Final   • Alkaline Phosphatase 02/29/2020 63  39 - 117 U/L Final   • Total Bilirubin 02/29/2020 0.4  0.2 - 1.2 mg/dL Final   • eGFR Non African Amer 02/29/2020 112  >60 mL/min/1.73 Final   • eGFR  African Amer " 02/29/2020 136  >60 mL/min/1.73 Final   • Globulin 02/29/2020 3.6  gm/dL Final   • A/G Ratio 02/29/2020 1.2  g/dL Final   • BUN/Creatinine Ratio 02/29/2020 23.6  7.0 - 25.0 Final   • Anion Gap 02/29/2020 10.0  5.0 - 15.0 mmol/L Final   • Color, UA 02/29/2020 Yellow  Yellow, Straw Final   • Appearance, UA 02/29/2020 Clear  Clear Final   • pH, UA 02/29/2020 7.5  5.0 - 8.0 Final   • Specific Gravity, UA 02/29/2020 <=1.005  1.001 - 1.030 Final   • Glucose, UA 02/29/2020 Negative  Negative Final   • Ketones, UA 02/29/2020 Negative  Negative Final   • Bilirubin, UA 02/29/2020 Negative  Negative Final   • Blood, UA 02/29/2020 Negative  Negative Final   • Protein, UA 02/29/2020 Negative  Negative Final   • Leuk Esterase, UA 02/29/2020 Negative  Negative Final   • Nitrite, UA 02/29/2020 Negative  Negative Final   • Urobilinogen, UA 02/29/2020 0.2 E.U./dL  0.2 - 1.0 E.U./dL Final   • WBC 02/29/2020 7.28  3.40 - 10.80 10*3/mm3 Final   • RBC 02/29/2020 5.35  4.14 - 5.80 10*6/mm3 Final   • Hemoglobin 02/29/2020 13.4  13.0 - 17.7 g/dL Final   • Hematocrit 02/29/2020 41.8  37.5 - 51.0 % Final   • MCV 02/29/2020 78.1* 79.0 - 97.0 fL Final   • MCH 02/29/2020 25.0* 26.6 - 33.0 pg Final   • MCHC 02/29/2020 32.1  31.5 - 35.7 g/dL Final   • RDW 02/29/2020 22.5* 12.3 - 15.4 % Final   • RDW-SD 02/29/2020 61.2* 37.0 - 54.0 fl Final   • MPV 02/29/2020 10.1  6.0 - 12.0 fL Final   • Platelets 02/29/2020 177  140 - 450 10*3/mm3 Final   • Neutrophil % 02/29/2020 56.3  42.7 - 76.0 % Final   • Lymphocyte % 02/29/2020 27.1  19.6 - 45.3 % Final   • Monocyte % 02/29/2020 7.6  5.0 - 12.0 % Final   • Eosinophil % 02/29/2020 8.8* 0.3 - 6.2 % Final   • Basophil % 02/29/2020 0.1  0.0 - 1.5 % Final   • Immature Grans % 02/29/2020 0.1  0.0 - 0.5 % Final   • Neutrophils, Absolute 02/29/2020 4.10  1.70 - 7.00 10*3/mm3 Final   • Lymphocytes, Absolute 02/29/2020 1.97  0.70 - 3.10 10*3/mm3 Final   • Monocytes, Absolute 02/29/2020 0.55  0.10 - 0.90 10*3/mm3  Final   • Eosinophils, Absolute 02/29/2020 0.64* 0.00 - 0.40 10*3/mm3 Final   • Basophils, Absolute 02/29/2020 0.01  0.00 - 0.20 10*3/mm3 Final   • Immature Grans, Absolute 02/29/2020 0.01  0.00 - 0.05 10*3/mm3 Final   • nRBC 02/29/2020 0.0  0.0 - 0.2 /100 WBC Final      No results found.(  ASSESSMENT: The patient is a very pleasant 60 y.o. male  with stage IV colon cancer.    PROBLEM LIST:  1. Currently stage IV colon cancer with lung and bony metastases.  K-edwige mutant, microsatellite stable, and intermediate mutational load.  A. Status post low anterior resection done by Dr. Young 2007, [id number]  B.  Attempted adjuvant chemotherapy for 1 cycle could not tolerate secondary to multiple toxicities.  C. Biopsy-proven L2 metastases done October 11, 2016. Status post CyberKnife radiation treatment.  D.  Started Keytruda 200 mg IV every 3 weeks on February 1, 2017, status post 10 cycles of treatment  E. Disease progression documented on PET scan completed 8/31/2017.   F. Treatment changed to Xeloda/Avastin/Oxaliplatin on 9/19/2017, status post 7 cycles.   G. Status post tumor debulking at L2 done by Dr. Frazier at [facility] on April 21, 2017  H.  The treatment was switched to Xeloda with Avastin maintenance treatment March 6, 2018  I.  Local relapse disease with another mass at L2 level document MRI spine done February 28, 2020.  Scheduled for surgical debulking with Dr. Frazier March 14, 2020.  2.  Cancer related pain  3.  Opioid-induced constipation  4.  Treatment induced asthenia  5.  Mild depression  6.  Insomnia  7.  Chemotherapy-induced anemia  8.  Chemotherapy-induced dermatitis  9.  Sleep apnea    PLAN:  1.  I will hold Avastin at this point in preparation for surgery.  2.  I will continue Xeloda 500 mg a.m. and 500 mg p.m. 1 weeks on, 1 week off.  This is dose reduction secondary to dermatitis.  He could not tolerate any higher doses.  3. We will monitor the patient's labs throughout treatment  including blood counts, kidney function, CEA, thyroid function, and liver functions. We will monitor his liver enzymes that have been elevated secondary to treatment with immune-therapy.   4.  The patient will follow-up with me in 6 weeks with treatment.    5.  Patient will continue taking over-the-counter stool softeners for constipation.  I will add lactulose as needed.  6.  I again reviewed with the patient the potential side effects from immune therapy including dermatitis, hepatitis, perforation, bleeding, thrombosis, and potential death.  7.  Patient tissue was submitted for the matched trial.  He did match on PETN mutation, however that arm was closed.  8.  I advised the patient to continue to hold his alternative treatment with high dose vitamin C while he is on immunetherapy.  9.  I did go over the MRIn results with the patient.  I will set him up to have whole-body PET scan prior to return.  10. The patient will continue Zofran as needed for treatment related nausea.  11.  I will start the patient on Percocet 5/325 mg every 6 hours as needed for cancer related pain.  12.  We offered Ritalin for treatment related fatigue, but the patient has declined at this time.   13.  Patient will continue Zaleplon as needed for insomnia.     Kristofer Rodriguez MD  03/02/20

## 2020-03-04 ENCOUNTER — APPOINTMENT (OUTPATIENT)
Dept: ONCOLOGY | Facility: HOSPITAL | Age: 60
End: 2020-03-04

## 2020-03-04 DIAGNOSIS — C18.9 COLON CANCER METASTASIZED TO BONE (HCC): ICD-10-CM

## 2020-03-04 DIAGNOSIS — C79.51 BONE METASTASIS: ICD-10-CM

## 2020-03-04 DIAGNOSIS — C79.51 COLON CANCER METASTASIZED TO BONE (HCC): ICD-10-CM

## 2020-03-04 DIAGNOSIS — C18.7 MALIGNANT NEOPLASM OF SIGMOID COLON (HCC): Primary | ICD-10-CM

## 2020-03-12 ENCOUNTER — HOSPITAL ENCOUNTER (OUTPATIENT)
Dept: PET IMAGING | Facility: HOSPITAL | Age: 60
End: 2020-03-12

## 2020-03-12 ENCOUNTER — APPOINTMENT (OUTPATIENT)
Dept: PET IMAGING | Facility: HOSPITAL | Age: 60
End: 2020-03-12

## 2020-03-13 ENCOUNTER — APPOINTMENT (OUTPATIENT)
Dept: PET IMAGING | Facility: HOSPITAL | Age: 60
End: 2020-03-13

## 2020-05-04 PROBLEM — F33.0 MILD EPISODE OF RECURRENT MAJOR DEPRESSIVE DISORDER (HCC): Status: ACTIVE | Noted: 2020-01-01

## 2020-05-04 NOTE — PROGRESS NOTES
DATE OF VISIT: 05/04/20      REASON FOR VISIT: Followup for metastatic colon cancer.      HISTORY OF PRESENT ILLNESS: The patient is a very pleasant 60 y.o. male with past medical history significant for metastatic colon cancer diagnosed March 2007 . He is status post lower anterior resection as well as 1 cycle FOLFOX, stopped due to multiple toxicities. Final pathology showed poorly differentiated adenocarcinoma with 25 negative lymph nodes and clear surgical margins. Isolated tumor deposits was found in the pericolonic fat. Pathologic stage T2 N1c M0 stage IIIA disease.The patient has been followed by serial colonoscopies as well as CAT scans that did document pulmonary metastatic disease with left lower lobe nodule that is gradually increasing in size. Patient was doing fairly well until recently, 3 months ago, patient presented with low back pain. Patient had restaging workup that revealed hypermetabolic L2 vertebral body lesion. Patient had biopsy done on October 11, 2016 that revealed metastatic adenocarcinoma consistent of colon primary with CK 7 negative CK 20 positive CDX2 positive. Patient had next generation gene sequencing that revealed intermediate mutational load with microsatellite stability.  He started chemotherapy with Keytruda on 2/1/2017. The patient was changed to CapeOx with Avastin on 9/19/2017.  He completed 8 cycles on February 12, 2018.  He was started on Avastin with Xeloda maintenance.    The patient presented with local recurrence at L2 treated with surgical resection March 19, 2020 pathology confirmed metastatic adenocarcinoma.  He is here today for scheduled follow up visit.       SUBJECTIVE: The patient was interviewed using telemedicine given the current pandemic.  His wife was sitting next to him.  His back pain is gradually improving.  He is anxious to get back to work.    PAST MEDICAL HISTORY/SOCIAL HISTORY/FAMILY HISTORY: Reviewed by me and unchanged from my documentation done on  09/25/18.    Review of Systems   Constitutional: Positive for fatigue. Negative for activity change, appetite change, chills, fever and unexpected weight change.   HENT: Negative for hearing loss, mouth sores, nosebleeds, sore throat and trouble swallowing.    Eyes: Negative for visual disturbance.   Respiratory: Negative for cough, chest tightness, shortness of breath and wheezing.    Cardiovascular: Negative for chest pain, palpitations and leg swelling.   Gastrointestinal: Negative for abdominal distention, abdominal pain, blood in stool, constipation, diarrhea, nausea, rectal pain and vomiting.   Endocrine: Negative for cold intolerance and heat intolerance.   Genitourinary: Negative for difficulty urinating, dysuria, frequency and urgency.   Musculoskeletal: Negative for arthralgias, gait problem, joint swelling and myalgias.   Skin: Negative for rash.   Neurological: Negative for dizziness, tremors, syncope, weakness, light-headedness, numbness and headaches.   Hematological: Negative for adenopathy. Does not bruise/bleed easily.   Psychiatric/Behavioral: Negative for confusion, sleep disturbance and suicidal ideas. The patient is not nervous/anxious.          Current Outpatient Medications:   •  acetaminophen (TYLENOL) 500 MG tablet, Tylenol Extra Strength 500 mg tablet, Disp: , Rfl:   •  capecitabine (XELODA) 500 MG chemo tablet, Take 1 tablets by mouth in the morning and 1 tablets by mouth in the evening for 7 days on, then off for 7 days., Disp: 56 tablet, Rfl: 3  •  celecoxib (CeleBREX) 200 MG capsule, Take 200 mg by mouth Daily., Disp: , Rfl:   •  cholecalciferol (VITAMIN D3) 31863 units capsule, Take 5,000 Units by mouth Daily., Disp: , Rfl:   •  lactulose (CHRONULAC) 10 GM/15ML solution, Take 30 mL by mouth 3 (Three) Times a Day. PRN constipation, Disp: 240 mL, Rfl: 5  •  lidocaine-prilocaine (EMLA) 2.5-2.5 % cream, Apply  topically to the appropriate area as directed As Needed (45-60 minutes prior to  port access.  Cover with saran/plastic wrap.)., Disp: 30 g, Rfl: 5  •  oxyCODONE-acetaminophen (PERCOCET) 5-325 MG per tablet, Take 1 tablet by mouth Every 6 (Six) Hours As Needed for Moderate Pain  or Severe Pain ., Disp: 120 tablet, Rfl: 0    PHYSICAL EXAMINATION:   There were no vitals taken for this visit.   ECOG Performance Status: 1 - Symptomatic but completely ambulatory  General Appearance:  alert, cooperative, no apparent distress and appears stated age   Neurologic/Psychiatric: A&O x 3, gait steady, appropriate affect   HEENT:     Neck:    Lungs:      Heart:     Abdomen:      Lymph nodes:    Extremities:    Skin:      Hospital Outpatient Visit on 05/01/2020   Component Date Value Ref Range Status   • Glucose 05/01/2020 107  70 - 130 mg/dL Final   Lab on 05/01/2020   Component Date Value Ref Range Status   • CEA 05/01/2020 5.09  ng/mL Final   • Glucose 05/01/2020 107* 65 - 99 mg/dL Final   • BUN 05/01/2020 16  6 - 20 mg/dL Final   • Creatinine 05/01/2020 0.74* 0.76 - 1.27 mg/dL Final   • Sodium 05/01/2020 137  136 - 145 mmol/L Final   • Potassium 05/01/2020 4.9  3.5 - 5.2 mmol/L Final   • Chloride 05/01/2020 100  98 - 107 mmol/L Final   • CO2 05/01/2020 26.0  22.0 - 29.0 mmol/L Final   • Calcium 05/01/2020 10.0  8.6 - 10.5 mg/dL Final   • Total Protein 05/01/2020 8.9* 6.0 - 8.5 g/dL Final   • Albumin 05/01/2020 4.80  3.50 - 5.20 g/dL Final   • ALT (SGPT) 05/01/2020 11  1 - 41 U/L Final   • AST (SGOT) 05/01/2020 23  1 - 40 U/L Final   • Alkaline Phosphatase 05/01/2020 76  39 - 117 U/L Final   • Total Bilirubin 05/01/2020 0.5  0.2 - 1.2 mg/dL Final   • eGFR Non African Amer 05/01/2020 109  >60 mL/min/1.73 Final   • eGFR  African Amer 05/01/2020 132  >60 mL/min/1.73 Final   • Globulin 05/01/2020 4.1  gm/dL Final   • A/G Ratio 05/01/2020 1.2  g/dL Final   • BUN/Creatinine Ratio 05/01/2020 21.6  7.0 - 25.0 Final   • Anion Gap 05/01/2020 11.0  5.0 - 15.0 mmol/L Final   • Color, UA 05/01/2020 Yellow  Yellow, Straw  Final   • Appearance, UA 05/01/2020 Clear  Clear Final   • pH, UA 05/01/2020 6.5  5.0 - 8.0 Final   • Specific Gravity, UA 05/01/2020 1.007  1.001 - 1.030 Final   • Glucose, UA 05/01/2020 Negative  Negative Final   • Ketones, UA 05/01/2020 Negative  Negative Final   • Bilirubin, UA 05/01/2020 Negative  Negative Final   • Blood, UA 05/01/2020 Negative  Negative Final   • Protein, UA 05/01/2020 Negative  Negative Final   • Leuk Esterase, UA 05/01/2020 Negative  Negative Final   • Nitrite, UA 05/01/2020 Negative  Negative Final   • Urobilinogen, UA 05/01/2020 0.2 E.U./dL  0.2 - 1.0 E.U./dL Final   • WBC 05/01/2020 6.50  3.40 - 10.80 10*3/mm3 Final   • RBC 05/01/2020 5.27  4.14 - 5.80 10*6/mm3 Final   • Hemoglobin 05/01/2020 12.9* 13.0 - 17.7 g/dL Final   • Hematocrit 05/01/2020 42.3  37.5 - 51.0 % Final   • MCV 05/01/2020 80.3  79.0 - 97.0 fL Final   • MCH 05/01/2020 24.5* 26.6 - 33.0 pg Final   • MCHC 05/01/2020 30.5* 31.5 - 35.7 g/dL Final   • RDW 05/01/2020 21.2* 12.3 - 15.4 % Final   • RDW-SD 05/01/2020 60.3* 37.0 - 54.0 fl Final   • MPV 05/01/2020 10.0  6.0 - 12.0 fL Final   • Platelets 05/01/2020 204  140 - 450 10*3/mm3 Final   • Neutrophil % 05/01/2020 62.2  42.7 - 76.0 % Final   • Lymphocyte % 05/01/2020 21.4  19.6 - 45.3 % Final   • Monocyte % 05/01/2020 8.9  5.0 - 12.0 % Final   • Eosinophil % 05/01/2020 6.9* 0.3 - 6.2 % Final   • Basophil % 05/01/2020 0.3  0.0 - 1.5 % Final   • Immature Grans % 05/01/2020 0.3  0.0 - 0.5 % Final   • Neutrophils, Absolute 05/01/2020 4.04  1.70 - 7.00 10*3/mm3 Final   • Lymphocytes, Absolute 05/01/2020 1.39  0.70 - 3.10 10*3/mm3 Final   • Monocytes, Absolute 05/01/2020 0.58  0.10 - 0.90 10*3/mm3 Final   • Eosinophils, Absolute 05/01/2020 0.45* 0.00 - 0.40 10*3/mm3 Final   • Basophils, Absolute 05/01/2020 0.02  0.00 - 0.20 10*3/mm3 Final   • Immature Grans, Absolute 05/01/2020 0.02  0.00 - 0.05 10*3/mm3 Final   • nRBC 05/01/2020 0.0  0.0 - 0.2 /100 WBC Final   • Anisocytosis  05/01/2020 Slight/1+  None Seen Final   • WBC Morphology 05/01/2020 Normal  Normal Final   • Platelet Morphology 05/01/2020 Normal  Normal Final      Nm Pet Skull Base To Mid Thigh    Result Date: 5/2/2020  Narrative: EXAMINATION: NM PET/CT SCAN, SKULL BASE TO MID THIGHS - 05/01/2020  INDICATION: Recurrent colon cancer.   C18.7-Malignant neoplasm of sigmoid colon; C79.51-Secondary malignant neoplasm of bone; C18.9-Malignant neoplasm of colon, unspecified; C79.51-Secondary malignant neoplasm of bone.  TECHNIQUE: With fasting blood glucose level of 107 mg/dl, a total 13.3 mCi of FDG was administered via right antecubital vein. Following appropriate delay, PET and CT images were obtained from the level of the skull vertex to the mid thighs and fused multiplanar images reconstructed. The CT scan is an unenhanced low-dose study for reference to the PET scan only and does not constitute a standard diagnostic CT scan.  COMPARISON: Whole-body PET scan 10/12/2018.  FINDINGS: Patient history indicates previous diagnosis of metastatic colon cancer March 2007. Prior history of pulmonary metastatic disease, metastatic disease to lumbar spine. Outside MRI by report demonstrating recurrent L2 tumor and involvement of L3.  Today's 3D images show diffuse uptake regional to the upper lumbar spine, and including posterior soft tissues up to the skin surface. Small focus of activity seen in the left groin region. 3D images otherwise show normal and normal variant uptake elsewhere.  Multiplanar images show no significant asymmetry of uptake in the brain. No hypermetabolic node or mass is seen in the neck.  In the chest, there is no evidence of hypermetabolic mediastinal, hilar, axillary or pulmonary parenchymal disease.  Below the diaphragm, no hypermetabolic solid organ disease or adenopathy is appreciated. There is expected GI and  tract uptake. A left inguinal lymph node has maximal SUV of 2.5 and appears larger than on the prior  study where it was barely visible. Maximal SUV on the prior study was 0.7. No hypermetabolic node or mass is seen elsewhere.  Streak artifact from the patient's fusion hardware limits image detail in this region on both PET and CT studies. It is difficult to determine if there is underlying soft tissue or bony abnormality, however, PET activity is significantly increased from 2.3 to maximal 6.9, whether from tumor recurrence or postoperative change. CT images give the impression of increased bony lytic change of the L1 and L2 posterior vertebral margins.      Impression: 1. Compared to the 2018 scan, there is significantly increased activity in the lumbar spine at L1 and L2 which appears to correspond to bone loss along the dorsal vertebral margins of these levels. Please correlate with patient's prior history, including surgery, as this could reflect postoperative change, focal inflammatory process or tumor. Limited image detail on CT due to adjacent hardware. 2. Mild enlargement and mild new hypermetabolic activity in a single left inguinal lymph node, nonspecific, possibly reactive. 3. No new PET scan abnormality is appreciated elsewhere.  DICTATED:   05/01/2020 EDITED/ls :   05/01/2020  This report was finalized on 5/2/2020 12:42 PM by Dr. Eliseo Riley MD.    (  ASSESSMENT: The patient is a very pleasant 60 y.o. male  with stage IV colon cancer.    PROBLEM LIST:  1. Currently stage IV colon cancer with lung and bony metastases.  K-edwige mutant, microsatellite stable, and intermediate mutational load.  A. Status post low anterior resection done by Dr. Young 2007, [id number]  B.  Attempted adjuvant chemotherapy for 1 cycle could not tolerate secondary to multiple toxicities.  C. Biopsy-proven L2 metastases done October 11, 2016. Status post CyberKnife radiation treatment.  D.  Started Keytruda 200 mg IV every 3 weeks on February 1, 2017, status post 10 cycles of treatment  E. Disease progression documented on PET scan  completed 8/31/2017.   F. Treatment changed to Xeloda/Avastin/Oxaliplatin on 9/19/2017, status post 7 cycles.   G. Status post tumor debulking at L2 done by Dr. Frazier at [facility] on April 21, 2017  H.  The treatment was switched to Xeloda with Avastin maintenance treatment March 6, 2018  I.  Local relapse disease with another mass at L2 level document MRI spine done February 28, 2020.  Status post surgical debulking with Dr. Frazier March 19, 2020 pathology consistent with adenocarcinoma  J.  Will start treatment with regorafenib may 11/20/2020  2.  Cancer related pain  3.  Opioid-induced constipation  4.  Treatment induced asthenia  5.  Mild depression  6.  Insomnia  7.  Chemotherapy-induced anemia  8.  Chemotherapy-induced dermatitis  9.  Sleep apnea    PLAN:  1.  I did go over the pathology report in details with the patient and his wife explained to him his persistent metastatic adenocarcinoma of the colon.  2.  We will switch systemic treatment to regorafenib will start with 80 mg daily for 1 week then 120 mg daily 3 weeks on 1 week off.  Consider present dose to full dose at 160 mg 3 weeks on 1 week off if patient is able to tolerate his treatment.  3. We will monitor the patient's labs throughout treatment including blood counts, kidney function, CEA, thyroid function, and liver functions. We will monitor his liver enzymes that have been elevated secondary to treatment with immune-therapy.   4.  The patient will follow-up with me in 5 weeks with labs.    5.  Patient will continue taking over-the-counter stool softeners for constipation.  I will add lactulose as needed.  6.  I again reviewed with the patient the potential side effects from his new targeted therapy including dermatitis, hepatitis, perforation, bleeding, thrombosis, and potential death.  7.  Patient tissue was submitted for the matched trial.  He did match on PETN mutation, however that arm was closed.  I will send templates on  his most recent pathologic report.  8.  I advised the patient to continue to hold his alternative treatment with high dose vitamin C while he is on immunetherapy.  9.  I did go over the CAT scan results with the patient explained to him that it showed grossly seen just L2 with a small left inguinal lymph node minimal activity could be reactive.  We will do 4 months follow-up  CT scans which will be due September 2020.  10. The patient will continue Zofran as needed for treatment related nausea.  11.  I will continue the patient on Percocet 5/325 mg every 6 hours as needed for cancer related pain.  12.  We offered Ritalin for treatment related fatigue, but the patient has declined at this time.   13.  Patient will continue Zaleplon as needed for insomnia.     This visit has been rescheduled as a phone visit to comply with patient safety concerns in accordance with CDC recommendations. Total time of discussion was 23 minutes.      Kristofer Rodriguez MD  05/04/20

## 2020-05-04 NOTE — PROGRESS NOTES
Drug: regorafenib  Strength: 40 mg tablet  Directions: Take 3 tablets PO daily for Days 1-21 then off 7 days  QTY: 84 (must dispense in original packaging)  RF:3    Released to pharmacy: [facility]    Completed independent double check on medication order/RX.

## 2020-05-04 NOTE — PROGRESS NOTES
Oral Chemotherapy Teaching      Patient Name/:  Marquis Esteban   1960  Oral Chemotherapy Regimen:  REgorafenib 120mg PO once daily x 21 days on, followed by 7 days off (plan to start at reduced starting dose of 80 mg for first 7 days then increase to 120mg, if patient is able to tolerate treatment, may consider further titration to goal dose of 160mg PO once daily x 21 days, followed by 7 days off.)     Date Started Medication: pending medication acquisition      Additional Notes: Oral chemotherapy clinic received referral from  regarding the initiation of Regorafenib. Reviewed patient chart. Released script for Regorafenib 120mg PO once daily x 21 days, followed by 7 days off #84 (must dispense in original bottle/packaged as QTY 84)  to [facility] to begin processing. Will plan to provide pharmacy education via telephone in an effort to practice spatial distancing and reduce transmission risk of COVID-19 for both patients and employees during the COVID-19 pandemic.

## 2020-05-19 NOTE — TELEPHONE ENCOUNTER
----- Message from Yomaira Blanchard RN sent at 5/19/2020  9:26 AM EDT -----  Regarding: New med symptoms  Contact: [phone]  Patient called into triage line with symptoms of headache and fatigue due to med change. Requested a visit with Dr. Rodriguez to discuss. Tried to call back, keep getting VM.

## 2020-06-01 NOTE — PROGRESS NOTES
DATE OF VISIT: 06/01/20      REASON FOR VISIT: Followup for metastatic colon cancer.      HISTORY OF PRESENT ILLNESS: The patient is a very pleasant 60 y.o. male with past medical history significant for metastatic colon cancer diagnosed March 2007 . He is status post lower anterior resection as well as 1 cycle FOLFOX, stopped due to multiple toxicities. Final pathology showed poorly differentiated adenocarcinoma with 25 negative lymph nodes and clear surgical margins. Isolated tumor deposits was found in the pericolonic fat. Pathologic stage T2 N1c M0 stage IIIA disease.The patient has been followed by serial colonoscopies as well as CAT scans that did document pulmonary metastatic disease with left lower lobe nodule that is gradually increasing in size. Patient was doing fairly well until recently, 3 months ago, patient presented with low back pain. Patient had restaging workup that revealed hypermetabolic L2 vertebral body lesion. Patient had biopsy done on October 11, 2016 that revealed metastatic adenocarcinoma consistent of colon primary with CK 7 negative CK 20 positive CDX2 positive. Patient had next generation gene sequencing that revealed intermediate mutational load with microsatellite stability.  He started chemotherapy with Keytruda on 2/1/2017. The patient was changed to CapeOx with Avastin on 9/19/2017.  He completed 8 cycles on February 12, 2018.  He was started on Avastin with Xeloda maintenance.  Repeated scans done March 2, 2020 revealed progressive disease with increase in size of L2 vertebral body mass.  Whole-body PET scan done May 1, 2020 did not reveal any other sites of disease.  Patient had debulking surgery done by Dr. Frazier at [facility] pathology consistent with metastatic adenocarcinoma.  He was started on regorafenib May 10, 2020. He is here today for scheduled follow up visit.    SUBJECTIVE: The patient is here today with his wife.  He could not tolerate 3 pills secondary  to hypertension and hand-and-foot syndrome.  He has been off treatment for 1 week now.  His rash has improved with prednisone.    PAST MEDICAL HISTORY/SOCIAL HISTORY/FAMILY HISTORY: Reviewed by me and unchanged from my documentation done on 09/25/18.    Review of Systems   Constitutional: Positive for fatigue. Negative for activity change, appetite change, chills, fever and unexpected weight change.   HENT: Negative for hearing loss, mouth sores, nosebleeds, sore throat and trouble swallowing.    Eyes: Negative for visual disturbance.   Respiratory: Negative for cough, chest tightness, shortness of breath and wheezing.    Cardiovascular: Negative for chest pain, palpitations and leg swelling.   Gastrointestinal: Negative for abdominal distention, abdominal pain, blood in stool, constipation, diarrhea, nausea, rectal pain and vomiting.   Endocrine: Negative for cold intolerance and heat intolerance.   Genitourinary: Negative for difficulty urinating, dysuria, frequency and urgency.   Musculoskeletal: Negative for arthralgias, gait problem, joint swelling and myalgias.   Skin: Negative for rash.   Neurological: Negative for dizziness, tremors, syncope, weakness, light-headedness, numbness and headaches.   Hematological: Negative for adenopathy. Does not bruise/bleed easily.   Psychiatric/Behavioral: Negative for confusion, sleep disturbance and suicidal ideas. The patient is not nervous/anxious.          Current Outpatient Medications:   •  acetaminophen (TYLENOL) 500 MG tablet, Tylenol Extra Strength 500 mg tablet, Disp: , Rfl:   •  celecoxib (CeleBREX) 200 MG capsule, Take 200 mg by mouth Daily., Disp: , Rfl:   •  cholecalciferol (VITAMIN D3) 44475 units capsule, Take 5,000 Units by mouth Daily., Disp: , Rfl:   •  cyclobenzaprine (FLEXERIL) 10 MG tablet, Every 8 (Eight) Hours., Disp: , Rfl:   •  lidocaine-prilocaine (EMLA) 2.5-2.5 % cream, Apply  topically to the appropriate area as directed As Needed (45-60 minutes  "prior to port access.  Cover with saran/plastic wrap.)., Disp: 30 g, Rfl: 5    PHYSICAL EXAMINATION:   /83   Pulse 58   Temp 97.6 °F (36.4 °C) (Temporal)   Resp 18   Ht 167.6 cm (65.98\")   Wt 69.4 kg (153 lb)   SpO2 98%   BMI 24.71 kg/m²    ECOG Performance Status: 1 - Symptomatic but completely ambulatory  General Appearance:  alert, cooperative, no apparent distress and appears stated age   Neurologic/Psychiatric: A&O x 3, gait steady, appropriate affect, strength 5/5 in all muscle groups   HEENT:  Normocephalic, without obvious abnormality, mucous membranes moist   Neck: Supple, symmetrical, trachea midline, no adenopathy;  No thyromegaly, masses, or tenderness   Lungs:   Clear to auscultation bilaterally; respirations regular, even, and unlabored bilaterally   Heart:  Regular rate and rhythm, no murmurs appreciated   Abdomen:   Soft, non-tender, non-distended and no organomegaly   Lymph nodes: No cervical, supraclavicular, inguinal or axillary adenopathy noted   Extremities: Normal, atraumatic; no clubbing, cyanosis, or edema    Skin: No rashes, ulcers, or suspicious lesions noted     No visits with results within 2 Week(s) from this visit.   Latest known visit with results is:   Hospital Outpatient Visit on 05/01/2020   Component Date Value Ref Range Status   • Glucose 05/01/2020 107  70 - 130 mg/dL Final      No results found.(  ASSESSMENT: The patient is a very pleasant 60 y.o. male  with stage IV colon cancer.    PROBLEM LIST:  1. Currently stage IV colon cancer with lung and bony metastases.  K-edwige mutant, microsatellite stable, and intermediate mutational load.  A. Status post low anterior resection done by Dr. Young 2007, [id number]  B.  Attempted adjuvant chemotherapy for 1 cycle could not tolerate secondary to multiple toxicities.  C. Biopsy-proven L2 metastases done October 11, 2016. Status post CyberKnife radiation treatment.  D.  Started Keytruda 200 mg IV every 3 weeks on February 1, 2017, " status post 10 cycles of treatment  E. Disease progression documented on PET scan completed 8/31/2017.   F. Treatment changed to Xeloda/Avastin/Oxaliplatin on 9/19/2017, status post 7 cycles.   G. Status post tumor debulking at L2 done by Dr. Frazier at [facility] on April 21, 2017  H.  The treatment was switched to Xeloda with Avastin maintenance treatment March 6, 2018  I.  Local relapse disease with another mass at L2 level document MRI spine done February 28, 2020.  Status post surgical debulking with Dr. Frazier March 19, 2020.  J.  Started regorafenib May 10, 2020  2.  Cancer related pain  3.  Opioid-induced constipation  4.  Treatment induced asthenia  5.  Mild depression  6.  Insomnia  7.  Chemotherapy-induced anemia  8.  Treatment induced hand-and-foot syndrome  9.   Sleep apnea  10.  Treatment induced hypertension    PLAN:  1.  I will continue treatment with regorafenib will do 50% dose reduction secondary to hand-and-foot syndrome as well as hypertension  3. We will monitor the patient's labs throughout treatment including blood counts, kidney function, CEA, thyroid function, and liver functions. We will monitor his liver enzymes that have been elevated secondary to treatment with immune-therapy.   4.  The patient will follow-up with me in 6 weeks with treatment.    5.  Patient will continue taking over-the-counter stool softeners for constipation.  I will add lactulose as needed.  6.  I again reviewed with the patient the potential side effects from his treatment including dermatitis, hepatitis, perforation, bleeding, thrombosis, and potential death.  7.  Patient tissue was submitted for the matched trial.  He did match on PETN mutation, however that arm was closed.  8.  I advised the patient to continue to hold his alternative treatment with high dose vitamin C while he is on immunetherapy.  9.  I will continue Norvasc and HCTZ for hypertension.  10. The patient will continue Zofran as needed  for treatment related nausea.  11.  I will start the patient on Percocet 5/325 mg every 6 hours as needed for cancer related pain.  12.  We offered Ritalin for treatment related fatigue, but the patient has declined at this time.   13.  Patient will continue Zaleplon as needed for insomnia.   14.  Repeat patient scans in 3 months.  15.  Continue port care.  Flush the patient's port today.  Kristofer Rodriguez MD  06/01/20

## 2020-06-15 NOTE — PROGRESS NOTES
Drug: capecitabine  Strength: 500mg tablet  Directions: Take 1 tablet PO BID 14 days on/7 days off  QTY: 28  RF:7    [facility] to pharmacy: [facility]    Completed independent double check on medication order/RX.

## 2020-06-15 NOTE — PROGRESS NOTES
Oral Chemotherapy Teaching      Patient Name/:  Marquis Esteban   1960  Oral Chemotherapy Regimen:  Xeloda 500mg PO BID x 14 days, followed by 7 days off + Bevacizumab IV on day 1 + Oxaliplatin on Day 1 of a 21 day cycle  Date Started Medication: 2020       Additional Notes: Oral chemotherapy clinic received referral from Dr. Rodriguez regarding the initiation of Xeloda/Bevacizumab/Oxaliplatin. Reviewed patient chart. Released script for Xeloda 500mg PO BID x 14 days, followed by 7 days off #28 with 7 RF to [facility] to begin processing. Pt scheduled for oral chemotherapy education and financial navigation appt on 20.  Will obtain consent and CCA at that time.

## 2020-06-18 NOTE — TELEPHONE ENCOUNTER
Spoke with patient, he was just making sure that treatment was authorized for tomorrow and it is.

## 2020-06-19 NOTE — PROGRESS NOTES
"[facility] Nutrition    Diagnosis:  Metastatic colon cancer;  progressive disease with increase in size of L2 vertebral mass; recent debulking at [facility]    Treatment Plan:  Oxaliplatin; Avastin / Xeloda -12 cycles    Weight 149 lbs /  lbs    Followed up with patient as he begins chemo for recurrence of vertebral disease.  Patient states that his appetite is good; prefers to eat two \"good\" meals per day and avoid snacking for weight management; vegan. He stated that he got up to 155 lbs, which is heavy for him and with the recurrence and surgery, he lost back down to 145 lbs. He was happy with his weight gain today.    Discussed possible side effects of treatment, including cold sensitivity with Oxaliplatin, importance of hydrationHe has been on a similar chemo treatment plan before and states that he is familiar with side effects to expect and being a physician, management of the issues.  He states that going back on chemo makes him nervous.     Will continue to follow up with patient as needed.  "

## 2020-06-19 NOTE — PLAN OF CARE
Outpatient Infusion • [address] [facility] • [address] • [place] [address] • [phone]      CHEMOTHERAPY EDUCATION SHEET    NAME:  Marquis Esteban      : 1960           DATE: 20    Booklets Given: Chemotherapy and You []  Eating Hints []    Sexuality/Fertility Books []     Chemotherapy/Biotherapy Education Sheets: (list all that apply)  capecitabine, oxaliplatin, bevacizumab-awwb                                                                                                                                                                Chemotherapy Regimen:  CapeOx: Oxaliplatin IV and Bevacizumab-awwb IV on day 1 plus capecitabine 500mg PO BID Days 1-14 of 21 day cycle    TOPICS EDUCATION PROVIDED EDUCATION REINFORCED COMMENTS   ANEMIA:  role of RBC, cause, s/s, ways to manage, role of transfusion [x] [] Discussed role of RBC and risk of anemia. Reviewed s/sx of anemia, including fatigue. Encouraged exercise to combat fatigue.   THROMBOCYTOPENIA:  role of platelet, cause, s/s, ways to prevent bleeding, things to avoid, when to seek help [x] [] Decreased platelets can increase risk of bleeding. Counseled on signs/symptoms of thrombocytopenia and when to call MD   NEUTROPENIA:  role of WBC, cause, infection precautions, s/s of infection, when to call MD [x] [] Low WBC can increase risk of infection. Counseled on preventing infection and to call MD if temperature reaches 100.4 or higher. Discussed appropriate hand hygiene, avoiding sick contacts, and use of neutropenic precautions when needed.   NUTRITION & APPETITE CHANGES:  importance of maintaining healthy diet & weight, ways to manage to improve intake, dietary consult, exercise regimen [x] [] Discussed risk of decreased appetite, reviewed plan for small more frequent meals.   DIARRHEA:  causes, s/s of dehydration, ways to manage, dietary changes, when to call MD [x] [] Can use OTC loperamide, but call MD if 4-6 episodes in 24 hours not relieved  by OTC loperamide   CONSTIPATION:  causes, ways to manage, dietary changes, when to call MD [x] [] Discussed risk of constipation associated with antiemetic regimen. Reviewed over the counter use of stool softners and stimulants as needed should constipation present as a problem   NAUSEA & VOMITING:  cause, use of antiemetics, dietary changes, when to call MD [x] [] Reviewed risk of N/V, counseled on prn ondansetron and to call MD if taking the max dose and unrelieved    MOUTH SORES:  causes, oral care, ways to manage [x] [] Discussed preventative measures such as salt/soda rinse, ice during infusion, use of soft brissel tooth brush, avoidance of acidic foods, and avoiding alcohol based mouth rinses   ALOPECIA:  cause, ways to manage, resources [] []    INFERTILITY & SEXUALITY:  causes, fertility preservation options, sexuality changes, ways to manage, importance of birth control [x] [] Safe sex practices with two forms of birth control including condom discussed   NERVOUS SYSTEM CHANGES:  causes, s/s, neuropathies, cognitive changes, ways to manage [x] [] DIscussed risk of peripheral neuropathy, or numbness, tingling, or cramping in hands/feet often triggered by cold.  This will generally lessen or go away between treatments patient advised to avoid drinking/eating cold food/beverages for first 3-5 days after each infusion. Call MD if symptoms do not go away or become intolerable.    PAIN:  causes, ways to manage [x] [] Discussed using acetaminophen or ibuprofen for pain as needed   SKIN & NAIL CHANGES:  cause, s/s, ways to manage [x] [] Discussed prevention and at home monitoring for rash and HFS due to capecitabine. Can help prevent by putting regular lotion on hands/feet daily, and avoiding extreme temperatures. Discussed impaired wound healing with bevacizumab.   ORGAN TOXICITIES:  cause, s/s, need for diagnostic tests, labs, when to notify MD [x] [] Discussed MD office will monitor for liver function, blood  cell counts. Discussed at home monitoring of edema. Dicsussed monitoring proteinuria with each bevacizumab infusion. Reviewed risk of HTN and need for frequent blood pressure monitoring prior to each infusion.    SURVIVORSHIP:  distress, distress assessment, secondary malignancies, early/late effects, follow-up, social issues, social support [] []    HOME CARE:  use of spill kits, storing of PO chemo, how to manage bodily fluids [x] [] Store oral medication safe away from children and pets, at room temp away from light. If you use a pill box, use a separate pill box from other medication. Counseled patient on safe handling of soiled linen and proper flush technique.  Discussed if the patient is handling their own medications, then they need to wash their hands properly after touching the medication.  If a caregiver is assisting with handling medication, need to wear disposal gloves and wash hands properly afterwards   MISCELLANEOUS:  drug interactions, administration, vesicant, et [x] [] No DDI identified to discuss with patient.  Advised patient on the process of obtaining the medication from a specialty pharmacy.     Referrals:        Notes:   Provided calendar to help improve adherence to capecitabine. Take medication twice a day with a full glass of water within 30 minutes after meal for 14 days, followed by 7 days off. Discussed patient can take within 6 hours if a dose is forgotten, but don't double up on doses. Provided patient with written materials and packet on medication.     Discussed aforementioned material with patient in the clinic. All questions and concerns addressed. Provided patient with my contact information and instructions to call should additional questions arise. Provided patient with personalized treatment calendar and written educational material. Obtained signed CCA and consent.

## 2020-07-14 NOTE — PROGRESS NOTES
Oral Chemotherapy Teaching      Patient Name/:  Marquis Esteban   1960  Oral Chemotherapy Regimen:  Xeloda 1000mg PO BID x 14 days, followed by 7 days off + Bevacizumab IV on day 1 + Oxaliplatin on Day 1 of a 21 day cycle    Date Started Medication:   Cycle 1: 2020   Cycle 2: 2020    Additional Notes: Received notification from  plan to INCREASE Xeloda dose to 1000mg PO BID x 14 days, followed by 7 days off.  Released script for Xeloda 1000mg PO BID x 14 days, followed by 7 days off #56 with 5 RF to [facility] to begin processing.

## 2020-07-14 NOTE — PROGRESS NOTES
Drug: capecitabine  Strength: 500mg tablet  Directions: Take 2 tablets PO BID Days 1-14 then off 7 days  QTY: 56  RF:5    Released to pharmacy: [facility]    Completed independent double check on medication order/RX.

## 2020-07-14 NOTE — PROGRESS NOTES
DATE OF VISIT: 07/14/20      REASON FOR VISIT: Followup for metastatic colon cancer.      HISTORY OF PRESENT ILLNESS: The patient is a very pleasant 60 y.o. male with past medical history significant for metastatic colon cancer diagnosed March 2007 . He is status post lower anterior resection as well as 1 cycle FOLFOX, stopped due to multiple toxicities. Final pathology showed poorly differentiated adenocarcinoma with 25 negative lymph nodes and clear surgical margins. Isolated tumor deposits was found in the pericolonic fat. Pathologic stage T2 N1c M0 stage IIIA disease.The patient has been followed by serial colonoscopies as well as CAT scans that did document pulmonary metastatic disease with left lower lobe nodule that is gradually increasing in size. Patient was doing fairly well until recently, 3 months ago, patient presented with low back pain. Patient had restaging workup that revealed hypermetabolic L2 vertebral body lesion. Patient had biopsy done on October 11, 2016 that revealed metastatic adenocarcinoma consistent of colon primary with CK 7 negative CK 20 positive CDX2 positive. Patient had next generation gene sequencing that revealed intermediate mutational load with microsatellite stability.  He started chemotherapy with Keytruda on 2/1/2017. The patient was changed to CapeOx with Avastin on 9/19/2017.  He completed 8 cycles on February 12, 2018.  He was started on Avastin with Xeloda maintenance.  Repeated scans done March 2, 2020 revealed progressive disease with increase in size of L2 vertebral body mass.  Whole-body PET scan done May 1, 2020 did not reveal any other sites of disease.  Patient had debulking surgery done by Dr. Frazier at [facility] pathology consistent with metastatic adenocarcinoma.  He was started on regorafenib May 10, 2020.   Treatment was stopped within 1 month secondary to multiple side effects including hypertension diarrhea and rash.  Treatment was switched to  Xeloda oxaliplatin with Avastin June 19, 2020 he is here today for scheduled follow up visit with treatment cycle #2.    SUBJECTIVE: The patient is here today by himself.  He is complaining of constipation.  Colace and lactulose has been helping.  He denies any neuropathy.  His back pain is better.    PAST MEDICAL HISTORY/SOCIAL HISTORY/FAMILY HISTORY: Reviewed by me and unchanged from my documentation done on 09/25/18.    Review of Systems   Constitutional: Positive for fatigue. Negative for activity change, appetite change, chills, fever and unexpected weight change.   HENT: Negative for hearing loss, mouth sores, nosebleeds, sore throat and trouble swallowing.    Eyes: Negative for visual disturbance.   Respiratory: Negative for cough, chest tightness, shortness of breath and wheezing.    Cardiovascular: Negative for chest pain, palpitations and leg swelling.   Gastrointestinal: Negative for abdominal distention, abdominal pain, blood in stool, constipation, diarrhea, nausea, rectal pain and vomiting.   Endocrine: Negative for cold intolerance and heat intolerance.   Genitourinary: Negative for difficulty urinating, dysuria, frequency and urgency.   Musculoskeletal: Negative for arthralgias, gait problem, joint swelling and myalgias.   Skin: Negative for rash.   Neurological: Negative for dizziness, tremors, syncope, weakness, light-headedness, numbness and headaches.   Hematological: Negative for adenopathy. Does not bruise/bleed easily.   Psychiatric/Behavioral: Negative for confusion, sleep disturbance and suicidal ideas. The patient is not nervous/anxious.          Current Outpatient Medications:   •  capecitabine (XELODA) 500 MG chemo tablet, Take 1 tablet by mouth 2 (Two) Times a Day. Take 1 tab(s) by mouth in the AM and 1 tab(s) by mouth in the PM on days 1-14, then off 7 days., Disp: 28 tablet, Rfl: 7  •  cholecalciferol (VITAMIN D3) 84831 units capsule, Take 5,000 Units by mouth Daily., Disp: , Rfl:   •   "lidocaine-prilocaine (EMLA) 2.5-2.5 % cream, Apply  topically to the appropriate area as directed As Needed (45-60 minutes prior to port access.  Cover with saran/plastic wrap.)., Disp: 30 g, Rfl: 5  •  ondansetron (ZOFRAN) 8 MG tablet, Take 1 tablet by mouth 3 (Three) Times a Day As Needed for Nausea or Vomiting., Disp: 30 tablet, Rfl: 5  •  Regorafenib (STIVARGA PO), Stivarga  40, Disp: , Rfl:     PHYSICAL EXAMINATION:   /99   Pulse 72   Temp 98.4 °F (36.9 °C) (Temporal)   Resp 16   Ht 167.6 cm (65.98\")   Wt 68.5 kg (151 lb)   SpO2 99%   BMI 24.38 kg/m²    ECOG Performance Status: 1 - Symptomatic but completely ambulatory  General Appearance:  alert, cooperative, no apparent distress and appears stated age   Neurologic/Psychiatric: A&O x 3, gait steady, appropriate affect, strength 5/5 in all muscle groups   HEENT:  Normocephalic, without obvious abnormality, mucous membranes moist   Neck: Supple, symmetrical, trachea midline, no adenopathy;  No thyromegaly, masses, or tenderness   Lungs:   Clear to auscultation bilaterally; respirations regular, even, and unlabored bilaterally   Heart:  Regular rate and rhythm, no murmurs appreciated   Abdomen:   Soft, non-tender, non-distended and no organomegaly   Lymph nodes: No cervical, supraclavicular, inguinal or axillary adenopathy noted   Extremities: Normal, atraumatic; no clubbing, cyanosis, or edema    Skin: No rashes, ulcers, or suspicious lesions noted     No visits with results within 2 Week(s) from this visit.   Latest known visit with results is:   Hospital Outpatient Visit on 06/19/2020   Component Date Value Ref Range Status   • Glucose 06/19/2020 106* 65 - 99 mg/dL Final   • BUN 06/19/2020 10  6 - 20 mg/dL Final   • Creatinine 06/19/2020 0.55* 0.76 - 1.27 mg/dL Final   • Sodium 06/19/2020 134* 136 - 145 mmol/L Final   • Potassium 06/19/2020 4.8  3.5 - 5.2 mmol/L Final   • Chloride 06/19/2020 96* 98 - 107 mmol/L Final   • CO2 06/19/2020 24.0  22.0 " - 29.0 mmol/L Final   • Calcium 06/19/2020 9.4  8.6 - 10.5 mg/dL Final   • Total Protein 06/19/2020 7.5  6.0 - 8.5 g/dL Final   • Albumin 06/19/2020 4.10  3.50 - 5.20 g/dL Final   • ALT (SGPT) 06/19/2020 19  1 - 41 U/L Final   • AST (SGOT) 06/19/2020 16  1 - 40 U/L Final   • Alkaline Phosphatase 06/19/2020 68  39 - 117 U/L Final   • Total Bilirubin 06/19/2020 0.4  0.2 - 1.2 mg/dL Final   • eGFR Non African Amer 06/19/2020 >150  >60 mL/min/1.73 Final   • eGFR   Amer 06/19/2020 >150  >60 mL/min/1.73 Final   • Globulin 06/19/2020 3.4  gm/dL Final   • A/G Ratio 06/19/2020 1.2  g/dL Final   • BUN/Creatinine Ratio 06/19/2020 18.2  7.0 - 25.0 Final   • Anion Gap 06/19/2020 14.0  5.0 - 15.0 mmol/L Final   • Color, UA 06/19/2020 Yellow  Yellow, Straw Final   • Appearance, UA 06/19/2020 Clear  Clear Final   • pH, UA 06/19/2020 6.0  5.0 - 8.0 Final   • Specific Gravity, UA 06/19/2020 1.005  1.005 - 1.030 Final   • Glucose, UA 06/19/2020 Negative  Negative Final   • Ketones, UA 06/19/2020 Negative  Negative Final   • Bilirubin, UA 06/19/2020 Negative  Negative Final   • Blood, UA 06/19/2020 Negative  Negative Final   • Protein, UA 06/19/2020 Negative  Negative Final   • Leuk Esterase, UA 06/19/2020 Negative  Negative Final   • Nitrite, UA 06/19/2020 Negative  Negative Final   • Urobilinogen, UA 06/19/2020 0.2 E.U./dL  0.2 - 1.0 E.U./dL Final   • WBC 06/19/2020 7.60  3.40 - 10.80 10*3/mm3 Final   • RBC 06/19/2020 5.04  4.14 - 5.80 10*6/mm3 Final   • Hemoglobin 06/19/2020 11.4* 13.0 - 17.7 g/dL Final   • Hematocrit 06/19/2020 37.2* 37.5 - 51.0 % Final   • RDW 06/19/2020 22.2* 12.3 - 15.4 % Final   • MCV 06/19/2020 73.8* 79.0 - 97.0 fL Final   • MCH 06/19/2020 22.6* 26.6 - 33.0 pg Final   • MCHC 06/19/2020 30.6* 31.5 - 35.7 g/dL Final   • MPV 06/19/2020 6.6  6.0 - 12.0 fL Final   • Platelets 06/19/2020 358  140 - 450 10*3/mm3 Final   • Neutrophil % 06/19/2020 74.6  42.7 - 76.0 % Final   • Lymphocyte % 06/19/2020 20.5  19.6  - 45.3 % Final   • Monocyte % 06/19/2020 4.9* 5.0 - 12.0 % Final   • Neutrophils, Absolute 06/19/2020 5.70  1.70 - 7.00 10*3/mm3 Final   • Lymphocytes, Absolute 06/19/2020 1.60  0.70 - 3.10 10*3/mm3 Final   • Monocytes, Absolute 06/19/2020 0.40  0.10 - 0.90 10*3/mm3 Final      No results found.(  ASSESSMENT: The patient is a very pleasant 60 y.o. male  with stage IV colon cancer.    PROBLEM LIST:  1. Currently stage IV colon cancer with lung and bony metastases.  K-edwige mutant, microsatellite stable, and intermediate mutational load.  A. Status post low anterior resection done by Dr. Young 2007, [id number]  B.  Attempted adjuvant chemotherapy for 1 cycle could not tolerate secondary to multiple toxicities.  C. Biopsy-proven L2 metastases done October 11, 2016. Status post CyberKnife radiation treatment.  D.  Started Keytruda 200 mg IV every 3 weeks on February 1, 2017, status post 10 cycles of treatment  E. Disease progression documented on PET scan completed 8/31/2017.   F. Treatment changed to Xeloda/Avastin/Oxaliplatin on 9/19/2017, status post 7 cycles.   G. Status post tumor debulking at L2 done by Dr. Frazier at [facility] on April 21, 2017  H.  The treatment was switched to Xeloda with Avastin maintenance treatment March 6, 2018  I.  Local relapse disease with another mass at L2 level document MRI spine done February 28, 2020.  Status post surgical debulking with Dr. Frazier March 19, 2020.  J.  Started regorafenib May 10, 2020 treatment was stopped secondary to multiple side effects including skin rash diarrhea and hypertension  K.  Started Xeloda oxaliplatin with Avastin June 19, 2020 status post 1 cycle  2.  Cancer related pain  3.  Opioid-induced constipation  4.  Treatment induced asthenia  5.  Mild depression  6.  Insomnia  7.  Chemotherapy-induced anemia  8.  Treatment induced hand-and-foot syndrome  9.   Sleep apnea  10.  Treatment induced hypertension    PLAN:  1.  I will continue to proceed  with treatment today Xeloda oxaliplatin with Avastin cycle #2.  We will continue dose reduction of Xeloda at 500 mg twice daily 2 weeks on 1 week off secondary to dermatitis. willt ry to increase the dsoe gradually.  2. We will monitor the patient's labs throughout treatment including blood counts, kidney function, CEA, thyroid function, and liver functions. We will monitor his liver enzymes that have been elevated secondary to treatment with immune-therapy.   3.  The patient will follow-up with me in 6 weeks with treatment.    4.  Patient will continue taking over-the-counter stool softeners for constipation.  I will continue lactulose as needed.  5.  We reviewed the potential side effects of this regimen including fatigue, vomiting and nausea, hair loss, nephropathy, neuropathy, hearing loss, myelosuppression, risk of infusion reaction, dermatitis, hepatitis, perforation, bleeding, thrombosis, and potential death.  6.  Patient tissue was submitted for the matched trial.  He did match on PETN mutation, however that arm was closed.  7.  I advised the patient to continue to hold his alternative treatment with high dose vitamin C while he is on immunetherapy.  8.  I will continue Norvasc and HCTZ for hypertension.  9. The patient will continue Zofran as needed for treatment related nausea.  10.  I will start the patient on Percocet 5/325 mg every 6 hours as needed for cancer related pain.  11.  We offered Ritalin for treatment related fatigue, but the patient has declined at this time.   12.  Patient will continue Zaleplon as needed for insomnia.  13.  Repeat patient scans after cycle #4.  14.  Continue port care.  Flush the patient's port today.  Kristofer Rodriguez MD  07/14/20

## 2020-08-04 NOTE — PROGRESS NOTES
DATE OF VISIT: 08/04/20      REASON FOR VISIT: Followup for metastatic colon cancer.      HISTORY OF PRESENT ILLNESS: The patient is a very pleasant 60 y.o. male with past medical history significant for metastatic colon cancer diagnosed March 2007 . He is status post lower anterior resection as well as 1 cycle FOLFOX, stopped due to multiple toxicities. Final pathology showed poorly differentiated adenocarcinoma with 25 negative lymph nodes and clear surgical margins. Isolated tumor deposits was found in the pericolonic fat. Pathologic stage T2 N1c M0 stage IIIA disease.The patient has been followed by serial colonoscopies as well as CAT scans that did document pulmonary metastatic disease with left lower lobe nodule that is gradually increasing in size. Patient was doing fairly well until recently, 3 months ago, patient presented with low back pain. Patient had restaging workup that revealed hypermetabolic L2 vertebral body lesion. Patient had biopsy done on October 11, 2016 that revealed metastatic adenocarcinoma consistent of colon primary with CK 7 negative CK 20 positive CDX2 positive. Patient had next generation gene sequencing that revealed intermediate mutational load with microsatellite stability.  He started chemotherapy with Keytruda on 2/1/2017. The patient was changed to CapeOx with Avastin on 9/19/2017.  He completed 8 cycles on February 12, 2018.  He was started on Avastin with Xeloda maintenance.  Repeated scans done March 2, 2020 revealed progressive disease with increase in size of L2 vertebral body mass.  Whole-body PET scan done May 1, 2020 did not reveal any other sites of disease.  Patient had debulking surgery done by Dr. Frazier at [facility] pathology consistent with metastatic adenocarcinoma.  He was started on regorafenib May 10, 2020.   Treatment was stopped within 1 month secondary to multiple side effects including hypertension diarrhea and rash.  Treatment was switched to  Xeloda oxaliplatin with Avastin June 19, 2020 he is here today for scheduled follow up visit with treatment cycle #3.    SUBJECTIVE: The patient is here today with his wife.  He is doing fairly well.  He is able to tolerate Xeloda 1000 mg twice daily.  He is complaining of low back pain that did increase slightly over the last 2 weeks however it is been waxing and waning.  He started to notice some neuropathy that has increased over the last 2 weeks.    PAST MEDICAL HISTORY/SOCIAL HISTORY/FAMILY HISTORY: Reviewed by me and unchanged from my documentation done on 09/25/18.    Review of Systems   Constitutional: Positive for fatigue. Negative for activity change, appetite change, chills, fever and unexpected weight change.   HENT: Negative for hearing loss, mouth sores, nosebleeds, sore throat and trouble swallowing.    Eyes: Negative for visual disturbance.   Respiratory: Negative for cough, chest tightness, shortness of breath and wheezing.    Cardiovascular: Negative for chest pain, palpitations and leg swelling.   Gastrointestinal: Negative for abdominal distention, abdominal pain, blood in stool, constipation, diarrhea, nausea, rectal pain and vomiting.   Endocrine: Negative for cold intolerance and heat intolerance.   Genitourinary: Negative for difficulty urinating, dysuria, frequency and urgency.   Musculoskeletal: Negative for arthralgias, gait problem, joint swelling and myalgias.   Skin: Negative for rash.   Neurological: Negative for dizziness, tremors, syncope, weakness, light-headedness, numbness and headaches.   Hematological: Negative for adenopathy. Does not bruise/bleed easily.   Psychiatric/Behavioral: Negative for confusion, sleep disturbance and suicidal ideas. The patient is not nervous/anxious.          Current Outpatient Medications:   •  acetaminophen (TYLENOL) 500 MG tablet, Take 500 mg by mouth Every 6 (Six) Hours As Needed for Mild Pain ., Disp: , Rfl:   •  celecoxib (CeleBREX) 200 MG  "capsule, Take 200 mg by mouth Daily., Disp: , Rfl:   •  oxyCODONE-acetaminophen (PERCOCET) 7.5-325 MG per tablet, Take 1 tablet by mouth Every 6 (Six) Hours As Needed., Disp: , Rfl:   •  capecitabine (XELODA) 500 MG chemo tablet, Take 2 tablets by mouth 2 (Two) Times a Day. On days 1-14, then off 7 days., Disp: 56 tablet, Rfl: 5  •  cholecalciferol (VITAMIN D3) 57325 units capsule, Take 5,000 Units by mouth Daily., Disp: , Rfl:   •  lidocaine-prilocaine (EMLA) 2.5-2.5 % cream, Apply  topically to the appropriate area as directed As Needed (45-60 minutes prior to port access.  Cover with saran/plastic wrap.)., Disp: 30 g, Rfl: 5  •  ondansetron (ZOFRAN) 8 MG tablet, Take 1 tablet by mouth 3 (Three) Times a Day As Needed for Nausea or Vomiting., Disp: 30 tablet, Rfl: 5  •  Regorafenib (STIVARGA PO), Stivarga  40, Disp: , Rfl:     PHYSICAL EXAMINATION:   /95   Pulse 66   Temp 97.8 °F (36.6 °C) (Temporal)   Resp 16   Ht 167.6 cm (65.98\")   Wt 68.9 kg (152 lb)   SpO2 98%   BMI 24.55 kg/m²    ECOG Performance Status: 1 - Symptomatic but completely ambulatory  General Appearance:  alert, cooperative, no apparent distress and appears stated age   Neurologic/Psychiatric: A&O x 3, gait steady, appropriate affect, strength 5/5 in all muscle groups   HEENT:  Normocephalic, without obvious abnormality, mucous membranes moist   Neck: Supple, symmetrical, trachea midline, no adenopathy;  No thyromegaly, masses, or tenderness   Lungs:   Clear to auscultation bilaterally; respirations regular, even, and unlabored bilaterally   Heart:  Regular rate and rhythm, no murmurs appreciated   Abdomen:   Soft, non-tender, non-distended and no organomegaly   Lymph nodes: No cervical, supraclavicular, inguinal or axillary adenopathy noted   Extremities: Normal, atraumatic; no clubbing, cyanosis, or edema    Skin: No rashes, ulcers, or suspicious lesions noted     No visits with results within 2 Week(s) from this visit.   Latest " known visit with results is:   Hospital Outpatient Visit on 07/14/2020   Component Date Value Ref Range Status   • Glucose 07/14/2020 116* 65 - 99 mg/dL Final   • BUN 07/14/2020 7  6 - 20 mg/dL Final   • Creatinine 07/14/2020 0.60* 0.76 - 1.27 mg/dL Final   • Sodium 07/14/2020 135* 136 - 145 mmol/L Final   • Potassium 07/14/2020 4.4  3.5 - 5.2 mmol/L Final    Specimen hemolyzed.  Results may be affected.   • Chloride 07/14/2020 101  98 - 107 mmol/L Final   • CO2 07/14/2020 26.0  22.0 - 29.0 mmol/L Final   • Calcium 07/14/2020 9.4  8.6 - 10.5 mg/dL Final   • Total Protein 07/14/2020 8.1  6.0 - 8.5 g/dL Final   • Albumin 07/14/2020 4.50  3.50 - 5.20 g/dL Final   • ALT (SGPT) 07/14/2020 12  1 - 41 U/L Final   • AST (SGOT) 07/14/2020 23  1 - 40 U/L Final   • Alkaline Phosphatase 07/14/2020 72  39 - 117 U/L Final   • Total Bilirubin 07/14/2020 0.4  0.0 - 1.2 mg/dL Final   • eGFR Non African Amer 07/14/2020 138  >60 mL/min/1.73 Final   • eGFR   Amer 07/14/2020 >150  >60 mL/min/1.73 Final   • Globulin 07/14/2020 3.6  gm/dL Final   • A/G Ratio 07/14/2020 1.3  g/dL Final   • BUN/Creatinine Ratio 07/14/2020 11.7  7.0 - 25.0 Final   • Anion Gap 07/14/2020 8.0  5.0 - 15.0 mmol/L Final   • Color, UA 07/14/2020 Yellow  Yellow, Straw Final   • Appearance, UA 07/14/2020 Clear  Clear Final   • pH, UA 07/14/2020 6.0  5.0 - 8.0 Final   • Specific Gravity, UA 07/14/2020 1.005  1.005 - 1.030 Final   • Glucose, UA 07/14/2020 Negative  Negative Final   • Ketones, UA 07/14/2020 Negative  Negative Final   • Bilirubin, UA 07/14/2020 Negative  Negative Final   • Blood, UA 07/14/2020 Negative  Negative Final   • Protein, UA 07/14/2020 Negative  Negative Final   • Leuk Esterase, UA 07/14/2020 Negative  Negative Final   • Nitrite, UA 07/14/2020 Negative  Negative Final   • Urobilinogen, UA 07/14/2020 0.2 E.U./dL  0.2 - 1.0 E.U./dL Final   • WBC 07/14/2020 6.10  3.40 - 10.80 10*3/mm3 Final   • RBC 07/14/2020 5.32  4.14 - 5.80 10*6/mm3  Final   • Hemoglobin 07/14/2020 12.1* 13.0 - 17.7 g/dL Final   • Hematocrit 07/14/2020 39.7  37.5 - 51.0 % Final   • RDW 07/14/2020 24.6* 12.3 - 15.4 % Final   • MCV 07/14/2020 74.7* 79.0 - 97.0 fL Final   • MCH 07/14/2020 22.7* 26.6 - 33.0 pg Final   • MCHC 07/14/2020 30.4* 31.5 - 35.7 g/dL Final   • MPV 07/14/2020 7.2  6.0 - 12.0 fL Final   • Platelets 07/14/2020 194  140 - 450 10*3/mm3 Final   • Neutrophil % 07/14/2020 62.9  42.7 - 76.0 % Final   • Lymphocyte % 07/14/2020 29.0  19.6 - 45.3 % Final   • Monocyte % 07/14/2020 8.1  5.0 - 12.0 % Final   • Neutrophils, Absolute 07/14/2020 3.80  1.70 - 7.00 10*3/mm3 Final   • Lymphocytes, Absolute 07/14/2020 1.80  0.70 - 3.10 10*3/mm3 Final   • Monocytes, Absolute 07/14/2020 0.50  0.10 - 0.90 10*3/mm3 Final   • Creatinine 07/14/2020 0.60  mg/dL Final      No results found.(  ASSESSMENT: The patient is a very pleasant 60 y.o. male  with stage IV colon cancer.    PROBLEM LIST:  1. Currently stage IV colon cancer with lung and bony metastases.  K-edwige mutant, microsatellite stable, and intermediate mutational load.  A. Status post low anterior resection done by Dr. Young 2007, [id number]  B.  Attempted adjuvant chemotherapy for 1 cycle could not tolerate secondary to multiple toxicities.  C. Biopsy-proven L2 metastases done October 11, 2016. Status post CyberKnife radiation treatment.  D.  Started Keytruda 200 mg IV every 3 weeks on February 1, 2017, status post 10 cycles of treatment  E. Disease progression documented on PET scan completed 8/31/2017.   F. Treatment changed to Xeloda/Avastin/Oxaliplatin on 9/19/2017, status post 7 cycles.   G. Status post tumor debulking at L2 done by Dr. Frazier at [facility] on April 21, 2017  H.  The treatment was switched to Xeloda with Avastin maintenance treatment March 6, 2018  I.  Local relapse disease with another mass at L2 level document MRI spine done February 28, 2020.  Status post surgical debulking with Dr. Frazier  March 19, 2020.  J.  Started regorafenib May 10, 2020 treatment was stopped secondary to multiple side effects including skin rash diarrhea and hypertension  K.  Started Xeloda oxaliplatin with Avastin June 19, 2020 status post 2 cycles  2.  Cancer related pain  3.  Opioid-induced constipation  4.  Treatment induced asthenia  5.  Mild depression  6.  Insomnia  7.  Chemotherapy-induced anemia  8.  Treatment induced hand-and-foot syndrome  9.   Sleep apnea  10.  Treatment induced hypertension  11.  Treatment induced neuropathy: Grade 2    PLAN:  1.  I will continue to proceed with treatment today Xeloda oxaliplatin with Avastin cycle #3.  We will continue dose reduction of Xeloda at 500 mg twice daily 2 weeks on 1 week off secondary to dermatitis. willt ry to increase the dsoe gradually.  2. We will monitor the patient's labs throughout treatment including blood counts, kidney function, CEA, thyroid function, and liver functions. We will monitor his liver enzymes that have been elevated secondary to treatment with immune-therapy.   3.  The patient will follow-up with me in 3 weeks with treatment.    4.  Patient will continue taking over-the-counter stool softeners for constipation.  I will continue lactulose as needed.  5.  We reviewed the potential side effects of this regimen including fatigue, vomiting and nausea, hair loss, nephropathy, neuropathy, hearing loss, myelosuppression, risk of infusion reaction, dermatitis, hepatitis, perforation, bleeding, thrombosis, and potential death.  6.  Patient tissue was submitted for the matched trial.  He did match on PETN mutation, however that arm was closed.  7.  I advised the patient to continue to hold his alternative treatment with high dose vitamin C while he is on immunetherapy.  8.  I will continue Norvasc and HCTZ for hypertension.  9. The patient will continue Zofran as needed for treatment related nausea.  10.  I will start the patient on Percocet 5/325 mg every 6  hours as needed for cancer related pain.  11.  We offered Ritalin for treatment related fatigue, but the patient has declined at this time.   12.  Patient will continue Zaleplon as needed for insomnia.  13.  Repeat patient scans after cycle #4.  14.  Continue port care.  Flush the patient's port today.  15.  I will reduce oxaliplatin down to 75 mg per square secondary to peripheral neuropathy grade 2  Kristofer Rodriguez MD  08/04/20

## 2020-08-04 NOTE — PROGRESS NOTES
I met patient over in the Infusion Area.  I introduced myself and explained my role in his care and provided my contact card.  Patient then called me over to the Infusion area and asked for help with Patient Portal System, so I sent him the number to reset his access to Portal.  Patient also requested colon cancer support groups.  I called the [employer] and they sent me information and I sent patient those per email.  GILBERT

## 2020-08-25 NOTE — PROGRESS NOTES
DATE OF VISIT: 08/25/20      REASON FOR VISIT: Followup for metastatic colon cancer.      HISTORY OF PRESENT ILLNESS: The patient is a very pleasant 60 y.o. male with past medical history significant for metastatic colon cancer diagnosed March 2007 . He is status post lower anterior resection as well as 1 cycle FOLFOX, stopped due to multiple toxicities. Final pathology showed poorly differentiated adenocarcinoma with 25 negative lymph nodes and clear surgical margins. Isolated tumor deposits was found in the pericolonic fat. Pathologic stage T2 N1c M0 stage IIIA disease.The patient has been followed by serial colonoscopies as well as CAT scans that did document pulmonary metastatic disease with left lower lobe nodule that is gradually increasing in size. Patient was doing fairly well until recently, 3 months ago, patient presented with low back pain. Patient had restaging workup that revealed hypermetabolic L2 vertebral body lesion. Patient had biopsy done on October 11, 2016 that revealed metastatic adenocarcinoma consistent of colon primary with CK 7 negative CK 20 positive CDX2 positive. Patient had next generation gene sequencing that revealed intermediate mutational load with microsatellite stability.  He started chemotherapy with Keytruda on 2/1/2017. The patient was changed to CapeOx with Avastin on 9/19/2017.  He completed 8 cycles on February 12, 2018.  He was started on Avastin with Xeloda maintenance.  Repeated scans done March 2, 2020 revealed progressive disease with increase in size of L2 vertebral body mass.  Whole-body PET scan done May 1, 2020 did not reveal any other sites of disease.  Patient had debulking surgery done by Dr. Frazier at [facility] pathology consistent with metastatic adenocarcinoma.  He was started on regorafenib May 10, 2020.   Treatment was stopped within 1 month secondary to multiple side effects including hypertension diarrhea and rash.  Treatment was switched to  Xeloda oxaliplatin with Avastin June 19, 2020 he is here today for scheduled follow up visit with treatment cycle #4.    SUBJECTIVE: The patient is here today by himself.  He is doing fairly well he did any fever chills night sweats his neuropathy is under control.  His diarrhea has improved after going down on Xeloda to 2 at evening and 1 in the morning.    PAST MEDICAL HISTORY/SOCIAL HISTORY/FAMILY HISTORY: Reviewed by me and unchanged from my documentation done on 09/25/18.    Review of Systems   Constitutional: Positive for fatigue. Negative for activity change, appetite change, chills, fever and unexpected weight change.   HENT: Negative for hearing loss, mouth sores, nosebleeds, sore throat and trouble swallowing.    Eyes: Negative for visual disturbance.   Respiratory: Negative for cough, chest tightness, shortness of breath and wheezing.    Cardiovascular: Negative for chest pain, palpitations and leg swelling.   Gastrointestinal: Negative for abdominal distention, abdominal pain, blood in stool, constipation, diarrhea, nausea, rectal pain and vomiting.   Endocrine: Negative for cold intolerance and heat intolerance.   Genitourinary: Negative for difficulty urinating, dysuria, frequency and urgency.   Musculoskeletal: Negative for arthralgias, gait problem, joint swelling and myalgias.   Skin: Negative for rash.   Neurological: Negative for dizziness, tremors, syncope, weakness, light-headedness, numbness and headaches.   Hematological: Negative for adenopathy. Does not bruise/bleed easily.   Psychiatric/Behavioral: Negative for confusion, sleep disturbance and suicidal ideas. The patient is not nervous/anxious.          Current Outpatient Medications:   •  acetaminophen (TYLENOL) 500 MG tablet, Take 500 mg by mouth Every 6 (Six) Hours As Needed for Mild Pain ., Disp: , Rfl:   •  capecitabine (XELODA) 500 MG chemo tablet, Take 2 tablets by mouth 2 (Two) Times a Day. On days 1-14, then off 7 days., Disp: 56  "tablet, Rfl: 5  •  celecoxib (CeleBREX) 200 MG capsule, Take 200 mg by mouth Daily., Disp: , Rfl:   •  cholecalciferol (VITAMIN D3) 27535 units capsule, Take 5,000 Units by mouth Daily., Disp: , Rfl:   •  lidocaine-prilocaine (EMLA) 2.5-2.5 % cream, Apply  topically to the appropriate area as directed As Needed (45-60 minutes prior to port access.  Cover with saran/plastic wrap.)., Disp: 30 g, Rfl: 5  •  ondansetron (ZOFRAN) 8 MG tablet, Take 1 tablet by mouth 3 (Three) Times a Day As Needed for Nausea or Vomiting., Disp: 30 tablet, Rfl: 5  •  oxyCODONE-acetaminophen (PERCOCET) 7.5-325 MG per tablet, Take 1 tablet by mouth Every 6 (Six) Hours As Needed., Disp: , Rfl:   •  Regorafenib (STIVARGA PO), Stivarga  40, Disp: , Rfl:     PHYSICAL EXAMINATION:   /90   Pulse 72   Temp 97.5 °F (36.4 °C) (Temporal)   Resp 16   Ht 167.6 cm (65.98\")   Wt 68.9 kg (152 lb)   SpO2 99%   BMI 24.55 kg/m²    ECOG Performance Status: 1 - Symptomatic but completely ambulatory  General Appearance:  alert, cooperative, no apparent distress and appears stated age   Neurologic/Psychiatric: A&O x 3, gait steady, appropriate affect, strength 5/5 in all muscle groups   HEENT:  Normocephalic, without obvious abnormality, mucous membranes moist   Neck: Supple, symmetrical, trachea midline, no adenopathy;  No thyromegaly, masses, or tenderness   Lungs:   Clear to auscultation bilaterally; respirations regular, even, and unlabored bilaterally   Heart:  Regular rate and rhythm, no murmurs appreciated   Abdomen:   Soft, non-tender, non-distended and no organomegaly   Lymph nodes: No cervical, supraclavicular, inguinal or axillary adenopathy noted   Extremities: Normal, atraumatic; no clubbing, cyanosis, or edema    Skin: No rashes, ulcers, or suspicious lesions noted     No visits with results within 2 Week(s) from this visit.   Latest known visit with results is:   Hospital Outpatient Visit on 08/04/2020   Component Date Value Ref Range " Status   • Glucose 08/04/2020 171* 65 - 99 mg/dL Final   • BUN 08/04/2020 10  6 - 20 mg/dL Final   • Creatinine 08/04/2020 0.72* 0.76 - 1.27 mg/dL Final   • Sodium 08/04/2020 135* 136 - 145 mmol/L Final   • Potassium 08/04/2020 4.3  3.5 - 5.2 mmol/L Final   • Chloride 08/04/2020 101  98 - 107 mmol/L Final   • CO2 08/04/2020 25.0  22.0 - 29.0 mmol/L Final   • Calcium 08/04/2020 9.3  8.6 - 10.5 mg/dL Final   • Total Protein 08/04/2020 7.8  6.0 - 8.5 g/dL Final   • Albumin 08/04/2020 4.50  3.50 - 5.20 g/dL Final   • ALT (SGPT) 08/04/2020 10  1 - 41 U/L Final   • AST (SGOT) 08/04/2020 23  1 - 40 U/L Final   • Alkaline Phosphatase 08/04/2020 70  39 - 117 U/L Final   • Total Bilirubin 08/04/2020 0.6  0.0 - 1.2 mg/dL Final   • eGFR Non African Amer 08/04/2020 112  >60 mL/min/1.73 Final   • eGFR  African Amer 08/04/2020 136  >60 mL/min/1.73 Final   • Globulin 08/04/2020 3.3  gm/dL Final   • A/G Ratio 08/04/2020 1.4  g/dL Final   • BUN/Creatinine Ratio 08/04/2020 13.9  7.0 - 25.0 Final   • Anion Gap 08/04/2020 9.0  5.0 - 15.0 mmol/L Final   • Color, UA 08/04/2020 Yellow  Yellow, Straw Final   • Appearance, UA 08/04/2020 Clear  Clear Final   • pH, UA 08/04/2020 6.0  5.0 - 8.0 Final   • Specific Gravity, UA 08/04/2020 1.005  1.005 - 1.030 Final   • Glucose, UA 08/04/2020 Negative  Negative Final   • Ketones, UA 08/04/2020 Negative  Negative Final   • Bilirubin, UA 08/04/2020 Negative  Negative Final   • Blood, UA 08/04/2020 Negative  Negative Final   • Protein, UA 08/04/2020 Negative  Negative Final   • Leuk Esterase, UA 08/04/2020 Negative  Negative Final   • Nitrite, UA 08/04/2020 Negative  Negative Final   • Urobilinogen, UA 08/04/2020 0.2 E.U./dL  0.2 - 1.0 E.U./dL Final   • WBC 08/04/2020 4.10  3.40 - 10.80 10*3/mm3 Final   • RBC 08/04/2020 5.35  4.14 - 5.80 10*6/mm3 Final   • Hemoglobin 08/04/2020 12.4* 13.0 - 17.7 g/dL Final   • Hematocrit 08/04/2020 40.7  37.5 - 51.0 % Final   • RDW 08/04/2020 25.6* 12.3 - 15.4 % Final    • MCV 08/04/2020 76.0* 79.0 - 97.0 fL Final   • MCH 08/04/2020 23.2* 26.6 - 33.0 pg Final   • MCHC 08/04/2020 30.6* 31.5 - 35.7 g/dL Final   • MPV 08/04/2020 7.4  6.0 - 12.0 fL Final   • Platelets 08/04/2020 171  140 - 450 10*3/mm3 Final   • Neutrophil % 08/04/2020 57.1  42.7 - 76.0 % Final   • Lymphocyte % 08/04/2020 33.6  19.6 - 45.3 % Final   • Monocyte % 08/04/2020 9.3  5.0 - 12.0 % Final   • Neutrophils, Absolute 08/04/2020 2.30  1.70 - 7.00 10*3/mm3 Final   • Lymphocytes, Absolute 08/04/2020 1.40  0.70 - 3.10 10*3/mm3 Final   • Monocytes, Absolute 08/04/2020 0.40  0.10 - 0.90 10*3/mm3 Final      No results found.(  ASSESSMENT: The patient is a very pleasant 60 y.o. male  with stage IV colon cancer.    PROBLEM LIST:  1. Currently stage IV colon cancer with lung and bony metastases.  K-edwige mutant, microsatellite stable, and intermediate mutational load.  A. Status post low anterior resection done by Dr. Young 2007, [id number]  B.  Attempted adjuvant chemotherapy for 1 cycle could not tolerate secondary to multiple toxicities.  C. Biopsy-proven L2 metastases done October 11, 2016. Status post CyberKnife radiation treatment.  D.  Started Keytruda 200 mg IV every 3 weeks on February 1, 2017, status post 10 cycles of treatment  E. Disease progression documented on PET scan completed 8/31/2017.   F. Treatment changed to Xeloda/Avastin/Oxaliplatin on 9/19/2017, status post 7 cycles.   G. Status post tumor debulking at L2 done by Dr. Frazier at [facility] on April 21, 2017  H.  The treatment was switched to Xeloda with Avastin maintenance treatment March 6, 2018  I.  Local relapse disease with another mass at L2 level document MRI spine done February 28, 2020.  Status post surgical debulking with Dr. Frazier March 19, 2020.  J.  Started regorafenib May 10, 2020 treatment was stopped secondary to multiple side effects including skin rash diarrhea and hypertension  K.  Started Xeloda oxaliplatin with Avastin  June 19, 2020 status post 3 cycles  2.  Cancer related pain  3.  Opioid-induced constipation  4.  Treatment induced asthenia  5.  Mild depression  6.  Insomnia  7.  Chemotherapy-induced anemia  8.  Treatment induced hand-and-foot syndrome  9.   Sleep apnea  10.  Treatment induced hypertension  11.  Treatment induced neuropathy: Grade 2    PLAN:  1.  I will proceed with treatment today Xeloda oxaliplatin with Avastin cycle #4.  We will continue dose reduction of Xeloda at 1000 mg in the evening and 5 mg in the morning 2 weeks on 1 week off secondary to dermatitis. willt ry to increase the dsoe gradually.  2. We will monitor the patient's labs throughout treatment including blood counts, kidney function, CEA, thyroid function, and liver functions. We will monitor his liver enzymes that have been elevated secondary to treatment with immune-therapy.   3.  The patient will follow-up with me in 3 weeks with treatment.    4.  Patient will continue taking over-the-counter stool softeners for constipation.  I will continue lactulose as needed.  5.  We reviewed the potential side effects of this regimen including fatigue, vomiting and nausea, hair loss, nephropathy, neuropathy, hearing loss, myelosuppression, risk of infusion reaction, dermatitis, hepatitis, perforation, bleeding, thrombosis, and potential death.  6.  Patient tissue was submitted for the matched trial.  He did match on PETN mutation, however that arm was closed.  7.  I advised the patient to continue to hold his alternative treatment with high dose vitamin C while he is on immunetherapy.  8.  I will continue Norvasc and HCTZ for hypertension.  9. The patient will continue Zofran as needed for treatment related nausea.  10.  I will start the patient on Percocet 7.5/325 mg every 6 hours as needed for cancer related pain.  I will refill it today.  11.  We offered Ritalin for treatment related fatigue, but the patient has declined at this time.   12.  Patient  will continue Zaleplon as needed for insomnia.  13.  Repeat patient scans after cycle #4.  14.  Continue port care.  Flush the patient's port today.  15.  I will continue dose reduction on oxaliplatin down to 75 mg per square secondary to peripheral neuropathy grade 2.  60.  The patient is moving to full disability which I think is very reasonable given his stage IV malignancy with active cancer treatment.  This will start September 1, 2020.  Kristofer Rodriguez MD  08/25/20

## 2020-08-27 NOTE — TELEPHONE ENCOUNTER
Patient called triage line and said he should have had a PET ordered instead of ct scans  I told him I would ask  Dr Rodriguez in the morning.  Analy, the  knows as well and has sent a msg to Dr. Michael Ruelas's nurse.

## 2020-09-15 NOTE — PROGRESS NOTES
DATE OF VISIT: 09/15/20      REASON FOR VISIT: Followup for metastatic colon cancer.      HISTORY OF PRESENT ILLNESS: The patient is a very pleasant 60 y.o. male with past medical history significant for metastatic colon cancer diagnosed March 2007 . He is status post lower anterior resection as well as 1 cycle FOLFOX, stopped due to multiple toxicities. Final pathology showed poorly differentiated adenocarcinoma with 25 negative lymph nodes and clear surgical margins. Isolated tumor deposits was found in the pericolonic fat. Pathologic stage T2 N1c M0 stage IIIA disease.The patient has been followed by serial colonoscopies as well as CAT scans that did document pulmonary metastatic disease with left lower lobe nodule that is gradually increasing in size. Patient was doing fairly well until recently, 3 months ago, patient presented with low back pain. Patient had restaging workup that revealed hypermetabolic L2 vertebral body lesion. Patient had biopsy done on October 11, 2016 that revealed metastatic adenocarcinoma consistent of colon primary with CK 7 negative CK 20 positive CDX2 positive. Patient had next generation gene sequencing that revealed intermediate mutational load with microsatellite stability.  He started chemotherapy with Keytruda on 2/1/2017. The patient was changed to CapeOx with Avastin on 9/19/2017.  He completed 8 cycles on February 12, 2018.  He was started on Avastin with Xeloda maintenance.  Repeated scans done March 2, 2020 revealed progressive disease with increase in size of L2 vertebral body mass.  Whole-body PET scan done May 1, 2020 did not reveal any other sites of disease.  Patient had debulking surgery done by Dr. Frazier at [facility] pathology consistent with metastatic adenocarcinoma.  He was started on regorafenib May 10, 2020.   Treatment was stopped within 1 month secondary to multiple side effects including hypertension diarrhea and rash.  Treatment was switched to  Xeloda oxaliplatin with Avastin June 19, 2020 he is here today for scheduled follow up visit with treatment cycle #5.    SUBJECTIVE: The patient is here today by himself.  His neuropathy is mild and stable.  He had any fever chills night sweats.  He has mild diarrhea.    PAST MEDICAL HISTORY/SOCIAL HISTORY/FAMILY HISTORY: Reviewed by me and unchanged from my documentation done on 09/25/18.    Review of Systems   Constitutional: Positive for fatigue. Negative for activity change, appetite change, chills, fever and unexpected weight change.   HENT: Negative for hearing loss, mouth sores, nosebleeds, sore throat and trouble swallowing.    Eyes: Negative for visual disturbance.   Respiratory: Negative for cough, chest tightness, shortness of breath and wheezing.    Cardiovascular: Negative for chest pain, palpitations and leg swelling.   Gastrointestinal: Negative for abdominal distention, abdominal pain, blood in stool, constipation, diarrhea, nausea, rectal pain and vomiting.   Endocrine: Negative for cold intolerance and heat intolerance.   Genitourinary: Negative for difficulty urinating, dysuria, frequency and urgency.   Musculoskeletal: Negative for arthralgias, gait problem, joint swelling and myalgias.   Skin: Negative for rash.   Neurological: Negative for dizziness, tremors, syncope, weakness, light-headedness, numbness and headaches.   Hematological: Negative for adenopathy. Does not bruise/bleed easily.   Psychiatric/Behavioral: Negative for confusion, sleep disturbance and suicidal ideas. The patient is not nervous/anxious.          Current Outpatient Medications:   •  acetaminophen (TYLENOL) 500 MG tablet, Take 500 mg by mouth Every 6 (Six) Hours As Needed for Mild Pain ., Disp: , Rfl:   •  amLODIPine (NORVASC) 5 MG tablet, Take 5 mg by mouth Daily., Disp: , Rfl:   •  capecitabine (XELODA) 500 MG chemo tablet, Take 2 tablets by mouth 2 (Two) Times a Day. On days 1-14, then off 7 days., Disp: 56 tablet,  "Rfl: 5  •  celecoxib (CeleBREX) 200 MG capsule, Take 200 mg by mouth Daily., Disp: , Rfl:   •  cholecalciferol (VITAMIN D3) 25941 units capsule, Take 5,000 Units by mouth Daily., Disp: , Rfl:   •  gabapentin (NEURONTIN) 300 MG capsule, gabapentin 300 mg capsule  Take one tablet by mouth 2x daily., Disp: , Rfl:   •  lidocaine-prilocaine (EMLA) 2.5-2.5 % cream, Apply  topically to the appropriate area as directed As Needed (45-60 minutes prior to port access.  Cover with saran/plastic wrap.)., Disp: 30 g, Rfl: 5  •  ondansetron (ZOFRAN) 8 MG tablet, Take 1 tablet by mouth 3 (Three) Times a Day As Needed for Nausea or Vomiting., Disp: 30 tablet, Rfl: 5  •  oxyCODONE-acetaminophen (PERCOCET) 7.5-325 MG per tablet, Take 1 tablet by mouth Every 8 (Eight) Hours As Needed for Moderate Pain  or Severe Pain ., Disp: 90 tablet, Rfl: 0  •  Regorafenib (STIVARGA PO), Stivarga  40, Disp: , Rfl:   No current facility-administered medications for this visit.     Facility-Administered Medications Ordered in Other Visits:   •  Bevacizumab-awwb (MVASI) 500 mg in sodium chloride 0.9 % 100 mL chemo IVPB, 500 mg, Intravenous, Once, Kristofer Rodriguez MD  •  dexamethasone (DECADRON) IVPB 12 mg, 12 mg, Intravenous, Once, Kristofer Rodriguez MD  •  dextrose (D5W) 5 % infusion 250 mL, 250 mL, Intravenous, Once, Kristofer Rodriguez MD  •  heparin injection 500 Units, 500 Units, Intravenous, PRN, Kristofer Rodriguez MD  •  OXALIplatin (ELOXATIN) 135 mg in dextrose (D5W) 5 % 277 mL chemo IVPB, 75 mg/m2 (Treatment Plan Recorded), Intravenous, Once, Kristofer Rodriguez MD  •  palonosetron (ALOXI) injection 0.25 mg, 0.25 mg, Intravenous, Once, Kristofer Rodriguez MD  •  sodium chloride 0.9 % infusion 250 mL, 250 mL, Intravenous, Once, Kristofer Rodriguez MD    PHYSICAL EXAMINATION:   /97   Pulse 61   Temp 97.1 °F (36.2 °C) (Temporal)   Resp 16   Ht 167.6 cm (65.98\")   Wt 71.2 kg (157 lb)   SpO2 98%   BMI 25.35 kg/m²    ECOG Performance Status: 1 - Symptomatic " but completely ambulatory  General Appearance:  alert, cooperative, no apparent distress and appears stated age   Neurologic/Psychiatric: A&O x 3, gait steady, appropriate affect, strength 5/5 in all muscle groups   HEENT:  Normocephalic, without obvious abnormality, mucous membranes moist   Neck: Supple, symmetrical, trachea midline, no adenopathy;  No thyromegaly, masses, or tenderness   Lungs:   Clear to auscultation bilaterally; respirations regular, even, and unlabored bilaterally   Heart:  Regular rate and rhythm, no murmurs appreciated   Abdomen:   Soft, non-tender, non-distended and no organomegaly   Lymph nodes: No cervical, supraclavicular, inguinal or axillary adenopathy noted   Extremities: Normal, atraumatic; no clubbing, cyanosis, or edema    Skin: No rashes, ulcers, or suspicious lesions noted     Hospital Outpatient Visit on 09/15/2020   Component Date Value Ref Range Status   • Color, UA 09/15/2020 Yellow  Yellow, Straw Final   • Appearance, UA 09/15/2020 Clear  Clear Final   • pH, UA 09/15/2020 7.5  5.0 - 8.0 Final   • Specific Gravity, UA 09/15/2020 1.010  1.005 - 1.030 Final   • Glucose, UA 09/15/2020 Negative  Negative Final   • Ketones, UA 09/15/2020 Negative  Negative Final   • Bilirubin, UA 09/15/2020 Negative  Negative Final   • Blood, UA 09/15/2020 Negative  Negative Final   • Protein, UA 09/15/2020 Negative  Negative Final   • Leuk Esterase, UA 09/15/2020 Negative  Negative Final   • Nitrite, UA 09/15/2020 Negative  Negative Final   • Urobilinogen, UA 09/15/2020 0.2 E.U./dL  0.2 - 1.0 E.U./dL Final   Hospital Outpatient Visit on 09/11/2020   Component Date Value Ref Range Status   • Glucose 09/11/2020 109  70 - 130 mg/dL Final   Lab on 09/11/2020   Component Date Value Ref Range Status   • Glucose 09/11/2020 98  65 - 99 mg/dL Final   • BUN 09/11/2020 12  6 - 20 mg/dL Final   • Creatinine 09/11/2020 0.77  0.76 - 1.27 mg/dL Final   • Sodium 09/11/2020 136  136 - 145 mmol/L Final   •  Potassium 09/11/2020 4.4  3.5 - 5.2 mmol/L Final   • Chloride 09/11/2020 99  98 - 107 mmol/L Final   • CO2 09/11/2020 28.0  22.0 - 29.0 mmol/L Final   • Calcium 09/11/2020 9.8  8.6 - 10.5 mg/dL Final   • Total Protein 09/11/2020 8.3  6.0 - 8.5 g/dL Final   • Albumin 09/11/2020 4.70  3.50 - 5.20 g/dL Final   • ALT (SGPT) 09/11/2020 18  1 - 41 U/L Final   • AST (SGOT) 09/11/2020 29  1 - 40 U/L Final   • Alkaline Phosphatase 09/11/2020 85  39 - 117 U/L Final   • Total Bilirubin 09/11/2020 1.0  0.0 - 1.2 mg/dL Final   • eGFR Non African Amer 09/11/2020 104  >60 mL/min/1.73 Final   • eGFR  African Amer 09/11/2020 126  >60 mL/min/1.73 Final   • Globulin 09/11/2020 3.6  gm/dL Final   • A/G Ratio 09/11/2020 1.3  g/dL Final   • BUN/Creatinine Ratio 09/11/2020 15.6  7.0 - 25.0 Final   • Anion Gap 09/11/2020 9.0  5.0 - 15.0 mmol/L Final   • WBC 09/11/2020 6.20  3.40 - 10.80 10*3/mm3 Final   • RBC 09/11/2020 5.04  4.14 - 5.80 10*6/mm3 Final   • Hemoglobin 09/11/2020 12.3* 13.0 - 17.7 g/dL Final   • Hematocrit 09/11/2020 39.3  37.5 - 51.0 % Final   • MCV 09/11/2020 78.0* 79.0 - 97.0 fL Final   • MCH 09/11/2020 24.4* 26.6 - 33.0 pg Final   • MCHC 09/11/2020 31.3* 31.5 - 35.7 g/dL Final   • RDW 09/11/2020 25.2* 12.3 - 15.4 % Final   • RDW-SD 09/11/2020 70.7* 37.0 - 54.0 fl Final   • MPV 09/11/2020 10.4  6.0 - 12.0 fL Final   • Platelets 09/11/2020 169  140 - 450 10*3/mm3 Final   • Neutrophil % 09/11/2020 55.1  42.7 - 76.0 % Final   • Lymphocyte % 09/11/2020 24.8  19.6 - 45.3 % Final   • Monocyte % 09/11/2020 12.6* 5.0 - 12.0 % Final   • Eosinophil % 09/11/2020 6.9* 0.3 - 6.2 % Final   • Basophil % 09/11/2020 0.3  0.0 - 1.5 % Final   • Immature Grans % 09/11/2020 0.3  0.0 - 0.5 % Final   • Neutrophils, Absolute 09/11/2020 3.41  1.70 - 7.00 10*3/mm3 Final   • Lymphocytes, Absolute 09/11/2020 1.54  0.70 - 3.10 10*3/mm3 Final   • Monocytes, Absolute 09/11/2020 0.78  0.10 - 0.90 10*3/mm3 Final   • Eosinophils, Absolute 09/11/2020 0.43*  0.00 - 0.40 10*3/mm3 Final   • Basophils, Absolute 09/11/2020 0.02  0.00 - 0.20 10*3/mm3 Final   • Immature Grans, Absolute 09/11/2020 0.02  0.00 - 0.05 10*3/mm3 Final   • nRBC 09/11/2020 0.0  0.0 - 0.2 /100 WBC Final   • Microcytes 09/11/2020 Slight/1+  None Seen Final   • WBC Morphology 09/11/2020 Normal  Normal Final   • Platelet Morphology 09/11/2020 Normal  Normal Final      Nm Pet Skull Base To Mid Thigh    Result Date: 9/11/2020  Narrative: EXAMINATION: NM PET SKULL BASE TO MID THIGH-  INDICATION: f/u scan; C18.7-Malignant neoplasm of sigmoid colon  TECHNIQUE: Fasting blood glucose 109 mg/dL, radiopharmaceutical injection 11.19 mCi F-18 FDG injected into the right AC vein with the patient imaged after an appropriate amount of time. CT datasets performed for fusion analysis alone and should not be used for diagnostic purposes as these are low-dose protocol.  The radiation dose reduction device was turned on as low as reasonably achievable for each scan per ALARA protocol.  COMPARISON: Subsequent exam for follow-up with prior study dated May 1, 2020  FINDINGS:  HEAD AND NECK: Grossly symmetric appearance of cerebral hemispheres on limited intracranial evaluation. Physiologic variant activity within the extraocular muscles, muscles of phonation and tonsils.  CHEST:  Physiologic activity within the left ventricular myocardium. No intraparenchymal lung hypermetabolism or nodule/mass. No axillary adenopathy. No mediastinal or hilar adenopathy.  ABDOMEN AND PELVIS: Physiologic activity within the liver, spleen, renal collecting systems and GI tract without abnormal hypermetabolism in the abdomen and pelvis identified. Previously noted borderline left inguinal lymph node is now nonhypermetabolic.  The osseous structures demonstrate diminished hypermetabolism involving the upper lumbar spine likely related to posttraumatic/postsurgical findings and instrumentation. No suspicious bony hypermetabolism.      Impression:  Mild residual hypermetabolism involving the lumbar spine, likely posttraumatic/postsurgical with instrumentation present. Interval resolved hypermetabolism involving the previously noted left inguinal lymph node. No new hypermetabolism to suggest disease progression elsewhere.  This report was finalized on 9/11/2020 11:30 AM by Abdullahi Neville.    (  ASSESSMENT: The patient is a very pleasant 60 y.o. male  with stage IV colon cancer.    PROBLEM LIST:  1. Currently stage IV colon cancer with lung and bony metastases.  K-edwige mutant, microsatellite stable, and intermediate mutational load.  A. Status post low anterior resection done by Dr. Young 2007, [id number]  B.  Attempted adjuvant chemotherapy for 1 cycle could not tolerate secondary to multiple toxicities.  C. Biopsy-proven L2 metastases done October 11, 2016. Status post CyberKnife radiation treatment.  D.  Started Keytruda 200 mg IV every 3 weeks on February 1, 2017, status post 10 cycles of treatment  E. Disease progression documented on PET scan completed 8/31/2017.   F. Treatment changed to Xeloda/Avastin/Oxaliplatin on 9/19/2017, status post 7 cycles.   G. Status post tumor debulking at L2 done by Dr. Frazier at [facility] on April 21, 2017  H.  The treatment was switched to Xeloda with Avastin maintenance treatment March 6, 2018  I.  Local relapse disease with another mass at L2 level document MRI spine done February 28, 2020.  Status post surgical debulking with Dr. Frazier March 19, 2020.  J.  Started regorafenib May 10, 2020 treatment was stopped secondary to multiple side effects including skin rash diarrhea and hypertension  K.  Started Xeloda oxaliplatin with Avastin June 19, 2020 status post 4 cycles  2.  Cancer related pain  3.  Opioid-induced constipation  4.  Treatment induced asthenia  5.  Mild depression  6.  Insomnia  7.  Chemotherapy-induced anemia  8.  Treatment induced hand-and-foot syndrome  9.   Sleep apnea  10.  Treatment induced  hypertension  11.  Treatment induced neuropathy: Grade 2    PLAN:  1.  I will proceed with treatment today Xeloda oxaliplatin with Avastin cycle #5.  We will continue dose reduction of Xeloda at 1000 mg in the evening and 500 mg in the morning 2 weeks on 1 week off secondary to dermatitis. willt ry to increase the dsoe gradually.  2. We will monitor the patient's labs throughout treatment including blood counts, kidney function, CEA, thyroid function, and liver functions. We will monitor his liver enzymes that have been elevated secondary to treatment with immune-therapy.   3.  The patient will follow-up with me in 3 weeks with treatment cycle number 6 out of 8 planned.    4.  Patient will continue taking over-the-counter stool softeners for constipation.  I will continue lactulose as needed.  5.  We reviewed the potential side effects of this regimen including fatigue, vomiting and nausea, hair loss, nephropathy, neuropathy, hearing loss, myelosuppression, risk of infusion reaction, dermatitis, hepatitis, perforation, bleeding, thrombosis, and potential death.  6.  Patient tissue was submitted for the matched trial.  He did match on PETN mutation, however that arm was closed.  7.  I advised the patient to continue to hold his alternative treatment with high dose vitamin C while he is on immunetherapy.  8.  I will continue Norvasc and HCTZ for hypertension.  9. The patient will continue Zofran as needed for treatment related nausea.  10.  I will start the patient on Percocet 7.5/325 mg every 6 hours as needed for cancer related pain.   11.  We offered Ritalin for treatment related fatigue, but the patient has declined at this time.   12.  Patient will continue Zaleplon as needed for insomnia.  13.  I did go over the PET scan results with the patient reassured there is no evidence of new or progressive disease.  Some of the lymph nodes has resolved.  Continue to have no activity in his lung nodule.  We will do  follow-up scan after cycle #8.  14.  Continue port care.  Flush the patient's port today.  15.  I will continue dose reduction on oxaliplatin down to 75 mg per square secondary to peripheral neuropathy grade 2.  60.  The patient is moving to full disability which I think is very reasonable given his stage IV malignancy with active cancer treatment.  This will start September 1, 2020.  Kristofer Rodriguez MD  09/15/20

## 2020-10-06 NOTE — PROGRESS NOTES
DATE OF VISIT: 10/06/20      REASON FOR VISIT: Followup for metastatic colon cancer.      HISTORY OF PRESENT ILLNESS: The patient is a very pleasant 60 y.o. male with past medical history significant for metastatic colon cancer diagnosed March 2007 . He is status post lower anterior resection as well as 1 cycle FOLFOX, stopped due to multiple toxicities. Final pathology showed poorly differentiated adenocarcinoma with 25 negative lymph nodes and clear surgical margins. Isolated tumor deposits was found in the pericolonic fat. Pathologic stage T2 N1c M0 stage IIIA disease.The patient has been followed by serial colonoscopies as well as CAT scans that did document pulmonary metastatic disease with left lower lobe nodule that is gradually increasing in size. Patient was doing fairly well until recently, 3 months ago, patient presented with low back pain. Patient had restaging workup that revealed hypermetabolic L2 vertebral body lesion. Patient had biopsy done on October 11, 2016 that revealed metastatic adenocarcinoma consistent of colon primary with CK 7 negative CK 20 positive CDX2 positive. Patient had next generation gene sequencing that revealed intermediate mutational load with microsatellite stability.  He started chemotherapy with Keytruda on 2/1/2017. The patient was changed to CapeOx with Avastin on 9/19/2017.  He completed 8 cycles on February 12, 2018.  He was started on Avastin with Xeloda maintenance.  Repeated scans done March 2, 2020 revealed progressive disease with increase in size of L2 vertebral body mass.  Whole-body PET scan done May 1, 2020 did not reveal any other sites of disease.  Patient had debulking surgery done by Dr. Frazier at [facility] pathology consistent with metastatic adenocarcinoma.  He was started on regorafenib May 10, 2020.   Treatment was stopped within 1 month secondary to multiple side effects including hypertension diarrhea and rash.  Treatment was switched to  Xeloda oxaliplatin with Avastin June 19, 2020 he is here today for scheduled follow up visit with treatment cycle #6.    SUBJECTIVE: The patient is here today by himself.  His wife Ela joined us over the phone.  He is doing fairly well.  He was able to increase his Xeloda dose to 1000 mg twice daily without significant change in his bowel habits or skin rash.    PAST MEDICAL HISTORY/SOCIAL HISTORY/FAMILY HISTORY: Reviewed by me and unchanged from my documentation done on 09/25/18.    Review of Systems   Constitutional: Positive for fatigue. Negative for activity change, appetite change, chills, fever and unexpected weight change.   HENT: Negative for hearing loss, mouth sores, nosebleeds, sore throat and trouble swallowing.    Eyes: Negative for visual disturbance.   Respiratory: Negative for cough, chest tightness, shortness of breath and wheezing.    Cardiovascular: Negative for chest pain, palpitations and leg swelling.   Gastrointestinal: Negative for abdominal distention, abdominal pain, blood in stool, constipation, diarrhea, nausea, rectal pain and vomiting.   Endocrine: Negative for cold intolerance and heat intolerance.   Genitourinary: Negative for difficulty urinating, dysuria, frequency and urgency.   Musculoskeletal: Negative for arthralgias, gait problem, joint swelling and myalgias.   Skin: Negative for rash.   Neurological: Negative for dizziness, tremors, syncope, weakness, light-headedness, numbness and headaches.   Hematological: Negative for adenopathy. Does not bruise/bleed easily.   Psychiatric/Behavioral: Negative for confusion, sleep disturbance and suicidal ideas. The patient is not nervous/anxious.          Current Outpatient Medications:   •  acetaminophen (TYLENOL) 500 MG tablet, Take 500 mg by mouth Every 6 (Six) Hours As Needed for Mild Pain ., Disp: , Rfl:   •  amLODIPine (NORVASC) 5 MG tablet, Take 5 mg by mouth Daily., Disp: , Rfl:   •  capecitabine (XELODA) 500 MG chemo tablet,  "Take 2 tablets by mouth 2 (Two) Times a Day. On days 1-14, then off 7 days., Disp: 56 tablet, Rfl: 5  •  celecoxib (CeleBREX) 200 MG capsule, Take 200 mg by mouth Daily., Disp: , Rfl:   •  cholecalciferol (VITAMIN D3) 55564 units capsule, Take 5,000 Units by mouth Daily., Disp: , Rfl:   •  diphenoxylate-atropine (Lomotil) 2.5-0.025 MG per tablet, Take 1 tablet by mouth Every 6 (Six) Hours As Needed for Diarrhea. 1 tablet, Disp: 30 tablet, Rfl: 5  •  gabapentin (NEURONTIN) 300 MG capsule, gabapentin 300 mg capsule  Take one tablet by mouth 2x daily., Disp: , Rfl:   •  lactulose (CHRONULAC) 10 GM/15ML solution, , Disp: , Rfl:   •  lidocaine-prilocaine (EMLA) 2.5-2.5 % cream, Apply  topically to the appropriate area as directed As Needed (45-60 minutes prior to port access.  Cover with saran/plastic wrap.)., Disp: 30 g, Rfl: 5  •  ondansetron (ZOFRAN) 8 MG tablet, Take 1 tablet by mouth 3 (Three) Times a Day As Needed for Nausea or Vomiting., Disp: 30 tablet, Rfl: 5  •  oxyCODONE-acetaminophen (PERCOCET) 7.5-325 MG per tablet, Take 1 tablet by mouth Every 8 (Eight) Hours As Needed for Moderate Pain  or Severe Pain ., Disp: 90 tablet, Rfl: 0    PHYSICAL EXAMINATION:   /93   Pulse 89   Temp 97.7 °F (36.5 °C) (Temporal)   Resp 16   Ht 167.6 cm (65.98\")   Wt 70.8 kg (156 lb)   SpO2 98%   BMI 25.19 kg/m²    ECOG Performance Status: 1 - Symptomatic but completely ambulatory  General Appearance:  alert, cooperative, no apparent distress and appears stated age   Neurologic/Psychiatric: A&O x 3, gait steady, appropriate affect, strength 5/5 in all muscle groups   HEENT:  Normocephalic, without obvious abnormality, mucous membranes moist   Neck: Supple, symmetrical, trachea midline, no adenopathy;  No thyromegaly, masses, or tenderness   Lungs:   Clear to auscultation bilaterally; respirations regular, even, and unlabored bilaterally   Heart:  Regular rate and rhythm, no murmurs appreciated   Abdomen:   Soft, " non-tender, non-distended and no organomegaly   Lymph nodes: No cervical, supraclavicular, inguinal or axillary adenopathy noted   Extremities: Normal, atraumatic; no clubbing, cyanosis, or edema    Skin: No rashes, ulcers, or suspicious lesions noted     Lab on 10/05/2020   Component Date Value Ref Range Status   • CEA 10/05/2020 14.10  ng/mL Final   • Glucose 10/05/2020 85  65 - 99 mg/dL Final   • BUN 10/05/2020 15  6 - 20 mg/dL Final   • Creatinine 10/05/2020 0.63* 0.76 - 1.27 mg/dL Final   • Sodium 10/05/2020 138  136 - 145 mmol/L Final   • Potassium 10/05/2020 4.6  3.5 - 5.2 mmol/L Final    Slight hemolysis detected by analyzer. Results may be affected.   • Chloride 10/05/2020 99  98 - 107 mmol/L Final   • CO2 10/05/2020 29.0  22.0 - 29.0 mmol/L Final   • Calcium 10/05/2020 9.7  8.6 - 10.5 mg/dL Final   • Total Protein 10/05/2020 7.8  6.0 - 8.5 g/dL Final   • Albumin 10/05/2020 4.80  3.50 - 5.20 g/dL Final   • ALT (SGPT) 10/05/2020 14  1 - 41 U/L Final   • AST (SGOT) 10/05/2020 27  1 - 40 U/L Final   • Alkaline Phosphatase 10/05/2020 81  39 - 117 U/L Final   • Total Bilirubin 10/05/2020 0.6  0.0 - 1.2 mg/dL Final   • eGFR Non African Amer 10/05/2020 131  >60 mL/min/1.73 Final   • eGFR  African Amer 10/05/2020 >150  >60 mL/min/1.73 Final   • Globulin 10/05/2020 3.0  gm/dL Final   • A/G Ratio 10/05/2020 1.6  g/dL Final   • BUN/Creatinine Ratio 10/05/2020 23.8  7.0 - 25.0 Final   • Anion Gap 10/05/2020 10.0  5.0 - 15.0 mmol/L Final   • Color, UA 10/05/2020 Yellow  Yellow, Straw Final   • Appearance, UA 10/05/2020 Clear  Clear Final   • pH, UA 10/05/2020 8.5* 5.0 - 8.0 Final   • Specific Gravity, UA 10/05/2020 1.012  1.001 - 1.030 Final   • Glucose, UA 10/05/2020 Negative  Negative Final   • Ketones, UA 10/05/2020 Negative  Negative Final   • Bilirubin, UA 10/05/2020 Negative  Negative Final   • Blood, UA 10/05/2020 Negative  Negative Final   • Protein, UA 10/05/2020 Trace* Negative Final   • Leuk Esterase, UA  10/05/2020 Negative  Negative Final   • Nitrite, UA 10/05/2020 Negative  Negative Final   • Urobilinogen, UA 10/05/2020 0.2 E.U./dL  0.2 - 1.0 E.U./dL Final   • WBC 10/05/2020 4.58  3.40 - 10.80 10*3/mm3 Final   • RBC 10/05/2020 4.94  4.14 - 5.80 10*6/mm3 Final   • Hemoglobin 10/05/2020 12.5* 13.0 - 17.7 g/dL Final   • Hematocrit 10/05/2020 39.6  37.5 - 51.0 % Final   • MCV 10/05/2020 80.2  79.0 - 97.0 fL Final   • MCH 10/05/2020 25.3* 26.6 - 33.0 pg Final   • MCHC 10/05/2020 31.6  31.5 - 35.7 g/dL Final   • RDW 10/05/2020 24.7* 12.3 - 15.4 % Final   • RDW-SD 10/05/2020 71.5* 37.0 - 54.0 fl Final   • MPV 10/05/2020 9.7  6.0 - 12.0 fL Final   • Platelets 10/05/2020 179  140 - 450 10*3/mm3 Final   • Neutrophil % 10/05/2020 47.8  42.7 - 76.0 % Final   • Lymphocyte % 10/05/2020 31.9  19.6 - 45.3 % Final   • Monocyte % 10/05/2020 11.1  5.0 - 12.0 % Final   • Eosinophil % 10/05/2020 9.0* 0.3 - 6.2 % Final   • Basophil % 10/05/2020 0.2  0.0 - 1.5 % Final   • Immature Grans % 10/05/2020 0.0  0.0 - 0.5 % Final   • Neutrophils, Absolute 10/05/2020 2.19  1.70 - 7.00 10*3/mm3 Final   • Lymphocytes, Absolute 10/05/2020 1.46  0.70 - 3.10 10*3/mm3 Final   • Monocytes, Absolute 10/05/2020 0.51  0.10 - 0.90 10*3/mm3 Final   • Eosinophils, Absolute 10/05/2020 0.41* 0.00 - 0.40 10*3/mm3 Final   • Basophils, Absolute 10/05/2020 0.01  0.00 - 0.20 10*3/mm3 Final   • Immature Grans, Absolute 10/05/2020 0.00  0.00 - 0.05 10*3/mm3 Final   • nRBC 10/05/2020 0.0  0.0 - 0.2 /100 WBC Final      Nm Pet Skull Base To Mid Thigh    Result Date: 9/11/2020  Narrative: EXAMINATION: NM PET SKULL BASE TO MID THIGH-  INDICATION: f/u scan; C18.7-Malignant neoplasm of sigmoid colon  TECHNIQUE: Fasting blood glucose 109 mg/dL, radiopharmaceutical injection 11.19 mCi F-18 FDG injected into the right AC vein with the patient imaged after an appropriate amount of time. CT datasets performed for fusion analysis alone and should not be used for diagnostic  purposes as these are low-dose protocol.  The radiation dose reduction device was turned on as low as reasonably achievable for each scan per ALARA protocol.  COMPARISON: Subsequent exam for follow-up with prior study dated May 1, 2020  FINDINGS:  HEAD AND NECK: Grossly symmetric appearance of cerebral hemispheres on limited intracranial evaluation. Physiologic variant activity within the extraocular muscles, muscles of phonation and tonsils.  CHEST:  Physiologic activity within the left ventricular myocardium. No intraparenchymal lung hypermetabolism or nodule/mass. No axillary adenopathy. No mediastinal or hilar adenopathy.  ABDOMEN AND PELVIS: Physiologic activity within the liver, spleen, renal collecting systems and GI tract without abnormal hypermetabolism in the abdomen and pelvis identified. Previously noted borderline left inguinal lymph node is now nonhypermetabolic.  The osseous structures demonstrate diminished hypermetabolism involving the upper lumbar spine likely related to posttraumatic/postsurgical findings and instrumentation. No suspicious bony hypermetabolism.      Impression: Mild residual hypermetabolism involving the lumbar spine, likely posttraumatic/postsurgical with instrumentation present. Interval resolved hypermetabolism involving the previously noted left inguinal lymph node. No new hypermetabolism to suggest disease progression elsewhere.  This report was finalized on 9/11/2020 11:30 AM by Abdullahi Neville.    (  ASSESSMENT: The patient is a very pleasant 60 y.o. male  with stage IV colon cancer.    PROBLEM LIST:  1. Currently stage IV colon cancer with lung and bony metastases.  K-edwige mutant, microsatellite stable, and intermediate mutational load.  A. Status post low anterior resection done by Dr. Young 2007, [id number]  B.  Attempted adjuvant chemotherapy for 1 cycle could not tolerate secondary to multiple toxicities.  C. Biopsy-proven L2 metastases done October 11, 2016. Status post  CyberKnife radiation treatment.  D.  Started Keytruda 200 mg IV every 3 weeks on February 1, 2017, status post 10 cycles of treatment  E. Disease progression documented on PET scan completed 8/31/2017.   F. Treatment changed to Xeloda/Avastin/Oxaliplatin on 9/19/2017, status post 7 cycles.   G. Status post tumor debulking at L2 done by Dr. Frazier at [facility] on April 21, 2017  H.  The treatment was switched to Xeloda with Avastin maintenance treatment March 6, 2018  I.  Local relapse disease with another mass at L2 level document MRI spine done February 28, 2020.  Status post surgical debulking with Dr. Frazier March 19, 2020.  J.  Started regorafenib May 10, 2020 treatment was stopped secondary to multiple side effects including skin rash diarrhea and hypertension  K.  Started Xeloda oxaliplatin with Avastin June 19, 2020 status post 4 cycles  2.  Cancer related pain  3.  Opioid-induced constipation  4.  Treatment induced asthenia  5.  Mild depression  6.  Insomnia  7.  Chemotherapy-induced anemia  8.  Treatment induced hand-and-foot syndrome  9.   Sleep apnea  10.  Treatment induced hypertension  11.  Treatment induced neuropathy: Grade 2    PLAN:  1.  I will proceed with treatment today Xeloda oxaliplatin with Avastin cycle #6.  We will continue Xeloda at 1000 mg twice a day 2 weeks on 1 week off.  2. We will monitor the patient's labs throughout treatment including blood counts, kidney function, CEA, thyroid function, and liver functions. We will monitor his liver enzymes that have been elevated secondary to treatment with immune-therapy.   3.  The patient will follow-up with me in 3 weeks with treatment cycle number 7 out of 8 planned.    4.  Patient will continue taking over-the-counter stool softeners for constipation.  I will continue lactulose as needed.  5.  We reviewed the potential side effects of this regimen including fatigue, vomiting and nausea, hair loss, nephropathy, neuropathy,  hearing loss, myelosuppression, risk of infusion reaction, dermatitis, hepatitis, perforation, bleeding, thrombosis, and potential death.  6.  Patient tissue was submitted for the matched trial.  He did match on PETN mutation, however that arm was closed.  7.  I advised the patient to continue to hold his alternative treatment with high dose vitamin C while he is on immunetherapy.  8.  I will continue Norvasc and HCTZ for hypertension.  9. The patient will continue Zofran as needed for treatment related nausea.  10.  I will start the patient on Percocet 7.5/325 mg every 6 hours as needed for cancer related pain.   11.  We offered Ritalin for treatment related fatigue, but the patient has declined at this time.   12.  Patient will continue Zaleplon as needed for insomnia.  13.  I did go over the PET scan results with the patient reassured there is no evidence of new or progressive disease.  Some of the lymph nodes has resolved.  Continue to have no activity in his lung nodule.  We will do follow-up scan after cycle #8.  14.  Continue port care.  Flush the patient's port today.  15.  I will continue dose reduction on oxaliplatin down to 75 mg per square secondary to peripheral neuropathy grade 2.  60.  The patient is moving to full disability which I think is very reasonable given his stage IV malignancy with active cancer treatment.  This will start September 1, 2020.  Kristofer Rodriguez MD  10/06/20

## 2020-10-14 NOTE — PROGRESS NOTES
DATE OF VISIT: 10/14/20      REASON FOR VISIT: Followup for metastatic colon cancer.      HISTORY OF PRESENT ILLNESS: The patient is a very pleasant 60 y.o. male with past medical history significant for metastatic colon cancer diagnosed March 2007 . He is status post lower anterior resection as well as 1 cycle FOLFOX, stopped due to multiple toxicities. Final pathology showed poorly differentiated adenocarcinoma with 25 negative lymph nodes and clear surgical margins. Isolated tumor deposits was found in the pericolonic fat. Pathologic stage T2 N1c M0 stage IIIA disease.The patient has been followed by serial colonoscopies as well as CAT scans that did document pulmonary metastatic disease with left lower lobe nodule that is gradually increasing in size. Patient was doing fairly well until recently, 3 months ago, patient presented with low back pain. Patient had restaging workup that revealed hypermetabolic L2 vertebral body lesion. Patient had biopsy done on October 11, 2016 that revealed metastatic adenocarcinoma consistent of colon primary with CK 7 negative CK 20 positive CDX2 positive. Patient had next generation gene sequencing that revealed intermediate mutational load with microsatellite stability.  He started chemotherapy with Keytruda on 2/1/2017. The patient was changed to CapeOx with Avastin on 9/19/2017.  He completed 8 cycles on February 12, 2018.  He was started on Avastin with Xeloda maintenance.  Repeated scans done March 2, 2020 revealed progressive disease with increase in size of L2 vertebral body mass.  Whole-body PET scan done May 1, 2020 did not reveal any other sites of disease.  Patient had debulking surgery done by Dr. Frazier at [facility] pathology consistent with metastatic adenocarcinoma.  He was started on regorafenib May 10, 2020.   Treatment was stopped within 1 month secondary to multiple side effects including hypertension diarrhea and rash.  Treatment was switched to  Xeloda oxaliplatin with Avastin June 19, 2020 .  He completed 6 cycles..  MRI lumbar spine revealed local recurrent disease.  The patient is here today for an acute visit.    SUBJECTIVE: The patient is here today with his wife.  He is complaining of increased low back pain most down to the right leg.  He had an MRI with contrast that revealed local recurrence at T3.    PAST MEDICAL HISTORY/SOCIAL HISTORY/FAMILY HISTORY: Reviewed by me and unchanged from my documentation done on 09/25/18.    Review of Systems   Constitutional: Positive for fatigue. Negative for activity change, appetite change, chills, fever and unexpected weight change.   HENT: Negative for hearing loss, mouth sores, nosebleeds, sore throat and trouble swallowing.    Eyes: Negative for visual disturbance.   Respiratory: Negative for cough, chest tightness, shortness of breath and wheezing.    Cardiovascular: Negative for chest pain, palpitations and leg swelling.   Gastrointestinal: Negative for abdominal distention, abdominal pain, blood in stool, constipation, diarrhea, nausea, rectal pain and vomiting.   Endocrine: Negative for cold intolerance and heat intolerance.   Genitourinary: Negative for difficulty urinating, dysuria, frequency and urgency.   Musculoskeletal: Negative for arthralgias, gait problem, joint swelling and myalgias.   Skin: Negative for rash.   Neurological: Negative for dizziness, tremors, syncope, weakness, light-headedness, numbness and headaches.   Hematological: Negative for adenopathy. Does not bruise/bleed easily.   Psychiatric/Behavioral: Negative for confusion, sleep disturbance and suicidal ideas. The patient is not nervous/anxious.          Current Outpatient Medications:   •  acetaminophen (TYLENOL) 500 MG tablet, Take 500 mg by mouth Every 6 (Six) Hours As Needed for Mild Pain ., Disp: , Rfl:   •  amLODIPine (NORVASC) 5 MG tablet, Take 5 mg by mouth Daily., Disp: , Rfl:   •  capecitabine (XELODA) 500 MG chemo  "tablet, Take 2 tablets by mouth 2 (Two) Times a Day. On days 1-14, then off 7 days., Disp: 56 tablet, Rfl: 5  •  celecoxib (CeleBREX) 200 MG capsule, Take 200 mg by mouth Daily., Disp: , Rfl:   •  cholecalciferol (VITAMIN D3) 90554 units capsule, Take 5,000 Units by mouth Daily., Disp: , Rfl:   •  diphenoxylate-atropine (Lomotil) 2.5-0.025 MG per tablet, Take 1 tablet by mouth Every 6 (Six) Hours As Needed for Diarrhea. 1 tablet, Disp: 30 tablet, Rfl: 5  •  gabapentin (NEURONTIN) 300 MG capsule, gabapentin 300 mg capsule  Take one tablet by mouth 2x daily., Disp: , Rfl:   •  lactulose (CHRONULAC) 10 GM/15ML solution, Take 15-30 mL by mouth 3 (Three) Times a Day As Needed (constipation)., Disp: 480 mL, Rfl: 5  •  lidocaine-prilocaine (EMLA) 2.5-2.5 % cream, Apply  topically to the appropriate area as directed As Needed (45-60 minutes prior to port access.  Cover with saran/plastic wrap.)., Disp: 30 g, Rfl: 5  •  ondansetron (ZOFRAN) 8 MG tablet, Take 1 tablet by mouth 3 (Three) Times a Day As Needed for Nausea or Vomiting., Disp: 30 tablet, Rfl: 5  •  oxyCODONE-acetaminophen (PERCOCET) 7.5-325 MG per tablet, Take 1 tablet by mouth Every 8 (Eight) Hours As Needed for Moderate Pain  or Severe Pain ., Disp: 90 tablet, Rfl: 0    PHYSICAL EXAMINATION:   /89   Pulse 73   Temp 98 °F (36.7 °C) (Temporal)   Resp 12   Ht 167.6 cm (66\")   Wt 68 kg (150 lb)   SpO2 100%   BMI 24.21 kg/m²    ECOG Performance Status: 1 - Symptomatic but completely ambulatory  General Appearance:  alert, cooperative, no apparent distress and appears stated age   Neurologic/Psychiatric: A&O x 3, gait steady, appropriate affect, strength 5/5 in all muscle groups   HEENT:  Normocephalic, without obvious abnormality, mucous membranes moist   Neck: Supple, symmetrical, trachea midline, no adenopathy;  No thyromegaly, masses, or tenderness   Lungs:   Clear to auscultation bilaterally; respirations regular, even, and unlabored bilaterally "   Heart:  Regular rate and rhythm, no murmurs appreciated   Abdomen:   Soft, non-tender, non-distended and no organomegaly   Lymph nodes: No cervical, supraclavicular, inguinal or axillary adenopathy noted   Extremities: Normal, atraumatic; no clubbing, cyanosis, or edema    Skin: No rashes, ulcers, or suspicious lesions noted     Lab on 10/05/2020   Component Date Value Ref Range Status   • CEA 10/05/2020 14.10  ng/mL Final   • Glucose 10/05/2020 85  65 - 99 mg/dL Final   • BUN 10/05/2020 15  6 - 20 mg/dL Final   • Creatinine 10/05/2020 0.63* 0.76 - 1.27 mg/dL Final   • Sodium 10/05/2020 138  136 - 145 mmol/L Final   • Potassium 10/05/2020 4.6  3.5 - 5.2 mmol/L Final    Slight hemolysis detected by analyzer. Results may be affected.   • Chloride 10/05/2020 99  98 - 107 mmol/L Final   • CO2 10/05/2020 29.0  22.0 - 29.0 mmol/L Final   • Calcium 10/05/2020 9.7  8.6 - 10.5 mg/dL Final   • Total Protein 10/05/2020 7.8  6.0 - 8.5 g/dL Final   • Albumin 10/05/2020 4.80  3.50 - 5.20 g/dL Final   • ALT (SGPT) 10/05/2020 14  1 - 41 U/L Final   • AST (SGOT) 10/05/2020 27  1 - 40 U/L Final   • Alkaline Phosphatase 10/05/2020 81  39 - 117 U/L Final   • Total Bilirubin 10/05/2020 0.6  0.0 - 1.2 mg/dL Final   • eGFR Non African Amer 10/05/2020 131  >60 mL/min/1.73 Final   • eGFR  African Amer 10/05/2020 >150  >60 mL/min/1.73 Final   • Globulin 10/05/2020 3.0  gm/dL Final   • A/G Ratio 10/05/2020 1.6  g/dL Final   • BUN/Creatinine Ratio 10/05/2020 23.8  7.0 - 25.0 Final   • Anion Gap 10/05/2020 10.0  5.0 - 15.0 mmol/L Final   • Color, UA 10/05/2020 Yellow  Yellow, Straw Final   • Appearance, UA 10/05/2020 Clear  Clear Final   • pH, UA 10/05/2020 8.5* 5.0 - 8.0 Final   • Specific Gravity, UA 10/05/2020 1.012  1.001 - 1.030 Final   • Glucose, UA 10/05/2020 Negative  Negative Final   • Ketones, UA 10/05/2020 Negative  Negative Final   • Bilirubin, UA 10/05/2020 Negative  Negative Final   • Blood, UA 10/05/2020 Negative  Negative  Final   • Protein, UA 10/05/2020 Trace* Negative Final   • Leuk Esterase, UA 10/05/2020 Negative  Negative Final   • Nitrite, UA 10/05/2020 Negative  Negative Final   • Urobilinogen, UA 10/05/2020 0.2 E.U./dL  0.2 - 1.0 E.U./dL Final   • WBC 10/05/2020 4.58  3.40 - 10.80 10*3/mm3 Final   • RBC 10/05/2020 4.94  4.14 - 5.80 10*6/mm3 Final   • Hemoglobin 10/05/2020 12.5* 13.0 - 17.7 g/dL Final   • Hematocrit 10/05/2020 39.6  37.5 - 51.0 % Final   • MCV 10/05/2020 80.2  79.0 - 97.0 fL Final   • MCH 10/05/2020 25.3* 26.6 - 33.0 pg Final   • MCHC 10/05/2020 31.6  31.5 - 35.7 g/dL Final   • RDW 10/05/2020 24.7* 12.3 - 15.4 % Final   • RDW-SD 10/05/2020 71.5* 37.0 - 54.0 fl Final   • MPV 10/05/2020 9.7  6.0 - 12.0 fL Final   • Platelets 10/05/2020 179  140 - 450 10*3/mm3 Final   • Neutrophil % 10/05/2020 47.8  42.7 - 76.0 % Final   • Lymphocyte % 10/05/2020 31.9  19.6 - 45.3 % Final   • Monocyte % 10/05/2020 11.1  5.0 - 12.0 % Final   • Eosinophil % 10/05/2020 9.0* 0.3 - 6.2 % Final   • Basophil % 10/05/2020 0.2  0.0 - 1.5 % Final   • Immature Grans % 10/05/2020 0.0  0.0 - 0.5 % Final   • Neutrophils, Absolute 10/05/2020 2.19  1.70 - 7.00 10*3/mm3 Final   • Lymphocytes, Absolute 10/05/2020 1.46  0.70 - 3.10 10*3/mm3 Final   • Monocytes, Absolute 10/05/2020 0.51  0.10 - 0.90 10*3/mm3 Final   • Eosinophils, Absolute 10/05/2020 0.41* 0.00 - 0.40 10*3/mm3 Final   • Basophils, Absolute 10/05/2020 0.01  0.00 - 0.20 10*3/mm3 Final   • Immature Grans, Absolute 10/05/2020 0.00  0.00 - 0.05 10*3/mm3 Final   • nRBC 10/05/2020 0.0  0.0 - 0.2 /100 WBC Final      No results found.(  ASSESSMENT: The patient is a very pleasant 60 y.o. male  with stage IV colon cancer.    PROBLEM LIST:  1. Currently stage IV colon cancer with lung and bony metastases.  K-edwige mutant, microsatellite stable, and intermediate mutational load.  A. Status post low anterior resection done by Dr. Young 2007, [id number]  B.  Attempted adjuvant chemotherapy for 1 cycle  could not tolerate secondary to multiple toxicities.  C. Biopsy-proven L2 metastases done October 11, 2016. Status post CyberKnife radiation treatment.  D.  Started Keytruda 200 mg IV every 3 weeks on February 1, 2017, status post 10 cycles of treatment  E. Disease progression documented on PET scan completed 8/31/2017.   F. Treatment changed to Xeloda/Avastin/Oxaliplatin on 9/19/2017, status post 7 cycles.   G. Status post tumor debulking at L2 done by Dr[facility] Frazier at [facility] on April 21, 2017  H.  The treatment was switched to Xeloda with Avastin maintenance treatment March 6, 2018  I.  Local relapse disease with another mass at L2 level document MRI spine done February 28, 2020.  Status post surgical debulking with Dr. Frazier March 19, 2020.  J.  Started regorafenib May 10, 2020 treatment was stopped secondary to multiple side effects including skin rash diarrhea and hypertension  K.  Started Xeloda oxaliplatin with Avastin June 19, 2020 status post 4 cycles  2.  Cancer related pain  3.  Opioid-induced constipation  4.  Treatment induced asthenia  5.  Mild depression  6.  Insomnia  7.  Chemotherapy-induced anemia  8.  Treatment induced hand-and-foot syndrome  9.   Sleep apnea  10.  Treatment induced hypertension  11.  Treatment induced neuropathy: Grade 2    PLAN:  1.  I will cancel chemotherapy at this point suspicious areas of progression.  2. We will monitor the patient's labs throughout treatment including blood counts, kidney function, CEA, thyroid function, and liver functions. We will monitor his liver enzymes that have been elevated secondary to treatment with immune-therapy.   3.  The patient will follow-up with me in 3 weeks after surgery.  4.  Patient will continue taking over-the-counter stool softeners for constipation.  I will continue lactulose as needed.  5.  The patient has met with Dr. Frazier today the plan is to proceed with debulking surgery on October 27, 2020.  6.  Patient  tissue was submitted for the matched trial.  He did match on PETN mutation, however that arm was closed.  I will send another NexGen sequencing from his upcoming surgery since it has been a long time since we have done gene analysis.  7.  I advised the patient to continue to hold his alternative treatment with high dose vitamin C while he is on immunetherapy.  8.  I will continue Norvasc and HCTZ for hypertension.  9. The patient will continue Zofran as needed for treatment related nausea.  10.  I will start the patient on Percocet 7.5/325 mg every 6 hours as needed for cancer related pain.   11.  We offered Ritalin for treatment related fatigue, but the patient has declined at this time.   12.  Patient will continue Zaleplon as needed for insomnia.  13.  The patient might be a candidate for consolidative CyberKnife radiation treatment after surgery.  14.  Continue port care.  Flush the patient's port today.  15.  I will continue dose reduction on oxaliplatin down to 75 mg per square secondary to peripheral neuropathy grade 2.  16.  Future treatment options might include immunotherapy with Opdivo plus Yervoy versus FOLFIRI versus oral Lonsurf.  Kristofer Rodriguez MD  10/14/20

## 2020-10-22 NOTE — TELEPHONE ENCOUNTER
Patient spoke to Dr. Rodriguez on his cell    He was going to send in prescription for   Fentanyl patch  every 3 days  Percocet 10  Decatron 4 mg    He has called the pharmacy and they don't have it yet.    [facility]    He would like this to be sent asap, please. He states he lives [place] hour away and needs to pick it up as soon as possible.    Please call him when sent-  [phone]

## 2020-10-22 NOTE — TELEPHONE ENCOUNTER
Lm for patient that we sent in scripts for him to the [facility] and to call me back with any questions.

## 2020-11-23 NOTE — PROGRESS NOTES
DATE OF VISIT: 11/23/20      REASON FOR VISIT: Followup for metastatic colon cancer.      HISTORY OF PRESENT ILLNESS: The patient is a very pleasant 60 y.o. male with past medical history significant for metastatic colon cancer diagnosed March 2007 . He is status post lower anterior resection as well as 1 cycle FOLFOX, stopped due to multiple toxicities. Final pathology showed poorly differentiated adenocarcinoma with 25 negative lymph nodes and clear surgical margins. Isolated tumor deposits was found in the pericolonic fat. Pathologic stage T2 N1c M0 stage IIIA disease.The patient has been followed by serial colonoscopies as well as CAT scans that did document pulmonary metastatic disease with left lower lobe nodule that is gradually increasing in size. Patient was doing fairly well until recently, 3 months ago, patient presented with low back pain. Patient had restaging workup that revealed hypermetabolic L2 vertebral body lesion. Patient had biopsy done on October 11, 2016 that revealed metastatic adenocarcinoma consistent of colon primary with CK 7 negative CK 20 positive CDX2 positive. Patient had next generation gene sequencing that revealed intermediate mutational load with microsatellite stability.  He started chemotherapy with Keytruda on 2/1/2017. The patient was changed to CapeOx with Avastin on 9/19/2017.  He completed 8 cycles on February 12, 2018.  He was started on Avastin with Xeloda maintenance.  Repeated scans done March 2, 2020 revealed progressive disease with increase in size of L2 vertebral body mass.  Whole-body PET scan done May 1, 2020 did not reveal any other sites of disease.  Patient had debulking surgery done by Dr. Frazier at [facility] pathology consistent with metastatic adenocarcinoma.  He was started on regorafenib May 10, 2020.   Treatment was stopped within 1 month secondary to multiple side effects including hypertension diarrhea and rash.  Treatment was switched to  Xeloda oxaliplatin with Avastin June 19, 2020 .  He completed 6 cycles..  MRI lumbar spine revealed local recurrent disease.  The patient is here today for an acute visit.    SUBJECTIVE: The patient is here today with his wife.  He is complaining of fatigue.  Is been having progressive low back pain.  No more spinal fluid leak.    PAST MEDICAL HISTORY/SOCIAL HISTORY/FAMILY HISTORY: Reviewed by me and unchanged from my documentation done on 09/25/18.    Review of Systems   Constitutional: Positive for fatigue. Negative for activity change, appetite change, chills, fever and unexpected weight change.   HENT: Negative for hearing loss, mouth sores, nosebleeds, sore throat and trouble swallowing.    Eyes: Negative for visual disturbance.   Respiratory: Negative for cough, chest tightness, shortness of breath and wheezing.    Cardiovascular: Negative for chest pain, palpitations and leg swelling.   Gastrointestinal: Negative for abdominal distention, abdominal pain, blood in stool, constipation, diarrhea, nausea, rectal pain and vomiting.   Endocrine: Negative for cold intolerance and heat intolerance.   Genitourinary: Negative for difficulty urinating, dysuria, frequency and urgency.   Musculoskeletal: Negative for arthralgias, gait problem, joint swelling and myalgias.   Skin: Negative for rash.   Neurological: Negative for dizziness, tremors, syncope, weakness, light-headedness, numbness and headaches.   Hematological: Negative for adenopathy. Does not bruise/bleed easily.   Psychiatric/Behavioral: Negative for confusion, sleep disturbance and suicidal ideas. The patient is not nervous/anxious.          Current Outpatient Medications:   •  Multiple Vitamins-Minerals (MULTI ADULT GUMMIES PO), Take  by mouth., Disp: , Rfl:   •  acetaminophen (TYLENOL) 500 MG tablet, Take 500 mg by mouth Every 6 (Six) Hours As Needed for Mild Pain ., Disp: , Rfl:   •  amLODIPine (NORVASC) 5 MG tablet, Take 5 mg by mouth 2 (two) times a  day., Disp: , Rfl:   •  capecitabine (XELODA) 500 MG chemo tablet, Take 2 tablets by mouth 2 (Two) Times a Day. On days 1-14, then off 7 days., Disp: 56 tablet, Rfl: 5  •  celecoxib (CeleBREX) 200 MG capsule, Take 200 mg by mouth Daily., Disp: , Rfl:   •  cholecalciferol (VITAMIN D3) 08037 units capsule, Take 5,000 Units by mouth Daily., Disp: , Rfl:   •  dexamethasone (DECADRON) 4 MG tablet, Take 1 tablet by mouth Every 8 (Eight) Hours., Disp: 90 tablet, Rfl: 0  •  diphenoxylate-atropine (Lomotil) 2.5-0.025 MG per tablet, Take 1 tablet by mouth Every 6 (Six) Hours As Needed for Diarrhea. 1 tablet, Disp: 30 tablet, Rfl: 5  •  fentaNYL (DURAGESIC) 12 MCG/HR, Place 1 patch on the skin as directed by provider Every 72 (Seventy-Two) Hours., Disp: 10 patch, Rfl: 0  •  gabapentin (NEURONTIN) 300 MG capsule, gabapentin 300 mg capsule  Take one tablet by mouth 2x daily., Disp: , Rfl:   •  gabapentin (NEURONTIN) 600 MG tablet, , Disp: , Rfl:   •  lactulose (CHRONULAC) 10 GM/15ML solution, Take 15-30 mL by mouth 3 (Three) Times a Day As Needed (constipation)., Disp: 480 mL, Rfl: 5  •  lidocaine-prilocaine (EMLA) 2.5-2.5 % cream, Apply  topically to the appropriate area as directed As Needed (45-60 minutes prior to port access.  Cover with saran/plastic wrap.)., Disp: 30 g, Rfl: 5  •  ondansetron (ZOFRAN) 8 MG tablet, Take 1 tablet by mouth 3 (Three) Times a Day As Needed for Nausea or Vomiting., Disp: 30 tablet, Rfl: 5  •  oxyCODONE (ROXICODONE) 15 MG immediate release tablet, , Disp: , Rfl:   •  oxyCODONE-acetaminophen (PERCOCET)  MG per tablet, Take 1 tablet by mouth Every 6 (Six) Hours As Needed for Moderate Pain ., Disp: 120 tablet, Rfl: 0  •  oxyCODONE-acetaminophen (PERCOCET) 7.5-325 MG per tablet, Take 1 tablet by mouth Every 8 (Eight) Hours As Needed for Moderate Pain  or Severe Pain ., Disp: 90 tablet, Rfl: 0    PHYSICAL EXAMINATION:   /77   Pulse 111   Temp 97.8 °F (36.6 °C) (Temporal)   Ht 167.6 cm  "(65.98\")   Wt 66.2 kg (146 lb)   SpO2 93%   BMI 23.58 kg/m²    ECOG Performance Status: 1 - Symptomatic but completely ambulatory  General Appearance:  alert, cooperative, no apparent distress and appears stated age   Neurologic/Psychiatric: A&O x 3, gait steady, appropriate affect, strength 5/5 in all muscle groups   HEENT:  Normocephalic, without obvious abnormality, mucous membranes moist   Neck: Supple, symmetrical, trachea midline, no adenopathy;  No thyromegaly, masses, or tenderness   Lungs:   Clear to auscultation bilaterally; respirations regular, even, and unlabored bilaterally   Heart:  Regular rate and rhythm, no murmurs appreciated   Abdomen:   Soft, non-tender, non-distended and no organomegaly   Lymph nodes: No cervical, supraclavicular, inguinal or axillary adenopathy noted   Extremities: Normal, atraumatic; no clubbing, cyanosis, or edema    Skin: No rashes, ulcers, or suspicious lesions noted     No visits with results within 2 Week(s) from this visit.   Latest known visit with results is:   Lab on 10/05/2020   Component Date Value Ref Range Status   • CEA 10/05/2020 14.10  ng/mL Final   • Glucose 10/05/2020 85  65 - 99 mg/dL Final   • BUN 10/05/2020 15  6 - 20 mg/dL Final   • Creatinine 10/05/2020 0.63* 0.76 - 1.27 mg/dL Final   • Sodium 10/05/2020 138  136 - 145 mmol/L Final   • Potassium 10/05/2020 4.6  3.5 - 5.2 mmol/L Final    Slight hemolysis detected by analyzer. Results may be affected.   • Chloride 10/05/2020 99  98 - 107 mmol/L Final   • CO2 10/05/2020 29.0  22.0 - 29.0 mmol/L Final   • Calcium 10/05/2020 9.7  8.6 - 10.5 mg/dL Final   • Total Protein 10/05/2020 7.8  6.0 - 8.5 g/dL Final   • Albumin 10/05/2020 4.80  3.50 - 5.20 g/dL Final   • ALT (SGPT) 10/05/2020 14  1 - 41 U/L Final   • AST (SGOT) 10/05/2020 27  1 - 40 U/L Final   • Alkaline Phosphatase 10/05/2020 81  39 - 117 U/L Final   • Total Bilirubin 10/05/2020 0.6  0.0 - 1.2 mg/dL Final   • eGFR Non African Amer 10/05/2020 131  " >60 mL/min/1.73 Final   • eGFR  African Amer 10/05/2020 >150  >60 mL/min/1.73 Final   • Globulin 10/05/2020 3.0  gm/dL Final   • A/G Ratio 10/05/2020 1.6  g/dL Final   • BUN/Creatinine Ratio 10/05/2020 23.8  7.0 - 25.0 Final   • Anion Gap 10/05/2020 10.0  5.0 - 15.0 mmol/L Final   • Color, UA 10/05/2020 Yellow  Yellow, Straw Final   • Appearance, UA 10/05/2020 Clear  Clear Final   • pH, UA 10/05/2020 8.5* 5.0 - 8.0 Final   • Specific Gravity, UA 10/05/2020 1.012  1.001 - 1.030 Final   • Glucose, UA 10/05/2020 Negative  Negative Final   • Ketones, UA 10/05/2020 Negative  Negative Final   • Bilirubin, UA 10/05/2020 Negative  Negative Final   • Blood, UA 10/05/2020 Negative  Negative Final   • Protein, UA 10/05/2020 Trace* Negative Final   • Leuk Esterase, UA 10/05/2020 Negative  Negative Final   • Nitrite, UA 10/05/2020 Negative  Negative Final   • Urobilinogen, UA 10/05/2020 0.2 E.U./dL  0.2 - 1.0 E.U./dL Final   • WBC 10/05/2020 4.58  3.40 - 10.80 10*3/mm3 Final   • RBC 10/05/2020 4.94  4.14 - 5.80 10*6/mm3 Final   • Hemoglobin 10/05/2020 12.5* 13.0 - 17.7 g/dL Final   • Hematocrit 10/05/2020 39.6  37.5 - 51.0 % Final   • MCV 10/05/2020 80.2  79.0 - 97.0 fL Final   • MCH 10/05/2020 25.3* 26.6 - 33.0 pg Final   • MCHC 10/05/2020 31.6  31.5 - 35.7 g/dL Final   • RDW 10/05/2020 24.7* 12.3 - 15.4 % Final   • RDW-SD 10/05/2020 71.5* 37.0 - 54.0 fl Final   • MPV 10/05/2020 9.7  6.0 - 12.0 fL Final   • Platelets 10/05/2020 179  140 - 450 10*3/mm3 Final   • Neutrophil % 10/05/2020 47.8  42.7 - 76.0 % Final   • Lymphocyte % 10/05/2020 31.9  19.6 - 45.3 % Final   • Monocyte % 10/05/2020 11.1  5.0 - 12.0 % Final   • Eosinophil % 10/05/2020 9.0* 0.3 - 6.2 % Final   • Basophil % 10/05/2020 0.2  0.0 - 1.5 % Final   • Immature Grans % 10/05/2020 0.0  0.0 - 0.5 % Final   • Neutrophils, Absolute 10/05/2020 2.19  1.70 - 7.00 10*3/mm3 Final   • Lymphocytes, Absolute 10/05/2020 1.46  0.70 - 3.10 10*3/mm3 Final   • Monocytes, Absolute  10/05/2020 0.51  0.10 - 0.90 10*3/mm3 Final   • Eosinophils, Absolute 10/05/2020 0.41* 0.00 - 0.40 10*3/mm3 Final   • Basophils, Absolute 10/05/2020 0.01  0.00 - 0.20 10*3/mm3 Final   • Immature Grans, Absolute 10/05/2020 0.00  0.00 - 0.05 10*3/mm3 Final   • nRBC 10/05/2020 0.0  0.0 - 0.2 /100 WBC Final      No results found.(  ASSESSMENT: The patient is a very pleasant 60 y.o. male  with stage IV colon cancer.    PROBLEM LIST:  1. Currently stage IV colon cancer with lung and bony metastases.  K-edwige mutant, microsatellite stable, and intermediate mutational load.  A. Status post low anterior resection done by Dr. Young 2007, [id number]  B.  Attempted adjuvant chemotherapy for 1 cycle could not tolerate secondary to multiple toxicities.  C. Biopsy-proven L2 metastases done October 11, 2016. Status post CyberKnife radiation treatment.  D.  Started Keytruda 200 mg IV every 3 weeks on February 1, 2017, status post 10 cycles of treatment  E. Disease progression documented on PET scan completed 8/31/2017.   F. Treatment changed to Xeloda/Avastin/Oxaliplatin on 9/19/2017, status post 7 cycles.   G. Status post tumor debulking at L2 done by Dr. Frazier at [facility] on April 21, 2017  H.  The treatment was switched to Xeloda with Avastin maintenance treatment March 6, 2018  I.  Local relapse disease with another mass at L2 level document MRI spine done February 28, 2020.  Status post surgical debulking with Dr. Frazier March 19, 2020.  J.  Started regorafenib May 10, 2020 treatment was stopped secondary to multiple side effects including skin rash diarrhea and hypertension  K.  Started Xeloda oxaliplatin with Avastin June 19, 2020 status post 4 cycles  2.  Cancer related pain  3.  Opioid-induced constipation  4.  Treatment induced asthenia  5.  Mild depression  6.  Insomnia  7.  Chemotherapy-induced anemia  8.  Treatment induced hand-and-foot syndrome  9.   Sleep apnea  10.  Treatment induced hypertension  11.   Treatment induced neuropathy: Grade 2    PLAN:  1.  I had long discussion today with the patient about his future treatment options as well as overall prognosis.  The patient molecular testing did not reveal any actionable mutation.  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  His immunotherapy markers did not suggest good response to further immunotherapy.  That would leave him with chemotherapy using FOLFIRI plus Cyramza versus refer him for clinical trial may be using K-edwige inhibitor versus best supportive care.  2.  After long discussion the patient is still undecided he would like to think more about it.  3.  The patient will follow-up with me in 3 weeks we will decide at that point which would be due next regarding his active cancer treatment.  4.  I will increase his long-acting pain medicine to OxyContin 30 mg twice daily.  I will continue Percocet 10/325 mg every 6 hours as needed for cancer-related pain.  5.  The patient can follow-up with Dr. Frazier for spinal fluid leak after his most recent lower back surgery.  Is scheduled see him in 2 weeks.  6. We will monitor the patient's labs throughout treatment including blood counts, kidney function, CEA, thyroid function, and liver functions. We will monitor his liver enzymes that have been elevated secondary to treatment with immune-therapy.   7.  Patient will continue taking over-the-counter stool softeners for constipation.  I will continue lactulose as needed.  8.  I will continue Norvasc and HCTZ for hypertension.  9. The patient will continue Zofran as needed for treatment related nausea.  10.  Patient will continue Zaleplon as needed for insomnia.  11.  Continue port care.  Flush the patient's port on return.  Kristofer Rodriguez MD  11/23/20

## 2020-11-27 PROBLEM — R50.9 FEVER: Status: ACTIVE | Noted: 2020-01-01

## 2020-11-27 PROBLEM — T81.49XA SURGICAL SITE INFECTION: Status: ACTIVE | Noted: 2020-01-01

## 2020-11-27 PROBLEM — C79.51 COLON CANCER METASTASIZED TO BONE (HCC): Status: ACTIVE | Noted: 2017-09-05

## 2020-11-27 PROBLEM — C18.9 COLON CANCER METASTASIZED TO BONE (HCC): Status: ACTIVE | Noted: 2017-09-05

## 2020-11-30 PROBLEM — R50.9 FEVER: Status: RESOLVED | Noted: 2020-01-01 | Resolved: 2020-01-01

## 2020-12-01 NOTE — TELEPHONE ENCOUNTER
JENNIFER WITH [facility] CALLING    JUST SPOKE WITH [facility] REGARDING PATIENT'S PA FOR OXYCONTIN 30MG, THE PA DID GO TROUGH BUT THE COST IS STILL HIGH, IT'S $280, PLEASE ADVISE?    CALL BACK #[phone]

## 2020-12-01 NOTE — TELEPHONE ENCOUNTER
Left a message for patient in regards to his oxycontin 30mg cost.  Awaiting call back.  Also advised him while I was on the phone with his pharmacy that they advised he did not need a pa for his dilaudid but that they just didn't have enough in stock and would be filling the remainder for him.

## 2020-12-01 NOTE — OUTREACH NOTE
Prep Survey      Responses   [facility] facility patient discharged from?  [facility]   Is LACE score < 7 ?  No   Eligibility  Readm Mgmt   Discharge diagnosis  Colon cancer metastasized to bone,  fever,  surgical site infection   Does the patient have one of the following disease processes/diagnoses(primary or secondary)?  Other   Does the patient have Home health ordered?  No   Is there a DME ordered?  No   Prep survey completed?  Yes          Anahy Tate RN

## 2020-12-01 NOTE — TELEPHONE ENCOUNTER
Called and discussed with patient his medication.  He advised at this time he didn't think it would be a problem with the cost and I told him if it became an issue to call our office back and let us know so that we can see if the MD could switch him to anything else that would help.

## 2020-12-07 NOTE — OUTREACH NOTE
Medical Week 1 Survey      Responses   [facility] patient discharged from?  [facility]   Does the patient have one of the following disease processes/diagnoses(primary or secondary)?  Other   Week 1 attempt successful?  No   Unsuccessful attempts  Attempt 1          Le Krueger RN

## 2020-12-09 NOTE — PROGRESS NOTES
DATE OF VISIT: 12/09/20      REASON FOR VISIT: Followup for metastatic colon cancer.      HISTORY OF PRESENT ILLNESS: The patient is a very pleasant 60 y.o. male with past medical history significant for metastatic colon cancer diagnosed March 2007 . He is status post lower anterior resection as well as 1 cycle FOLFOX, stopped due to multiple toxicities. Final pathology showed poorly differentiated adenocarcinoma with 25 negative lymph nodes and clear surgical margins. Isolated tumor deposits was found in the pericolonic fat. Pathologic stage T2 N1c M0 stage IIIA disease.The patient has been followed by serial colonoscopies as well as CAT scans that did document pulmonary metastatic disease with left lower lobe nodule that is gradually increasing in size. Patient was doing fairly well until recently, 3 months ago, patient presented with low back pain. Patient had restaging workup that revealed hypermetabolic L2 vertebral body lesion. Patient had biopsy done on October 11, 2016 that revealed metastatic adenocarcinoma consistent of colon primary with CK 7 negative CK 20 positive CDX2 positive. Patient had next generation gene sequencing that revealed intermediate mutational load with microsatellite stability.  He started chemotherapy with Keytruda on 2/1/2017. The patient was changed to CapeOx with Avastin on 9/19/2017.  He completed 8 cycles on February 12, 2018.  He was started on Avastin with Xeloda maintenance.  Repeated scans done March 2, 2020 revealed progressive disease with increase in size of L2 vertebral body mass.  Whole-body PET scan done May 1, 2020 did not reveal any other sites of disease.  Patient had debulking surgery done by Dr. Frazier at [facility] pathology consistent with metastatic adenocarcinoma.  He was started on regorafenib May 10, 2020.   Treatment was stopped within 1 month secondary to multiple side effects including hypertension diarrhea and rash.  Treatment was switched to  Xeloda oxaliplatin with Avastin June 19, 2020 .  He completed 6 cycles..  MRI lumbar spine revealed local recurrent disease.  The patient is here today for an acute visit.    SUBJECTIVE: The patient was interviewed today using video visit given the current pandemic.  He was in the car driving back after seeing neurosurgeon Dr. Frazier.  His wife and brother were sitting next to him.  Unfortunately had repeat MRI spine that revealed rapid progression.  Is been having increased pain.    PAST MEDICAL HISTORY/SOCIAL HISTORY/FAMILY HISTORY: Reviewed by me and unchanged from my documentation done on 09/25/18.    Review of Systems   Constitutional: Positive for fatigue. Negative for activity change, appetite change, chills, fever and unexpected weight change.   HENT: Negative for hearing loss, mouth sores, nosebleeds, sore throat and trouble swallowing.    Eyes: Negative for visual disturbance.   Respiratory: Negative for cough, chest tightness, shortness of breath and wheezing.    Cardiovascular: Negative for chest pain, palpitations and leg swelling.   Gastrointestinal: Negative for abdominal distention, abdominal pain, blood in stool, constipation, diarrhea, nausea, rectal pain and vomiting.   Endocrine: Negative for cold intolerance and heat intolerance.   Genitourinary: Negative for difficulty urinating, dysuria, frequency and urgency.   Musculoskeletal: Negative for arthralgias, gait problem, joint swelling and myalgias.   Skin: Negative for rash.   Neurological: Negative for dizziness, tremors, syncope, weakness, light-headedness, numbness and headaches.   Hematological: Negative for adenopathy. Does not bruise/bleed easily.   Psychiatric/Behavioral: Negative for confusion, sleep disturbance and suicidal ideas. The patient is not nervous/anxious.          Current Outpatient Medications:   •  acetaminophen (TYLENOL) 500 MG tablet, Take 500 mg by mouth Every 6 (Six) Hours As Needed for Mild Pain ., Disp: , Rfl:   •   amLODIPine (NORVASC) 5 MG tablet, Take 5 mg by mouth 2 (two) times a day., Disp: , Rfl:   •  cholecalciferol (VITAMIN D3) 61435 units capsule, Take 10,000 Units by mouth Daily., Disp: , Rfl:   •  gabapentin (NEURONTIN) 600 MG tablet, Take 900 mg by mouth 3 (Three) Times a Day. 1 and 1/2 tab, Disp: , Rfl:   •  HYDROmorphone (Dilaudid) 2 MG tablet, 1-2 tablets every 6 hours as needed for pain, Disp: 240 tablet, Rfl: 0  •  levoFLOXacin (Levaquin) 750 MG tablet, Take 1 tablet by mouth Daily., Disp: 7 tablet, Rfl: 0  •  Magnesium Citrate 125 MG capsule, Take 125 mg by mouth 2 (two) times a day., Disp: , Rfl:   •  Multiple Vitamins-Minerals (MULTI ADULT GUMMIES PO), Take 1 tablet by mouth Daily., Disp: , Rfl:   •  oxyCODONE HCl ER (OxyCONTIN) 30 MG tablet extended-release 12 hour, Take 1 tablet by mouth Every 12 (Twelve) Hours., Disp: 60 tablet, Rfl: 0  •  oxyCODONE-acetaminophen (PERCOCET)  MG per tablet, Take 1 tablet by mouth Every 6 (Six) Hours As Needed for Moderate Pain ., Disp: 120 tablet, Rfl: 0  •  polyethylene glycol (MIRALAX) 17 g packet, Take 17 g by mouth Daily As Needed (constipation)., Disp:  , Rfl:   •  saccharomyces boulardii (FLORASTOR) 250 MG capsule, Take 250 mg by mouth Daily., Disp: , Rfl:   •  sennosides-docusate (PERICOLACE) 8.6-50 MG per tablet, Take 2 tablets by mouth 2 (Two) Times a Day., Disp:  , Rfl:     PHYSICAL EXAMINATION:   There were no vitals taken for this visit.   ECOG Performance Status: 3 - Symptomatic, >50% confined to bed  General Appearance:  alert, cooperative, no apparent distress and appears stated age   Neurologic/Psychiatric: A&O x 3, gait steady, appropriate affect, strength 5/5 in all muscle groups   HEENT:  Normocephalic, without obvious abnormality, mucous membranes moist   Neck:    Lungs:      Heart:     Abdomen:      Lymph nodes:    Extremities:    Skin:      No results displayed because visit has over 200 results.         Xr Chest 1 View    Result Date:  11/27/2020  Narrative: CR Chest 1 Vw INDICATION: Fever back pain and stomach pain today COMPARISON:  9/12/2017 FINDINGS: Single portable AP view(s) of the chest. Left Sided portacatheter has its tip in the superior vena cava. There is minimal linear atelectasis in the right base and left base. Otherwise lungs are clear. Heart size normal. Bones are normal     Impression: Minimal basilar atelectasis otherwise no active disease Signer Name: Segundo Mak MD  Signed: 11/27/2020 8:51 PM  Workstation Name: [facility]  [facility]  (  ASSESSMENT: The patient is a very pleasant 60 y.o. male  with stage IV colon cancer.    PROBLEM LIST:  1. Currently stage IV colon cancer with lung and bony metastases.  K-edwige mutant, microsatellite stable, and intermediate mutational load.  A. Status post low anterior resection done by Dr. Young 2007, [id number]  B.  Attempted adjuvant chemotherapy for 1 cycle could not tolerate secondary to multiple toxicities.  C. Biopsy-proven L2 metastases done October 11, 2016. Status post CyberKnife radiation treatment.  D.  Started Keytruda 200 mg IV every 3 weeks on February 1, 2017, status post 10 cycles of treatment  E. Disease progression documented on PET scan completed 8/31/2017.   F. Treatment changed to Xeloda/Avastin/Oxaliplatin on 9/19/2017, status post 7 cycles.   G. Status post tumor debulking at L2 done by Dr. Frazier at [facility] on April 21, 2017  H.  The treatment was switched to Xeloda with Avastin maintenance treatment March 6, 2018  I.  Local relapse disease with another mass at L2 level document MRI spine done February 28, 2020.  Status post surgical debulking with Dr. Frazier March 19, 2020.  J.  Started regorafenib May 10, 2020 treatment was stopped secondary to multiple side effects including skin rash diarrhea and hypertension  K.  Started Xeloda oxaliplatin with Avastin June 19, 2020 status post 4 cycles  2.  Cancer related pain  3.   Opioid-induced constipation  4.  Treatment induced asthenia  5.  Mild depression  6.  Insomnia  7.  Chemotherapy-induced anemia  8.  Treatment induced hand-and-foot syndrome  9.   Sleep apnea  10.  Treatment induced hypertension  11.  Treatment induced neuropathy: Grade 2    PLAN:  1.  I had long discussion today with the patient and his wife about current situation and his overall prognosis.  MRI lumbar spine revealed rapid progressive disease.  Continue to have open wound with on and off spinal fluid leak.  Dr. Frazier from neurosurgery recommended hospice care which I completely agree with.  The patient is not a candidate for further surgery no radiation.  Third line chemotherapy will be associated with high risk for infection possibly meningitis and septic shock.  2.  The patient is in agreement he is interested in best supportive care alone at this point.  3.  We discussed role of hospice patient is interested, I will consult hospice, I will be primary provider with hospice.  4.  I will continue long-acting pain medicine to OxyContin 30 mg twice daily.  I will continue Percocet 10/325 mg every 6 hours as needed for cancer-related pain.  5.  I will do 2 weeks follow-up visit.  6. Patient will continue taking over-the-counter stool softeners for constipation.  I will continue lactulose as needed.  7.  I will continue Norvasc and HCTZ for hypertension.  8. The patient will continue Zofran as needed for treatment related nausea.  9.  Patient will continue Zaleplon as needed for insomnia.  10.  Continue port care.  Kristofer Rodriguez MD  12/09/20

## 2020-12-22 NOTE — PROGRESS NOTES
DATE OF VISIT: 12/22/2020    REASON FOR VISIT: Followup for metastatic colon cancer     HISTORY OF PRESENT ILLNESS: The patient is a very pleasant 60 y.o. male  with past medical history significant for metastatic colon cancer. The  patient is here today for scheduled follow-up visit.    SUBJECTIVE: The patient was interviewed today using telemedicine.  All in all he is doing fair.  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  He has been wearing a diaper and he has a Mer[place] catheter in place.  His brother-in-law has been helping him get out of bed to the wheelchair.  His pain is under control with Dilaudid as needed as well as the fentanyl patches.    PAST MEDICAL HISTORY/SOCIAL HISTORY/FAMILY HISTORY: Reviewed by me and unchanged from my documentation done on 12/22/20.    Review of Systems   Constitutional: Negative for activity change, appetite change, chills, fatigue, fever and unexpected weight change.   HENT: Negative for hearing loss, mouth sores, nosebleeds, sore throat and trouble swallowing.    Eyes: Negative for visual disturbance.   Respiratory: Negative for cough, chest tightness, shortness of breath and wheezing.    Cardiovascular: Negative for chest pain, palpitations and leg swelling.   Gastrointestinal: Negative for abdominal distention, abdominal pain, blood in stool, constipation, diarrhea, nausea, rectal pain and vomiting.   Endocrine: Negative for cold intolerance and heat intolerance.   Genitourinary: Negative for difficulty urinating, dysuria, frequency and urgency.   Musculoskeletal: Negative for arthralgias, back pain, gait problem, joint swelling and myalgias.   Skin: Negative for rash.   Neurological: Negative for dizziness, tremors, syncope, weakness, light-headedness, numbness and headaches.   Hematological: Negative for adenopathy. Does not bruise/bleed easily.   Psychiatric/Behavioral: Negative for confusion, sleep disturbance and suicidal ideas. The patient is not nervous/anxious.           Current Outpatient Medications:   •  acetaminophen (TYLENOL) 500 MG tablet, Take 500 mg by mouth Every 6 (Six) Hours As Needed for Mild Pain ., Disp: , Rfl:   •  amLODIPine (NORVASC) 5 MG tablet, Take 5 mg by mouth 2 (two) times a day., Disp: , Rfl:   •  cholecalciferol (VITAMIN D3) 94212 units capsule, Take 10,000 Units by mouth Daily., Disp: , Rfl:   •  gabapentin (NEURONTIN) 600 MG tablet, Take 900 mg by mouth 3 (Three) Times a Day. 1 and 1/2 tab, Disp: , Rfl:   •  HYDROmorphone (Dilaudid) 2 MG tablet, 1-2 tablets every 6 hours as needed for pain, Disp: 240 tablet, Rfl: 0  •  levoFLOXacin (Levaquin) 750 MG tablet, Take 1 tablet by mouth Daily., Disp: 7 tablet, Rfl: 0  •  Magnesium Citrate 125 MG capsule, Take 125 mg by mouth 2 (two) times a day., Disp: , Rfl:   •  Multiple Vitamins-Minerals (MULTI ADULT GUMMIES PO), Take 1 tablet by mouth Daily., Disp: , Rfl:   •  oxyCODONE HCl ER (OxyCONTIN) 30 MG tablet extended-release 12 hour, Take 1 tablet by mouth Every 12 (Twelve) Hours., Disp: 60 tablet, Rfl: 0  •  oxyCODONE-acetaminophen (PERCOCET)  MG per tablet, Take 1 tablet by mouth Every 6 (Six) Hours As Needed for Moderate Pain ., Disp: 120 tablet, Rfl: 0  •  polyethylene glycol (MIRALAX) 17 g packet, Take 17 g by mouth Daily As Needed (constipation)., Disp:  , Rfl:   •  saccharomyces boulardii (FLORASTOR) 250 MG capsule, Take 250 mg by mouth Daily., Disp: , Rfl:   •  sennosides-docusate (PERICOLACE) 8.6-50 MG per tablet, Take 2 tablets by mouth 2 (Two) Times a Day., Disp:  , Rfl:     PHYSICAL EXAMINATION:   There were no vitals taken for this visit.   There were no vitals filed for this visit.    ECOG Performance Status: 4 - Bedbound  General Appearance:  alert, cooperative, no apparent distress and appears stated age   Neurologic/Psychiatric: A&O x 3, gait steady, appropriate affect, strength 5/5 in all muscle groups   HEENT:     Neck:    Lungs:      Heart:     Abdomen:      Lymph nodes:     Extremities:    Skin:      No visits with results within 2 Week(s) from this visit.   Latest known visit with results is:   No results displayed because visit has over 200 results.           Xr Chest 1 View    Result Date: 11/27/2020  Narrative: CR Chest 1 Vw INDICATION: Fever back pain and stomach pain today COMPARISON:  9/12/2017 FINDINGS: Single portable AP view(s) of the chest. Left Sided portacatheter has its tip in the superior vena cava. There is minimal linear atelectasis in the right base and left base. Otherwise lungs are clear. Heart size normal. Bones are normal     Impression: Minimal basilar atelectasis otherwise no active disease Signer Name: Segundo Mak MD  Signed: 11/27/2020 8:51 PM  Workstation Name: [facility]  [facility]      ASSESSMENT: The patient is a very pleasant 60 y.o. male  with static colon cancer    PLAN:  1.  We will continue hospice care.  2.  Continue Dilaudid 4 to 8 mg every 6 hours as needed with fentanyl patches 50 mcg every 3 days.  3.  The patient goal to stay home as long as he is able to manage with his family assistance if this is not achievable he is okay with inpatient hospice.  4.  We will do 4 weeks follow-up visit.    This visit has been rescheduled as a phone visit to comply with patient safety concerns in accordance with CDC recommendations. Total time of discussion was 10 minutes.    You have chosen to receive care through a telephone visit. Do you consent to use a telephone visit for your medical care today? Yes        Kristofer Rodriguez MD  12/22/2020

## 2021-01-01 ENCOUNTER — OFFICE VISIT (OUTPATIENT)
Dept: ONCOLOGY | Facility: CLINIC | Age: 61
End: 2021-01-01

## 2021-01-01 DIAGNOSIS — C18.7 MALIGNANT NEOPLASM OF SIGMOID COLON (HCC): Primary | ICD-10-CM

## 2021-01-01 PROCEDURE — 99441 PR PHYS/QHP TELEPHONE EVALUATION 5-10 MIN: CPT | Performed by: INTERNAL MEDICINE

## 2021-01-01 RX ORDER — CIPROFLOXACIN 250 MG/1
250 TABLET, FILM COATED ORAL 2 TIMES DAILY
Qty: 14 TABLET | Refills: 0 | Status: SHIPPED | OUTPATIENT
Start: 2021-01-01

## 2021-01-19 NOTE — PROGRESS NOTES
DATE OF VISIT: 1/19/2021    REASON FOR VISIT: Followup for metastatic colon cancer     HISTORY OF PRESENT ILLNESS: The patient is a very pleasant 60 y.o. male  with past medical history significant for metastatic colon cancer. The  patient is here today for scheduled follow-up visit.    SUBJECTIVE: The patient was interviewed today using telemedicine.  His wife Ela was sitting next to him.  He is complaining of urethral discharge.  He has had a low-grade temperature 99.5.  His pain is under control.    PAST MEDICAL HISTORY/SOCIAL HISTORY/FAMILY HISTORY: Reviewed by me and unchanged from my documentation done on 01/19/21.    Review of Systems   Constitutional: Negative for activity change, appetite change, chills, fatigue, fever and unexpected weight change.   HENT: Negative for hearing loss, mouth sores, nosebleeds, sore throat and trouble swallowing.    Eyes: Negative for visual disturbance.   Respiratory: Negative for cough, chest tightness, shortness of breath and wheezing.    Cardiovascular: Negative for chest pain, palpitations and leg swelling.   Gastrointestinal: Negative for abdominal distention, abdominal pain, blood in stool, constipation, diarrhea, nausea, rectal pain and vomiting.   Endocrine: Negative for cold intolerance and heat intolerance.   Genitourinary: Negative for difficulty urinating, dysuria, frequency and urgency.   Musculoskeletal: Negative for arthralgias, back pain, gait problem, joint swelling and myalgias.   Skin: Negative for rash.   Neurological: Negative for dizziness, tremors, syncope, weakness, light-headedness, numbness and headaches.   Hematological: Negative for adenopathy. Does not bruise/bleed easily.   Psychiatric/Behavioral: Negative for confusion, sleep disturbance and suicidal ideas. The patient is not nervous/anxious.          Current Outpatient Medications:   •  acetaminophen (TYLENOL) 500 MG tablet, Take 500 mg by mouth Every 6 (Six) Hours As Needed for Mild Pain .,  Disp: , Rfl:   •  amLODIPine (NORVASC) 5 MG tablet, Take 5 mg by mouth 2 (two) times a day., Disp: , Rfl:   •  cholecalciferol (VITAMIN D3) 04073 units capsule, Take 10,000 Units by mouth Daily., Disp: , Rfl:   •  gabapentin (NEURONTIN) 600 MG tablet, Take 900 mg by mouth 3 (Three) Times a Day. 1 and 1/2 tab, Disp: , Rfl:   •  HYDROmorphone (Dilaudid) 2 MG tablet, 1-2 tablets every 6 hours as needed for pain, Disp: 240 tablet, Rfl: 0  •  levoFLOXacin (Levaquin) 750 MG tablet, Take 1 tablet by mouth Daily., Disp: 7 tablet, Rfl: 0  •  Magnesium Citrate 125 MG capsule, Take 125 mg by mouth 2 (two) times a day., Disp: , Rfl:   •  Multiple Vitamins-Minerals (MULTI ADULT GUMMIES PO), Take 1 tablet by mouth Daily., Disp: , Rfl:   •  oxyCODONE HCl ER (OxyCONTIN) 30 MG tablet extended-release 12 hour, Take 1 tablet by mouth Every 12 (Twelve) Hours., Disp: 60 tablet, Rfl: 0  •  oxyCODONE-acetaminophen (PERCOCET)  MG per tablet, Take 1 tablet by mouth Every 6 (Six) Hours As Needed for Moderate Pain ., Disp: 120 tablet, Rfl: 0  •  polyethylene glycol (MIRALAX) 17 g packet, Take 17 g by mouth Daily As Needed (constipation)., Disp:  , Rfl:   •  saccharomyces boulardii (FLORASTOR) 250 MG capsule, Take 250 mg by mouth Daily., Disp: , Rfl:   •  sennosides-docusate (PERICOLACE) 8.6-50 MG per tablet, Take 2 tablets by mouth 2 (Two) Times a Day., Disp:  , Rfl:     PHYSICAL EXAMINATION:   There were no vitals taken for this visit.   There were no vitals filed for this visit.    ECOG Performance Status: 4 - Bedbound  General Appearance:  alert, cooperative, no apparent distress and appears stated age   Neurologic/Psychiatric: A&O x 3, gait steady, appropriate affect, strength 5/5 in all muscle groups   HEENT:     Neck:    Lungs:      Heart:     Abdomen:      Lymph nodes:    Extremities:    Skin:      No visits with results within 2 Week(s) from this visit.   Latest known visit with results is:   No results displayed because visit  has over 200 results.           No results found.    ASSESSMENT: The patient is a very pleasant 60 y.o. male  with static colon cancer    PLAN:  1.  We will continue hospice care.  2.  We will continue methadone 2.5 mg twice a day with fentanyl patches 200 mcg every 3 days.  3.  The patient goal to stay home as long as he is able to manage with his family assistance if this is not achievable he is okay with inpatient hospice.  4.  I will start the patient on Cipro 250 mg twice daily for 7 days  5.  We will do 4 weeks follow-up visit.    This visit has been rescheduled as a phone visit to comply with patient safety concerns in accordance with CDC recommendations. Total time of discussion was 10 minutes.    You have chosen to receive care through a telephone visit. Do you consent to use a telephone visit for your medical care today? Yes        Kristofer Rodriguez MD  1/19/2021

## 2021-02-16 NOTE — PROGRESS NOTES
DATE OF VISIT: 2/16/2021    REASON FOR VISIT: Followup for metastatic colon cancer     HISTORY OF PRESENT ILLNESS: The patient is a very pleasant 61 y.o. male  with past medical history significant for metastatic colon cancer. The  patient is here today for scheduled follow-up visit.    SUBJECTIVE: The patient was interviewed today using telemedicine.  All in all he is doing about the same.  He is laying comfortable in bed.  His pain is under control.    PAST MEDICAL HISTORY/SOCIAL HISTORY/FAMILY HISTORY: Reviewed by me and unchanged from my documentation done on 02/16/21.    Review of Systems   Constitutional: Negative for activity change, appetite change, chills, fatigue, fever and unexpected weight change.   HENT: Negative for hearing loss, mouth sores, nosebleeds, sore throat and trouble swallowing.    Eyes: Negative for visual disturbance.   Respiratory: Negative for cough, chest tightness, shortness of breath and wheezing.    Cardiovascular: Negative for chest pain, palpitations and leg swelling.   Gastrointestinal: Negative for abdominal distention, abdominal pain, blood in stool, constipation, diarrhea, nausea, rectal pain and vomiting.   Endocrine: Negative for cold intolerance and heat intolerance.   Genitourinary: Negative for difficulty urinating, dysuria, frequency and urgency.   Musculoskeletal: Negative for arthralgias, back pain, gait problem, joint swelling and myalgias.   Skin: Negative for rash.   Neurological: Negative for dizziness, tremors, syncope, weakness, light-headedness, numbness and headaches.   Hematological: Negative for adenopathy. Does not bruise/bleed easily.   Psychiatric/Behavioral: Negative for confusion, sleep disturbance and suicidal ideas. The patient is not nervous/anxious.          Current Outpatient Medications:   •  acetaminophen (TYLENOL) 500 MG tablet, Take 500 mg by mouth Every 6 (Six) Hours As Needed for Mild Pain ., Disp: , Rfl:   •  amLODIPine (NORVASC) 5 MG tablet,  Take 5 mg by mouth 2 (two) times a day., Disp: , Rfl:   •  cholecalciferol (VITAMIN D3) 65589 units capsule, Take 10,000 Units by mouth Daily., Disp: , Rfl:   •  ciprofloxacin (Cipro) 250 MG tablet, Take 1 tablet by mouth 2 (Two) Times a Day., Disp: 14 tablet, Rfl: 0  •  gabapentin (NEURONTIN) 600 MG tablet, Take 900 mg by mouth 3 (Three) Times a Day. 1 and 1/2 tab, Disp: , Rfl:   •  HYDROmorphone (Dilaudid) 2 MG tablet, 1-2 tablets every 6 hours as needed for pain, Disp: 240 tablet, Rfl: 0  •  levoFLOXacin (Levaquin) 750 MG tablet, Take 1 tablet by mouth Daily., Disp: 7 tablet, Rfl: 0  •  Magnesium Citrate 125 MG capsule, Take 125 mg by mouth 2 (two) times a day., Disp: , Rfl:   •  Multiple Vitamins-Minerals (MULTI ADULT GUMMIES PO), Take 1 tablet by mouth Daily., Disp: , Rfl:   •  oxyCODONE HCl ER (OxyCONTIN) 30 MG tablet extended-release 12 hour, Take 1 tablet by mouth Every 12 (Twelve) Hours., Disp: 60 tablet, Rfl: 0  •  oxyCODONE-acetaminophen (PERCOCET)  MG per tablet, Take 1 tablet by mouth Every 6 (Six) Hours As Needed for Moderate Pain ., Disp: 120 tablet, Rfl: 0  •  polyethylene glycol (MIRALAX) 17 g packet, Take 17 g by mouth Daily As Needed (constipation)., Disp:  , Rfl:   •  saccharomyces boulardii (FLORASTOR) 250 MG capsule, Take 250 mg by mouth Daily., Disp: , Rfl:   •  sennosides-docusate (PERICOLACE) 8.6-50 MG per tablet, Take 2 tablets by mouth 2 (Two) Times a Day., Disp:  , Rfl:     PHYSICAL EXAMINATION:   There were no vitals taken for this visit.   There were no vitals filed for this visit.    ECOG Performance Status: 4 - Bedbound  General Appearance:  alert, cooperative, no apparent distress and appears stated age   Neurologic/Psychiatric: A&O x 3, gait steady, appropriate affect, strength 5/5 in all muscle groups   HEENT:     Neck:    Lungs:      Heart:     Abdomen:      Lymph nodes:    Extremities:    Skin:      No visits with results within 2 Week(s) from this visit.   Latest known  visit with results is:   No results displayed because visit has over 200 results.           No results found.    ASSESSMENT: The patient is a very pleasant 61 y.o. male  with static colon cancer    PLAN:  1.  We will continue hospice care.  2.  We will continue methadone 2.5 mg twice a day with fentanyl patches 200 mcg every 3 days.  3.  The patient goal to stay home as long as he is able to manage with his family assistance if this is not achievable he is okay with inpatient hospice.  4.  The patient asked me about getting a third opinion from surgeon at the [facility].  I told her I am not opposed to that however I think the likelihood of having surgery is very minimum I am afraid that this can cause him more anxiety and reluctance he to what we are doing right now.  The patient would like to think about it and let me know if he needs assistance.  5.  We will do 4 weeks follow-up visit.    This visit has been rescheduled as a phone visit to comply with patient safety concerns in accordance with CDC recommendations. Total time of discussion was 10 minutes.    You have chosen to receive care through a telephone visit. Do you consent to use a telephone visit for your medical care today? Yes        Kristofer Rodriguez MD  2/16/2021

## 2021-03-29 ENCOUNTER — TELEPHONE (OUTPATIENT)
Dept: ONCOLOGY | Facility: CLINIC | Age: 61
End: 2021-03-29

## 2021-03-29 NOTE — TELEPHONE ENCOUNTER
“Please be informed that patient has passed. Patient has been marked  in the system. The date of death is: 3/28/2021.    Caller: KRYSTYNA    Relationship: Other    Best call back number: [phone]    KRYSTYNA FROM [facility] IN [place], [place] ASKED ME TO LET YOU KNOW THAT KAHLIL PASSED YESTERDAY AT 8 A.M. AT [facility].

## 2024-01-18 NOTE — MR AVS SNAPSHOT
Marquis Carlito   1/17/2017 9:00 AM   Office Visit    Dept Phone:  [phone]   Encounter #:  [id number]    Provider:  Pravin Gautam MD   Department:  [facility]                Your Full Care Plan              Today's Medication Changes          These changes are accurate as of: 1/17/17  9:57 AM.  If you have any questions, ask your nurse or doctor.               Stop taking medication(s)listed here:     esomeprazole 40 MG capsule   Commonly known as:  nexIUM   Stopped by:  Pravin Gautam MD           ibuprofen 400 MG tablet   Commonly known as:  ADVIL,MOTRIN   Stopped by:  Pravin Gautam MD           ondansetron 4 MG tablet   Commonly known as:  ZOFRAN   Stopped by:  Pravin Gautam MD           oxyCODONE-acetaminophen  MG per tablet   Commonly known as:  PERCOCET   Stopped by:  Pravin Gautam MD                      Your Updated Medication List          This list is accurate as of: 1/17/17  9:57 AM.  Always use your most recent med list.                vitamin D3 5000 UNITS capsule capsule               We Performed the Following     NM Pet Skull Base To Mid Thigh       You Were Diagnosed With        Codes Comments    Malignant neoplasm of sigmoid colon    -  Primary ICD-10-CM: C18.7  ICD-9-CM: 153.3     Bone metastasis     ICD-10-CM: C79.51  ICD-9-CM: 198.5     Solitary lung nodule     ICD-10-CM: R91.1  ICD-9-CM: 793.11       Instructions     None    Patient Instructions History      Upcoming Appointments     Visit Type Date Time Department    CK FU< 6 MONTHS 1/17/2017  9:00 AM [facility]    NEW PT - ONCOLOGY 1/17/2017 10:15 AM [facility] [place]      MyChart Signup     Our records indicate that you have declined [facility] MyChart signup. If you would like to sign up for [facility], please email [email or web address] or call [phone] to obtain an activation code.             Other Info from Your Visit           Your  "Appointments     Jan 17, 2017 10:15 AM EST   NEW ONCOLOGY with Kristofer Rodriguez MD   [facility] HEMATOLOGY  AND ONCOLOGY ([place])    [address], [facility] [address]  [facility] [address] [phone]              Allergies     No Known Allergies      Reason for Visit     Colon Cancer Mets to L lung CK c/d 11//11/16 and L2 CK c/d 10/20/16      Vital Signs     Blood Pressure Pulse Temperature Respirations Height Weight    132/64 61 98.1 °F (36.7 °C) (Oral) 16 66\" (167.6 cm) 134 lb 6.4 oz (61 kg)    Oxygen Saturation Body Mass Index Smoking Status             99% 21.69 kg/m2 Never Smoker         Problems and Diagnoses Noted     Bone metastasis    Colon cancer    Lung nodule        " Spray Paint Text: The liquid nitrogen was applied to the skin utilizing a spray paint frosting technique. Add 52 Modifier (Optional): no Medical Necessity Clause: This procedure was medically necessary because the lesions that were treated were: Medical Necessity Information: It is in your best interest to select a reason for this procedure from the list below. All of these items fulfill various CMS LCD requirements except the new and changing color options. Show Topical Anesthesia Variable?: Yes Consent: The patient's consent was obtained including but not limited to risks of crusting, scabbing, blistering, scarring, darker or lighter pigmentary change, recurrence, incomplete removal and infection. Detail Level: Detailed Post-Care Instructions: I reviewed with the patient in detail post-care instructions. Patient is to wear sunprotection, and avoid picking at any of the treated lesions. Pt may apply Vaseline to crusted or scabbing areas. Number Of Freeze-Thaw Cycles: 1 freeze-thaw cycle

## (undated) DEVICE — ENCORE® LATEX MICRO SIZE 8, STERILE LATEX POWDER-FREE SURGICAL GLOVE: Brand: ENCORE

## (undated) DEVICE — SNAP KOVER: Brand: UNBRANDED

## (undated) DEVICE — NDL HYPO ECLPS SFTY 18G 1 1/2IN

## (undated) DEVICE — 3M™ IOBAN™ 2 ANTIMICROBIAL INCISE DRAPE 6650EZ: Brand: IOBAN™ 2

## (undated) DEVICE — RADIFOCUS GLIDEWIRE: Brand: GLIDEWIRE

## (undated) DEVICE — AIRWY 90MM NO9

## (undated) DEVICE — NDL HYPO SFTY PROEDGE 27G 1 1/4IN GRY

## (undated) DEVICE — SUT SILK 3/0 TIES 18IN A184H

## (undated) DEVICE — CANN NASL CO2 DIVIDED A/

## (undated) DEVICE — DRSNG SURESITE WNDW 4X4.5

## (undated) DEVICE — PK MINOR SPLT 10

## (undated) DEVICE — SYR CONTRL LUERLOK 10CC

## (undated) DEVICE — NDL HYPO ECLPS SFTY 22G 1 1/2IN

## (undated) DEVICE — SUT MONOCRYL PLS ANTIB UND 3/0  PS1 27IN

## (undated) DEVICE — SYR LL TP 10ML STRL

## (undated) DEVICE — APPL CHLORAPREP W/TINT 26ML BLU

## (undated) DEVICE — 3M(TM) STERI-STRIP(TM) BLEND TONE SKIN CLOSURES (NON-REINFORCED) B1553: Brand: 3M™ STERI-STRIP™

## (undated) DEVICE — INTENDED FOR TISSUE SEPARATION, AND OTHER PROCEDURES THAT REQUIRE A SHARP SURGICAL BLADE TO PUNCTURE OR CUT.: Brand: BARD-PARKER ® STAINLESS STEEL BLADES

## (undated) DEVICE — ANTIBACTERIAL UNDYED BRAIDED (POLYGLACTIN 910), SYNTHETIC ABSORBABLE SUTURE: Brand: COATED VICRYL

## (undated) DEVICE — C-ARM: Brand: UNBRANDED

## (undated) DEVICE — ADAPT ST INFUS ADMIN SYR 70IN

## (undated) DEVICE — 3M(TM) TEGADERM(TM) IV TRANSPARENT FILM DRESSING WITH BORDER 1650: Brand: 3M™ TEGADERM™

## (undated) DEVICE — SUT SILK 2/0 SH 30IN K833H

## (undated) DEVICE — DECANT BG O JET

## (undated) DEVICE — SPNG GZ WOVN 4X4IN 12PLY 10/BX STRL